# Patient Record
Sex: FEMALE | Race: WHITE | NOT HISPANIC OR LATINO | Employment: OTHER | URBAN - METROPOLITAN AREA
[De-identification: names, ages, dates, MRNs, and addresses within clinical notes are randomized per-mention and may not be internally consistent; named-entity substitution may affect disease eponyms.]

---

## 2018-05-09 RX ORDER — PSYLLIUM HUSK 0.4 G
CAPSULE ORAL
COMMUNITY
Start: 2015-01-14 | End: 2019-12-17

## 2018-05-09 RX ORDER — MULTIVITAMIN
TABLET ORAL
COMMUNITY
Start: 2015-01-14 | End: 2019-12-17

## 2018-05-09 RX ORDER — AMLODIPINE BESYLATE 10 MG/1
10 TABLET ORAL DAILY
COMMUNITY
Start: 2015-01-14 | End: 2022-01-19 | Stop reason: DRUGHIGH

## 2018-05-09 RX ORDER — NAPROXEN SODIUM 220 MG/1
81 TABLET, FILM COATED ORAL 3 TIMES WEEKLY
COMMUNITY
Start: 2017-04-10 | End: 2021-11-23

## 2018-05-09 RX ORDER — ATORVASTATIN CALCIUM 40 MG/1
40 TABLET, FILM COATED ORAL DAILY
COMMUNITY
Start: 2012-01-25

## 2018-05-09 RX ORDER — OMEPRAZOLE 20 MG/1
20 CAPSULE, DELAYED RELEASE ORAL
COMMUNITY
Start: 2015-01-14 | End: 2020-04-14

## 2018-05-09 RX ORDER — VENLAFAXINE HYDROCHLORIDE 75 MG/1
75 CAPSULE, EXTENDED RELEASE ORAL DAILY
COMMUNITY
Start: 2015-10-21

## 2018-05-16 ENCOUNTER — OFFICE VISIT (OUTPATIENT)
Dept: CARDIOLOGY | Facility: CLINIC | Age: 73
End: 2018-05-16
Attending: INTERNAL MEDICINE
Payer: MEDICARE

## 2018-05-16 VITALS
HEIGHT: 62 IN | WEIGHT: 113 LBS | SYSTOLIC BLOOD PRESSURE: 136 MMHG | BODY MASS INDEX: 20.8 KG/M2 | TEMPERATURE: 98.4 F | RESPIRATION RATE: 12 BRPM | HEART RATE: 76 BPM | DIASTOLIC BLOOD PRESSURE: 70 MMHG

## 2018-05-16 DIAGNOSIS — I10 MODERATE HYPERTENSION CONTROL: Primary | ICD-10-CM

## 2018-05-16 PROBLEM — I35.1 NONRHEUMATIC AORTIC VALVE INSUFFICIENCY: Status: ACTIVE | Noted: 2018-05-16

## 2018-05-16 PROBLEM — I34.0 NON-RHEUMATIC MITRAL REGURGITATION: Status: ACTIVE | Noted: 2018-05-16

## 2018-05-16 PROBLEM — Z82.49 FAMILY HISTORY OF CORONARY ARTERY DISEASE: Status: ACTIVE | Noted: 2018-05-16

## 2018-05-16 PROCEDURE — 93000 ELECTROCARDIOGRAM COMPLETE: CPT | Performed by: INTERNAL MEDICINE

## 2018-05-16 PROCEDURE — 99214 OFFICE O/P EST MOD 30 MIN: CPT | Performed by: INTERNAL MEDICINE

## 2018-05-16 RX ORDER — NITROFURANTOIN MACROCRYSTALS 50 MG/1
CAPSULE ORAL
Refills: 3 | COMMUNITY
Start: 2018-03-19 | End: 2019-12-17

## 2018-05-16 ASSESSMENT — ENCOUNTER SYMPTOMS
BACK PAIN: 1
CONSTITUTIONAL NEGATIVE: 1
ENDOCRINE NEGATIVE: 1
RESPIRATORY NEGATIVE: 1
NEUROLOGICAL NEGATIVE: 1
BRUISES/BLEEDS EASILY: 1
EYES NEGATIVE: 1
PSYCHIATRIC NEGATIVE: 1
GASTROINTESTINAL NEGATIVE: 1

## 2018-05-16 NOTE — PROGRESS NOTES
Chief Complaint: I saw the patient today on a routine visit for her hypertension and mild valvular heart disease.  She denies any form of chest discomfort or shortness of breath with exertion, anxiety, cold weather, postprandially, at rest, or nocturnally.  She denies exertional dyspnea, proximal nocturnal dyspnea, orthopnea, palpitations, syncope, ankle edema, she has nocturia ×1.       Medications:  Current Outpatient Prescriptions on File Prior to Visit   Medication Sig Dispense Refill   • amLODIPine (NORVASC) 5 mg tablet Take 5 mg by mouth daily.       • aspirin 325 mg tablet Take 325 mg by mouth daily.       • atorvastatin (LIPITOR) 40 mg tablet Take 40 mg by mouth daily.       • calcium carbonate-vitamin D3 (CALCIUM 500 + D) 500 mg(1,250mg) -200 unit per tablet take 2 tablet by oral route  every day     • multivitamin (THERAGRAN) tablet take 2 Tablet by oral route  every day with food     • omeprazole (PriLOSEC) 20 mg capsule Take 20 mg by mouth daily before breakfast.       • venlafaxine XR (EFFEXOR XR) 75 mg 24 hr capsule Take 75 mg by mouth daily.         No current facility-administered medications on file prior to visit.        Review of Systems:  Review of Systems   Constitution: Negative.   HENT: Positive for hearing loss.    Eyes: Negative.    Respiratory: Negative.    Endocrine: Negative.    Hematologic/Lymphatic: Bruises/bleeds easily.   Skin: Negative.    Musculoskeletal: Positive for back pain.   Gastrointestinal: Negative.    Genitourinary: Positive for nocturia.   Neurological: Negative.    Psychiatric/Behavioral: Negative.    Allergic/Immunologic: Positive for environmental allergies.       Physical Exam:  Vitals:    05/16/18 1305   BP: 136/70   Pulse: 76   Resp: 12   Temp: 36.9 °C (98.4 °F)     Physical Exam   Constitutional: She is oriented to person, place, and time. She appears well-developed and well-nourished.   HENT:   Head: Normocephalic and atraumatic.   Eyes: Conjunctivae and EOM  are normal. Pupils are equal, round, and reactive to light.   Neck: Normal range of motion. Neck supple.   Cardiovascular: Normal rate and regular rhythm.    Pulmonary/Chest: Effort normal and breath sounds normal.   Abdominal: Soft. Bowel sounds are normal.   Musculoskeletal: Normal range of motion.   Neurological: She is alert and oriented to person, place, and time. She has normal reflexes.   Skin: Skin is warm and dry.   Psychiatric: She has a normal mood and affect. Her behavior is normal. Judgment and thought content normal.     EKG: Sr WNL    Assessment and Plan:  Impression:  Hypertension.  Hyperlipidemia.  Previous TIA.  Aortic regurgitation.  Tricuspid regurgitation.  Recommendation:  She is hemodynamically stable on the current change any medications today.  We will see her again in 6 months time and repeat her echocardiogram.  If there is a problem we will be happy to see her sooner.  We did discuss diet and exercise with her today.    Chano Boateng,

## 2018-10-30 ENCOUNTER — TELEPHONE (OUTPATIENT)
Dept: CARDIOLOGY | Facility: CLINIC | Age: 73
End: 2018-10-30

## 2018-10-30 NOTE — TELEPHONE ENCOUNTER
LVM- for pt to call office back to reschedule ofv on 11/07/18 at 11:30 am due to  will be out of the office.Pt should still keep apt for echocardiogram at 10:30.

## 2018-10-31 NOTE — TELEPHONE ENCOUNTER
Spoke with pt and rescheduled apt from 11/07/18 to 11/14/18. Pt confirmed date and time. Mailed out new reminder.

## 2018-11-07 ENCOUNTER — HOSPITAL ENCOUNTER (OUTPATIENT)
Dept: CARDIOLOGY | Facility: HOSPITAL | Age: 73
Discharge: HOME | End: 2018-11-07
Attending: INTERNAL MEDICINE
Payer: MEDICARE

## 2018-11-07 VITALS
HEART RATE: 64 BPM | WEIGHT: 115 LBS | DIASTOLIC BLOOD PRESSURE: 84 MMHG | HEIGHT: 61 IN | SYSTOLIC BLOOD PRESSURE: 140 MMHG | BODY MASS INDEX: 21.71 KG/M2

## 2018-11-07 LAB
AORTIC ROOT ANNULUS - M-MODE: 2.49 CM
AORTIC VALVE AREA: 1.3 SQUARE CENTIMETERS PER SQUARE METER
ASCENDING AORTA: 3.09 CM
AV PEAK GRADIENT: 13.25 MMHG
AV PEAK VELOCITY-S: 1.82 M/S
AV REGURGITATION PRESSURE HALF TIME: 331 MS
BSA FOR ECHO PROCEDURE: 1.5 M2
CUSP SEPARATION: 1.59 CM
DOP CALC LVOT STROKE VOLUME: 67.51 ML
DOP CALC RVOT VTI: 13.33 CM
E WAVE DECELERATION TIME: 233.33 MS
E/A RATIO: 0.66
E/E' RATIO: 8.57
E/LAT E' RATIO: 6.26
EDV (BP): 72.35 ML
EF (A4C): 58.17 %
EF A2C: 61.74 %
EJECTION FRACTION: 59.96 %
EST RIGHT VENT SYSTOLIC PRESSURE BY TRICUSPID REGURGITATION JET: 26 MMHG
ESV (BP): 28.97 ML
INTERVENTRICULAR SEPTUM: 0.75 CM
LA ESV (BP): 59.72 ML
LA ESV INDEX (A2C): 31.73 ML/M2
LA ESV INDEX (BP): 39.81 ML/M2
LA/AORTA RATIO: 1.33
LAAS-AP2: 17.12 CM2
LAAS-AP4: 22.25 CM2
LAD 2D - M-MODE: 3.3 CM
LAV-S: 47.6 ML
LEFT ATRIUM VOLUME INDEX: 47.27 ML/M2
LEFT ATRIUM VOLUME: 70.9 ML
LEFT INTERNAL DIMENSION IN SYSTOLE: 2.83 CM (ref 2.23–3.38)
LEFT VENTRICLE DIASTOLIC VOLUME INDEX: 48.13 ML/M2
LEFT VENTRICLE DIASTOLIC VOLUME: 72.2 ML
LEFT VENTRICLE MASS INDEX: 84.57 G/M2
LEFT VENTRICLE SYSTOLIC VOLUME INDEX: 20.13 ML/M2
LEFT VENTRICLE SYSTOLIC VOLUME: 30.2 ML
LEFT VENTRICULAR INTERNAL DIMENSION IN DIASTOLE: 4.55 CM (ref 3.76–5.21)
LEFT VENTRICULAR MASS: 126.85 G
LEFT VENTRICULAR POSTERIOR WALL IN END DIASTOLE: 0.97 CM (ref 0.48–0.89)
LV DIASTOLIC VOLUME: 69 ML
LV ESV (APICAL 2 CHAMBER): 26.4 ML
LVCI: 1.79 LITERS PER MINUTE PER SQUARE METER
LVCO: 2.67 LITERS PER MINUTE
LVEDVI(A2C): 46 ML/M2
LVEDVI(BP): 48.23 ML/M2
LVESVI(A2C): 17.6 ML/M2
LVESVI(BP): 19.31 ML/M2
LVOT 2D: 1.84 CM
LVOT A: 2.67 CM2
LVOT MG: 3 MMHG
LVOT MV: 0.78 M/S
LVOT PEAK VELOCITY: 1.29 M/S
LVOT PG: 5.6 MMHG
LVOT VTI: 25.4 CM
MV E'TISSUE VEL-LAT: 0.11 M/S
MV E'TISSUE VEL-MED: 0.08 M/S
MV PEAK A VEL: 1.1 M/S
MV PEAK E VEL: 0.72 M/S
MV VALVE AREA P 1/2 METHOD: 3.25 CM2
POSTERIOR WALL: 0.97 CM
RVOT VMAX: 0.7 M/S
RVOT VTI: 16.2 CM
SEPTAL TISSUE DOPPLER FREE WALL LATE DIA VELOCITY (APICAL 4 CHAMBER VIEW): 0.1 M/S
TR MAX PG: 34.81 MMHG
TRICUSPID VALVE PEAK REGURGITATION VELOCITY: 2.95 M/S
Z-SCORE OF LEFT VENTRICULAR DIMENSION IN END DIASTOLE: 0.28
Z-SCORE OF LEFT VENTRICULAR DIMENSION IN END SYSTOLE: 0.24
Z-SCORE OF LEFT VENTRICULAR POSTERIOR WALL IN END DIASTOLE: 2.09

## 2018-11-07 PROCEDURE — 93306 TTE W/DOPPLER COMPLETE: CPT | Mod: 26 | Performed by: INTERNAL MEDICINE

## 2018-11-07 PROCEDURE — 93306 TTE W/DOPPLER COMPLETE: CPT

## 2018-11-14 ENCOUNTER — OFFICE VISIT (OUTPATIENT)
Dept: CARDIOLOGY | Facility: CLINIC | Age: 73
End: 2018-11-14
Payer: MEDICARE

## 2018-11-14 VITALS
RESPIRATION RATE: 14 BRPM | DIASTOLIC BLOOD PRESSURE: 76 MMHG | SYSTOLIC BLOOD PRESSURE: 142 MMHG | TEMPERATURE: 98.6 F | HEART RATE: 68 BPM

## 2018-11-14 DIAGNOSIS — I34.0 NON-RHEUMATIC MITRAL REGURGITATION: ICD-10-CM

## 2018-11-14 DIAGNOSIS — I35.1 NONRHEUMATIC AORTIC VALVE INSUFFICIENCY: ICD-10-CM

## 2018-11-14 DIAGNOSIS — R06.09 DYSPNEA ON EXERTION: ICD-10-CM

## 2018-11-14 DIAGNOSIS — I10 HYPERTENSION, BENIGN: Primary | ICD-10-CM

## 2018-11-14 PROCEDURE — 99214 OFFICE O/P EST MOD 30 MIN: CPT | Performed by: INTERNAL MEDICINE

## 2018-11-14 PROCEDURE — 93000 ELECTROCARDIOGRAM COMPLETE: CPT | Performed by: INTERNAL MEDICINE

## 2018-11-14 RX ORDER — AMLODIPINE BESYLATE 2.5 MG/1
2.5 TABLET ORAL DAILY
Qty: 90 TABLET | Refills: 1 | Status: SHIPPED | OUTPATIENT
Start: 2018-11-14 | End: 2019-01-02

## 2018-11-14 RX ORDER — VENLAFAXINE 75 MG/1
75 TABLET ORAL DAILY
Status: ON HOLD | COMMUNITY
Start: 2018-10-01 | End: 2019-02-01 | Stop reason: SDUPTHER

## 2018-11-14 RX ORDER — CLOTRIMAZOLE AND BETAMETHASONE DIPROPIONATE 10; .64 MG/G; MG/G
CREAM TOPICAL
COMMUNITY
Start: 2018-11-13 | End: 2019-01-02

## 2018-11-14 ASSESSMENT — ENCOUNTER SYMPTOMS
DYSPNEA ON EXERTION: 1
ENDOCRINE NEGATIVE: 1
BACK PAIN: 1
RESPIRATORY NEGATIVE: 1
EYES NEGATIVE: 1
PSYCHIATRIC NEGATIVE: 1
NEUROLOGICAL NEGATIVE: 1
HEMATOLOGIC/LYMPHATIC NEGATIVE: 1
GASTROINTESTINAL NEGATIVE: 1

## 2018-11-14 NOTE — LETTER
November 15, 2018     José Antonio REAGAN DO  [address]  [address]  [facility] [address]    Patient: Angeles Cardoza   YOB: 1945   Date of Visit: 11/14/2018       Dear Dr. Leroy:    Thank you for referring Angeles Cardoza to me for evaluation. Below are my notes for this consultation.    If you have questions, please do not hesitate to call me. I look forward to following your patient along with you.         Sincerely,        Chano Boateng DO        CC: No Recipients  Chano Boateng DO  11/15/2018 11:07 AM  Signed      Chief Complaint: I saw Angeles in the office today as a follow-up from her echocardiogram.  She is getting short of breath climbing 1-1/2 flights of stairs which is new.  She does not get chest discomfort or shortness of breath normally climbing 1 flight of stairs but she does get short of breath with anxiety not cold weather postprandially at rest or nocturnally.  She denies proximal nocturnal dyspnea, orthopnea, palpitations, syncope, ankle edema she has nocturia.       Medications:  Current Outpatient Prescriptions on File Prior to Visit   Medication Sig Dispense Refill   • amLODIPine (NORVASC) 5 mg tablet Take 5 mg by mouth daily.       • aspirin 325 mg tablet Take 325 mg by mouth daily.       • atorvastatin (LIPITOR) 40 mg tablet Take 40 mg by mouth daily.       • calcium carbonate-vitamin D3 (CALCIUM 500 + D) 500 mg(1,250mg) -200 unit per tablet take 2 tablet by oral route  every day     • multivitamin (THERAGRAN) tablet take 2 Tablet by oral route  every day with food     • nitrofurantoin (MACRODANTIN) 50 mg capsule TK 1 C PO QHS FOR 3 MONTHS THEN PRN  3   • omeprazole (PriLOSEC) 20 mg capsule Take 20 mg by mouth daily before breakfast.       • venlafaxine XR (EFFEXOR XR) 75 mg 24 hr capsule Take 75 mg by mouth daily.         No current facility-administered medications on file prior to visit.        Review of Systems:  Review of Systems   HENT: Negative.    Eyes: Negative.     Cardiovascular: Positive for dyspnea on exertion.   Respiratory: Negative.    Endocrine: Negative.    Hematologic/Lymphatic: Negative.    Skin: Negative.    Musculoskeletal: Positive for back pain.   Gastrointestinal: Negative.    Genitourinary: Positive for nocturia.   Neurological: Negative.    Psychiatric/Behavioral: Negative.    Allergic/Immunologic: Positive for environmental allergies.       Physical Exam:  There were no vitals filed for this visit.  Physical Exam   Constitutional: She is oriented to person, place, and time. She appears well-developed and well-nourished.   HENT:   Head: Normocephalic.   Eyes: Conjunctivae and EOM are normal. Pupils are equal, round, and reactive to light.   Neck: Normal range of motion. Neck supple.   Cardiovascular: Normal rate and regular rhythm.    Pulmonary/Chest: Effort normal and breath sounds normal.   Abdominal: Soft. Bowel sounds are normal.   Musculoskeletal: Normal range of motion.   Neurological: She is alert and oriented to person, place, and time. She has normal reflexes.   Skin: Skin is warm and dry.   Psychiatric: She has a normal mood and affect. Her behavior is normal. Judgment and thought content normal.     EKG: SR ST-Tabn    Assessment and Plan:  Impression:  Moderate mitral regurgitation by echocardiogram with mildly enlarged left atrium.  Dyspnea with exertion that is new.  Hypertension.  Hyperlipidemia.  Renal artery stenosis status post stent.  Aortic regurgitation.  Mitral regurgitation.  Tricuspid regurgitation.  Recommendation:  I increase her amlodipine to 7.5 mg once a day.  I schedule her for a standard stress test to rule out arrhythmia or ischemic heart disease as to the etiology of her shortness of breath.  We will see her in follow-up in 3 months, if her stress study is abnormal we will see her sooner.  Thank you for the opportunity seeing Savi in the office today.    Chano Boateng DO

## 2018-11-14 NOTE — PROGRESS NOTES
Chief Complaint: I saw Angeles in the office today as a follow-up from her echocardiogram.  She is getting short of breath climbing 1-1/2 flights of stairs which is new.  She does not get chest discomfort or shortness of breath normally climbing 1 flight of stairs but she does get short of breath with anxiety not cold weather postprandially at rest or nocturnally.  She denies proximal nocturnal dyspnea, orthopnea, palpitations, syncope, ankle edema she has nocturia.       Medications:  Current Outpatient Prescriptions on File Prior to Visit   Medication Sig Dispense Refill   • amLODIPine (NORVASC) 5 mg tablet Take 5 mg by mouth daily.       • aspirin 325 mg tablet Take 325 mg by mouth daily.       • atorvastatin (LIPITOR) 40 mg tablet Take 40 mg by mouth daily.       • calcium carbonate-vitamin D3 (CALCIUM 500 + D) 500 mg(1,250mg) -200 unit per tablet take 2 tablet by oral route  every day     • multivitamin (THERAGRAN) tablet take 2 Tablet by oral route  every day with food     • nitrofurantoin (MACRODANTIN) 50 mg capsule TK 1 C PO QHS FOR 3 MONTHS THEN PRN  3   • omeprazole (PriLOSEC) 20 mg capsule Take 20 mg by mouth daily before breakfast.       • venlafaxine XR (EFFEXOR XR) 75 mg 24 hr capsule Take 75 mg by mouth daily.         No current facility-administered medications on file prior to visit.        Review of Systems:  Review of Systems   HENT: Negative.    Eyes: Negative.    Cardiovascular: Positive for dyspnea on exertion.   Respiratory: Negative.    Endocrine: Negative.    Hematologic/Lymphatic: Negative.    Skin: Negative.    Musculoskeletal: Positive for back pain.   Gastrointestinal: Negative.    Genitourinary: Positive for nocturia.   Neurological: Negative.    Psychiatric/Behavioral: Negative.    Allergic/Immunologic: Positive for environmental allergies.       Physical Exam:  There were no vitals filed for this visit.  Physical Exam   Constitutional: She is oriented to person, place, and time.  She appears well-developed and well-nourished.   HENT:   Head: Normocephalic.   Eyes: Conjunctivae and EOM are normal. Pupils are equal, round, and reactive to light.   Neck: Normal range of motion. Neck supple.   Cardiovascular: Normal rate and regular rhythm.    Pulmonary/Chest: Effort normal and breath sounds normal.   Abdominal: Soft. Bowel sounds are normal.   Musculoskeletal: Normal range of motion.   Neurological: She is alert and oriented to person, place, and time. She has normal reflexes.   Skin: Skin is warm and dry.   Psychiatric: She has a normal mood and affect. Her behavior is normal. Judgment and thought content normal.     EKG:  ST-Tabn    Assessment and Plan:  Impression:  Moderate mitral regurgitation by echocardiogram with mildly enlarged left atrium.  Dyspnea with exertion that is new.  Hypertension.  Hyperlipidemia.  Renal artery stenosis status post stent.  Aortic regurgitation.  Mitral regurgitation.  Tricuspid regurgitation.  Recommendation:  I increase her amlodipine to 7.5 mg once a day.  I schedule her for a standard stress test to rule out arrhythmia or ischemic heart disease as to the etiology of her shortness of breath.  We will see her in follow-up in 3 months, if her stress study is abnormal we will see her sooner.  Thank you for the opportunity seeing Savi in the office today.    Chano Boateng DO

## 2018-11-15 ENCOUNTER — TELEPHONE (OUTPATIENT)
Dept: CARDIOLOGY | Facility: HOSPITAL | Age: 73
End: 2018-11-15

## 2018-11-16 ENCOUNTER — HOSPITAL ENCOUNTER (OUTPATIENT)
Dept: CARDIOLOGY | Facility: HOSPITAL | Age: 73
Discharge: HOME | End: 2018-11-16
Attending: INTERNAL MEDICINE
Payer: MEDICARE

## 2018-11-16 VITALS
WEIGHT: 115 LBS | SYSTOLIC BLOOD PRESSURE: 176 MMHG | OXYGEN SATURATION: 96 % | DIASTOLIC BLOOD PRESSURE: 86 MMHG | HEIGHT: 61 IN | HEART RATE: 95 BPM | BODY MASS INDEX: 21.71 KG/M2

## 2018-11-16 PROCEDURE — 93018 CV STRESS TEST I&R ONLY: CPT | Performed by: INTERNAL MEDICINE

## 2018-11-16 PROCEDURE — 93017 CV STRESS TEST TRACING ONLY: CPT

## 2018-11-16 PROCEDURE — 93016 CV STRESS TEST SUPVJ ONLY: CPT | Performed by: INTERNAL MEDICINE

## 2018-11-19 LAB
STRESS ANGINA INDEX: 0
STRESS BASELINE BP: NORMAL MMHG
STRESS BASELINE HR: 80 BPM
STRESS O2 SAT REST: 96 %
STRESS PERCENT HR: 101 %
STRESS POST ESTIMATED WORKLOAD: 10.1 METS
STRESS POST EXERCISE DUR MIN: 8 MIN
STRESS POST EXERCISE DUR SEC: 16 SEC
STRESS POST O2 SAT PEAK: 98 %
STRESS POST PEAK BP: NORMAL MMHG
STRESS POST PEAK HR: 146 BPM
STRESS TARGET HR: 123 BPM

## 2018-11-29 ENCOUNTER — TELEPHONE (OUTPATIENT)
Dept: SCHEDULING | Facility: CLINIC | Age: 73
End: 2018-11-29

## 2018-11-29 NOTE — TELEPHONE ENCOUNTER
Spoke with patient advised per Dr. MACIEL to have patient take Norvasc 10mg daily  As well as have her coming in 12/3 for an appointment with him/ Alyse Lopez  Can you call patient and schedule her with appt on 12/3 with Dr. MACIEL ?  Thanks

## 2018-11-29 NOTE — TELEPHONE ENCOUNTER
Pt calling to speak with Dr. Boateng in regards to her blood pressure being high, she currently taking Amlodipine 7.5mg and it is not helping. BP 11/28 4pm 201/98. Pressure this morning 160/80 HR 89. Pt can be reached at [phone]

## 2018-11-29 NOTE — TELEPHONE ENCOUNTER
I left a message for Mrs. Anderson that I was aware of her blood pressure problems and that I would see her on Monday.  That I believe that her renal artery stent may be having problems staying open and this might be the cause of her labile blood pressure at this point.  We will order a repeat renal ultrasound once I see her and may be a renal flow study as well.

## 2018-11-29 NOTE — TELEPHONE ENCOUNTER
"Spoke with patient   Last seen by Dr. MACIEL 11/14 -  /76, at this visit Norvasc increased to 7.5mg daily    Patient states she still doesn't \"feel right\" , reports no other symptoms      11/7- echo -EF = 60%.   · Mild aortic valve regurgitation.  · Moderate mitral valve regurgitation. Sclerotic mitral valve.  · Mildly dilated left atrium.  · Mild tricuspid valve regurgitation with RVSP of 26 mmHg.       11/16- stress test no ischemia seen    States she does have anxiety at the moment     BP 11/28 201/98 at 4pm    This /80 HR 69   "

## 2018-11-30 ENCOUNTER — APPOINTMENT (OUTPATIENT)
Dept: RADIOLOGY | Age: 73
End: 2018-11-30
Attending: FAMILY MEDICINE
Payer: MEDICARE

## 2018-11-30 ENCOUNTER — HOSPITAL ENCOUNTER (OUTPATIENT)
Facility: CLINIC | Age: 73
Discharge: HOME | End: 2018-11-30
Attending: FAMILY MEDICINE
Payer: MEDICARE

## 2018-11-30 VITALS
RESPIRATION RATE: 15 BRPM | HEART RATE: 80 BPM | TEMPERATURE: 98.2 F | SYSTOLIC BLOOD PRESSURE: 140 MMHG | DIASTOLIC BLOOD PRESSURE: 76 MMHG | OXYGEN SATURATION: 97 %

## 2018-11-30 DIAGNOSIS — R05.9 COUGH: Primary | ICD-10-CM

## 2018-11-30 PROCEDURE — 99213 OFFICE O/P EST LOW 20 MIN: CPT | Performed by: FAMILY MEDICINE

## 2018-11-30 PROCEDURE — 71046 X-RAY EXAM CHEST 2 VIEWS: CPT | Performed by: FAMILY MEDICINE

## 2018-11-30 ASSESSMENT — ENCOUNTER SYMPTOMS
PHOTOPHOBIA: 0
SINUS PAIN: 0
STRIDOR: 0
WHEEZING: 0
FACIAL SWELLING: 0
SHORTNESS OF BREATH: 0
CHOKING: 0
LIGHT-HEADEDNESS: 0
WEAKNESS: 0
NECK STIFFNESS: 0
CHEST TIGHTNESS: 0
GASTROINTESTINAL NEGATIVE: 1
EYE PAIN: 0

## 2018-11-30 NOTE — DISCHARGE INSTRUCTIONS
Suspect allergies with postnasal drip as the primary cause of past month of coughing.    Restart Allegra as needed for continued allergy symptoms.    Follow-up with your primary medical doctor if symptoms persist despite use of Allegra.  Chest x-ray shows nothing acute.  See details below.    IMPRESSION: No acute cardiopulmonary findings.    COMMENT: The current two view examination of the chest demonstrates the lungs to  be free of active infiltration and congestion.  A linear scar seen in the left  midlung zone.    The cardiomediastinal silhouette is within normal limits of size and  configuration.    A prominent rotatory thoracolumbar scoliosis is seen.  There is deformity of the  left rib cage with multiple old healed left rib fractures.

## 2018-11-30 NOTE — ED PROVIDER NOTES
History  Chief Complaint   Patient presents with   • Cough     x1 month   • Fatigue     1 month of coughing, postnasal drip.  h/o GERD with recent heartburn.  Symptoms not severe, just duration is a concern.    No mention of b-symptoms.              Past Medical History:   Diagnosis Date   • Acid reflux    • Hypertension    • Lipid disorder        Past Surgical History:   Procedure Laterality Date   • BREAST SURGERY  2003    breast reduction   • RENAL ANGIOPLASTY     • TOE SURGERY Left    • TONSILLECTOMY     • WISDOM TOOTH EXTRACTION         Family History   Problem Relation Age of Onset   • Stroke Mother    • Heart failure Father        Social History   Substance Use Topics   • Smoking status: Never Smoker   • Smokeless tobacco: Never Used   • Alcohol use Yes      Comment: occasional       Review of Systems   HENT: Negative for ear pain, facial swelling and sinus pain.    Eyes: Negative for photophobia, pain and visual disturbance.   Respiratory: Negative for choking, chest tightness, shortness of breath, wheezing and stridor.    Gastrointestinal: Negative.    Musculoskeletal: Negative for neck stiffness.   Neurological: Negative for syncope, weakness and light-headedness.        No lethargy       Physical Exam  ED Triage Vitals [11/30/18 1425]   Temp Heart Rate Resp BP SpO2   36.8 °C (98.2 °F) 80 15 140/76 97 %      Temp Source Heart Rate Source Patient Position BP Location FiO2 (%) (Set)   Oral Monitor Sitting Right upper arm --       Physical Exam   Constitutional: She is oriented to person, place, and time. She appears well-developed and well-nourished.   HENT:   Right Ear: External ear normal. No mastoid tenderness.   Left Ear: External ear normal. No mastoid tenderness.   Mouth/Throat: Oropharynx is clear and moist. No oropharyngeal exudate, posterior oropharyngeal edema or tonsillar abscesses. No tonsillar exudate.   Eyes: Conjunctivae are normal.   Neck: Neck supple. No tracheal deviation present.    Cardiovascular: Regular rhythm and normal heart sounds.    Pulmonary/Chest: Effort normal. No stridor. No respiratory distress. She has no decreased breath sounds. She has no wheezes. She has no rhonchi. She has no rales.   Abdominal: Soft. She exhibits no distension. There is no tenderness. There is no guarding.   Neurological: She is alert and oriented to person, place, and time.         Procedures  Procedures    UC Course  Clinical Impressions as of Nov 30 1640   Cough       MDM  Number of Diagnoses or Management Options  Cough:   Diagnosis management comments: 1 month of coughing, postnasal drip.  h/o GERD with recent heartburn.  lungs clear on exam.  vitals benign.   CXR to r/o PNA, etc.   Suspect GERD/PNDrip related.    Wants to leave & return to further discuss.  Reasonable.    X-RAY CHEST 2 VIEWS   Final Result    IMPRESSION: No acute cardiopulmonary findings.        COMMENT: The current two view examination of the chest demonstrates the lungs to    be free of active infiltration and congestion.  A linear scar seen in the left    midlung zone.        The cardiomediastinal silhouette is within normal limits of size and    configuration.        A prominent rotatory thoracolumbar scoliosis is seen.  There is deformity of the    left rib cage with multiple old healed left rib fractures.     Suspect allergy induced cough, given duration, benign CXR, etc.  Allegra.                   Ismael Salinas MD  11/30/18 4973

## 2018-12-03 ENCOUNTER — OFFICE VISIT (OUTPATIENT)
Dept: CARDIOLOGY | Facility: CLINIC | Age: 73
End: 2018-12-03
Payer: MEDICARE

## 2018-12-03 VITALS
TEMPERATURE: 98.6 F | WEIGHT: 116 LBS | DIASTOLIC BLOOD PRESSURE: 66 MMHG | OXYGEN SATURATION: 98 % | RESPIRATION RATE: 14 BRPM | HEART RATE: 88 BPM | BODY MASS INDEX: 21.92 KG/M2 | SYSTOLIC BLOOD PRESSURE: 136 MMHG

## 2018-12-03 DIAGNOSIS — I70.1 RENAL ARTERY STENOSIS DUE TO FIBROMUSCULAR DYSPLASIA (CMS/HCC): Primary | ICD-10-CM

## 2018-12-03 DIAGNOSIS — I77.3 RENAL ARTERY STENOSIS DUE TO FIBROMUSCULAR DYSPLASIA (CMS/HCC): Primary | ICD-10-CM

## 2018-12-03 PROCEDURE — 99214 OFFICE O/P EST MOD 30 MIN: CPT | Performed by: INTERNAL MEDICINE

## 2018-12-03 ASSESSMENT — ENCOUNTER SYMPTOMS
BRUISES/BLEEDS EASILY: 1
GASTROINTESTINAL NEGATIVE: 1
NEUROLOGICAL NEGATIVE: 1
BACK PAIN: 1
EYES NEGATIVE: 1
ENDOCRINE NEGATIVE: 1
NERVOUS/ANXIOUS: 1
CONSTITUTIONAL NEGATIVE: 1
CARDIOVASCULAR NEGATIVE: 1
COUGH: 1

## 2018-12-03 NOTE — PROGRESS NOTES
Chief Complaint: I saw Angeles in follow-up today because of low pressure has been rising.  She has had a headache and some awareness of her heartbeat but no syncope or lightheadedness or dizziness.  She denies chest discomfort has had a little bit of shortness of breath with climbing more than 1 flight of stairs but her recent stress test was negative.  She denies proximal nocturnal dyspnea, orthopnea, palpitations, syncope, ankle edema she has nocturia.  She has what sounds to possibly be a panic attack the other day which preceded her blood pressure rising.  She denies exertional dyspnea 1 flight of fair stairs, denies proximal nocturnal dyspnea orthopnea syncope or ankle edema.  She has a stent in her right renal artery and my concern is that this is beginning to close and her blood pressures becoming episodic.  Medications:  Current Outpatient Prescriptions   Medication Sig Dispense Refill   • amLODIPine (NORVASC) 5 mg tablet Take 10 mg by mouth daily.       • amLODIPine (NORVASC) 2.5 mg tablet Take 1 tablet (2.5 mg total) by mouth daily. (Patient not taking: Reported on 12/3/2018 ) 90 tablet 1   • aspirin 325 mg tablet Take 81 mg by mouth daily.      • atorvastatin (LIPITOR) 40 mg tablet Take 40 mg by mouth daily.       • calcium carbonate-vitamin D3 (CALCIUM 500 + D) 500 mg(1,250mg) -200 unit per tablet take 2 tablet by oral route  every day     • clotrimazole-betamethasone (LOTRISONE) 1-0.05 % cream      • multivitamin (THERAGRAN) tablet take 2 Tablet by oral route  every day with food     • nitrofurantoin (MACRODANTIN) 50 mg capsule TK 1 C PO QHS FOR 3 MONTHS THEN PRN  3   • omeprazole (PriLOSEC) 20 mg capsule Take 20 mg by mouth daily before breakfast.       • venlafaxine (EFFEXOR) 75 mg tablet      • venlafaxine XR (EFFEXOR XR) 75 mg 24 hr capsule Take 75 mg by mouth daily.         No current facility-administered medications for this visit.        Review of Systems:  Review of Systems   Constitution:  Negative.   HENT: Negative.    Eyes: Negative.    Cardiovascular: Negative.    Respiratory: Positive for cough.    Endocrine: Negative.    Hematologic/Lymphatic: Bruises/bleeds easily.   Skin: Negative.    Musculoskeletal: Positive for back pain.   Gastrointestinal: Negative.    Genitourinary: Positive for nocturia.   Neurological: Negative.    Psychiatric/Behavioral: The patient is nervous/anxious.    Allergic/Immunologic: Positive for environmental allergies.       Physical Exam:  Vitals:    12/03/18 1132   BP: 136/66   Pulse: 88   Resp: 14   Temp: 37 °C (98.6 °F)   SpO2:      Physical Exam   Constitutional: She is oriented to person, place, and time. She appears well-developed and well-nourished.   HENT:   Head: Normocephalic and atraumatic.   Eyes: Conjunctivae and EOM are normal. Pupils are equal, round, and reactive to light.   Neck: Normal range of motion. Neck supple.   Cardiovascular: Normal rate, regular rhythm, normal heart sounds and intact distal pulses.    Pulmonary/Chest: Effort normal and breath sounds normal.   Abdominal: Soft. Bowel sounds are normal.   Musculoskeletal: Normal range of motion.   Neurological: She is alert and oriented to person, place, and time. She has normal reflexes.   Skin: Skin is warm and dry.   Psychiatric: She has a normal mood and affect. Her behavior is normal. Judgment and thought content normal.     EKG: Not done today    Assessment and Plan:  Impression:  Hypertension etiology renal arteiy stenosis with stent in the right renal artery.  Rule out closing or narrowing of the renal artery stent causing episodic hypertension.  Anxiety on Effexor possibly this is not the correct drug for her at this point.  Recommendation:  I asked her to make an appointment to see her primary care doc to see if the Effexor should be changed to something more effective for anxiety as opposed to depression at this point.  I ordered a renal artery ultrasound and flow study to check the  patency of the stent.  If the stent is the problem then I will refer her to Dr. Garza to try to open the stent.  In the meantime her blood pressure is coming down nicely on 10 of [facility].  We will see her again in a month if there is a problem we will see her sooner.  Thank you so much for allowing us to see Rochelle today.    Chano Boateng, DO

## 2018-12-03 NOTE — LETTER
December 3, 2018     José Antonio REAGAN DO  [address]   [address]  [facility] [address]    Patient: Angeles Cardoza   YOB: 1945   Date of Visit: 12/3/2018       Dear Dr. Leroy:    Thank you for referring Angeles Cardoza to me for evaluation. Below are my notes for this consultation.    If you have questions, please do not hesitate to call me. I look forward to following your patient along with you.         Sincerely,        Chano Boateng DO        CC: No Recipients  Chano Boateng DO  12/3/2018 12:31 PM  Signed      Chief Complaint: I saw Angeles in follow-up today because of low pressure has been rising.  She has had a headache and some awareness of her heartbeat but no syncope or lightheadedness or dizziness.  She denies chest discomfort has had a little bit of shortness of breath with climbing more than 1 flight of stairs but her recent stress test was negative.  She denies proximal nocturnal dyspnea, orthopnea, palpitations, syncope, ankle edema she has nocturia.  She has what sounds to possibly be a panic attack the other day which preceded her blood pressure rising.  She denies exertional dyspnea 1 flight of fair stairs, denies proximal nocturnal dyspnea orthopnea syncope or ankle edema.  She has a stent in her right renal artery and my concern is that this is beginning to close and her blood pressures becoming episodic.  Medications:  Current Outpatient Prescriptions   Medication Sig Dispense Refill   • amLODIPine (NORVASC) 5 mg tablet Take 10 mg by mouth daily.       • amLODIPine (NORVASC) 2.5 mg tablet Take 1 tablet (2.5 mg total) by mouth daily. (Patient not taking: Reported on 12/3/2018 ) 90 tablet 1   • aspirin 325 mg tablet Take 81 mg by mouth daily.      • atorvastatin (LIPITOR) 40 mg tablet Take 40 mg by mouth daily.       • calcium carbonate-vitamin D3 (CALCIUM 500 + D) 500 mg(1,250mg) -200 unit per tablet take 2 tablet by oral route  every day     • clotrimazole-betamethasone  (LOTRISONE) 1-0.05 % cream      • multivitamin (THERAGRAN) tablet take 2 Tablet by oral route  every day with food     • nitrofurantoin (MACRODANTIN) 50 mg capsule TK 1 C PO QHS FOR 3 MONTHS THEN PRN  3   • omeprazole (PriLOSEC) 20 mg capsule Take 20 mg by mouth daily before breakfast.       • venlafaxine (EFFEXOR) 75 mg tablet      • venlafaxine XR (EFFEXOR XR) 75 mg 24 hr capsule Take 75 mg by mouth daily.         No current facility-administered medications for this visit.        Review of Systems:  Review of Systems   Constitution: Negative.   HENT: Negative.    Eyes: Negative.    Cardiovascular: Negative.    Respiratory: Positive for cough.    Endocrine: Negative.    Hematologic/Lymphatic: Bruises/bleeds easily.   Skin: Negative.    Musculoskeletal: Positive for back pain.   Gastrointestinal: Negative.    Genitourinary: Positive for nocturia.   Neurological: Negative.    Psychiatric/Behavioral: The patient is nervous/anxious.    Allergic/Immunologic: Positive for environmental allergies.       Physical Exam:  Vitals:    12/03/18 1132   BP: 136/66   Pulse: 88   Resp: 14   Temp: 37 °C (98.6 °F)   SpO2:      Physical Exam   Constitutional: She is oriented to person, place, and time. She appears well-developed and well-nourished.   HENT:   Head: Normocephalic and atraumatic.   Eyes: Conjunctivae and EOM are normal. Pupils are equal, round, and reactive to light.   Neck: Normal range of motion. Neck supple.   Cardiovascular: Normal rate, regular rhythm, normal heart sounds and intact distal pulses.    Pulmonary/Chest: Effort normal and breath sounds normal.   Abdominal: Soft. Bowel sounds are normal.   Musculoskeletal: Normal range of motion.   Neurological: She is alert and oriented to person, place, and time. She has normal reflexes.   Skin: Skin is warm and dry.   Psychiatric: She has a normal mood and affect. Her behavior is normal. Judgment and thought content normal.     EKG: Not done today    Assessment and  Plan:  Impression:  Hypertension etiology renal arteiy stenosis with stent in the right renal artery.  Rule out closing or narrowing of the renal artery stent causing episodic hypertension.  Anxiety on Effexor possibly this is not the correct drug for her at this point.  Recommendation:  I asked her to make an appointment to see her primary care doc to see if the Effexor should be changed to something more effective for anxiety as opposed to depression at this point.  I ordered a renal artery ultrasound and flow study to check the patency of the stent.  If the stent is the problem then I will refer her to Dr. Garza to try to open the stent.  In the meantime her blood pressure is coming down nicely on 10 of [facility].  We will see her again in a month if there is a problem we will see her sooner.  Thank you so much for allowing us to see Rochelle today.    Chano Boateng, DO

## 2018-12-05 ENCOUNTER — HOSPITAL ENCOUNTER (OUTPATIENT)
Dept: RADIOLOGY | Facility: HOSPITAL | Age: 73
Discharge: HOME | End: 2018-12-05
Attending: INTERNAL MEDICINE
Payer: MEDICARE

## 2018-12-05 DIAGNOSIS — I70.1 RENAL ARTERY STENOSIS DUE TO FIBROMUSCULAR DYSPLASIA (CMS/HCC): ICD-10-CM

## 2018-12-05 DIAGNOSIS — I77.3 RENAL ARTERY STENOSIS DUE TO FIBROMUSCULAR DYSPLASIA (CMS/HCC): ICD-10-CM

## 2018-12-05 PROCEDURE — 93975 VASCULAR STUDY: CPT

## 2018-12-10 LAB
ALBUMIN SERPL-MCNC: 4.5 G/DL (ref 3.6–5.1)
BUN SERPL-MCNC: 21 MG/DL (ref 7–25)
BUN/CREAT SERPL: NORMAL (CALC) (ref 6–22)
CALCIUM SERPL-MCNC: 9.9 MG/DL (ref 8.6–10.4)
CHLORIDE SERPL-SCNC: 101 MMOL/L (ref 98–110)
CO2 SERPL-SCNC: 27 MMOL/L (ref 20–32)
CREAT SERPL-MCNC: 0.78 MG/DL (ref 0.6–0.93)
GFR SERPL CREATININE-BSD FRML MDRD: 74 ML/MIN/1.73M2
GLUCOSE SERPL-MCNC: 79 MG/DL (ref 65–99)
PHOSPHATE SERPL-MCNC: 3.7 MG/DL (ref 2.1–4.3)
POTASSIUM SERPL-SCNC: 4.3 MMOL/L (ref 3.5–5.3)
SODIUM SERPL-SCNC: 140 MMOL/L (ref 135–146)

## 2019-01-02 ENCOUNTER — OFFICE VISIT (OUTPATIENT)
Dept: CARDIOLOGY | Facility: CLINIC | Age: 74
End: 2019-01-02
Payer: MEDICARE

## 2019-01-02 VITALS — WEIGHT: 114.9 LBS | HEART RATE: 66 BPM | BODY MASS INDEX: 21.69 KG/M2 | HEIGHT: 61 IN

## 2019-01-02 DIAGNOSIS — I10 HYPERTENSION, BENIGN: ICD-10-CM

## 2019-01-02 DIAGNOSIS — Z82.49 FAMILY HISTORY OF CORONARY ARTERY DISEASE: ICD-10-CM

## 2019-01-02 DIAGNOSIS — I10 ESSENTIAL HYPERTENSION: Primary | ICD-10-CM

## 2019-01-02 PROCEDURE — 93000 ELECTROCARDIOGRAM COMPLETE: CPT | Performed by: INTERNAL MEDICINE

## 2019-01-02 PROCEDURE — 99214 OFFICE O/P EST MOD 30 MIN: CPT | Performed by: INTERNAL MEDICINE

## 2019-01-02 RX ORDER — PSYLLIUM HUSK 0.4 G
CAPSULE ORAL DAILY
COMMUNITY
End: 2019-12-17

## 2019-01-02 ASSESSMENT — ENCOUNTER SYMPTOMS
BACK PAIN: 1
SHORTNESS OF BREATH: 1
BRUISES/BLEEDS EASILY: 1
GASTROINTESTINAL NEGATIVE: 1
NEUROLOGICAL NEGATIVE: 1
EYES NEGATIVE: 1
WEIGHT GAIN: 1
NERVOUS/ANXIOUS: 1
DEPRESSION: 1
ENDOCRINE NEGATIVE: 1
DYSPNEA ON EXERTION: 1
MEMORY LOSS: 1

## 2019-01-02 NOTE — PROGRESS NOTES
Chief Complaint: Saw Angeles in the office today on a routine visit for her hypertension which is basically controlled.  She denies chest discomfort but has some shortness of breath with severe exertion.  She denies chest discomfort or shortness of breath with anxiety, cold weather, postprandially, at rest, or nocturnally.  She can climb 1 flight of stairs without getting short of breath, she denies proximal nocturnal dyspnea, orthopnea, palpitations, syncope, ankle edema she has nocturia.       Medications:  Current Outpatient Prescriptions   Medication Sig Dispense Refill   • amLODIPine (NORVASC) 10 mg tablet Take 10 mg by mouth daily.       • aspirin 81 mg chewable tablet Take 81 mg by mouth daily.      • atorvastatin (LIPITOR) 40 mg tablet Take 40 mg by mouth daily.       • calcium carbonate-vitamin D3 (CALCIUM 500 + D) 500 mg(1,250mg) -200 unit per tablet take 2 tablet by oral route  every day     • calcium citrate-vitamin D3 (CITRACAL) 200 mg calcium -250 unit tablet Take by mouth daily.     • multivitamin (THERAGRAN) tablet take 2 Tablet by oral route  every day with food     • nitrofurantoin (MACRODANTIN) 50 mg capsule TK 1 C PO QHS FOR 3 MONTHS THEN PRN  3   • omeprazole (PriLOSEC) 20 mg capsule Take 20 mg by mouth daily before breakfast.       • venlafaxine XR (EFFEXOR XR) 75 mg 24 hr capsule Take 75 mg by mouth daily.       • venlafaxine (EFFEXOR) 75 mg tablet Take 75 mg by mouth daily.         No current facility-administered medications for this visit.        Review of Systems:  Review of Systems   Constitution: Positive for weight gain.   HENT: Positive for congestion and hearing loss.    Eyes: Negative.    Cardiovascular: Positive for dyspnea on exertion.   Respiratory: Positive for shortness of breath.    Endocrine: Negative.    Hematologic/Lymphatic: Bruises/bleeds easily.   Skin: Negative.    Musculoskeletal: Positive for back pain.   Gastrointestinal: Negative.    Genitourinary: Positive for  nocturia.   Neurological: Negative.    Psychiatric/Behavioral: Positive for depression and memory loss. The patient is nervous/anxious.    Allergic/Immunologic: Positive for environmental allergies.       Physical Exam:  Vitals:    01/02/19 1126   Pulse: 66     Physical Exam   Constitutional: She is oriented to person, place, and time. She appears well-developed and well-nourished.   HENT:   Head: Normocephalic and atraumatic.   Eyes: Conjunctivae and EOM are normal. Pupils are equal, round, and reactive to light.   Neck: Normal range of motion. Neck supple.   Cardiovascular: Normal rate and regular rhythm.    Pulmonary/Chest: Effort normal and breath sounds normal.   Abdominal: Soft. Bowel sounds are normal.   Musculoskeletal: Normal range of motion.   Neurological: She is alert and oriented to person, place, and time. She has normal reflexes.   Skin: Skin is warm and dry.   Psychiatric: She has a normal mood and affect. Her behavior is normal. Judgment and thought content normal.     EKG:AI     Assessment and Plan:  Impression:  Hypertension secondary to renal artery stenosis mostly controlled.  Anxiety/depression.  Dyspnea secondary to deconditioning.  Mild aortic regurgitation.  Mild mitral regurgitation.  Hyperlipidemia.  Recommendation:  We asked her to start to take her blood pressures again in the late afternoon prior to her taking her amlodipine.  If her blood pressure is rising later in the day than she needs some medication in the morning but her blood pressures in the morning are good.  We asked her to reinstitute her swimming.  We will see her again in 4 months time, if there is a problem we will see her sooner.  Thank you so much for allowing us see this very nice woman.    Chano Boateng,

## 2019-01-02 NOTE — LETTER
January 2, 2019     José Antonio REAGAN DO  [address]  [address]  [facility] [address]    Patient: Angeles Cardoza   YOB: 1945   Date of Visit: 1/2/2019       Dear Dr. Leroy:    Thank you for referring Angeles Cardoza to me for evaluation. Below are my notes for this consultation.    If you have questions, please do not hesitate to call me. I look forward to following your patient along with you.         Sincerely,        Chano Boateng DO        CC: No Recipients  Chano Boateng DO  1/2/2019 12:00 PM  Signed      Chief Complaint: Saw Angeles in the office today on a routine visit for her hypertension which is basically controlled.  She denies chest discomfort but has some shortness of breath with severe exertion.  She denies chest discomfort or shortness of breath with anxiety, cold weather, postprandially, at rest, or nocturnally.  She can climb 1 flight of stairs without getting short of breath, she denies proximal nocturnal dyspnea, orthopnea, palpitations, syncope, ankle edema she has nocturia.       Medications:  Current Outpatient Prescriptions   Medication Sig Dispense Refill   • amLODIPine (NORVASC) 10 mg tablet Take 10 mg by mouth daily.       • aspirin 81 mg chewable tablet Take 81 mg by mouth daily.      • atorvastatin (LIPITOR) 40 mg tablet Take 40 mg by mouth daily.       • calcium carbonate-vitamin D3 (CALCIUM 500 + D) 500 mg(1,250mg) -200 unit per tablet take 2 tablet by oral route  every day     • calcium citrate-vitamin D3 (CITRACAL) 200 mg calcium -250 unit tablet Take by mouth daily.     • multivitamin (THERAGRAN) tablet take 2 Tablet by oral route  every day with food     • nitrofurantoin (MACRODANTIN) 50 mg capsule TK 1 C PO QHS FOR 3 MONTHS THEN PRN  3   • omeprazole (PriLOSEC) 20 mg capsule Take 20 mg by mouth daily before breakfast.       • venlafaxine XR (EFFEXOR XR) 75 mg 24 hr capsule Take 75 mg by mouth daily.       • venlafaxine (EFFEXOR) 75 mg tablet Take 75 mg by mouth  daily.         No current facility-administered medications for this visit.        Review of Systems:  Review of Systems   Constitution: Positive for weight gain.   HENT: Positive for congestion and hearing loss.    Eyes: Negative.    Cardiovascular: Positive for dyspnea on exertion.   Respiratory: Positive for shortness of breath.    Endocrine: Negative.    Hematologic/Lymphatic: Bruises/bleeds easily.   Skin: Negative.    Musculoskeletal: Positive for back pain.   Gastrointestinal: Negative.    Genitourinary: Positive for nocturia.   Neurological: Negative.    Psychiatric/Behavioral: Positive for depression and memory loss. The patient is nervous/anxious.    Allergic/Immunologic: Positive for environmental allergies.       Physical Exam:  Vitals:    01/02/19 1126   Pulse: 66     Physical Exam   Constitutional: She is oriented to person, place, and time. She appears well-developed and well-nourished.   HENT:   Head: Normocephalic and atraumatic.   Eyes: Conjunctivae and EOM are normal. Pupils are equal, round, and reactive to light.   Neck: Normal range of motion. Neck supple.   Cardiovascular: Normal rate and regular rhythm.    Pulmonary/Chest: Effort normal and breath sounds normal.   Abdominal: Soft. Bowel sounds are normal.   Musculoskeletal: Normal range of motion.   Neurological: She is alert and oriented to person, place, and time. She has normal reflexes.   Skin: Skin is warm and dry.   Psychiatric: She has a normal mood and affect. Her behavior is normal. Judgment and thought content normal.     EKG:AI     Assessment and Plan:  Impression:  Hypertension secondary to renal artery stenosis mostly controlled.  Anxiety/depression.  Dyspnea secondary to deconditioning.  Mild aortic regurgitation.  Mild mitral regurgitation.  Hyperlipidemia.  Recommendation:  We asked her to start to take her blood pressures again in the late afternoon prior to her taking her amlodipine.  If her blood pressure is rising later  in the day than she needs some medication in the morning but her blood pressures in the morning are good.  We asked her to reinstitute her swimming.  We will see her again in 4 months time, if there is a problem we will see her sooner.  Thank you so much for allowing us see this very nice woman.    Chano Boateng, DO

## 2019-01-25 ENCOUNTER — TELEPHONE (OUTPATIENT)
Dept: SCHEDULING | Facility: CLINIC | Age: 74
End: 2019-01-25

## 2019-01-25 ENCOUNTER — APPOINTMENT (EMERGENCY)
Dept: RADIOLOGY | Facility: HOSPITAL | Age: 74
End: 2019-01-25
Attending: EMERGENCY MEDICINE
Payer: MEDICARE

## 2019-01-25 ENCOUNTER — HOSPITAL ENCOUNTER (EMERGENCY)
Facility: HOSPITAL | Age: 74
Discharge: HOME | End: 2019-01-25
Attending: EMERGENCY MEDICINE
Payer: MEDICARE

## 2019-01-25 VITALS
SYSTOLIC BLOOD PRESSURE: 173 MMHG | HEART RATE: 81 BPM | RESPIRATION RATE: 18 BRPM | BODY MASS INDEX: 22.38 KG/M2 | HEIGHT: 60 IN | TEMPERATURE: 97.8 F | DIASTOLIC BLOOD PRESSURE: 75 MMHG | WEIGHT: 114 LBS | OXYGEN SATURATION: 98 %

## 2019-01-25 DIAGNOSIS — R42 VERTIGO: Primary | ICD-10-CM

## 2019-01-25 LAB
ALBUMIN SERPL-MCNC: 4.7 G/DL (ref 3.4–5)
ALP SERPL-CCNC: 95 IU/L (ref 35–126)
ALT SERPL-CCNC: 38 IU/L (ref 11–54)
ANION GAP SERPL CALC-SCNC: 12 MEQ/L (ref 3–15)
AST SERPL-CCNC: 37 IU/L (ref 15–41)
BASOPHILS # BLD: 0.08 K/UL (ref 0.01–0.1)
BASOPHILS NFR BLD: 0.7 %
BILIRUB SERPL-MCNC: 0.6 MG/DL (ref 0.3–1.2)
BUN SERPL-MCNC: 11 MG/DL (ref 8–20)
CALCIUM SERPL-MCNC: 9.9 MG/DL (ref 8.9–10.3)
CHLORIDE SERPL-SCNC: 104 MEQ/L (ref 98–109)
CO2 SERPL-SCNC: 24 MEQ/L (ref 22–32)
CREAT SERPL-MCNC: 0.7 MG/DL
DIFFERENTIAL METHOD BLD: ABNORMAL
EOSINOPHIL # BLD: 0.02 K/UL (ref 0.04–0.36)
EOSINOPHIL NFR BLD: 0.2 %
ERYTHROCYTE [DISTWIDTH] IN BLOOD BY AUTOMATED COUNT: 13.8 % (ref 11.7–14.4)
GFR SERPL CREATININE-BSD FRML MDRD: >60 ML/MIN/1.73M*2
GLUCOSE BLD-MCNC: 120 MG/DL (ref 70–99)
GLUCOSE SERPL-MCNC: 124 MG/DL (ref 70–99)
HCT VFR BLDCO AUTO: 46 %
HGB BLD-MCNC: 15.3 G/DL
IMM GRANULOCYTES # BLD AUTO: 0.04 K/UL (ref 0–0.08)
IMM GRANULOCYTES NFR BLD AUTO: 0.4 %
LYMPHOCYTES # BLD: 1.84 K/UL (ref 1.2–3.5)
LYMPHOCYTES NFR BLD: 17.1 %
MCH RBC QN AUTO: 29.3 PG (ref 28–33.2)
MCHC RBC AUTO-ENTMCNC: 33.3 G/DL (ref 32.2–35.5)
MCV RBC AUTO: 88.1 FL (ref 83–98)
MONOCYTES # BLD: 0.86 K/UL (ref 0.28–0.8)
MONOCYTES NFR BLD: 8 %
NEUTROPHILS # BLD: 7.94 K/UL (ref 1.7–7)
NEUTS SEG NFR BLD: 73.6 %
NRBC BLD-RTO: 0 %
PDW BLD AUTO: 10 FL (ref 9.4–12.3)
PLATELET # BLD AUTO: 378 K/UL
POCT TEST: ABNORMAL
POTASSIUM SERPL-SCNC: 4 MEQ/L (ref 3.6–5.1)
PROT SERPL-MCNC: 8.3 G/DL (ref 6–8.2)
RBC # BLD AUTO: 5.22 M/UL (ref 3.93–5.22)
SODIUM SERPL-SCNC: 140 MEQ/L (ref 136–144)
TROPONIN I SERPL-MCNC: <0.03 NG/ML
WBC # BLD AUTO: 10.78 K/UL

## 2019-01-25 PROCEDURE — 63700000 HC SELF-ADMINISTRABLE DRUG: Performed by: PHYSICIAN ASSISTANT

## 2019-01-25 PROCEDURE — 93005 ELECTROCARDIOGRAM TRACING: CPT | Performed by: EMERGENCY MEDICINE

## 2019-01-25 PROCEDURE — 36415 COLL VENOUS BLD VENIPUNCTURE: CPT | Performed by: EMERGENCY MEDICINE

## 2019-01-25 PROCEDURE — 25800000 HC PHARMACY IV SOLUTIONS: Performed by: PHYSICIAN ASSISTANT

## 2019-01-25 PROCEDURE — 70450 CT HEAD/BRAIN W/O DYE: CPT

## 2019-01-25 PROCEDURE — 85025 COMPLETE CBC W/AUTO DIFF WBC: CPT | Performed by: EMERGENCY MEDICINE

## 2019-01-25 PROCEDURE — 80053 COMPREHEN METABOLIC PANEL: CPT | Performed by: EMERGENCY MEDICINE

## 2019-01-25 PROCEDURE — 84484 ASSAY OF TROPONIN QUANT: CPT | Performed by: EMERGENCY MEDICINE

## 2019-01-25 PROCEDURE — 99284 EMERGENCY DEPT VISIT MOD MDM: CPT | Mod: 25

## 2019-01-25 RX ORDER — MECLIZINE HCL 12.5 MG 12.5 MG/1
12.5 TABLET ORAL 3 TIMES DAILY PRN
Qty: 15 TABLET | Refills: 0 | Status: SHIPPED | OUTPATIENT
Start: 2019-01-25 | End: 2019-01-25

## 2019-01-25 RX ORDER — MECLIZINE HCL 12.5 MG 12.5 MG/1
12.5 TABLET ORAL 3 TIMES DAILY PRN
Status: DISCONTINUED | OUTPATIENT
Start: 2019-01-25 | End: 2019-01-25 | Stop reason: HOSPADM

## 2019-01-25 RX ORDER — MECLIZINE HCL 12.5 MG 12.5 MG/1
12.5 TABLET ORAL 3 TIMES DAILY PRN
Qty: 15 TABLET | Refills: 0 | Status: ON HOLD | OUTPATIENT
Start: 2019-01-25 | End: 2022-04-12

## 2019-01-25 RX ADMIN — MECLIZINE HCL 12.5 MG 12.5 MG: 12.5 TABLET ORAL at 15:20

## 2019-01-25 RX ADMIN — SODIUM CHLORIDE 1000 ML: 9 INJECTION, SOLUTION INTRAVENOUS at 14:52

## 2019-01-25 ASSESSMENT — ENCOUNTER SYMPTOMS
HEADACHES: 0
NAUSEA: 0
SHORTNESS OF BREATH: 0
WEAKNESS: 0
SEIZURES: 0
CHILLS: 0
COUGH: 0
VOMITING: 0
DYSURIA: 0
ARTHRALGIAS: 0
DIZZINESS: 1
BACK PAIN: 0
SORE THROAT: 0
PALPITATIONS: 0
VISUAL CHANGE: 0
FEVER: 0
EYE PAIN: 0
CONFUSION: 0
HEMATURIA: 0
COLOR CHANGE: 0
ABDOMINAL PAIN: 0

## 2019-01-25 NOTE — TELEPHONE ENCOUNTER
Pt reports waking up at 4am this morning with hr of 108 and bp of 208/88 and had extreme dizziness.      Pt was transferred to nurse line

## 2019-01-25 NOTE — DISCHARGE INSTRUCTIONS
RETURN IMMEDIATELY TO THE EMERGENCY DEPARTMENT IF YOU EXPERIENCE ANY NEW, WORSENING OR RECURRENT SYMPTOMS.     IF YOU HAVE ANY LAB RESULTS PENDING PLEASE LOG ON TO THE PATIENT PORTAL AND USE YOUR USER SPECIFIC CODE FOUND IN YOUR DISCHARGE PAPERWORK TO ACCESS THESE RESULTS.    ANY IMAGING THAT WAS PERFORMED TODAY (XRAYS, CT SCANS, ULTRASOUNDS, MRI SCANS) MAY BE STILL PENDING. PRELIMNARY READINGS MAY BE AVAILABLE TODAY BUT FINAL READINGS BY THE RADIOLOGIST MAY NOT BE AVAILABLE UNTIL TOMORROW. IMPORTANT FINDINGS MAY BE RESULTED IN THE RADIOLOGISTS FINAL REVIEW.    FOLLOW UP WITH YOUR DOCTOR WITHIN 48HRS AND REVIEW ALL OF YOUR (LABS AND IMAGES) FROM TODAYS VISIT.    Thank you for allowing us at [facility] to take care of you today.    Feel better soon!

## 2019-01-25 NOTE — TELEPHONE ENCOUNTER
Needs to be seen next week.  I believe there is something wrong with the renal artery stent will need repeat angiogram.

## 2019-01-25 NOTE — ED ATTESTATION NOTE
I have personally seen and examined the patient. I have discussed the current plan and answered all questions the patient has regarding the current plan.     I reviewed and agree with physician assistant assessment and plan of care, with the following exceptions: None    My examination, assessment, and plan of care is as follows:  Gen: She appears comfortable on examination supine in stretcher.  In seated position.  Cardiac: Not tachycardic equal pulses bilaterally  Pulmonary: Equal breath sounds  Neuro: Mental status testing reveals no deficits inpatient is awake alert and oriented to all realms. Cranial nerves: Cranial nerves grossly intact. With pupils equally reactive to light and intact accommodation with pupils equal size bilaterally. There is no nystagmus noted facial symmetry is intact. Motor Strength: Upper and lower extremity motor testing reveals intact tone, and strength. Sensation: Gross sensation to face, hands, feet intact. Cerebellar testing with no deficits noted. The patient has normal finger to nose testing       I was physically present for the key/critical portions of the following procedures: None    MDM:  Patient with history of vertigo presenting to the emergency department for evaluation of symptoms similar to previous vertiginous episodes.  The patient is neurologically intact with no deficits noted on neuro exam.  The patient states that the previously meclizine had helped with her symptoms.  However given the patient's age, and concern the patient is requesting CT imaging.  Will obtain CT scan head.     Nikunj Clifton,   01/25/19 155

## 2019-01-25 NOTE — ED PROVIDER NOTES
HPI     Chief Complaint   Patient presents with   • Dizziness     pt rpeors she woke up this morning feeling dizzy, nauseated and rapid heart beat       73-year-old female past medical history of hypertension, lipid disorder, acid reflux chief complaint of feeling dizzy and rapid heart rate this morning upon awakening.  Patient states that when she went to get out of bed she felt extremely dizzy like the room was spinning and similar to previous vertigo experiences.  Patient states she then noticed that her heart was racing.  Patient states she called her doctor's office after taking her blood pressure which was elevated to 202/90 something which is high for her and she was advised to come to the emergency department.  Patient states that the dizziness waxes and wanes along with associated nausea.  She is followed by Dr. Boateng and cardiology.  Her most recent echo revealed EF greater than 60%.  She states that she took her medicines this morning but has not eaten or drank anything.  She states that she is currently feeling better and only experiences the dizziness when she moves her head quickly side to side.  States that she has a very mild headache at this time 1 out of 2 on the pain scale.  Denies any visual changes, chest pain, shortness of breath, abdominal or back pain, difficulties or changes in urination or bowels, lower leg extremity swelling,        History provided by:  Patient   used: No    Dizziness   Quality:  Vertigo and room spinning  Severity:  Moderate  Onset quality:  Sudden  Duration:  2 hours  Timing:  Sporadic  Progression:  Partially resolved  Chronicity:  New  Context: head movement and standing up    Relieved by:  None tried  Worsened by:  Turning head and standing up  Ineffective treatments:  None tried  Associated symptoms: no chest pain, no headaches, no nausea, no palpitations, no shortness of breath, no tinnitus, no vision changes, no vomiting and no weakness     Risk factors: hx of vertigo         Patient History     Past Medical History:   Diagnosis Date   • Acid reflux    • Hypertension    • Lipid disorder        Past Surgical History:   Procedure Laterality Date   • BREAST SURGERY  2003    breast reduction   • RENAL ANGIOPLASTY     • TOE SURGERY Left    • TONSILLECTOMY     • WISDOM TOOTH EXTRACTION         Family History   Problem Relation Age of Onset   • Stroke Mother    • Heart failure Father        Social History   Substance Use Topics   • Smoking status: Never Smoker   • Smokeless tobacco: Never Used   • Alcohol use Yes      Comment: occasional       Systems Reviewed from Nursing Triage:          Review of Systems     Review of Systems   Constitutional: Negative for chills and fever.   HENT: Negative for ear pain, sore throat and tinnitus.    Eyes: Negative for pain and visual disturbance.   Respiratory: Negative for cough and shortness of breath.    Cardiovascular: Negative for chest pain and palpitations.   Gastrointestinal: Negative for abdominal pain, nausea and vomiting.   Genitourinary: Negative for dysuria and hematuria.   Musculoskeletal: Negative for arthralgias and back pain.   Skin: Negative for color change and rash.   Neurological: Positive for dizziness. Negative for seizures, syncope, weakness and headaches.   Psychiatric/Behavioral: Negative for confusion.   All other systems reviewed and are negative.       Physical Exam     ED Triage Vitals [01/25/19 1159]   Temp Heart Rate Resp BP SpO2   36.6 °C (97.8 °F) 96 18 (!) 190/76 98 %      Temp src Heart Rate Source Patient Position BP Location FiO2 (%) (Set)   -- -- -- -- --                     Patient Vitals for the past 24 hrs:   BP Temp Pulse Resp SpO2 Height Weight   01/25/19 1159 (!) 190/76 36.6 °C (97.8 °F) 96 18 98 % 1.524 m (5') 51.7 kg (114 lb)           Physical Exam   Constitutional: She appears well-developed and well-nourished. No distress.   HENT:   Head: Normocephalic and atraumatic.    Eyes: Conjunctivae and EOM are normal. Pupils are equal, round, and reactive to light.   Neck: Neck supple.   Cardiovascular: Normal rate and regular rhythm.    No murmur heard.  Pulmonary/Chest: Effort normal and breath sounds normal. No respiratory distress.   Abdominal: Soft. Bowel sounds are normal. There is no tenderness.   Musculoskeletal: She exhibits no edema or tenderness.   Neurological: She is alert. She has normal strength.   CN 2-12 intact. Motor: Good muscle bulk and tone. Strength 5/5 throughout.  appropriate finger to nose, heel to shin intact.  No pronator drift.  Mild nystagmus was appreciated on EOM evaluation however the patient was placed supine and had moved left and right no nystagmus was appreciated.   Skin: Skin is warm and dry.   Psychiatric: She has a normal mood and affect.   Nursing note and vitals reviewed.           Procedures    ED Course & MDM     Labs Reviewed   CBC - Abnormal        Result Value    WBC 10.78 (*)     RBC 5.22      Hemoglobin 15.3      Hematocrit 46.0 (*)     MCV 88.1      MCH 29.3      MCHC 33.3      RDW 13.8      Platelets 378 (*)     MPV 10.0     DIFF COUNT - Abnormal     Differential Type Auto      nRBC 0.0      Immature Granulocytes 0.4      Neutrophils 73.6      Lymphocytes 17.1      Monocytes 8.0      Eosinophils 0.2      Basophils 0.7      Immature Granulocytes, Absolute 0.04      Neutrophils, Absolute 7.94 (*)     Lymphocytes, Absolute 1.84      Monocytes, Absolute 0.86 (*)     Eosinophils, Absolute 0.02 (*)     Basophils, Absolute 0.08     POCT GLUCOSE (BEAKER) - Abnormal     POCT Bedside Glucose 120 (*)     POC Test POC     CBC AND DIFFERENTIAL    Narrative:     The following orders were created for panel order CBC and differential.  Procedure                               Abnormality         Status                     ---------                               -----------         ------                     CBC[[id number]]                           Abnormal             Final result               Diff Count[[id number]]                    Abnormal            Final result                 Please view results for these tests on the individual orders.   TROPONIN I   COMPREHENSIVE METABOLIC PANEL   URINALYSIS REFLEX CULTURE    Narrative:     The following orders were created for panel order Urinalysis with Reflex Culture.  Procedure                               Abnormality         Status                     ---------                               -----------         ------                     UA Reflex to Culture (Mac...[[id number]]                                                   Please view results for these tests on the individual orders.   UA REFLEX CULTURE (MACROSCOPIC)   POCT GLUCOSE       ECG 12 lead    (Results Pending)               MDM  Number of Diagnoses or Management Options  Diagnosis management comments: 73-year-old female past medical history of hypertension, lipid disorder, acid reflux chief complaint of dizziness upon awakening this morning.  Patient states that she felt as if the room was spinning and then she felt like her heart was racing so she took her blood pressure which was 202 over 90s.  The patient was concerned and called her doctor who advised her to come to the emergency department.  On exam in emergency department patient states that she was no longer having dizziness and was having a very mild headache that was a 1-2 out of 10 on the pain scale.  She was given Antivert and fluids as well as a CT exam of the head with no acute findings.  Labs were within normal ranges.  Will ambulate patient at this time and give prescription for Antivert to go home with as she is diagnosed with vertigo and will have with close follow-up.  Patient is stable and she agrees with this course of action.       Amount and/or Complexity of Data Reviewed  Clinical lab tests: reviewed  Decide to obtain previous medical records or to obtain history from someone other than the  patient: yes             ED Course as of Jan 25 1728 Fri Jan 25, 2019   1425 Differential diagnosis at this time includes vertigo, anxiety, heart arrhythmia, stroke.  [GM]   1450 Patient discussed with Dr. Clifton, since patient dizziness is very mild at this time she will be given meclizine and 1 L of fluids.  Will reassess after treatment and labs are returned.  After discussion will wait on head CT as there is no indication that it is needed currently.  [GM]   1710 CT Head: IMPRESSION:  No acute intracranial hemorrhage, acute infarct in a major vascular  territory or mass-effect.  [GM]   1728 Patient tolerated ambulation will be discharged home with close follow-up and Antivert  [GM]      ED Course User Index  [GM] Juan Antonio Perkins PA C         Clinical Impressions as of Jan 25 1728   Vertigo        Juan Antonio Perkins PA C  01/25/19 1728

## 2019-01-25 NOTE — TELEPHONE ENCOUNTER
Patient of Dr Boateng.  Patient woke at 4AM with extreme dizziness, nausea and SOB.  Her BP  At that time was 208/88with  and could feel her heart racing.  She takes her Amlodipine at night and did take it last PM.  Not using NSAIDs, eating take ut or having high Na diet.    At this point her BP is still high but better 170's/70's with heart rate 86.  She still feels woozy and is audibly SOB.  I instructed her to go to ER.  She will go t  ER.  Dr Boateng paged.  Report to ER.

## 2019-01-26 LAB
ATRIAL RATE: 99
P AXIS: 58
PR INTERVAL: 134
QRS DURATION: 70
QT INTERVAL: 324
QTC CALCULATION(BAZETT): 415
R AXIS: 16
T WAVE AXIS: 43
VENTRICULAR RATE: 99

## 2019-01-26 PROCEDURE — 93010 ELECTROCARDIOGRAM REPORT: CPT | Performed by: INTERNAL MEDICINE

## 2019-01-28 DIAGNOSIS — I16.1 HYPERTENSIVE EMERGENCY: Primary | ICD-10-CM

## 2019-01-28 NOTE — TELEPHONE ENCOUNTER
Spoke with pt/ Pt is out of town and won't be back until end of February. Pt stated she feels okay. Pt was unsure if her bp was high due to not feeling well (vertigo). Pt can be reached on her home phone to discuss.

## 2019-02-01 ENCOUNTER — TELEPHONE (OUTPATIENT)
Dept: SCHEDULING | Facility: CLINIC | Age: 74
End: 2019-02-01

## 2019-02-01 ENCOUNTER — HOSPITAL ENCOUNTER (OUTPATIENT)
Facility: HOSPITAL | Age: 74
Setting detail: HOSPITAL OUTPATIENT SURGERY
Discharge: HOME | End: 2019-02-01
Attending: INTERNAL MEDICINE | Admitting: INTERNAL MEDICINE
Payer: MEDICARE

## 2019-02-01 VITALS
RESPIRATION RATE: 32 BRPM | HEART RATE: 85 BPM | SYSTOLIC BLOOD PRESSURE: 178 MMHG | DIASTOLIC BLOOD PRESSURE: 78 MMHG | TEMPERATURE: 97.5 F

## 2019-02-01 DIAGNOSIS — I16.1 HYPERTENSIVE EMERGENCY: ICD-10-CM

## 2019-02-01 LAB — TSH SERPL DL<=0.05 MIU/L-ACNC: 1.66 MIU/L (ref 0.34–5.6)

## 2019-02-01 PROCEDURE — 71000011 HC PACU PHASE 1 EA ADDL MIN: Performed by: INTERNAL MEDICINE

## 2019-02-01 PROCEDURE — 75625 CONTRAST EXAM ABDOMINL AORTA: CPT | Performed by: INTERNAL MEDICINE

## 2019-02-01 PROCEDURE — 36252 INS CATH REN ART 1ST BILAT: CPT

## 2019-02-01 PROCEDURE — 36252 INS CATH REN ART 1ST BILAT: CPT | Performed by: INTERNAL MEDICINE

## 2019-02-01 PROCEDURE — 63600105 HC IODINE BASED CONTRAST: Mod: JW | Performed by: INTERNAL MEDICINE

## 2019-02-01 PROCEDURE — C1887 CATHETER, GUIDING: HCPCS | Performed by: INTERNAL MEDICINE

## 2019-02-01 PROCEDURE — 99153 MOD SED SAME PHYS/QHP EA: CPT | Performed by: INTERNAL MEDICINE

## 2019-02-01 PROCEDURE — 99152 MOD SED SAME PHYS/QHP 5/>YRS: CPT | Performed by: INTERNAL MEDICINE

## 2019-02-01 PROCEDURE — B4181ZZ FLUOROSCOPY OF BILATERAL RENAL ARTERIES USING LOW OSMOLAR CONTRAST: ICD-10-PCS | Performed by: INTERNAL MEDICINE

## 2019-02-01 PROCEDURE — C1894 INTRO/SHEATH, NON-LASER: HCPCS | Performed by: INTERNAL MEDICINE

## 2019-02-01 PROCEDURE — 25000000 HC PHARMACY GENERAL: Performed by: INTERNAL MEDICINE

## 2019-02-01 PROCEDURE — 27200000 HC STERILE SUPPLY: Performed by: INTERNAL MEDICINE

## 2019-02-01 PROCEDURE — 63700000 HC SELF-ADMINISTRABLE DRUG: Performed by: INTERNAL MEDICINE

## 2019-02-01 PROCEDURE — 71000001 HC PACU PHASE 1 INITIAL 30MIN: Performed by: INTERNAL MEDICINE

## 2019-02-01 PROCEDURE — 36415 COLL VENOUS BLD VENIPUNCTURE: CPT | Performed by: NURSE PRACTITIONER

## 2019-02-01 PROCEDURE — 63600000 HC DRUGS/DETAIL CODE: Performed by: INTERNAL MEDICINE

## 2019-02-01 PROCEDURE — 84443 ASSAY THYROID STIM HORMONE: CPT | Performed by: NURSE PRACTITIONER

## 2019-02-01 PROCEDURE — C1769 GUIDE WIRE: HCPCS | Performed by: INTERNAL MEDICINE

## 2019-02-01 RX ORDER — LIDOCAINE HYDROCHLORIDE 10 MG/ML
INJECTION, SOLUTION INFILTRATION; PERINEURAL AS NEEDED
Status: DISCONTINUED | OUTPATIENT
Start: 2019-02-01 | End: 2019-02-01 | Stop reason: HOSPADM

## 2019-02-01 RX ORDER — NICARDIPINE HCL-0.9% SOD CHLOR 1 MG/10 ML
SYRINGE (ML) INTRAVENOUS AS NEEDED
Status: DISCONTINUED | OUTPATIENT
Start: 2019-02-01 | End: 2019-02-01 | Stop reason: HOSPADM

## 2019-02-01 RX ORDER — IODIXANOL 320 MG/ML
INJECTION, SOLUTION INTRAVASCULAR AS NEEDED
Status: DISCONTINUED | OUTPATIENT
Start: 2019-02-01 | End: 2019-02-01 | Stop reason: HOSPADM

## 2019-02-01 RX ORDER — HEPARIN SODIUM 1000 [USP'U]/ML
INJECTION, SOLUTION INTRAVENOUS; SUBCUTANEOUS AS NEEDED
Status: DISCONTINUED | OUTPATIENT
Start: 2019-02-01 | End: 2019-02-01 | Stop reason: HOSPADM

## 2019-02-01 RX ORDER — ASPIRIN 325 MG
325 TABLET ORAL ONCE
Status: COMPLETED | OUTPATIENT
Start: 2019-02-01 | End: 2019-02-01

## 2019-02-01 RX ORDER — FENTANYL CITRATE 50 UG/ML
INJECTION, SOLUTION INTRAMUSCULAR; INTRAVENOUS AS NEEDED
Status: DISCONTINUED | OUTPATIENT
Start: 2019-02-01 | End: 2019-02-01 | Stop reason: HOSPADM

## 2019-02-01 RX ORDER — MIDAZOLAM HYDROCHLORIDE 2 MG/2ML
INJECTION, SOLUTION INTRAMUSCULAR; INTRAVENOUS AS NEEDED
Status: DISCONTINUED | OUTPATIENT
Start: 2019-02-01 | End: 2019-02-01 | Stop reason: HOSPADM

## 2019-02-01 RX ADMIN — ASPIRIN 325 MG: 325 TABLET ORAL at 07:20

## 2019-02-01 NOTE — TELEPHONE ENCOUNTER
ROLDAN in case discharge instructions state otherwise and pt calls back.    Pt anticipating hospital discharge today. States Dr. Boateng advised her to schedule appt w/ us next week. Pt scheduled 2/7. [phone]

## 2019-02-01 NOTE — DISCHARGE INSTRUCTIONS
· Post cardiac catheterization instructions:  · NO driving for 24 hours.  This is because of sedation you received for your procedure.  · On day of discharge, limit activities.  · No heavy lifting greater than 10 lbs for the next 2 days after procedure.    · Remove dressing next day. Keep site clean and dry.  · May shower next day of procedure. Do not submerge catherization site in pool or tub baths for 5 days  · Call if drainage, swelling, bleeding, fever or drainage from catheterization site          Medications:  Please take medications as directed. Any questions or concerns, please contact your physician's  office.    Follow up: Dr. Boateng   [phone]  Follow up in 2 weeks

## 2019-02-01 NOTE — Clinical Note
Closure device placed for the left radial artery. Closure device used: radial compression system. Hemostasis achieved. 
GE DAP of 4094 cGy*cm2 converts to 40.94 Gy*cm2. 
ID band present and verified with patient name and . 
Inserted under fluoro.  
Inserted under fluoro.  
Over the wire.  
Over the wire.  
Removed intact.  
Sheath inserted in the left radial artery. 
The right groin and left radial was clipped, marked  and prepped with ChloraPrep. The patient was draped in a sterile fashion after allowing for the recommended dry time. 
Universal procedure checklist completed. Airway assessment completed. Physician agrees with Mallampati. 
Verified procedural consent signed. Verified complete H&P in chart.
etCO2 hooked up but not working
Negative

## 2019-02-07 ENCOUNTER — OFFICE VISIT (OUTPATIENT)
Dept: CARDIOLOGY | Facility: CLINIC | Age: 74
End: 2019-02-07
Payer: MEDICARE

## 2019-02-07 VITALS
HEIGHT: 60 IN | WEIGHT: 115.4 LBS | SYSTOLIC BLOOD PRESSURE: 144 MMHG | RESPIRATION RATE: 14 BRPM | DIASTOLIC BLOOD PRESSURE: 70 MMHG | BODY MASS INDEX: 22.65 KG/M2 | HEART RATE: 74 BPM

## 2019-02-07 DIAGNOSIS — I10 ESSENTIAL HYPERTENSION: Primary | ICD-10-CM

## 2019-02-07 PROCEDURE — 93000 ELECTROCARDIOGRAM COMPLETE: CPT | Performed by: INTERNAL MEDICINE

## 2019-02-07 PROCEDURE — 99214 OFFICE O/P EST MOD 30 MIN: CPT | Performed by: INTERNAL MEDICINE

## 2019-02-07 RX ORDER — METOPROLOL SUCCINATE 25 MG/1
25 TABLET, EXTENDED RELEASE ORAL DAILY
Qty: 90 TABLET | Refills: 0 | Status: SHIPPED | OUTPATIENT
Start: 2019-02-07 | End: 2019-05-01 | Stop reason: SDUPTHER

## 2019-02-07 RX ORDER — AMLODIPINE BESYLATE 5 MG/1
2 TABLET ORAL DAILY
COMMUNITY
Start: 2019-01-29 | End: 2019-05-28 | Stop reason: SDUPTHER

## 2019-02-07 RX ORDER — CALCIUM CARBONATE/VITAMIN D3 500 MG-10
2 TABLET,CHEWABLE ORAL DAILY
COMMUNITY
End: 2019-12-17

## 2019-02-07 ASSESSMENT — ENCOUNTER SYMPTOMS
RESPIRATORY NEGATIVE: 1
MYALGIAS: 1
ENDOCRINE NEGATIVE: 1
PSYCHIATRIC NEGATIVE: 1
EYES NEGATIVE: 1
CONSTITUTIONAL NEGATIVE: 1
GASTROINTESTINAL NEGATIVE: 1
CARDIOVASCULAR NEGATIVE: 1

## 2019-02-07 NOTE — PROGRESS NOTES
Chief Complaint: I saw Angeles status post renal artery arteriogram last week to check the patency of her renal artery stent.  The stent is patent with a 50% stenosis and her symptoms of dizziness lightheadedness and diaphoresis have not been repeated.  She denies any chest discomfort or shortness of breath that is episodic as well as signs and symptoms of stroke or arrhythmia.  She can climb a flight of stairs without getting short of breath, she denies proximal nocturnal dyspnea, orthopnea, palpitations, syncope, ankle edema, she still has nocturia.  Her medications presently are Norvasc 10 mg daily.   Medications:  Current Outpatient Prescriptions   Medication Sig Dispense Refill   • amLODIPine (NORVASC) 5 mg tablet Take 2 tablets by mouth daily.     • aspirin 81 mg chewable tablet Take 81 mg by mouth daily.      • atorvastatin (LIPITOR) 40 mg tablet Take 40 mg by mouth daily.       • calcium carbonate-vitamin D3 (CALCIUM 500 + D) 500 mg(1,250mg) -400 unit chewable tablet Take 2 tablets by mouth daily.     • meclizine (ANTIVERT) 12.5 mg tablet Take 1 tablet (12.5 mg total) by mouth 3 (three) times a day as needed for dizziness for up to 5 days. 15 tablet 0   • multivitamin (THERAGRAN) tablet take 2 Tablet by oral route  every day with food     • omeprazole (PriLOSEC) 20 mg capsule Take 20 mg by mouth daily before breakfast.       • venlafaxine XR (EFFEXOR XR) 75 mg 24 hr capsule Take 75 mg by mouth daily.       • amLODIPine (NORVASC) 10 mg tablet Take 10 mg by mouth daily.       • calcium carbonate-vitamin D3 (CALCIUM 500 + D) 500 mg(1,250mg) -200 unit per tablet take 2 tablet by oral route  every day     • calcium citrate-vitamin D3 (CITRACAL) 200 mg calcium -250 unit tablet Take by mouth daily.     • nitrofurantoin (MACRODANTIN) 50 mg capsule TK 1 C PO QHS FOR 3 MONTHS THEN PRN  3     No current facility-administered medications for this visit.        Review of Systems:  Review of Systems   Constitution:  Negative.   HENT: Negative.    Eyes: Negative.    Cardiovascular: Negative.    Respiratory: Negative.    Endocrine: Negative.    Skin: Negative.    Musculoskeletal: Positive for myalgias.   Gastrointestinal: Negative.    Genitourinary: Positive for nocturia.   Psychiatric/Behavioral: Negative.    Allergic/Immunologic: Positive for environmental allergies.       Physical Exam:  Vitals:    02/07/19 1512   BP: (!) 144/70   Pulse: 74   Resp: 14     Physical Exam   Constitutional: She is oriented to person, place, and time. She appears well-developed and well-nourished.   HENT:   Head: Normocephalic and atraumatic.   Eyes: Conjunctivae are normal. Pupils are equal, round, and reactive to light.   Neck: Normal range of motion. Neck supple.   Cardiovascular: Normal rate and regular rhythm.    Pulmonary/Chest: Effort normal and breath sounds normal.   Abdominal: Soft. Bowel sounds are normal.   Musculoskeletal: Normal range of motion.   Neurological: She is alert and oriented to person, place, and time. She has normal reflexes.   Skin: Skin is warm and dry.   Psychiatric: She has a normal mood and affect. Her behavior is normal. Judgment and thought content normal.     EKG: SR     Assessment and Plan:  Impression:  Vertigo causing hypertensive urgency.  Panic attack secondary to the vertigo.  Hypertension.  Hyperlipidemia.  Renal artery stenosis status post stent that is patent.  Recommendation:  Though considered a fourth line drug to control blood pressure I decided to use Toprol-XL 50 mg in the evening along with her amlodipine in the morning to control her blood pressure.  I want a centrally acting agent that has some sedation to it so she is calmer and less anxious because she is living alone.  I will see her every month for the next 3 months to supervise her blood pressure management.  If there is a problem she knows to call me.  Thank you for allowing me to see this very nice woman.  We counseled her on exercise and  diet to help relieve her anxieties and depression over the loss of her .    Chano Boateng, DO

## 2019-02-07 NOTE — LETTER
February 7, 2019     José Antonio Leroy V DO  [address]  [address]  [facility] [address]    Patient: Angeles Cardoza   YOB: 1945   Date of Visit: 2/7/2019       Dear Dr. Leroy:    Thank you for referring Angeles Cardoza to me for evaluation. Below are my notes for this consultation.    If you have questions, please do not hesitate to call me. I look forward to following your patient along with you.         Sincerely,        Chano Boateng DO        CC: No Recipients  Chano Boateng DO  2/7/2019  4:22 PM  Signed      Chief Complaint: I saw Angeles status post renal artery arteriogram last week to check the patency of her renal artery stent.  The stent is patent with a 50% stenosis and her symptoms of dizziness lightheadedness and diaphoresis have not been repeated.  She denies any chest discomfort or shortness of breath that is episodic as well as signs and symptoms of stroke or arrhythmia.  She can climb a flight of stairs without getting short of breath, she denies proximal nocturnal dyspnea, orthopnea, palpitations, syncope, ankle edema, she still has nocturia.  Her medications presently are Norvasc 10 mg daily.   Medications:  Current Outpatient Prescriptions   Medication Sig Dispense Refill   • amLODIPine (NORVASC) 5 mg tablet Take 2 tablets by mouth daily.     • aspirin 81 mg chewable tablet Take 81 mg by mouth daily.      • atorvastatin (LIPITOR) 40 mg tablet Take 40 mg by mouth daily.       • calcium carbonate-vitamin D3 (CALCIUM 500 + D) 500 mg(1,250mg) -400 unit chewable tablet Take 2 tablets by mouth daily.     • meclizine (ANTIVERT) 12.5 mg tablet Take 1 tablet (12.5 mg total) by mouth 3 (three) times a day as needed for dizziness for up to 5 days. 15 tablet 0   • multivitamin (THERAGRAN) tablet take 2 Tablet by oral route  every day with food     • omeprazole (PriLOSEC) 20 mg capsule Take 20 mg by mouth daily before breakfast.       • venlafaxine XR (EFFEXOR XR) 75 mg 24 hr capsule Take 75  mg by mouth daily.       • amLODIPine (NORVASC) 10 mg tablet Take 10 mg by mouth daily.       • calcium carbonate-vitamin D3 (CALCIUM 500 + D) 500 mg(1,250mg) -200 unit per tablet take 2 tablet by oral route  every day     • calcium citrate-vitamin D3 (CITRACAL) 200 mg calcium -250 unit tablet Take by mouth daily.     • nitrofurantoin (MACRODANTIN) 50 mg capsule TK 1 C PO QHS FOR 3 MONTHS THEN PRN  3     No current facility-administered medications for this visit.        Review of Systems:  Review of Systems   Constitution: Negative.   HENT: Negative.    Eyes: Negative.    Cardiovascular: Negative.    Respiratory: Negative.    Endocrine: Negative.    Skin: Negative.    Musculoskeletal: Positive for myalgias.   Gastrointestinal: Negative.    Genitourinary: Positive for nocturia.   Psychiatric/Behavioral: Negative.    Allergic/Immunologic: Positive for environmental allergies.       Physical Exam:  Vitals:    02/07/19 1512   BP: (!) 144/70   Pulse: 74   Resp: 14     Physical Exam   Constitutional: She is oriented to person, place, and time. She appears well-developed and well-nourished.   HENT:   Head: Normocephalic and atraumatic.   Eyes: Conjunctivae are normal. Pupils are equal, round, and reactive to light.   Neck: Normal range of motion. Neck supple.   Cardiovascular: Normal rate and regular rhythm.    Pulmonary/Chest: Effort normal and breath sounds normal.   Abdominal: Soft. Bowel sounds are normal.   Musculoskeletal: Normal range of motion.   Neurological: She is alert and oriented to person, place, and time. She has normal reflexes.   Skin: Skin is warm and dry.   Psychiatric: She has a normal mood and affect. Her behavior is normal. Judgment and thought content normal.     EKG: SR     Assessment and Plan:  Impression:  Vertigo causing hypertensive urgency.  Panic attack secondary to the vertigo.  Hypertension.  Hyperlipidemia.  Renal artery stenosis status post stent that is patent.  Recommendation:  Though  considered a fourth line drug to control blood pressure I decided to use Toprol-XL 50 mg in the evening along with her amlodipine in the morning to control her blood pressure.  I want a centrally acting agent that has some sedation to it so she is calmer and less anxious because she is living alone.  I will see her every month for the next 3 months to supervise her blood pressure management.  If there is a problem she knows to call me.  Thank you for allowing me to see this very nice woman.  We counseled her on exercise and diet to help relieve her anxieties and depression over the loss of her .    Chano Boateng, DO

## 2019-03-18 ENCOUNTER — OFFICE VISIT (OUTPATIENT)
Dept: CARDIOLOGY | Facility: CLINIC | Age: 74
End: 2019-03-18
Payer: MEDICARE

## 2019-03-18 VITALS
WEIGHT: 112 LBS | OXYGEN SATURATION: 98 % | TEMPERATURE: 98.6 F | SYSTOLIC BLOOD PRESSURE: 140 MMHG | HEART RATE: 78 BPM | BODY MASS INDEX: 21.14 KG/M2 | DIASTOLIC BLOOD PRESSURE: 62 MMHG | RESPIRATION RATE: 14 BRPM | HEIGHT: 61 IN

## 2019-03-18 DIAGNOSIS — I10 ESSENTIAL HYPERTENSION: Primary | ICD-10-CM

## 2019-03-18 DIAGNOSIS — E78.00 PURE HYPERCHOLESTEROLEMIA: ICD-10-CM

## 2019-03-18 PROCEDURE — 93000 ELECTROCARDIOGRAM COMPLETE: CPT | Performed by: INTERNAL MEDICINE

## 2019-03-18 PROCEDURE — 99214 OFFICE O/P EST MOD 30 MIN: CPT | Performed by: INTERNAL MEDICINE

## 2019-03-18 ASSESSMENT — ENCOUNTER SYMPTOMS
CARDIOVASCULAR NEGATIVE: 1
RESPIRATORY NEGATIVE: 1
CONSTITUTIONAL NEGATIVE: 1
NEUROLOGICAL NEGATIVE: 1
BRUISES/BLEEDS EASILY: 1
EYES NEGATIVE: 1
ENDOCRINE NEGATIVE: 1
GASTROINTESTINAL NEGATIVE: 1
MUSCULOSKELETAL NEGATIVE: 1
DEPRESSION: 1

## 2019-03-18 NOTE — LETTER
March 18, 2019     José Antonio Leroy V DO  [address]  [address]  [facility] (formerly [facility]) [address]    Patient: Angeles Cardoza   YOB: 1945   Date of Visit: 3/18/2019       Dear Dr. Leroy:    Thank you for referring Angeles Cardoza to me for evaluation. Below are my notes for this consultation.    If you have questions, please do not hesitate to call me. I look forward to following your patient along with you.         Sincerely,        Chano Boateng DO        CC: No Recipients  Chano Boateng DO  3/18/2019  2:43 PM  Signed      Chief Complaint: I saw Angeles in the office today on her for her hypertension.  She is feeling well denies any form of chest discomfort or shortness of breath with exertion, anxiety, cold weather, postprandially, with sex, at rest, or nocturnally.  She denies exertional dyspnea, proximal nocturnal dyspnea, orthopnea, palpitations, syncope, ankle edema, she has nocturia.       Medications:  Current Outpatient Prescriptions   Medication Sig Dispense Refill   • amLODIPine (NORVASC) 10 mg tablet Take 10 mg by mouth daily.       • amLODIPine (NORVASC) 5 mg tablet Take 2 tablets by mouth daily.     • aspirin 81 mg chewable tablet Take 81 mg by mouth daily.      • atorvastatin (LIPITOR) 40 mg tablet Take 40 mg by mouth daily.       • calcium carbonate-vitamin D3 (CALCIUM 500 + D) 500 mg(1,250mg) -200 unit per tablet take 2 tablet by oral route  every day     • calcium carbonate-vitamin D3 (CALCIUM 500 + D) 500 mg(1,250mg) -400 unit chewable tablet Take 2 tablets by mouth daily.     • calcium citrate-vitamin D3 (CITRACAL) 200 mg calcium -250 unit tablet Take by mouth daily.     • meclizine (ANTIVERT) 12.5 mg tablet Take 1 tablet (12.5 mg total) by mouth 3 (three) times a day as needed for dizziness for up to 5 days. 15 tablet 0   • metoprolol succinate XL (TOPROL-XL) 25 mg 24 hr tablet Take 1 tablet (25 mg total) by mouth daily. 90 tablet 0   • multivitamin (THERAGRAN) tablet take 2 Tablet by oral  route  every day with food     • nitrofurantoin (MACRODANTIN) 50 mg capsule TK 1 C PO QHS FOR 3 MONTHS THEN PRN  3   • omeprazole (PriLOSEC) 20 mg capsule Take 20 mg by mouth daily before breakfast.       • venlafaxine XR (EFFEXOR XR) 75 mg 24 hr capsule Take 75 mg by mouth daily.         No current facility-administered medications for this visit.        Review of Systems:  Review of Systems   Constitution: Negative.   HENT: Negative.    Eyes: Negative.    Cardiovascular: Negative.    Respiratory: Negative.    Endocrine: Negative.    Hematologic/Lymphatic: Bruises/bleeds easily.   Skin: Negative.    Musculoskeletal: Negative.    Gastrointestinal: Negative.    Genitourinary: Positive for nocturia.   Neurological: Negative.    Psychiatric/Behavioral: Positive for depression.       Physical Exam:  Vitals:    03/18/19 1336   BP: 140/62   Pulse: 78   Resp: 14   Temp: 37 °C (98.6 °F)   SpO2: 98%     Physical Exam   Constitutional: She is oriented to person, place, and time. She appears well-developed and well-nourished.   HENT:   Head: Normocephalic and atraumatic.   Eyes: Conjunctivae and EOM are normal. Pupils are equal, round, and reactive to light.   Neck: Normal range of motion. Neck supple.   Cardiovascular: Normal rate, regular rhythm and intact distal pulses.  Exam reveals gallop.    Pulmonary/Chest: Effort normal and breath sounds normal.   Abdominal: Soft. Bowel sounds are normal.   Musculoskeletal: Normal range of motion.   Neurological: She is alert and oriented to person, place, and time. She has normal strength and normal reflexes.   Skin: Skin is warm, dry and intact.   Psychiatric: She has a normal mood and affect. Her speech is normal and behavior is normal. Judgment and thought content normal. Cognition and memory are normal.     EKG:  SR wnl    Assessment and Plan:  Impression:  Hypertension secondary to renal artery stenosis.  Aortic regurgitation.  Mitral regurgitation.  Recommendation:  I am going  to add the Toprol-XL 25 mg in the evening and keep the amlodipine 10 mg in the morning we will see her in 6 weeks time and see how she is doing.  If there is a problem we will see her sooner.  She is doing much better she is lost a few pounds she is able to exercise I think were on the right track but I am not sure this is going to be the final dosages of medication he could go up or could go down at this point Francine.  Thank you very much I see mom in the office today if there is a problem we will see her sooner in 6 weeks.  Chano Boateng, DO

## 2019-03-18 NOTE — PROGRESS NOTES
Chief Complaint: I saw Angeles in the office today on her for her hypertension.  She is feeling well denies any form of chest discomfort or shortness of breath with exertion, anxiety, cold weather, postprandially, with sex, at rest, or nocturnally.  She denies exertional dyspnea, proximal nocturnal dyspnea, orthopnea, palpitations, syncope, ankle edema, she has nocturia.       Medications:  Current Outpatient Prescriptions   Medication Sig Dispense Refill   • amLODIPine (NORVASC) 10 mg tablet Take 10 mg by mouth daily.       • amLODIPine (NORVASC) 5 mg tablet Take 2 tablets by mouth daily.     • aspirin 81 mg chewable tablet Take 81 mg by mouth daily.      • atorvastatin (LIPITOR) 40 mg tablet Take 40 mg by mouth daily.       • calcium carbonate-vitamin D3 (CALCIUM 500 + D) 500 mg(1,250mg) -200 unit per tablet take 2 tablet by oral route  every day     • calcium carbonate-vitamin D3 (CALCIUM 500 + D) 500 mg(1,250mg) -400 unit chewable tablet Take 2 tablets by mouth daily.     • calcium citrate-vitamin D3 (CITRACAL) 200 mg calcium -250 unit tablet Take by mouth daily.     • meclizine (ANTIVERT) 12.5 mg tablet Take 1 tablet (12.5 mg total) by mouth 3 (three) times a day as needed for dizziness for up to 5 days. 15 tablet 0   • metoprolol succinate XL (TOPROL-XL) 25 mg 24 hr tablet Take 1 tablet (25 mg total) by mouth daily. 90 tablet 0   • multivitamin (THERAGRAN) tablet take 2 Tablet by oral route  every day with food     • nitrofurantoin (MACRODANTIN) 50 mg capsule TK 1 C PO QHS FOR 3 MONTHS THEN PRN  3   • omeprazole (PriLOSEC) 20 mg capsule Take 20 mg by mouth daily before breakfast.       • venlafaxine XR (EFFEXOR XR) 75 mg 24 hr capsule Take 75 mg by mouth daily.         No current facility-administered medications for this visit.        Review of Systems:  Review of Systems   Constitution: Negative.   HENT: Negative.    Eyes: Negative.    Cardiovascular: Negative.    Respiratory: Negative.    Endocrine:  Negative.    Hematologic/Lymphatic: Bruises/bleeds easily.   Skin: Negative.    Musculoskeletal: Negative.    Gastrointestinal: Negative.    Genitourinary: Positive for nocturia.   Neurological: Negative.    Psychiatric/Behavioral: Positive for depression.       Physical Exam:  Vitals:    03/18/19 1336   BP: 140/62   Pulse: 78   Resp: 14   Temp: 37 °C (98.6 °F)   SpO2: 98%     Physical Exam   Constitutional: She is oriented to person, place, and time. She appears well-developed and well-nourished.   HENT:   Head: Normocephalic and atraumatic.   Eyes: Conjunctivae and EOM are normal. Pupils are equal, round, and reactive to light.   Neck: Normal range of motion. Neck supple.   Cardiovascular: Normal rate, regular rhythm and intact distal pulses.  Exam reveals gallop.    Pulmonary/Chest: Effort normal and breath sounds normal.   Abdominal: Soft. Bowel sounds are normal.   Musculoskeletal: Normal range of motion.   Neurological: She is alert and oriented to person, place, and time. She has normal strength and normal reflexes.   Skin: Skin is warm, dry and intact.   Psychiatric: She has a normal mood and affect. Her speech is normal and behavior is normal. Judgment and thought content normal. Cognition and memory are normal.     EKG:  SR wnl    Assessment and Plan:  Impression:  Hypertension secondary to renal artery stenosis.  Aortic regurgitation.  Mitral regurgitation.  Recommendation:  I am going to add the Toprol-XL 25 mg in the evening and keep the amlodipine 10 mg in the morning we will see her in 6 weeks time and see how she is doing.  If there is a problem we will see her sooner.  She is doing much better she is lost a few pounds she is able to exercise I think were on the right track but I am not sure this is going to be the final dosages of medication he could go up or could go down at this point Francine.  Thank you very much I see mom in the office today if there is a problem we will see her sooner in 6  weeks.  Chano Boateng, DO

## 2019-04-11 ENCOUNTER — TELEPHONE (OUTPATIENT)
Dept: SCHEDULING | Facility: CLINIC | Age: 74
End: 2019-04-11

## 2019-04-11 NOTE — TELEPHONE ENCOUNTER
Pt of Dr. Boateng. Pt called to report a recent increase in bruising on her hands and feet. Concerned this may be r/t the ASA 81mg. Pt has been on ASA for many years, but has noticed the increased bruising over the past week. Not sure if she has experienced any recent trauma to the hands and feet that may have caused bruising. No other symptoms of increased bruising or bleeding.    Pt requesting callback to discuss. [phone]. Thank you.

## 2019-04-11 NOTE — TELEPHONE ENCOUNTER
ROLDAN Ellison. The patient called back. I advised as below. Directed her to please call office if her symptoms do not improve. Thank you.

## 2019-04-24 RX ORDER — VENLAFAXINE 75 MG/1
TABLET ORAL
COMMUNITY
Start: 2019-04-02 | End: 2019-12-17 | Stop reason: SDUPTHER

## 2019-05-02 RX ORDER — METOPROLOL SUCCINATE 25 MG/1
25 TABLET, EXTENDED RELEASE ORAL DAILY
Qty: 90 TABLET | Refills: 3 | Status: SHIPPED | OUTPATIENT
Start: 2019-05-02 | End: 2019-05-07 | Stop reason: SDUPTHER

## 2019-05-07 RX ORDER — METOPROLOL SUCCINATE 25 MG/1
25 TABLET, EXTENDED RELEASE ORAL DAILY
Qty: 90 TABLET | Refills: 2 | Status: SHIPPED | OUTPATIENT
Start: 2019-05-07 | End: 2019-05-08 | Stop reason: SDUPTHER

## 2019-05-08 RX ORDER — METOPROLOL SUCCINATE 25 MG/1
25 TABLET, EXTENDED RELEASE ORAL DAILY
Qty: 90 TABLET | Refills: 2 | Status: SHIPPED | OUTPATIENT
Start: 2019-05-08 | End: 2020-11-13 | Stop reason: SDUPTHER

## 2019-05-15 NOTE — TELEPHONE ENCOUNTER
Jessica spoke with Dr. MACIEL patient needs to come in this week ASAP can you please try her again for appt?    Milady, as we discussed as she was having symptoms would consider adding low-dose beta-blocker therapy as blood pressure allows.. aravind

## 2019-05-28 RX ORDER — AMLODIPINE BESYLATE 10 MG/1
10 TABLET ORAL DAILY
Qty: 90 TABLET | Refills: 3 | Status: SHIPPED | OUTPATIENT
Start: 2019-05-28 | End: 2019-12-17 | Stop reason: SDUPTHER

## 2019-05-28 NOTE — TELEPHONE ENCOUNTER
Medicine Refill Request    Last Office Visit: 3/18/19  Next Office Visit: 6/4/19        Current Outpatient Prescriptions:   •  amLODIPine (NORVASC) 10 mg tablet, Take 10 mg by mouth daily.  , Disp: , Rfl:   •  amLODIPine (NORVASC) 5 mg tablet, Take 2 tablets by mouth daily., Disp: , Rfl:   •  aspirin 81 mg chewable tablet, Take 81 mg by mouth daily. , Disp: , Rfl:   •  atorvastatin (LIPITOR) 40 mg tablet, Take 40 mg by mouth daily.  , Disp: , Rfl:   •  calcium carbonate-vitamin D3 (CALCIUM 500 + D) 500 mg(1,250mg) -200 unit per tablet, take 2 tablet by oral route  every day, Disp: , Rfl:   •  calcium carbonate-vitamin D3 (CALCIUM 500 + D) 500 mg(1,250mg) -400 unit chewable tablet, Take 2 tablets by mouth daily., Disp: , Rfl:   •  calcium citrate-vitamin D3 (CITRACAL) 200 mg calcium -250 unit tablet, Take by mouth daily., Disp: , Rfl:   •  meclizine (ANTIVERT) 12.5 mg tablet, Take 1 tablet (12.5 mg total) by mouth 3 (three) times a day as needed for dizziness for up to 5 days., Disp: 15 tablet, Rfl: 0  •  metoprolol succinate XL (TOPROL-XL) 25 mg 24 hr tablet, Take 1 tablet (25 mg total) by mouth daily., Disp: 90 tablet, Rfl: 2  •  multivitamin (THERAGRAN) tablet, take 2 Tablet by oral route  every day with food, Disp: , Rfl:   •  nitrofurantoin (MACRODANTIN) 50 mg capsule, TK 1 C PO QHS FOR 3 MONTHS THEN PRN, Disp: , Rfl: 3  •  omeprazole (PriLOSEC) 20 mg capsule, Take 20 mg by mouth daily before breakfast.  , Disp: , Rfl:   •  venlafaxine (EFFEXOR) 75 mg tablet, , Disp: , Rfl:   •  venlafaxine XR (EFFEXOR XR) 75 mg 24 hr capsule, Take 75 mg by mouth daily.  , Disp: , Rfl:       BP Readings from Last 3 Encounters:   03/18/19 140/62   02/07/19 (!) 144/70   02/01/19 (!) 178/78       Recent Lab results:  No results found for: CHOL, No results found for: HDL, No results found for: LDLCALC, No results found for: TRIG     Lab Results   Component Value Date    GLUCOSE 124 (H) 01/25/2019   , No results found for:  HGBA1C    Lab Results   Component Value Date    CREATININE 0.7 01/25/2019      Pt requesting to take two 5 mg tablets daily instead of 10mg tablet - Out of meds

## 2019-06-04 ENCOUNTER — OFFICE VISIT (OUTPATIENT)
Dept: CARDIOLOGY | Facility: CLINIC | Age: 74
End: 2019-06-04
Payer: MEDICARE

## 2019-06-04 VITALS
OXYGEN SATURATION: 97 % | BODY MASS INDEX: 21.52 KG/M2 | HEIGHT: 61 IN | RESPIRATION RATE: 14 BRPM | WEIGHT: 114 LBS | DIASTOLIC BLOOD PRESSURE: 64 MMHG | TEMPERATURE: 98.6 F | SYSTOLIC BLOOD PRESSURE: 134 MMHG | HEART RATE: 73 BPM

## 2019-06-04 DIAGNOSIS — I10 HYPERTENSION, BENIGN: Primary | ICD-10-CM

## 2019-06-04 PROCEDURE — 93000 ELECTROCARDIOGRAM COMPLETE: CPT | Performed by: INTERNAL MEDICINE

## 2019-06-04 PROCEDURE — 99214 OFFICE O/P EST MOD 30 MIN: CPT | Performed by: INTERNAL MEDICINE

## 2019-06-04 RX ORDER — PNV NO.95/FERROUS FUM/FOLIC AC 28MG-0.8MG
100 TABLET ORAL DAILY
COMMUNITY

## 2019-06-04 RX ORDER — SYRING-NEEDL,DISP,INSUL,0.3 ML 29 G X1/2"
296 SYRINGE, EMPTY DISPOSABLE MISCELLANEOUS ONCE
COMMUNITY
End: 2021-11-23

## 2019-06-04 ASSESSMENT — ENCOUNTER SYMPTOMS
BRUISES/BLEEDS EASILY: 1
GASTROINTESTINAL NEGATIVE: 1
DEPRESSION: 1
NEUROLOGICAL NEGATIVE: 1
NERVOUS/ANXIOUS: 1
EYES NEGATIVE: 1
BACK PAIN: 1
CONSTITUTIONAL NEGATIVE: 1
CARDIOVASCULAR NEGATIVE: 1
ENDOCRINE NEGATIVE: 1
JOINT SWELLING: 1
COUGH: 1

## 2019-06-04 NOTE — PROGRESS NOTES
Chief Complaint: I saw Angeles in the office today on a routine she is here to have her blood pressure checked which she is just about normal now.  She denies any chest discomfort or shortness of breath with exertion, anxiety, cold weather, postprandially, at rest, or nocturnally.  She denies exertional dyspnea, proximal nocturnal dyspnea, orthopnea, palpitations, syncope, ankle edema she has intermittently that goes down by the next day and she has nocturia.       Medications:  Current Outpatient Prescriptions   Medication Sig Dispense Refill   • amLODIPine (NORVASC) 10 mg tablet Take 10 mg by mouth daily.       • aspirin 81 mg chewable tablet Take 81 mg by mouth 3 (three) times a week (Mon, Wed, Fri).       • atorvastatin (LIPITOR) 40 mg tablet Take 40 mg by mouth daily.       • calcium carbonate-vitamin D3 (CALCIUM 500 + D) 500 mg(1,250mg) -200 unit per tablet take 2 tablet by oral route  every day     • cyanocobalamin (VITAMIN B12) 100 mcg tablet Take 100 mcg by mouth daily.     • magnesium citrate solution Take 296 mL by mouth once.     • meclizine (ANTIVERT) 12.5 mg tablet Take 1 tablet (12.5 mg total) by mouth 3 (three) times a day as needed for dizziness for up to 5 days. 15 tablet 0   • metoprolol succinate XL (TOPROL-XL) 25 mg 24 hr tablet Take 1 tablet (25 mg total) by mouth daily. 90 tablet 2   • multivitamin (THERAGRAN) tablet take 2 Tablet by oral route  every day with food     • omeprazole (PriLOSEC) 20 mg capsule Take 20 mg by mouth daily before breakfast.       • venlafaxine (EFFEXOR) 75 mg tablet      • amLODIPine (NORVASC) 10 mg tablet Take 1 tablet (10 mg total) by mouth daily. (Patient not taking: Reported on 6/4/2019 ) 90 tablet 3   • calcium carbonate-vitamin D3 (CALCIUM 500 + D) 500 mg(1,250mg) -400 unit chewable tablet Take 2 tablets by mouth daily.     • calcium citrate-vitamin D3 (CITRACAL) 200 mg calcium -250 unit tablet Take by mouth daily.     • nitrofurantoin (MACRODANTIN) 50 mg  capsule TK 1 C PO QHS FOR 3 MONTHS THEN PRN  3   • venlafaxine XR (EFFEXOR XR) 75 mg 24 hr capsule Take 75 mg by mouth daily.         No current facility-administered medications for this visit.        Review of Systems:  Review of Systems   Constitution: Negative.   HENT: Negative.    Eyes: Negative.    Cardiovascular: Negative.    Respiratory: Positive for cough.         Cough is dry   Endocrine: Negative.    Hematologic/Lymphatic: Bruises/bleeds easily.   Skin: Positive for rash.   Musculoskeletal: Positive for back pain and joint swelling.   Gastrointestinal: Negative.    Genitourinary: Positive for nocturia.   Neurological: Negative.    Psychiatric/Behavioral: Positive for depression. The patient is nervous/anxious.    Allergic/Immunologic: Positive for environmental allergies.       Physical Exam:  Vitals:    06/04/19 1119   BP: 134/64   Pulse: 73   Resp: 14   Temp: 37 °C (98.6 °F)   SpO2: 97%     Physical Exam   Constitutional: She is oriented to person, place, and time. She appears well-developed and well-nourished.   HENT:   Head: Normocephalic and atraumatic.   Eyes: Pupils are equal, round, and reactive to light. Conjunctivae and EOM are normal.   Neck: Normal range of motion. Neck supple.   Cardiovascular: Normal rate, regular rhythm and intact distal pulses.  Exam reveals gallop (S4).    Pulmonary/Chest: Effort normal and breath sounds normal.   Abdominal: Soft. Bowel sounds are normal.   Musculoskeletal: Normal range of motion.   Neurological: She is alert and oriented to person, place, and time. She has normal strength and normal reflexes.   Skin: Skin is warm, dry and intact.   Psychiatric: She has a normal mood and affect. Her speech is normal and behavior is normal. Judgment and thought content normal. Cognition and memory are normal.     EKG:  SR WNL    Assessment and Plan:  Impression:  Hypertension controlled.  Mitral regurgitation.  Tricuspid regurgitation.  Benign  edema.  Hyperlipidemia.  Recommendation:  She is hemodynamically stable I did not have to make any adjustments in her medications today.  We counseled her on diet and exercise.  She seems to be doing but much better she seems to be calmer and I think her blood pressure will remain controlled.  We will see her again in 4 months time if the summer is over.  If there is a problem we will see her sooner.  We counseled her on diet and exercise today.    Chano Boateng, DO

## 2019-06-04 NOTE — LETTER
June 4, 2019     José Antonio Leroy V DO  [address]  [address]  [facility] [address]    Patient: Angeles Cardoza   YOB: 1945   Date of Visit: 6/4/2019       Dear Dr. Leroy:    Thank you for referring Angeles Cardoza to me for evaluation. Below are my notes for this consultation.    If you have questions, please do not hesitate to call me. I look forward to following your patient along with you.         Sincerely,        Chano Boateng DO        CC: No Recipients  Chano Boateng DO  6/4/2019 11:47 AM  Signed      Chief Complaint: I saw Angeles in the office today on a routine she is here to have her blood pressure checked which she is just about normal now.  She denies any chest discomfort or shortness of breath with exertion, anxiety, cold weather, postprandially, at rest, or nocturnally.  She denies exertional dyspnea, proximal nocturnal dyspnea, orthopnea, palpitations, syncope, ankle edema she has intermittently that goes down by the next day and she has nocturia.       Medications:  Current Outpatient Prescriptions   Medication Sig Dispense Refill   • amLODIPine (NORVASC) 10 mg tablet Take 10 mg by mouth daily.       • aspirin 81 mg chewable tablet Take 81 mg by mouth 3 (three) times a week (Mon, Wed, Fri).       • atorvastatin (LIPITOR) 40 mg tablet Take 40 mg by mouth daily.       • calcium carbonate-vitamin D3 (CALCIUM 500 + D) 500 mg(1,250mg) -200 unit per tablet take 2 tablet by oral route  every day     • cyanocobalamin (VITAMIN B12) 100 mcg tablet Take 100 mcg by mouth daily.     • magnesium citrate solution Take 296 mL by mouth once.     • meclizine (ANTIVERT) 12.5 mg tablet Take 1 tablet (12.5 mg total) by mouth 3 (three) times a day as needed for dizziness for up to 5 days. 15 tablet 0   • metoprolol succinate XL (TOPROL-XL) 25 mg 24 hr tablet Take 1 tablet (25 mg total) by mouth daily. 90 tablet 2   • multivitamin (THERAGRAN) tablet take 2 Tablet by oral route  every day with food     •  omeprazole (PriLOSEC) 20 mg capsule Take 20 mg by mouth daily before breakfast.       • venlafaxine (EFFEXOR) 75 mg tablet      • amLODIPine (NORVASC) 10 mg tablet Take 1 tablet (10 mg total) by mouth daily. (Patient not taking: Reported on 6/4/2019 ) 90 tablet 3   • calcium carbonate-vitamin D3 (CALCIUM 500 + D) 500 mg(1,250mg) -400 unit chewable tablet Take 2 tablets by mouth daily.     • calcium citrate-vitamin D3 (CITRACAL) 200 mg calcium -250 unit tablet Take by mouth daily.     • nitrofurantoin (MACRODANTIN) 50 mg capsule TK 1 C PO QHS FOR 3 MONTHS THEN PRN  3   • venlafaxine XR (EFFEXOR XR) 75 mg 24 hr capsule Take 75 mg by mouth daily.         No current facility-administered medications for this visit.        Review of Systems:  Review of Systems   Constitution: Negative.   HENT: Negative.    Eyes: Negative.    Cardiovascular: Negative.    Respiratory: Positive for cough.         Cough is dry   Endocrine: Negative.    Hematologic/Lymphatic: Bruises/bleeds easily.   Skin: Positive for rash.   Musculoskeletal: Positive for back pain and joint swelling.   Gastrointestinal: Negative.    Genitourinary: Positive for nocturia.   Neurological: Negative.    Psychiatric/Behavioral: Positive for depression. The patient is nervous/anxious.    Allergic/Immunologic: Positive for environmental allergies.       Physical Exam:  Vitals:    06/04/19 1119   BP: 134/64   Pulse: 73   Resp: 14   Temp: 37 °C (98.6 °F)   SpO2: 97%     Physical Exam   Constitutional: She is oriented to person, place, and time. She appears well-developed and well-nourished.   HENT:   Head: Normocephalic and atraumatic.   Eyes: Pupils are equal, round, and reactive to light. Conjunctivae and EOM are normal.   Neck: Normal range of motion. Neck supple.   Cardiovascular: Normal rate, regular rhythm and intact distal pulses.  Exam reveals gallop (S4).    Pulmonary/Chest: Effort normal and breath sounds normal.   Abdominal: Soft. Bowel sounds are normal.    Musculoskeletal: Normal range of motion.   Neurological: She is alert and oriented to person, place, and time. She has normal strength and normal reflexes.   Skin: Skin is warm, dry and intact.   Psychiatric: She has a normal mood and affect. Her speech is normal and behavior is normal. Judgment and thought content normal. Cognition and memory are normal.     EKG:  SR WNL    Assessment and Plan:  Impression:  Hypertension controlled.  Mitral regurgitation.  Tricuspid regurgitation.  Benign edema.  Hyperlipidemia.  Recommendation:  She is hemodynamically stable I did not have to make any adjustments in her medications today.  We counseled her on diet and exercise.  She seems to be doing but much better she seems to be calmer and I think her blood pressure will remain controlled.  We will see her again in 4 months time if the summer is over.  If there is a problem we will see her sooner.  We counseled her on diet and exercise today.    Chano Boateng, DO

## 2019-11-05 ENCOUNTER — OFFICE VISIT (OUTPATIENT)
Dept: CARDIOLOGY | Facility: CLINIC | Age: 74
End: 2019-11-05
Payer: MEDICARE

## 2019-11-05 VITALS
HEIGHT: 61 IN | OXYGEN SATURATION: 95 % | SYSTOLIC BLOOD PRESSURE: 106 MMHG | TEMPERATURE: 98.6 F | RESPIRATION RATE: 14 BRPM | BODY MASS INDEX: 21.9 KG/M2 | HEART RATE: 73 BPM | DIASTOLIC BLOOD PRESSURE: 64 MMHG | WEIGHT: 116 LBS

## 2019-11-05 DIAGNOSIS — I10 HYPERTENSION, UNSPECIFIED TYPE: Primary | ICD-10-CM

## 2019-11-05 PROCEDURE — 93000 ELECTROCARDIOGRAM COMPLETE: CPT | Performed by: INTERNAL MEDICINE

## 2019-11-05 PROCEDURE — 99214 OFFICE O/P EST MOD 30 MIN: CPT | Performed by: INTERNAL MEDICINE

## 2019-11-05 RX ORDER — CHOLECALCIFEROL (VITAMIN D3) 25 MCG
2000 TABLET ORAL DAILY
COMMUNITY

## 2019-11-05 RX ORDER — ASCORBIC ACID 250 MG
250 TABLET ORAL DAILY PRN
COMMUNITY
End: 2022-05-02

## 2019-11-05 ASSESSMENT — ENCOUNTER SYMPTOMS
RESPIRATORY NEGATIVE: 1
HEMATOLOGIC/LYMPHATIC NEGATIVE: 1
ENDOCRINE NEGATIVE: 1
GASTROINTESTINAL NEGATIVE: 1
MUSCULOSKELETAL NEGATIVE: 1
WEIGHT GAIN: 1
VERTIGO: 1
CARDIOVASCULAR NEGATIVE: 1
EYES NEGATIVE: 1
SORE THROAT: 1
NERVOUS/ANXIOUS: 1

## 2019-11-05 NOTE — PROGRESS NOTES
Chief Complaint: I saw Savannah in the office today on a routine visit for her labile hypertension.  She is doing well and had a good summer.  She denies any form of chest discomfort or shortness of breath with exertion, anxiety, cold weather, postprandially, at rest, or nocturnally.  She can climb a flight of stairs without getting short of breath, she denies proximal nocturnal dyspnea, orthopnea, palpitations, syncope, ankle edema she has nocturia.  She seems to be stable as had a couple vertigo episodes but this is not new and she had an uneventful summer.       Medications:  Current Outpatient Medications   Medication Sig Dispense Refill   • amLODIPine (NORVASC) 10 mg tablet Take 10 mg by mouth daily.       • ascorbic acid (VITAMIN C) 250 mg tablet Take 250 mg by mouth daily.     • aspirin 81 mg chewable tablet Take 81 mg by mouth 3 (three) times a week (Mon, Wed, Fri).       • atorvastatin (LIPITOR) 40 mg tablet Take 40 mg by mouth daily.       • calcium citrate-vitamin D3 (CITRACAL) 200 mg calcium -250 unit tablet Take by mouth daily.     • cholecalciferol, vitamin D3, 1,000 unit (25 mcg) tablet Take 1,000 Units by mouth daily.     • cyanocobalamin (VITAMIN B12) 100 mcg tablet Take 100 mcg by mouth daily.     • magnesium citrate solution Take 296 mL by mouth once.     • meclizine (ANTIVERT) 12.5 mg tablet Take 1 tablet (12.5 mg total) by mouth 3 (three) times a day as needed for dizziness for up to 5 days. 15 tablet 0   • metoprolol succinate XL (TOPROL-XL) 25 mg 24 hr tablet Take 1 tablet (25 mg total) by mouth daily. 90 tablet 2   • omeprazole (PriLOSEC) 20 mg capsule Take 20 mg by mouth daily before breakfast.       • venlafaxine (EFFEXOR) 75 mg tablet      • amLODIPine (NORVASC) 10 mg tablet Take 1 tablet (10 mg total) by mouth daily. (Patient not taking: Reported on 6/4/2019 ) 90 tablet 3   • calcium carbonate-vitamin D3 (CALCIUM 500 + D) 500 mg(1,250mg) -200 unit per tablet take 2 tablet by oral route   every day     • calcium carbonate-vitamin D3 (CALCIUM 500 + D) 500 mg(1,250mg) -400 unit chewable tablet Take 2 tablets by mouth daily.     • multivitamin (THERAGRAN) tablet take 2 Tablet by oral route  every day with food     • nitrofurantoin (MACRODANTIN) 50 mg capsule TK 1 C PO QHS FOR 3 MONTHS THEN PRN  3   • venlafaxine XR (EFFEXOR XR) 75 mg 24 hr capsule Take 75 mg by mouth daily.         No current facility-administered medications for this visit.        Review of Systems:  Review of Systems   Constitution: Positive for weight gain.   HENT: Positive for sore throat.    Eyes: Negative.    Cardiovascular: Negative.    Respiratory: Negative.    Endocrine: Negative.    Hematologic/Lymphatic: Negative.    Skin: Negative.    Musculoskeletal: Negative.    Gastrointestinal: Negative.    Genitourinary: Positive for nocturia.   Neurological: Positive for vertigo.   Psychiatric/Behavioral: The patient is nervous/anxious.    Allergic/Immunologic: Positive for environmental allergies.       Physical Exam:  Vitals:    11/05/19 1106   BP: 106/64   Pulse: 73   Resp: 14   Temp: 37 °C (98.6 °F)   SpO2: 95%     Physical Exam   Constitutional: She is oriented to person, place, and time. She appears well-developed and well-nourished.   HENT:   Head: Normocephalic and atraumatic.   Eyes: Pupils are equal, round, and reactive to light. Conjunctivae and EOM are normal.   Neck: Normal range of motion. Neck supple.   Cardiovascular: Normal rate and regular rhythm.   Pulmonary/Chest: Effort normal and breath sounds normal.   Abdominal: Soft. Bowel sounds are normal.   Musculoskeletal: Normal range of motion.   Neurological: She is alert and oriented to person, place, and time. She has normal reflexes.   Skin: Skin is warm and dry.   Psychiatric: She has a normal mood and affect. Her behavior is normal. Judgment and thought content normal.     EKG:   ST-Tabn    Assessment and Plan:  Impression:  Hypertension.  Aortic  regurgitation.  Mitral regurgitation.  Hyperlipidemia.  Anxiety.  Recommendation:  She is hemodynamically stable I did not have to make any changes in her medications today.  Her blood pressure is well controlled and she only rarely has some palpitations which I think her anxiety produced.  Her vertigo is positional probably from her sinuses.  Please forward to me her latest labs for our records.  We will see her again in 4 months time, if there is a problem we will see her sooner.  Thank you for the opportunity seeing this very nice woman in the office today.    Chano Boateng, DO

## 2019-11-05 NOTE — LETTER
November 5, 2019     José Antonio REAGAN DO  [address] [facility] [address]    Patient: Angeles Cardoza  YOB: 1945  Date of Visit: 11/5/2019      Dear Dr. Leroy:    Thank you for referring Angeles Cardoza to me for evaluation. Below are my notes for this consultation.    If you have questions, please do not hesitate to call me. I look forward to following your patient along with you.         Sincerely,        Chano Boateng DO        CC: No Recipients  Chano Boateng DO  11/5/2019 12:17 PM  Signed      Chief Complaint: I saw Savannah in the office today on a routine visit for her labile hypertension.  She is doing well and had a good summer.  She denies any form of chest discomfort or shortness of breath with exertion, anxiety, cold weather, postprandially, at rest, or nocturnally.  She can climb a flight of stairs without getting short of breath, she denies proximal nocturnal dyspnea, orthopnea, palpitations, syncope, ankle edema she has nocturia.  She seems to be stable as had a couple vertigo episodes but this is not new and she had an uneventful summer.       Medications:  Current Outpatient Medications   Medication Sig Dispense Refill   • amLODIPine (NORVASC) 10 mg tablet Take 10 mg by mouth daily.       • ascorbic acid (VITAMIN C) 250 mg tablet Take 250 mg by mouth daily.     • aspirin 81 mg chewable tablet Take 81 mg by mouth 3 (three) times a week (Mon, Wed, Fri).       • atorvastatin (LIPITOR) 40 mg tablet Take 40 mg by mouth daily.       • calcium citrate-vitamin D3 (CITRACAL) 200 mg calcium -250 unit tablet Take by mouth daily.     • cholecalciferol, vitamin D3, 1,000 unit (25 mcg) tablet Take 1,000 Units by mouth daily.     • cyanocobalamin (VITAMIN B12) 100 mcg tablet Take 100 mcg by mouth daily.     • magnesium citrate solution Take 296 mL by mouth once.     • meclizine (ANTIVERT) 12.5 mg tablet Take 1 tablet (12.5 mg total) by mouth 3 (three) times a day as needed for  dizziness for up to 5 days. 15 tablet 0   • metoprolol succinate XL (TOPROL-XL) 25 mg 24 hr tablet Take 1 tablet (25 mg total) by mouth daily. 90 tablet 2   • omeprazole (PriLOSEC) 20 mg capsule Take 20 mg by mouth daily before breakfast.       • venlafaxine (EFFEXOR) 75 mg tablet      • amLODIPine (NORVASC) 10 mg tablet Take 1 tablet (10 mg total) by mouth daily. (Patient not taking: Reported on 6/4/2019 ) 90 tablet 3   • calcium carbonate-vitamin D3 (CALCIUM 500 + D) 500 mg(1,250mg) -200 unit per tablet take 2 tablet by oral route  every day     • calcium carbonate-vitamin D3 (CALCIUM 500 + D) 500 mg(1,250mg) -400 unit chewable tablet Take 2 tablets by mouth daily.     • multivitamin (THERAGRAN) tablet take 2 Tablet by oral route  every day with food     • nitrofurantoin (MACRODANTIN) 50 mg capsule TK 1 C PO QHS FOR 3 MONTHS THEN PRN  3   • venlafaxine XR (EFFEXOR XR) 75 mg 24 hr capsule Take 75 mg by mouth daily.         No current facility-administered medications for this visit.        Review of Systems:  Review of Systems   Constitution: Positive for weight gain.   HENT: Positive for sore throat.    Eyes: Negative.    Cardiovascular: Negative.    Respiratory: Negative.    Endocrine: Negative.    Hematologic/Lymphatic: Negative.    Skin: Negative.    Musculoskeletal: Negative.    Gastrointestinal: Negative.    Genitourinary: Positive for nocturia.   Neurological: Positive for vertigo.   Psychiatric/Behavioral: The patient is nervous/anxious.    Allergic/Immunologic: Positive for environmental allergies.       Physical Exam:  Vitals:    11/05/19 1106   BP: 106/64   Pulse: 73   Resp: 14   Temp: 37 °C (98.6 °F)   SpO2: 95%     Physical Exam   Constitutional: She is oriented to person, place, and time. She appears well-developed and well-nourished.   HENT:   Head: Normocephalic and atraumatic.   Eyes: Pupils are equal, round, and reactive to light. Conjunctivae and EOM are normal.   Neck: Normal range of motion.  Neck supple.   Cardiovascular: Normal rate and regular rhythm.   Pulmonary/Chest: Effort normal and breath sounds normal.   Abdominal: Soft. Bowel sounds are normal.   Musculoskeletal: Normal range of motion.   Neurological: She is alert and oriented to person, place, and time. She has normal reflexes.   Skin: Skin is warm and dry.   Psychiatric: She has a normal mood and affect. Her behavior is normal. Judgment and thought content normal.     EKG:  SR ST-Tabn    Assessment and Plan:  Impression:  Hypertension.  Aortic regurgitation.  Mitral regurgitation.  Hyperlipidemia.  Anxiety.  Recommendation:  She is hemodynamically stable I did not have to make any changes in her medications today.  Her blood pressure is well controlled and she only rarely has some palpitations which I think her anxiety produced.  Her vertigo is positional probably from her sinuses.  Please forward to me her latest labs for our records.  We will see her again in 4 months time, if there is a problem we will see her sooner.  Thank you for the opportunity seeing this very nice woman in the office today.    Chano Boateng,

## 2019-12-17 ENCOUNTER — TRANSCRIBE ORDERS (OUTPATIENT)
Dept: LAB | Facility: HOSPITAL | Age: 74
End: 2019-12-17

## 2019-12-17 ENCOUNTER — TRANSCRIBE ORDERS (OUTPATIENT)
Dept: SCHEDULING | Age: 74
End: 2019-12-17

## 2019-12-17 ENCOUNTER — APPOINTMENT (OUTPATIENT)
Dept: LAB | Facility: HOSPITAL | Age: 74
End: 2019-12-17
Attending: UROLOGY
Payer: MEDICARE

## 2019-12-17 DIAGNOSIS — R31.0 GROSS HEMATURIA: Primary | ICD-10-CM

## 2019-12-17 DIAGNOSIS — R31.0 GROSS HEMATURIA: ICD-10-CM

## 2019-12-17 LAB
BUN SERPL-MCNC: 24 MG/DL (ref 8–20)
CREAT SERPL-MCNC: 0.7 MG/DL
GFR SERPL CREATININE-BSD FRML MDRD: >60 ML/MIN/1.73M*2

## 2019-12-17 PROCEDURE — 36415 COLL VENOUS BLD VENIPUNCTURE: CPT

## 2019-12-17 PROCEDURE — 82565 ASSAY OF CREATININE: CPT

## 2019-12-17 PROCEDURE — 84520 ASSAY OF UREA NITROGEN: CPT

## 2019-12-23 ENCOUNTER — HOSPITAL ENCOUNTER (OUTPATIENT)
Dept: RADIOLOGY | Age: 74
Discharge: HOME | End: 2019-12-23
Attending: UROLOGY
Payer: MEDICARE

## 2019-12-23 VITALS — WEIGHT: 113 LBS | BODY MASS INDEX: 21.35 KG/M2

## 2019-12-23 DIAGNOSIS — R31.0 GROSS HEMATURIA: ICD-10-CM

## 2019-12-23 PROCEDURE — 74178 CT ABD&PLV WO CNTR FLWD CNTR: CPT

## 2019-12-23 PROCEDURE — 63600105 HC IODINE BASED CONTRAST: Mod: JW | Performed by: UROLOGY

## 2019-12-23 RX ADMIN — IOHEXOL 95 ML: 350 INJECTION, SOLUTION INTRAVENOUS at 11:09

## 2020-02-10 ENCOUNTER — TELEPHONE (OUTPATIENT)
Dept: CARDIOLOGY | Facility: CLINIC | Age: 75
End: 2020-02-10

## 2020-02-10 NOTE — TELEPHONE ENCOUNTER
Alyse see below,    Looks like patient has no hx of cardiac stents  Per renal angiography of kidneys- she has a stent in her right renal artery      Per Dr. Boateng note- Hx: HTN secondary to renal artery stenosis, TIA  Please advise

## 2020-02-10 NOTE — TELEPHONE ENCOUNTER
Patient of Dr Boateng.  Patient is having an endoscopy on Tuesday the 18th.  Can she hold her Aspirin?  If so how long?  She takes it every MWF.

## 2020-02-11 NOTE — TELEPHONE ENCOUNTER
Andreia Chester I spoke with Dr. Boateng she needs to stay in [facility] for procedure  Hx of right renal stent and TIA  Please tell patient to stay on [facility]    Dr. Boateng prefers she is on daily dosing if she is willing   She has had bruising in the past, hence MWF dosing  TY

## 2020-04-13 ENCOUNTER — TELEPHONE (OUTPATIENT)
Dept: CARDIOLOGY | Facility: CLINIC | Age: 75
End: 2020-04-13

## 2020-04-13 NOTE — TELEPHONE ENCOUNTER
Left vm to call back and confirm telemed on 4/14    If confirm, please verify address, phone number and insurance

## 2020-04-13 NOTE — TELEPHONE ENCOUNTER
FYI- patient called to confirm tele med apt, all information is up to date.    Pt can be reached at   [phone]

## 2020-04-14 ENCOUNTER — TELEMEDICINE (OUTPATIENT)
Dept: CARDIOLOGY | Facility: CLINIC | Age: 75
End: 2020-04-14
Payer: MEDICARE

## 2020-04-14 DIAGNOSIS — I10 ESSENTIAL HYPERTENSION: Primary | ICD-10-CM

## 2020-04-14 PROCEDURE — 99443 PR PHYS/QHP TELEPHONE EVALUATION 21-30 MIN: CPT | Performed by: INTERNAL MEDICINE

## 2020-04-14 RX ORDER — OMEPRAZOLE 40 MG/1
20 CAPSULE, DELAYED RELEASE ORAL
Refills: 3 | COMMUNITY
Start: 2020-04-14

## 2020-04-14 ASSESSMENT — ENCOUNTER SYMPTOMS
GASTROINTESTINAL NEGATIVE: 1
PSYCHIATRIC NEGATIVE: 1
NEUROLOGICAL NEGATIVE: 1
HEMATOLOGIC/LYMPHATIC NEGATIVE: 1
CARDIOVASCULAR NEGATIVE: 1
CONSTITUTIONAL NEGATIVE: 1
RESPIRATORY NEGATIVE: 1
EYES NEGATIVE: 1
ENDOCRINE NEGATIVE: 1
MUSCULOSKELETAL NEGATIVE: 1

## 2020-04-14 NOTE — PROGRESS NOTES
Chief Complaint:I spoke to the patient and explained what a tele med visit is and what they are trying to accomplish.  I told her the visit was going to be charged to her insurance.  She agreed to the visit.       Medications:  Current Outpatient Medications   Medication Sig Dispense Refill   • amLODIPine (NORVASC) 10 mg tablet Take 10 mg by mouth daily.       • ascorbic acid (VITAMIN C) 250 mg tablet Take 250 mg by mouth daily.     • aspirin 81 mg chewable tablet Take 81 mg by mouth 3 (three) times a week (Mon, Wed, Fri).       • atorvastatin (LIPITOR) 40 mg tablet Take 40 mg by mouth daily.       • cholecalciferol, vitamin D3, 1,000 unit (25 mcg) tablet Take 2,000 Units by mouth daily.      • cyanocobalamin (VITAMIN B12) 100 mcg tablet Take 100 mcg by mouth daily.     • magnesium citrate solution Take 296 mL by mouth once.     • meclizine (ANTIVERT) 12.5 mg tablet Take 1 tablet (12.5 mg total) by mouth 3 (three) times a day as needed for dizziness for up to 5 days. 15 tablet 0   • MELATONIN ORAL Take by mouth as needed.     • metoprolol succinate XL (TOPROL-XL) 25 mg 24 hr tablet Take 1 tablet (25 mg total) by mouth daily. 90 tablet 2   • omeprazole (PriLOSEC) 20 mg capsule Take 20 mg by mouth daily before breakfast.       • venlafaxine XR (EFFEXOR XR) 75 mg 24 hr capsule Take 75 mg by mouth daily.         No current facility-administered medications for this visit.        Review of Systems:  Review of Systems   Constitution: Negative.   HENT: Negative.    Eyes: Negative.    Cardiovascular: Negative.    Respiratory: Negative.    Endocrine: Negative.    Hematologic/Lymphatic: Negative.    Skin: Negative.    Musculoskeletal: Negative.    Gastrointestinal: Negative.    Genitourinary: Positive for nocturia.   Neurological: Negative.    Psychiatric/Behavioral: Negative.    Allergic/Immunologic: Positive for environmental allergies.       Physical Exam:  There were no vitals filed for this visit.  Physical  Exam  EKG: not done    Assessment and Plan:  Impression:  Hypertension.  TIA  Hypercholesterolemia  Gerd.  Recommendation:  Stay on the same  Meds.  Continue on her present diet and to exercise on a daily basis.  Make an office visit for July.  This tele visit took 25 minutes    Chano Boateng DO

## 2020-11-03 ENCOUNTER — APPOINTMENT (RX ONLY)
Dept: URBAN - METROPOLITAN AREA CLINIC 28 | Facility: CLINIC | Age: 75
Setting detail: DERMATOLOGY
End: 2020-11-03

## 2020-11-03 DIAGNOSIS — L82.1 OTHER SEBORRHEIC KERATOSIS: ICD-10-CM

## 2020-11-03 DIAGNOSIS — D18.0 HEMANGIOMA: ICD-10-CM

## 2020-11-03 DIAGNOSIS — L81.4 OTHER MELANIN HYPERPIGMENTATION: ICD-10-CM

## 2020-11-03 DIAGNOSIS — L57.0 ACTINIC KERATOSIS: ICD-10-CM

## 2020-11-03 PROBLEM — D18.01 HEMANGIOMA OF SKIN AND SUBCUTANEOUS TISSUE: Status: ACTIVE | Noted: 2020-11-03

## 2020-11-03 PROCEDURE — 17003 DESTRUCT PREMALG LES 2-14: CPT

## 2020-11-03 PROCEDURE — ? FULL BODY SKIN EXAM

## 2020-11-03 PROCEDURE — ? BENIGN DESTRUCTION

## 2020-11-03 PROCEDURE — ? PREMALIGNANT DESTRUCTION

## 2020-11-03 PROCEDURE — 99213 OFFICE O/P EST LOW 20 MIN: CPT | Mod: 25

## 2020-11-03 PROCEDURE — 17000 DESTRUCT PREMALG LESION: CPT

## 2020-11-03 PROCEDURE — ? COUNSELING

## 2020-11-03 ASSESSMENT — LOCATION ZONE DERM
LOCATION ZONE: NECK
LOCATION ZONE: ARM
LOCATION ZONE: LEG
LOCATION ZONE: TRUNK
LOCATION ZONE: EAR

## 2020-11-03 ASSESSMENT — LOCATION DETAILED DESCRIPTION DERM
LOCATION DETAILED: MIDDLE STERNUM
LOCATION DETAILED: LEFT KNEE
LOCATION DETAILED: RIGHT KNEE
LOCATION DETAILED: LEFT ANTERIOR DISTAL THIGH
LOCATION DETAILED: LEFT LATERAL NECK
LOCATION DETAILED: LEFT PROXIMAL POSTERIOR UPPER ARM
LOCATION DETAILED: RIGHT SUPERIOR HELIX
LOCATION DETAILED: RIGHT DISTAL DORSAL FOREARM
LOCATION DETAILED: RIGHT MEDIAL BREAST 2-3:00 REGION
LOCATION DETAILED: RIGHT LATERAL NECK
LOCATION DETAILED: PERIUMBILICAL SKIN
LOCATION DETAILED: SUPERIOR THORACIC SPINE
LOCATION DETAILED: LEFT PROXIMAL DORSAL FOREARM
LOCATION DETAILED: RIGHT ANTERIOR DISTAL THIGH
LOCATION DETAILED: LEFT MEDIAL PROXIMAL PRETIBIAL REGION
LOCATION DETAILED: LEFT INFERIOR POSTERIOR NECK
LOCATION DETAILED: RIGHT DISTAL POSTERIOR UPPER ARM

## 2020-11-03 ASSESSMENT — LOCATION SIMPLE DESCRIPTION DERM
LOCATION SIMPLE: RIGHT KNEE
LOCATION SIMPLE: RIGHT FOREARM
LOCATION SIMPLE: CHEST
LOCATION SIMPLE: LEFT KNEE
LOCATION SIMPLE: RIGHT THIGH
LOCATION SIMPLE: POSTERIOR NECK
LOCATION SIMPLE: UPPER BACK
LOCATION SIMPLE: LEFT FOREARM
LOCATION SIMPLE: RIGHT EAR
LOCATION SIMPLE: LEFT THIGH
LOCATION SIMPLE: RIGHT POSTERIOR UPPER ARM
LOCATION SIMPLE: LEFT PRETIBIAL REGION
LOCATION SIMPLE: ABDOMEN
LOCATION SIMPLE: RIGHT BREAST
LOCATION SIMPLE: LEFT POSTERIOR UPPER ARM

## 2020-11-03 NOTE — PROCEDURE: PREMALIGNANT DESTRUCTION
Consent: The patient's consent was obtained including but not limited to risks of crusting, scabbing, blistering, scarring, darker or lighter pigmentary change, recurrence, incomplete removal and infection.
Method: electrodesiccation
Render Post-Care In The Note: No
Post-Care Instructions: I reviewed with the patient in detail post-care instructions. Patient is to wear sunprotection, and avoid picking at any of the treated lesions. Pt may apply Vaseline to crusted or scabbing areas.
Detail Level: Detailed
Anesthesia Volume In Cc: 0

## 2020-11-03 NOTE — PROCEDURE: BENIGN DESTRUCTION
Medical Necessity Clause: This procedure was medically necessary because the lesions that were treated were:
Consent: The patient's consent was obtained including but not limited to risks of crusting, scabbing, blistering, scarring, darker or lighter pigmentary change, recurrence, incomplete removal and infection.
Anesthesia Volume In Cc: 0.5
Render Note In Bullet Format When Appropriate: No
Detail Level: Detailed
Post-Care Instructions: I reviewed with the patient in detail post-care instructions. Patient is to wear sunprotection, and avoid picking at any of the treated lesions. Pt may apply Vaseline to crusted or scabbing areas.
Medical Necessity Information: It is in your best interest to select a reason for this procedure from the list below. All of these items fulfill various CMS LCD requirements except the new and changing color options.

## 2020-11-13 RX ORDER — METOPROLOL SUCCINATE 25 MG/1
25 TABLET, EXTENDED RELEASE ORAL DAILY
Qty: 90 TABLET | Refills: 1 | Status: SHIPPED | OUTPATIENT
Start: 2020-11-13 | End: 2021-07-13

## 2020-11-13 NOTE — TELEPHONE ENCOUNTER
Medicine Refill Request    Last Office Visit: Visit date not found  Last Telemedicine Visit: Visit date not found    Next Office Visit: Visit date not found  Next Telemedicine Visit: Visit date not found   [facility] contacted last week for refill.    Metoprolol succinate XL 25mg take daily #90 with refills      Current Outpatient Medications:   •  amLODIPine (NORVASC) 10 mg tablet, Take 10 mg by mouth daily.  , Disp: , Rfl:   •  ascorbic acid (VITAMIN C) 250 mg tablet, Take 250 mg by mouth daily., Disp: , Rfl:   •  aspirin 81 mg chewable tablet, Take 81 mg by mouth 3 (three) times a week (Mon, Wed, Fri).  , Disp: , Rfl:   •  atorvastatin (LIPITOR) 40 mg tablet, Take 40 mg by mouth daily.  , Disp: , Rfl:   •  cholecalciferol, vitamin D3, 1,000 unit (25 mcg) tablet, Take 2,000 Units by mouth daily. , Disp: , Rfl:   •  cyanocobalamin (VITAMIN B12) 100 mcg tablet, Take 100 mcg by mouth daily., Disp: , Rfl:   •  magnesium citrate solution, Take 296 mL by mouth once., Disp: , Rfl:   •  meclizine (ANTIVERT) 12.5 mg tablet, Take 1 tablet (12.5 mg total) by mouth 3 (three) times a day as needed for dizziness for up to 5 days., Disp: 15 tablet, Rfl: 0  •  MELATONIN ORAL, Take by mouth as needed., Disp: , Rfl:   •  metoprolol succinate XL (TOPROL-XL) 25 mg 24 hr tablet, Take 1 tablet (25 mg total) by mouth daily., Disp: 90 tablet, Rfl: 2  •  omeprazole (PriLOSEC) 20 mg capsule, Take 2 capsules (40 mg total) by mouth 2 (two) times a day., Disp: , Rfl: 3  •  venlafaxine XR (EFFEXOR XR) 75 mg 24 hr capsule, Take 75 mg by mouth daily.  , Disp: , Rfl:       BP Readings from Last 3 Encounters:   11/05/19 106/64   06/04/19 134/64   03/18/19 140/62       Recent Lab results:  No results found for: CHOL, No results found for: HDL, No results found for: LDLCALC, No results found for: TRIG     Lab Results   Component Value Date    GLUCOSE 124 (H) 01/25/2019   , No results found for: HGBA1C      Lab Results   Component Value Date     CREATININE 0.7 12/17/2019       Lab Results   Component Value Date    TSH 1.66 02/01/2019

## 2021-01-20 ENCOUNTER — OFFICE VISIT (OUTPATIENT)
Dept: CARDIOLOGY | Facility: CLINIC | Age: 76
End: 2021-01-20
Payer: MEDICARE

## 2021-01-20 VITALS
HEIGHT: 61 IN | HEART RATE: 65 BPM | RESPIRATION RATE: 15 BRPM | WEIGHT: 114 LBS | OXYGEN SATURATION: 96 % | BODY MASS INDEX: 21.52 KG/M2

## 2021-01-20 DIAGNOSIS — I10 ESSENTIAL HYPERTENSION: Primary | ICD-10-CM

## 2021-01-20 PROCEDURE — 93000 ELECTROCARDIOGRAM COMPLETE: CPT | Performed by: INTERNAL MEDICINE

## 2021-01-20 PROCEDURE — 99214 OFFICE O/P EST MOD 30 MIN: CPT | Performed by: INTERNAL MEDICINE

## 2021-01-20 ASSESSMENT — ENCOUNTER SYMPTOMS
CARDIOVASCULAR NEGATIVE: 1
PSYCHIATRIC NEGATIVE: 1
ENDOCRINE NEGATIVE: 1
EYES NEGATIVE: 1
BRUISES/BLEEDS EASILY: 1
GASTROINTESTINAL NEGATIVE: 1
NEUROLOGICAL NEGATIVE: 1
BACK PAIN: 1
CONSTITUTIONAL NEGATIVE: 1
RESPIRATORY NEGATIVE: 1

## 2021-01-20 NOTE — LETTER
January 20, 2021     José Antonio REAGAN DO  [address] [facility] [address]    Patient: Angeles Cardoza  YOB: 1945  Date of Visit: 1/20/2021      Dear Dr. Leroy:    Thank you for referring Angeles Cardoza to me for evaluation. Below are my notes for this consultation.    If you have questions, please do not hesitate to call me. I look forward to following your patient along with you.         Sincerely,        Chano Boateng DO        CC: No Recipients  Chano Boateng DO  1/20/2021 12:13 PM  Signed      Chief Complaint: I saw Angeles in the office today on a routine visit for her labile hypertension which is low today.  She denies any chest discomfort or shortness of breath with exertion, anxiety, cold weather, postprandially, at rest, or nocturnally.  She can easily climb a flight of stairs without getting short of breath, she denies proximal nocturnal dyspnea, orthopnea, palpitations, syncope, ankle edema she has nocturia.       Medications:  Current Outpatient Medications   Medication Sig Dispense Refill   • amLODIPine (NORVASC) 10 mg tablet Take 10 mg by mouth daily.       • ascorbic acid (VITAMIN C) 250 mg tablet Take 250 mg by mouth daily.     • aspirin 81 mg chewable tablet Take 81 mg by mouth 3 (three) times a week (Mon, Wed, Fri).       • atorvastatin (LIPITOR) 40 mg tablet Take 40 mg by mouth daily.       • calcium carbonate (CALCIUM 500 ORAL) Take by mouth daily.     • cholecalciferol, vitamin D3, 1,000 unit (25 mcg) tablet Take 2,000 Units by mouth daily.      • cyanocobalamin (VITAMIN B12) 100 mcg tablet Take 100 mcg by mouth daily.     • MELATONIN ORAL Take by mouth as needed.     • metoprolol succinate XL (TOPROL-XL) 25 mg 24 hr tablet Take 1 tablet (25 mg total) by mouth daily. 90 tablet 1   • omeprazole (PriLOSEC) 40 mg capsule Take 40 mg by mouth daily.    3   • venlafaxine XR (EFFEXOR XR) 75 mg 24 hr capsule Take 75 mg by mouth daily.       • magnesium citrate solution  Take 296 mL by mouth once.     • meclizine (ANTIVERT) 12.5 mg tablet Take 1 tablet (12.5 mg total) by mouth 3 (three) times a day as needed for dizziness for up to 5 days. (Patient not taking: Reported on 1/20/2021 ) 15 tablet 0     No current facility-administered medications for this visit.        Review of Systems:  Review of Systems   Constitution: Negative.   HENT: Negative.    Eyes: Negative.    Cardiovascular: Negative.    Respiratory: Negative.    Endocrine: Negative.    Hematologic/Lymphatic: Bruises/bleeds easily.   Skin: Positive for rash.   Musculoskeletal: Positive for back pain.   Gastrointestinal: Negative.    Genitourinary: Positive for nocturia.   Neurological: Negative.    Psychiatric/Behavioral: Negative.    Allergic/Immunologic: Positive for environmental allergies.       Physical Exam:  Vitals:    01/20/21 1135   Pulse: 65   Resp: 15   SpO2: 96%     Physical Exam   Constitutional: She is oriented to person, place, and time. She appears well-developed and well-nourished.   HENT:   Head: Normocephalic and atraumatic.   Eyes: Pupils are equal, round, and reactive to light. Conjunctivae and EOM are normal.   Neck: Normal range of motion. Neck supple.   Cardiovascular: Normal rate, regular rhythm and intact distal pulses. Exam reveals gallop.   Pulmonary/Chest: Effort normal and breath sounds normal.   Abdominal: Soft. Bowel sounds are normal.   Musculoskeletal: Normal range of motion.   Neurological: She is alert and oriented to person, place, and time. She has normal strength and normal reflexes.   Skin: Skin is warm, dry and intact.   Psychiatric: She has a normal mood and affect. Her speech is normal and behavior is normal. Judgment and thought content normal. Cognition and memory are normal.     EKG: SR ST-Tabn no change    Assessment and Plan:  Impression:  Hypertension controlled.  Aortic regurgitation.  Mitral regurgitation.  Hyperlipidemia.  GERD.  Recommendation she appears to be  hemodynamically stable I did not make any changes in her medications.  We counseled her on diet and exercise.  We will see her again in 6 months time, if there is a problem we will see her sooner.  Thank you for allowing us see this very nice woman in the office today.    Chano Boateng, DO

## 2021-01-20 NOTE — PROGRESS NOTES
Chief Complaint: I saw Angeles in the office today on a routine visit for her labile hypertension which is low today.  She denies any chest discomfort or shortness of breath with exertion, anxiety, cold weather, postprandially, at rest, or nocturnally.  She can easily climb a flight of stairs without getting short of breath, she denies proximal nocturnal dyspnea, orthopnea, palpitations, syncope, ankle edema she has nocturia.       Medications:  Current Outpatient Medications   Medication Sig Dispense Refill   • amLODIPine (NORVASC) 10 mg tablet Take 10 mg by mouth daily.       • ascorbic acid (VITAMIN C) 250 mg tablet Take 250 mg by mouth daily.     • aspirin 81 mg chewable tablet Take 81 mg by mouth 3 (three) times a week (Mon, Wed, Fri).       • atorvastatin (LIPITOR) 40 mg tablet Take 40 mg by mouth daily.       • calcium carbonate (CALCIUM 500 ORAL) Take by mouth daily.     • cholecalciferol, vitamin D3, 1,000 unit (25 mcg) tablet Take 2,000 Units by mouth daily.      • cyanocobalamin (VITAMIN B12) 100 mcg tablet Take 100 mcg by mouth daily.     • MELATONIN ORAL Take by mouth as needed.     • metoprolol succinate XL (TOPROL-XL) 25 mg 24 hr tablet Take 1 tablet (25 mg total) by mouth daily. 90 tablet 1   • omeprazole (PriLOSEC) 40 mg capsule Take 40 mg by mouth daily.    3   • venlafaxine XR (EFFEXOR XR) 75 mg 24 hr capsule Take 75 mg by mouth daily.       • magnesium citrate solution Take 296 mL by mouth once.     • meclizine (ANTIVERT) 12.5 mg tablet Take 1 tablet (12.5 mg total) by mouth 3 (three) times a day as needed for dizziness for up to 5 days. (Patient not taking: Reported on 1/20/2021 ) 15 tablet 0     No current facility-administered medications for this visit.        Review of Systems:  Review of Systems   Constitution: Negative.   HENT: Negative.    Eyes: Negative.    Cardiovascular: Negative.    Respiratory: Negative.    Endocrine: Negative.    Hematologic/Lymphatic: Bruises/bleeds easily.    Skin: Positive for rash.   Musculoskeletal: Positive for back pain.   Gastrointestinal: Negative.    Genitourinary: Positive for nocturia.   Neurological: Negative.    Psychiatric/Behavioral: Negative.    Allergic/Immunologic: Positive for environmental allergies.       Physical Exam:  Vitals:    01/20/21 1135   Pulse: 65   Resp: 15   SpO2: 96%     Physical Exam   Constitutional: She is oriented to person, place, and time. She appears well-developed and well-nourished.   HENT:   Head: Normocephalic and atraumatic.   Eyes: Pupils are equal, round, and reactive to light. Conjunctivae and EOM are normal.   Neck: Normal range of motion. Neck supple.   Cardiovascular: Normal rate, regular rhythm and intact distal pulses. Exam reveals gallop.   Pulmonary/Chest: Effort normal and breath sounds normal.   Abdominal: Soft. Bowel sounds are normal.   Musculoskeletal: Normal range of motion.   Neurological: She is alert and oriented to person, place, and time. She has normal strength and normal reflexes.   Skin: Skin is warm, dry and intact.   Psychiatric: She has a normal mood and affect. Her speech is normal and behavior is normal. Judgment and thought content normal. Cognition and memory are normal.     EKG: SR ST-Tabn no change    Assessment and Plan:  Impression:  Hypertension controlled.  Aortic regurgitation.  Mitral regurgitation.  Hyperlipidemia.  GERD.  Recommendation she appears to be hemodynamically stable I did not make any changes in her medications.  We counseled her on diet and exercise.  We will see her again in 6 months time, if there is a problem we will see her sooner.  Thank you for allowing us see this very nice woman in the office today.    Chano Boateng,

## 2021-07-13 RX ORDER — METOPROLOL SUCCINATE 25 MG/1
TABLET, EXTENDED RELEASE ORAL
Qty: 90 TABLET | Refills: 3 | Status: SHIPPED | OUTPATIENT
Start: 2021-07-13 | End: 2022-01-10 | Stop reason: SDUPTHER

## 2021-11-02 ENCOUNTER — APPOINTMENT (RX ONLY)
Dept: URBAN - METROPOLITAN AREA CLINIC 28 | Facility: CLINIC | Age: 76
Setting detail: DERMATOLOGY
End: 2021-11-02

## 2021-11-02 ENCOUNTER — OFFICE VISIT (OUTPATIENT)
Dept: CARDIOLOGY | Facility: CLINIC | Age: 76
End: 2021-11-02
Payer: MEDICARE

## 2021-11-02 VITALS
DIASTOLIC BLOOD PRESSURE: 76 MMHG | BODY MASS INDEX: 23.31 KG/M2 | TEMPERATURE: 98.6 F | HEIGHT: 59 IN | OXYGEN SATURATION: 98 % | SYSTOLIC BLOOD PRESSURE: 124 MMHG | WEIGHT: 115.6 LBS | HEART RATE: 80 BPM | RESPIRATION RATE: 14 BRPM

## 2021-11-02 DIAGNOSIS — I35.1 NONRHEUMATIC AORTIC VALVE INSUFFICIENCY: ICD-10-CM

## 2021-11-02 DIAGNOSIS — L81.4 OTHER MELANIN HYPERPIGMENTATION: ICD-10-CM

## 2021-11-02 DIAGNOSIS — L82.1 OTHER SEBORRHEIC KERATOSIS: ICD-10-CM

## 2021-11-02 DIAGNOSIS — L30.4 ERYTHEMA INTERTRIGO: ICD-10-CM

## 2021-11-02 DIAGNOSIS — D22 MELANOCYTIC NEVI: ICD-10-CM

## 2021-11-02 DIAGNOSIS — D485 NEOPLASM OF UNCERTAIN BEHAVIOR OF SKIN: ICD-10-CM

## 2021-11-02 DIAGNOSIS — D18.0 HEMANGIOMA: ICD-10-CM

## 2021-11-02 DIAGNOSIS — L57.8 OTHER SKIN CHANGES DUE TO CHRONIC EXPOSURE TO NONIONIZING RADIATION: ICD-10-CM

## 2021-11-02 DIAGNOSIS — I10 ESSENTIAL HYPERTENSION: Primary | ICD-10-CM

## 2021-11-02 PROBLEM — D22.9 MELANOCYTIC NEVI, UNSPECIFIED: Status: ACTIVE | Noted: 2021-11-02

## 2021-11-02 PROBLEM — D18.01 HEMANGIOMA OF SKIN AND SUBCUTANEOUS TISSUE: Status: ACTIVE | Noted: 2021-11-02

## 2021-11-02 PROBLEM — D48.5 NEOPLASM OF UNCERTAIN BEHAVIOR OF SKIN: Status: ACTIVE | Noted: 2021-11-02

## 2021-11-02 PROCEDURE — G8752 SYS BP LESS 140: HCPCS | Performed by: INTERNAL MEDICINE

## 2021-11-02 PROCEDURE — ? PRESCRIPTION

## 2021-11-02 PROCEDURE — G8754 DIAS BP LESS 90: HCPCS | Performed by: INTERNAL MEDICINE

## 2021-11-02 PROCEDURE — 11102 TANGNTL BX SKIN SINGLE LES: CPT

## 2021-11-02 PROCEDURE — 99213 OFFICE O/P EST LOW 20 MIN: CPT | Mod: 25

## 2021-11-02 PROCEDURE — ? COUNSELING

## 2021-11-02 PROCEDURE — ? PHOTO-DOCUMENTATION

## 2021-11-02 PROCEDURE — ? BIOPSY BY SHAVE METHOD

## 2021-11-02 PROCEDURE — 99214 OFFICE O/P EST MOD 30 MIN: CPT | Performed by: INTERNAL MEDICINE

## 2021-11-02 PROCEDURE — 93000 ELECTROCARDIOGRAM COMPLETE: CPT | Performed by: INTERNAL MEDICINE

## 2021-11-02 PROCEDURE — ? FULL BODY SKIN EXAM

## 2021-11-02 RX ORDER — CLOTRIMAZOLE AND BETAMETHASONE DIPROPIONATE 10; .5 MG/G; MG/G
CREAM TOPICAL
Qty: 45 | Refills: 3 | Status: ERX | COMMUNITY
Start: 2021-11-02

## 2021-11-02 RX ADMIN — CLOTRIMAZOLE AND BETAMETHASONE DIPROPIONATE 1-0.05: 10; .5 CREAM TOPICAL at 00:00

## 2021-11-02 ASSESSMENT — ENCOUNTER SYMPTOMS
DYSPNEA ON EXERTION: 1
RESPIRATORY NEGATIVE: 1
PSYCHIATRIC NEGATIVE: 1
CONSTITUTIONAL NEGATIVE: 1
EYES NEGATIVE: 1
NEUROLOGICAL NEGATIVE: 1
BACK PAIN: 1
BRUISES/BLEEDS EASILY: 1
GASTROINTESTINAL NEGATIVE: 1

## 2021-11-02 ASSESSMENT — LOCATION SIMPLE DESCRIPTION DERM: LOCATION SIMPLE: ABDOMEN

## 2021-11-02 ASSESSMENT — LOCATION ZONE DERM: LOCATION ZONE: TRUNK

## 2021-11-02 ASSESSMENT — LOCATION DETAILED DESCRIPTION DERM: LOCATION DETAILED: LEFT LATERAL ABDOMEN

## 2021-11-02 NOTE — LETTER
November 2, 2021     José Antonio REAGAN DO  [address] [facility] [address]    Patient: Angeles Cardoza  YOB: 1945  Date of Visit: 11/2/2021      Dear Dr. Leroy:    Thank you for referring Angeles Cardoza to me for evaluation. Below are my notes for this consultation.    If you have questions, please do not hesitate to call me. I look forward to following your patient along with you.         Sincerely,        Chano Boateng DO        CC: No Recipients  Chano Boateng DO  11/2/2021  2:13 PM  Signed      Chief Complaint: I saw Angeles in the office today on a routine visit for her hypertension and mild valvular heart disease.  She denies any form of chest discomfort occasionally gets short of breath with exertion or climbing stairs it goes away right away.  She denies chest discomfort or shortness of breath with anxiety, cold weather, postprandially, at rest, or nocturnally.  She can climb 1 flight of stairs most the time without getting short of breath, she denies proximal nocturnal dyspnea orthopnea palpitations syncope ankle edema she has nocturia.       Medications:  Current Outpatient Medications   Medication Sig Dispense Refill   • amLODIPine (NORVASC) 10 mg tablet Take 10 mg by mouth daily.       • ascorbic acid (VITAMIN C) 250 mg tablet Take 250 mg by mouth daily.     • aspirin 81 mg chewable tablet Take 81 mg by mouth 3 (three) times a week (Mon, Wed, Fri).       • atorvastatin (LIPITOR) 40 mg tablet Take 40 mg by mouth daily.       • calcium carbonate (CALCIUM 500 ORAL) Take by mouth daily.     • cholecalciferol, vitamin D3, 1,000 unit (25 mcg) tablet Take 2,000 Units by mouth daily.      • cyanocobalamin (VITAMIN B12) 100 mcg tablet Take 100 mcg by mouth daily.     • MELATONIN ORAL Take by mouth as needed.     • metoprolol succinate XL (TOPROL-XL) 25 mg 24 hr tablet TAKE 1 TABLET DAILY 90 tablet 3   • omeprazole (PriLOSEC) 40 mg capsule Take 40 mg by mouth daily.    3   •  venlafaxine XR (EFFEXOR XR) 75 mg 24 hr capsule Take 75 mg by mouth daily.       • magnesium citrate solution Take 296 mL by mouth once.     • meclizine (ANTIVERT) 12.5 mg tablet Take 1 tablet (12.5 mg total) by mouth 3 (three) times a day as needed for dizziness for up to 5 days. (Patient not taking: Reported on 1/20/2021 ) 15 tablet 0     No current facility-administered medications for this visit.       Review of Systems:  Review of Systems   Constitutional: Negative.   HENT: Negative.    Eyes: Negative.    Cardiovascular: Positive for chest pain and dyspnea on exertion.   Respiratory: Negative.    Hematologic/Lymphatic: Bruises/bleeds easily.   Skin: Positive for rash.   Musculoskeletal: Positive for back pain and joint pain.   Gastrointestinal: Negative.    Genitourinary: Positive for nocturia.   Neurological: Negative.    Psychiatric/Behavioral: Negative.    Allergic/Immunologic: Positive for environmental allergies.       Physical Exam:  Vitals:    11/02/21 1345   BP: 124/76   Pulse: 80   Resp: 14   Temp: 37 °C (98.6 °F)   SpO2:      Physical Exam  Constitutional:       Appearance: She is well-developed.   HENT:      Head: Normocephalic and atraumatic.   Eyes:      Conjunctiva/sclera: Conjunctivae normal.      Pupils: Pupils are equal, round, and reactive to light.   Cardiovascular:      Rate and Rhythm: Normal rate and regular rhythm.      Pulses: Intact distal pulses.      Heart sounds: Murmur (AR) heard.   Gallop present.    Pulmonary:      Effort: Pulmonary effort is normal.      Breath sounds: Normal breath sounds.   Abdominal:      General: Bowel sounds are normal.      Palpations: Abdomen is soft.   Musculoskeletal:         General: Normal range of motion.      Cervical back: Normal range of motion and neck supple.   Skin:     General: Skin is warm and dry.   Neurological:      Mental Status: She is alert and oriented to person, place, and time.      Deep Tendon Reflexes: Reflexes are normal and  symmetric.   Psychiatric:         Speech: Speech normal.         Behavior: Behavior normal.         Thought Content: Thought content normal.         Judgment: Judgment normal.       EKG: SR voltage criteria for LVH borderline AI ST-Tabn    Assessment and Plan:  Impression:  Hypertension controlled.  Mild aortic regurgitation.  Mild mitral regurgitation.  Mild tricuspid regurgitation.  Hyperlipidemia.  She appears to be hemodynamically stable I did not make any changes in her medications there are no signs and symptoms of heart failure from her mild valvular heart disease.  We will see her again in 6 months time and repeat her echocardiogram because it will be almost 3-1/2 years to follow her mild AR and MR.  If there is a problem we will see her sooner.  Thank you for allowing us see this odell woman in the office today.    Chano Boateng, DO

## 2021-11-02 NOTE — PROGRESS NOTES
Chief Complaint: I saw Angeles in the office today on a routine visit for her hypertension and mild valvular heart disease.  She denies any form of chest discomfort occasionally gets short of breath with exertion or climbing stairs it goes away right away.  She denies chest discomfort or shortness of breath with anxiety, cold weather, postprandially, at rest, or nocturnally.  She can climb 1 flight of stairs most the time without getting short of breath, she denies proximal nocturnal dyspnea orthopnea palpitations syncope ankle edema she has nocturia.       Medications:  Current Outpatient Medications   Medication Sig Dispense Refill   • amLODIPine (NORVASC) 10 mg tablet Take 10 mg by mouth daily.       • ascorbic acid (VITAMIN C) 250 mg tablet Take 250 mg by mouth daily.     • aspirin 81 mg chewable tablet Take 81 mg by mouth 3 (three) times a week (Mon, Wed, Fri).       • atorvastatin (LIPITOR) 40 mg tablet Take 40 mg by mouth daily.       • calcium carbonate (CALCIUM 500 ORAL) Take by mouth daily.     • cholecalciferol, vitamin D3, 1,000 unit (25 mcg) tablet Take 2,000 Units by mouth daily.      • cyanocobalamin (VITAMIN B12) 100 mcg tablet Take 100 mcg by mouth daily.     • MELATONIN ORAL Take by mouth as needed.     • metoprolol succinate XL (TOPROL-XL) 25 mg 24 hr tablet TAKE 1 TABLET DAILY 90 tablet 3   • omeprazole (PriLOSEC) 40 mg capsule Take 40 mg by mouth daily.    3   • venlafaxine XR (EFFEXOR XR) 75 mg 24 hr capsule Take 75 mg by mouth daily.       • magnesium citrate solution Take 296 mL by mouth once.     • meclizine (ANTIVERT) 12.5 mg tablet Take 1 tablet (12.5 mg total) by mouth 3 (three) times a day as needed for dizziness for up to 5 days. (Patient not taking: Reported on 1/20/2021 ) 15 tablet 0     No current facility-administered medications for this visit.       Review of Systems:  Review of Systems   Constitutional: Negative.   HENT: Negative.    Eyes: Negative.    Cardiovascular: Positive  for chest pain and dyspnea on exertion.   Respiratory: Negative.    Hematologic/Lymphatic: Bruises/bleeds easily.   Skin: Positive for rash.   Musculoskeletal: Positive for back pain and joint pain.   Gastrointestinal: Negative.    Genitourinary: Positive for nocturia.   Neurological: Negative.    Psychiatric/Behavioral: Negative.    Allergic/Immunologic: Positive for environmental allergies.       Physical Exam:  Vitals:    11/02/21 1345   BP: 124/76   Pulse: 80   Resp: 14   Temp: 37 °C (98.6 °F)   SpO2:      Physical Exam  Constitutional:       Appearance: She is well-developed.   HENT:      Head: Normocephalic and atraumatic.   Eyes:      Conjunctiva/sclera: Conjunctivae normal.      Pupils: Pupils are equal, round, and reactive to light.   Cardiovascular:      Rate and Rhythm: Normal rate and regular rhythm.      Pulses: Intact distal pulses.      Heart sounds: Murmur (AR) heard.   Gallop present.    Pulmonary:      Effort: Pulmonary effort is normal.      Breath sounds: Normal breath sounds.   Abdominal:      General: Bowel sounds are normal.      Palpations: Abdomen is soft.   Musculoskeletal:         General: Normal range of motion.      Cervical back: Normal range of motion and neck supple.   Skin:     General: Skin is warm and dry.   Neurological:      Mental Status: She is alert and oriented to person, place, and time.      Deep Tendon Reflexes: Reflexes are normal and symmetric.   Psychiatric:         Speech: Speech normal.         Behavior: Behavior normal.         Thought Content: Thought content normal.         Judgment: Judgment normal.       EKG: SR voltage criteria for LVH borderline AI ST-Tabn    Assessment and Plan:  Impression:  Hypertension controlled.  Mild aortic regurgitation.  Mild mitral regurgitation.  Mild tricuspid regurgitation.  Hyperlipidemia.  She appears to be hemodynamically stable I did not make any changes in her medications there are no signs and symptoms of heart failure from  her mild valvular heart disease.  We will see her again in 6 months time and repeat her echocardiogram because it will be almost 3-1/2 years to follow her mild AR and MR.  If there is a problem we will see her sooner.  Thank you for allowing us see this odell woman in the office today.    Chano Boateng, DO

## 2021-11-02 NOTE — PROCEDURE: BIOPSY BY SHAVE METHOD
Detail Level: Detailed
Depth Of Biopsy: dermis
Was A Bandage Applied: Yes
Size Of Lesion In Cm: 1.4
X Size Of Lesion In Cm: 1.6
Biopsy Type: H and E
Biopsy Method: curette
Anesthesia Type: 1% lidocaine with epinephrine
Anesthesia Volume In Cc (Will Not Render If 0): 0.5
Additional Anesthesia Volume In Cc (Will Not Render If 0): 0
Hemostasis: Electrodesiccation
Wound Care: Petrolatum
Dressing: bandage
Destruction After The Procedure: No
Type Of Destruction Used: Curettage
Curettage Text: The wound bed was treated with curettage after the biopsy was performed.
Cryotherapy Text: The wound bed was treated with cryotherapy after the biopsy was performed.
Electrodesiccation Text: The wound bed was treated with electrodesiccation after the biopsy was performed.
Electrodesiccation And Curettage Text: The wound bed was treated with electrodesiccation and curettage after the biopsy was performed.
Silver Nitrate Text: The wound bed was treated with silver nitrate after the biopsy was performed.
Lab: 6
Consent: Written consent was obtained and risks were reviewed including but not limited to scarring, infection, bleeding, scabbing, incomplete removal, nerve damage and allergy to anesthesia.
Post-Care Instructions: I reviewed with the patient in detail post-care instructions. Patient is to keep the biopsy site dry overnight, and then apply bacitracin twice daily until healed. Patient may apply hydrogen peroxide soaks to remove any crusting.
Notification Instructions: Patient will be notified of biopsy results. However, patient instructed to call the office if not contacted within 2 weeks.
Billing Type: Third-Party Bill
Information: Selecting Yes will display possible errors in your note based on the variables you have selected. This validation is only offered as a suggestion for you. PLEASE NOTE THAT THE VALIDATION TEXT WILL BE REMOVED WHEN YOU FINALIZE YOUR NOTE. IF YOU WANT TO FAX A PRELIMINARY NOTE YOU WILL NEED TO TOGGLE THIS TO 'NO' IF YOU DO NOT WANT IT IN YOUR FAXED NOTE.

## 2021-11-22 ENCOUNTER — TELEPHONE (OUTPATIENT)
Dept: SCHEDULING | Facility: CLINIC | Age: 76
End: 2021-11-22

## 2021-11-22 NOTE — TELEPHONE ENCOUNTER
Spoke with the patient  She is scheduled 11/23 at 9:45 for hospital follow up with GERARDO Richard- please see prior task down below the patient she wondering if you give her a call sometime today if you are not busy but she did say if he can't she can wait till tomorrow.

## 2021-11-22 NOTE — TELEPHONE ENCOUNTER
Hospital Follow Up     Name of patient: Angeles Cardoza    New or Established: Est. Patient     Doctor requested:  Dr. Boateng     Location of Admission:  [facility] in [place]. Admitted for 1 week - Pt was found to have New A-Fib.     Timeframe instructed to follow-up within: Pt states ASAP. Pt is also requesting to speak with Dr. Boateng.     Best contact number: [phone].     No Appt to offer.     TY

## 2021-11-23 ENCOUNTER — OFFICE VISIT (OUTPATIENT)
Dept: CARDIOLOGY | Facility: CLINIC | Age: 76
End: 2021-11-23
Payer: MEDICARE

## 2021-11-23 VITALS
BODY MASS INDEX: 22.19 KG/M2 | RESPIRATION RATE: 16 BRPM | DIASTOLIC BLOOD PRESSURE: 70 MMHG | SYSTOLIC BLOOD PRESSURE: 100 MMHG | HEIGHT: 60 IN | TEMPERATURE: 98.6 F | HEART RATE: 64 BPM | WEIGHT: 113 LBS | OXYGEN SATURATION: 98 %

## 2021-11-23 DIAGNOSIS — I10 ESSENTIAL HYPERTENSION: Primary | ICD-10-CM

## 2021-11-23 PROCEDURE — G8754 DIAS BP LESS 90: HCPCS | Performed by: INTERNAL MEDICINE

## 2021-11-23 PROCEDURE — 93000 ELECTROCARDIOGRAM COMPLETE: CPT | Performed by: INTERNAL MEDICINE

## 2021-11-23 PROCEDURE — G8752 SYS BP LESS 140: HCPCS | Performed by: INTERNAL MEDICINE

## 2021-11-23 PROCEDURE — 99214 OFFICE O/P EST MOD 30 MIN: CPT | Performed by: INTERNAL MEDICINE

## 2021-11-23 RX ORDER — APIXABAN 5 MG/1
TABLET, FILM COATED ORAL 2 TIMES DAILY
COMMUNITY
Start: 2021-11-21 | End: 2021-12-14 | Stop reason: SDUPTHER

## 2021-11-23 RX ORDER — AMIODARONE HYDROCHLORIDE 200 MG/1
200 TABLET ORAL DAILY
COMMUNITY
Start: 2021-11-21 | End: 2022-01-19 | Stop reason: ALTCHOICE

## 2021-11-23 ASSESSMENT — ENCOUNTER SYMPTOMS
HEMATOLOGIC/LYMPHATIC NEGATIVE: 1
WEIGHT LOSS: 1
PSYCHIATRIC NEGATIVE: 1
RESPIRATORY NEGATIVE: 1
DIARRHEA: 1
PALPITATIONS: 1
ENDOCRINE NEGATIVE: 1
ALLERGIC/IMMUNOLOGIC NEGATIVE: 1
NEUROLOGICAL NEGATIVE: 1
EYES NEGATIVE: 1

## 2021-11-23 NOTE — LETTER
November 23, 2021     José Antonio REAGAN DO  [address] [facility] [address]    Patient: Angeles Cardoza  YOB: 1945  Date of Visit: 11/23/2021      Dear Dr. Leroy:    Thank you for referring Angeles Cardoza to me for evaluation. Below are my notes for this consultation.    If you have questions, please do not hesitate to call me. I look forward to following your patient along with you.         Sincerely,        Chano Boateng DO        CC: No Recipients  Chano Boateng,   11/23/2021 10:51 AM  Signed      Chief Complaint: I saw Angeles in the office today on a routine visit for she flipped into atrial fibrillation I suspect because she had severe diarrhea from C. difficile and lost some of her magnesium and potassium.  She still has some abdominal pain multiple stools but noted no more diarrhea.  She denies any form of chest discomfort probably has some shortness of breath climbing stairs because she lost a lot of volume.  She denies chest discomfort or shortness of breath with anxiety, cold weather, postprandially, at rest, or nocturnally.  She admits to exertional dyspnea, she denies proximal nocturnal dyspnea orthopnea no longer has palpitations no syncope no ankle edema she has nocturia.       Medications:  Current Outpatient Medications   Medication Sig Dispense Refill   • amiodarone 200 mg tablet Take 200 mg by mouth daily. Take 2 tablets 2 times daily      • amLODIPine (NORVASC) 10 mg tablet Take 10 mg by mouth daily.       • ascorbic acid (VITAMIN C) 250 mg tablet Take 250 mg by mouth daily.     • atorvastatin (LIPITOR) 40 mg tablet Take 40 mg by mouth daily.       • calcium carbonate (CALCIUM 500 ORAL) Take by mouth daily.     • cholecalciferol, vitamin D3, 1,000 unit (25 mcg) tablet Take 2,000 Units by mouth daily.      • cyanocobalamin (VITAMIN B12) 100 mcg tablet Take 100 mcg by mouth daily.     • ELIQUIS 5 mg tablet Take by mouth 2 (two) times a day.     • meclizine  (ANTIVERT) 12.5 mg tablet Take 1 tablet (12.5 mg total) by mouth 3 (three) times a day as needed for dizziness for up to 5 days. 15 tablet 0   • MELATONIN ORAL Take by mouth as needed.     • metoprolol succinate XL (TOPROL-XL) 25 mg 24 hr tablet TAKE 1 TABLET DAILY 90 tablet 3   • multivit-min/ferrous fumarate (MULTI VITAMIN ORAL) Take by mouth daily.     • omeprazole (PriLOSEC) 40 mg capsule Take 40 mg by mouth 2 (two) times a day.    3   • venlafaxine XR (EFFEXOR XR) 75 mg 24 hr capsule Take 75 mg by mouth daily.         No current facility-administered medications for this visit.       Review of Systems:  Review of Systems   Constitutional: Positive for weight loss.   HENT: Negative.    Eyes: Negative.    Cardiovascular: Positive for palpitations.   Respiratory: Negative.    Endocrine: Negative.    Hematologic/Lymphatic: Negative.    Skin: Negative.    Musculoskeletal: Positive for joint pain.   Gastrointestinal: Positive for diarrhea.   Genitourinary: Positive for nocturia.   Neurological: Negative.    Psychiatric/Behavioral: Negative.    Allergic/Immunologic: Negative.        Physical Exam:  Vitals:    11/23/21 1002   BP: 100/70   Pulse: 64   Resp: 16   Temp: 37 °C (98.6 °F)   SpO2: 98%     Physical Exam  Constitutional:       Appearance: She is well-developed.   HENT:      Head: Normocephalic and atraumatic.   Eyes:      Conjunctiva/sclera: Conjunctivae normal.      Pupils: Pupils are equal, round, and reactive to light.   Cardiovascular:      Rate and Rhythm: Normal rate and regular rhythm.      Pulses: Intact distal pulses.      Heart sounds: Murmur (TR) heard.       Pulmonary:      Effort: Pulmonary effort is normal.      Breath sounds: Normal breath sounds.   Abdominal:      General: Bowel sounds are normal.      Palpations: Abdomen is soft.   Musculoskeletal:         General: Normal range of motion.      Cervical back: Normal range of motion and neck supple.   Skin:     General: Skin is warm and dry.    Neurological:      Mental Status: She is alert and oriented to person, place, and time.      Deep Tendon Reflexes: Reflexes are normal and symmetric.   Psychiatric:         Speech: Speech normal.         Behavior: Behavior normal.         Thought Content: Thought content normal.         Judgment: Judgment normal.       EKG: SR AI LVH T wave abn    Assessment and Plan:  Impression:  New onset A. fib etiology probably electrolyte dysfunction.  C. difficile.  Hypertension controlled.  Hyperlipidemia.  Mitral regurgitation.  Aortic regurgitation.  Recommendation:  I am going to leave her alone for 6 weeks I did take her off aspirin I because of her age I do not want her on Eliquis and aspirin at the same time because of the risk of bleeding.  I will reassess her in 6 weeks time and hopefully I will take her off the amiodarone and pending what her blood pressure either bump up her beta-blocker or switch her to either flecainide or Multaq probably Multaq.  If there is a problem she is to call me.  Thank you for allowing me to see Angeles in the office today.    Chano Boateng, DO

## 2021-11-23 NOTE — PROGRESS NOTES
Chief Complaint: I saw Angeles in the office today on a routine visit for she flipped into atrial fibrillation I suspect because she had severe diarrhea from C. difficile and lost some of her magnesium and potassium.  She still has some abdominal pain multiple stools but noted no more diarrhea.  She denies any form of chest discomfort probably has some shortness of breath climbing stairs because she lost a lot of volume.  She denies chest discomfort or shortness of breath with anxiety, cold weather, postprandially, at rest, or nocturnally.  She admits to exertional dyspnea, she denies proximal nocturnal dyspnea orthopnea no longer has palpitations no syncope no ankle edema she has nocturia.       Medications:  Current Outpatient Medications   Medication Sig Dispense Refill   • amiodarone 200 mg tablet Take 200 mg by mouth daily. Take 2 tablets 2 times daily      • amLODIPine (NORVASC) 10 mg tablet Take 10 mg by mouth daily.       • ascorbic acid (VITAMIN C) 250 mg tablet Take 250 mg by mouth daily.     • atorvastatin (LIPITOR) 40 mg tablet Take 40 mg by mouth daily.       • calcium carbonate (CALCIUM 500 ORAL) Take by mouth daily.     • cholecalciferol, vitamin D3, 1,000 unit (25 mcg) tablet Take 2,000 Units by mouth daily.      • cyanocobalamin (VITAMIN B12) 100 mcg tablet Take 100 mcg by mouth daily.     • ELIQUIS 5 mg tablet Take by mouth 2 (two) times a day.     • meclizine (ANTIVERT) 12.5 mg tablet Take 1 tablet (12.5 mg total) by mouth 3 (three) times a day as needed for dizziness for up to 5 days. 15 tablet 0   • MELATONIN ORAL Take by mouth as needed.     • metoprolol succinate XL (TOPROL-XL) 25 mg 24 hr tablet TAKE 1 TABLET DAILY 90 tablet 3   • multivit-min/ferrous fumarate (MULTI VITAMIN ORAL) Take by mouth daily.     • omeprazole (PriLOSEC) 40 mg capsule Take 40 mg by mouth 2 (two) times a day.    3   • venlafaxine XR (EFFEXOR XR) 75 mg 24 hr capsule Take 75 mg by mouth daily.         No current  facility-administered medications for this visit.       Review of Systems:  Review of Systems   Constitutional: Positive for weight loss.   HENT: Negative.    Eyes: Negative.    Cardiovascular: Positive for palpitations.   Respiratory: Negative.    Endocrine: Negative.    Hematologic/Lymphatic: Negative.    Skin: Negative.    Musculoskeletal: Positive for joint pain.   Gastrointestinal: Positive for diarrhea.   Genitourinary: Positive for nocturia.   Neurological: Negative.    Psychiatric/Behavioral: Negative.    Allergic/Immunologic: Negative.        Physical Exam:  Vitals:    11/23/21 1002   BP: 100/70   Pulse: 64   Resp: 16   Temp: 37 °C (98.6 °F)   SpO2: 98%     Physical Exam  Constitutional:       Appearance: She is well-developed.   HENT:      Head: Normocephalic and atraumatic.   Eyes:      Conjunctiva/sclera: Conjunctivae normal.      Pupils: Pupils are equal, round, and reactive to light.   Cardiovascular:      Rate and Rhythm: Normal rate and regular rhythm.      Pulses: Intact distal pulses.      Heart sounds: Murmur (TR) heard.       Pulmonary:      Effort: Pulmonary effort is normal.      Breath sounds: Normal breath sounds.   Abdominal:      General: Bowel sounds are normal.      Palpations: Abdomen is soft.   Musculoskeletal:         General: Normal range of motion.      Cervical back: Normal range of motion and neck supple.   Skin:     General: Skin is warm and dry.   Neurological:      Mental Status: She is alert and oriented to person, place, and time.      Deep Tendon Reflexes: Reflexes are normal and symmetric.   Psychiatric:         Speech: Speech normal.         Behavior: Behavior normal.         Thought Content: Thought content normal.         Judgment: Judgment normal.       EKG: SR AI LVH T wave abn    Assessment and Plan:  Impression:  New onset A. fib etiology probably electrolyte dysfunction.  C. difficile.  Hypertension controlled.  Hyperlipidemia.  Mitral regurgitation.  Aortic  regurgitation.  Recommendation:  I am going to leave her alone for 6 weeks I did take her off aspirin I because of her age I do not want her on Eliquis and aspirin at the same time because of the risk of bleeding.  I will reassess her in 6 weeks time and hopefully I will take her off the amiodarone and pending what her blood pressure either bump up her beta-blocker or switch her to either flecainide or Multaq probably Multaq.  If there is a problem she is to call me.  Thank you for allowing me to see Angeles in the office today.    Chano Boateng, DO

## 2021-12-02 ENCOUNTER — TELEPHONE (OUTPATIENT)
Dept: SCHEDULING | Facility: CLINIC | Age: 76
End: 2021-12-02

## 2021-12-02 NOTE — TELEPHONE ENCOUNTER
I spoke to Angeles and told her she had to force fluids over the next week to bring up her blood pressure so she is not lightheaded or dizzy.  She will try and agrees

## 2021-12-02 NOTE — TELEPHONE ENCOUNTER
Pt called wanting to speak with Dr Boateng about off and on dizzy spells and lost of balance. Pt call was sent to triage.   Pt still would like to speak with Dr Boateng.  Pt can be reached at [phone]

## 2021-12-02 NOTE — TELEPHONE ENCOUNTER
A couple of outpatient sessions would certainly help her she needs to increase her volume status I will try and call her

## 2021-12-02 NOTE — TELEPHONE ENCOUNTER
Mrs. Cardoza was hospitalized in [place] 11/15/21-11/21/21-presented with severe diarrhea (C diff). Developed new onset atrial fibrillation which was felt to be related to electrolyte imbalance. Placed on Eliquis 5mg BID and Amiodarone-initially 400mg BID-reduced to 200mg BID on 11/28/21 with direction to reduce to 200mg daily on 12/13/21.    Patient has noted daily episodes of dizziness/lightheadedness since her hospitalization.    At times, above associated with balance issues but not always.    No falls.    Hasn't checked vital signs with all of her episodes but when she does, readings within normal range. BP 130s/60s mm Hg and HRs=mid 60s bpm.    While she doesn't check blood sugars, doesn't seem to be related to hypoglycemia.    No further diarrhea since hospitalization last month.    No active bleeding.    Dehydration may play a role-only drinks 24 oz water daily so I asked her to increase this for better hydration.    Drinks 1-2 cups decaf coffee daily.    Do you think an outpatient monitoring session would be of benefit?      I asked her about Meclizine which is on her active list of medications.    She reports this medication was prescribed for 2 episodes of vasovagal syncope in the past-most recent during last month's hospitalization. She reports pain during attempted IV insertion resulted in a drop in her BP and sensation of near syncope.    She would like to speak with Dr. Boateng but will not be available today between 10:30am-1:15pm.    Call back # [phone].

## 2021-12-14 RX ORDER — APIXABAN 5 MG/1
5 TABLET, FILM COATED ORAL 2 TIMES DAILY
Qty: 180 TABLET | Refills: 1 | Status: SHIPPED | OUTPATIENT
Start: 2021-12-14 | End: 2022-01-10 | Stop reason: SDUPTHER

## 2021-12-14 NOTE — TELEPHONE ENCOUNTER
Medicine Refill Request  Medicine Refill Request    Pt needs refill on eliquis 5mg        Current Outpatient Medications:   •  amiodarone 200 mg tablet, Take 200 mg by mouth daily. Take 2 tablets 2 times daily , Disp: , Rfl:   •  amLODIPine (NORVASC) 10 mg tablet, Take 10 mg by mouth daily.  , Disp: , Rfl:   •  ascorbic acid (VITAMIN C) 250 mg tablet, Take 250 mg by mouth daily., Disp: , Rfl:   •  atorvastatin (LIPITOR) 40 mg tablet, Take 40 mg by mouth daily.  , Disp: , Rfl:   •  calcium carbonate (CALCIUM 500 ORAL), Take by mouth daily., Disp: , Rfl:   •  cholecalciferol, vitamin D3, 1,000 unit (25 mcg) tablet, Take 2,000 Units by mouth daily. , Disp: , Rfl:   •  cyanocobalamin (VITAMIN B12) 100 mcg tablet, Take 100 mcg by mouth daily., Disp: , Rfl:   •  ELIQUIS 5 mg tablet, Take by mouth 2 (two) times a day., Disp: , Rfl:   •  meclizine (ANTIVERT) 12.5 mg tablet, Take 1 tablet (12.5 mg total) by mouth 3 (three) times a day as needed for dizziness for up to 5 days., Disp: 15 tablet, Rfl: 0  •  MELATONIN ORAL, Take by mouth as needed., Disp: , Rfl:   •  metoprolol succinate XL (TOPROL-XL) 25 mg 24 hr tablet, TAKE 1 TABLET DAILY, Disp: 90 tablet, Rfl: 3  •  multivit-min/ferrous fumarate (MULTI VITAMIN ORAL), Take by mouth daily., Disp: , Rfl:   •  omeprazole (PriLOSEC) 40 mg capsule, Take 40 mg by mouth 2 (two) times a day.  , Disp: , Rfl: 3  •  venlafaxine XR (EFFEXOR XR) 75 mg 24 hr capsule, Take 75 mg by mouth daily.  , Disp: , Rfl:       BP Readings from Last 3 Encounters:   11/23/21 100/70   11/02/21 124/76   11/05/19 106/64       Recent Lab results:  No results found for: CHOL, No results found for: HDL, No results found for: LDLCALC, No results found for: TRIG     Lab Results   Component Value Date    GLUCOSE 124 (H) 01/25/2019   , No results found for: HGBA1C      Lab Results   Component Value Date    CREATININE 0.7 12/17/2019       Lab Results   Component Value Date    TSH 1.66 02/01/2019                Current Outpatient Medications:   •  amiodarone 200 mg tablet, Take 200 mg by mouth daily. Take 2 tablets 2 times daily , Disp: , Rfl:   •  amLODIPine (NORVASC) 10 mg tablet, Take 10 mg by mouth daily.  , Disp: , Rfl:   •  ascorbic acid (VITAMIN C) 250 mg tablet, Take 250 mg by mouth daily., Disp: , Rfl:   •  atorvastatin (LIPITOR) 40 mg tablet, Take 40 mg by mouth daily.  , Disp: , Rfl:   •  calcium carbonate (CALCIUM 500 ORAL), Take by mouth daily., Disp: , Rfl:   •  cholecalciferol, vitamin D3, 1,000 unit (25 mcg) tablet, Take 2,000 Units by mouth daily. , Disp: , Rfl:   •  cyanocobalamin (VITAMIN B12) 100 mcg tablet, Take 100 mcg by mouth daily., Disp: , Rfl:   •  ELIQUIS 5 mg tablet, Take by mouth 2 (two) times a day., Disp: , Rfl:   •  meclizine (ANTIVERT) 12.5 mg tablet, Take 1 tablet (12.5 mg total) by mouth 3 (three) times a day as needed for dizziness for up to 5 days., Disp: 15 tablet, Rfl: 0  •  MELATONIN ORAL, Take by mouth as needed., Disp: , Rfl:   •  metoprolol succinate XL (TOPROL-XL) 25 mg 24 hr tablet, TAKE 1 TABLET DAILY, Disp: 90 tablet, Rfl: 3  •  multivit-min/ferrous fumarate (MULTI VITAMIN ORAL), Take by mouth daily., Disp: , Rfl:   •  omeprazole (PriLOSEC) 40 mg capsule, Take 40 mg by mouth 2 (two) times a day.  , Disp: , Rfl: 3  •  venlafaxine XR (EFFEXOR XR) 75 mg 24 hr capsule, Take 75 mg by mouth daily.  , Disp: , Rfl:       BP Readings from Last 3 Encounters:   11/23/21 100/70   11/02/21 124/76   11/05/19 106/64       Recent Lab results:  No results found for: CHOL, No results found for: HDL, No results found for: LDLCALC, No results found for: TRIG     Lab Results   Component Value Date    GLUCOSE 124 (H) 01/25/2019   , No results found for: HGBA1C      Lab Results   Component Value Date    CREATININE 0.7 12/17/2019       Lab Results   Component Value Date    TSH 1.66 02/01/2019

## 2022-01-05 ENCOUNTER — OFFICE VISIT (OUTPATIENT)
Dept: CARDIOLOGY | Facility: CLINIC | Age: 77
End: 2022-01-05
Payer: MEDICARE

## 2022-01-05 VITALS
BODY MASS INDEX: 22.38 KG/M2 | RESPIRATION RATE: 16 BRPM | HEIGHT: 60 IN | HEART RATE: 56 BPM | OXYGEN SATURATION: 95 % | WEIGHT: 114 LBS

## 2022-01-05 DIAGNOSIS — I10 ESSENTIAL HYPERTENSION: Primary | ICD-10-CM

## 2022-01-05 PROCEDURE — G8756 NO BP MEASURE DOC: HCPCS | Performed by: INTERNAL MEDICINE

## 2022-01-05 PROCEDURE — 99214 OFFICE O/P EST MOD 30 MIN: CPT | Performed by: INTERNAL MEDICINE

## 2022-01-05 PROCEDURE — 93000 ELECTROCARDIOGRAM COMPLETE: CPT | Performed by: INTERNAL MEDICINE

## 2022-01-05 ASSESSMENT — ENCOUNTER SYMPTOMS
PALPITATIONS: 1
GASTROINTESTINAL NEGATIVE: 1
MUSCULOSKELETAL NEGATIVE: 1
RESPIRATORY NEGATIVE: 1
DIZZINESS: 1
EYES NEGATIVE: 1
ENDOCRINE NEGATIVE: 1
WEIGHT LOSS: 1
BRUISES/BLEEDS EASILY: 1
NERVOUS/ANXIOUS: 1
DYSPNEA ON EXERTION: 1

## 2022-01-05 NOTE — PROGRESS NOTES
Chief Complaint: I saw Angeles in follow-up today and she is having a lot of side effects of nausea diarrhea lightheadedness dizziness and I believe this is due to her amiodarone.  She is also experiencing shortness of breath with exertion anxiety not cold weather postprandially at rest or nocturnally.  He is short of breath climbing stairs, she denies proximal nocturnal dyspnea orthopnea still has a few palpitations not associated with syncope but she does get a little lightheaded she has some intermittent lower extremity edema that goes away by the next day and she has nocturia.       Medications:  Current Outpatient Medications   Medication Sig Dispense Refill   • amiodarone 200 mg tablet Take 200 mg by mouth daily.       • amLODIPine (NORVASC) 10 mg tablet Take 10 mg by mouth daily.       • ascorbic acid (VITAMIN C) 250 mg tablet Take 250 mg by mouth daily.     • atorvastatin (LIPITOR) 40 mg tablet Take 40 mg by mouth daily.       • calcium carbonate (CALCIUM 500 ORAL) Take by mouth daily.     • cholecalciferol, vitamin D3, 1,000 unit (25 mcg) tablet Take 2,000 Units by mouth daily.      • cyanocobalamin (VITAMIN B12) 100 mcg tablet Take 100 mcg by mouth daily.     • ELIQUIS 5 mg tablet Take 1 tablet (5 mg total) by mouth 2 (two) times a day. 180 tablet 1   • meclizine (ANTIVERT) 12.5 mg tablet Take 1 tablet (12.5 mg total) by mouth 3 (three) times a day as needed for dizziness for up to 5 days. 15 tablet 0   • MELATONIN ORAL Take by mouth as needed.     • metoprolol succinate XL (TOPROL-XL) 25 mg 24 hr tablet TAKE 1 TABLET DAILY 90 tablet 3   • omeprazole (PriLOSEC) 40 mg capsule Take 40 mg by mouth daily and an additional dose as needed.    3   • venlafaxine XR (EFFEXOR XR) 75 mg 24 hr capsule Take 75 mg by mouth daily.       • multivit-min/ferrous fumarate (MULTI VITAMIN ORAL) Take by mouth daily.       No current facility-administered medications for this visit.       Review of Systems:  Review of Systems    Constitutional: Positive for weight loss.   HENT: Negative.    Eyes: Negative.    Cardiovascular: Positive for dyspnea on exertion, leg swelling and palpitations.   Respiratory: Negative.    Endocrine: Negative.    Hematologic/Lymphatic: Bruises/bleeds easily.   Skin: Positive for itching.   Musculoskeletal: Negative.    Gastrointestinal: Negative.    Genitourinary: Positive for nocturia.   Neurological: Positive for dizziness.   Psychiatric/Behavioral: The patient is nervous/anxious.    Allergic/Immunologic: Positive for environmental allergies.       Physical Exam:  Vitals:    01/05/22 1121   Pulse: (!) 56   Resp: 16   SpO2: 95%     Physical Exam  Constitutional:       Appearance: She is well-developed.   HENT:      Head: Normocephalic and atraumatic.   Eyes:      Conjunctiva/sclera: Conjunctivae normal.      Pupils: Pupils are equal, round, and reactive to light.   Cardiovascular:      Rate and Rhythm: Normal rate and regular rhythm.      Pulses: Intact distal pulses.      Heart sounds:     Gallop present.   Pulmonary:      Effort: Pulmonary effort is normal.      Breath sounds: Normal breath sounds.   Abdominal:      General: Bowel sounds are normal.      Palpations: Abdomen is soft.   Musculoskeletal:         General: Normal range of motion.      Cervical back: Normal range of motion and neck supple.   Skin:     General: Skin is warm and dry.   Neurological:      Mental Status: She is alert and oriented to person, place, and time.      Deep Tendon Reflexes: Reflexes are normal and symmetric.   Psychiatric:         Speech: Speech normal.         Behavior: Behavior normal.         Thought Content: Thought content normal.         Judgment: Judgment normal.       EKG:SB ST-Tabn    Assessment and Plan:  Impression:  Atrial fibrillation now sinus bradycardia.  Possible side effects from amiodarone.  Hypertension controlled.  Hyperlipidemia.  GERD.  Depression.  Recommendation:  I am going to stop the amiodarone  altogether I am going to increase her Metroprolol XL from 25 mg to 37-1/2 starting Saturday but we are going to stop the amiodarone today.  We will see her in 2 weeks to see how she is doing.  If there is a problem she knows to call me.  Thank you for allowing us see this odell woman in the office today.    Chano Boateng, DO

## 2022-01-05 NOTE — LETTER
January 5, 2022     José Antonio REAGAN DO  [address] [facility] [address]    Patient: Angeles Cardoza  YOB: 1945  Date of Visit: 1/5/2022      Dear Dr. Leroy:    Thank you for referring Angeles Cardoza to me for evaluation. Below are my notes for this consultation.    If you have questions, please do not hesitate to call me. I look forward to following your patient along with you.         Sincerely,        Chano Boateng DO        CC: No Recipients  Chano Boateng DO  1/5/2022 11:54 AM  Signed      Chief Complaint: I saw Angeles in follow-up today and she is having a lot of side effects of nausea diarrhea lightheadedness dizziness and I believe this is due to her amiodarone.  She is also experiencing shortness of breath with exertion anxiety not cold weather postprandially at rest or nocturnally.  He is short of breath climbing stairs, she denies proximal nocturnal dyspnea orthopnea still has a few palpitations not associated with syncope but she does get a little lightheaded she has some intermittent lower extremity edema that goes away by the next day and she has nocturia.       Medications:  Current Outpatient Medications   Medication Sig Dispense Refill   • amiodarone 200 mg tablet Take 200 mg by mouth daily.       • amLODIPine (NORVASC) 10 mg tablet Take 10 mg by mouth daily.       • ascorbic acid (VITAMIN C) 250 mg tablet Take 250 mg by mouth daily.     • atorvastatin (LIPITOR) 40 mg tablet Take 40 mg by mouth daily.       • calcium carbonate (CALCIUM 500 ORAL) Take by mouth daily.     • cholecalciferol, vitamin D3, 1,000 unit (25 mcg) tablet Take 2,000 Units by mouth daily.      • cyanocobalamin (VITAMIN B12) 100 mcg tablet Take 100 mcg by mouth daily.     • ELIQUIS 5 mg tablet Take 1 tablet (5 mg total) by mouth 2 (two) times a day. 180 tablet 1   • meclizine (ANTIVERT) 12.5 mg tablet Take 1 tablet (12.5 mg total) by mouth 3 (three) times a day as needed for dizziness for up to  5 days. 15 tablet 0   • MELATONIN ORAL Take by mouth as needed.     • metoprolol succinate XL (TOPROL-XL) 25 mg 24 hr tablet TAKE 1 TABLET DAILY 90 tablet 3   • omeprazole (PriLOSEC) 40 mg capsule Take 40 mg by mouth daily and an additional dose as needed.    3   • venlafaxine XR (EFFEXOR XR) 75 mg 24 hr capsule Take 75 mg by mouth daily.       • multivit-min/ferrous fumarate (MULTI VITAMIN ORAL) Take by mouth daily.       No current facility-administered medications for this visit.       Review of Systems:  Review of Systems   Constitutional: Positive for weight loss.   HENT: Negative.    Eyes: Negative.    Cardiovascular: Positive for dyspnea on exertion, leg swelling and palpitations.   Respiratory: Negative.    Endocrine: Negative.    Hematologic/Lymphatic: Bruises/bleeds easily.   Skin: Positive for itching.   Musculoskeletal: Negative.    Gastrointestinal: Negative.    Genitourinary: Positive for nocturia.   Neurological: Positive for dizziness.   Psychiatric/Behavioral: The patient is nervous/anxious.    Allergic/Immunologic: Positive for environmental allergies.       Physical Exam:  Vitals:    01/05/22 1121   Pulse: (!) 56   Resp: 16   SpO2: 95%     Physical Exam  Constitutional:       Appearance: She is well-developed.   HENT:      Head: Normocephalic and atraumatic.   Eyes:      Conjunctiva/sclera: Conjunctivae normal.      Pupils: Pupils are equal, round, and reactive to light.   Cardiovascular:      Rate and Rhythm: Normal rate and regular rhythm.      Pulses: Intact distal pulses.      Heart sounds:     Gallop present.   Pulmonary:      Effort: Pulmonary effort is normal.      Breath sounds: Normal breath sounds.   Abdominal:      General: Bowel sounds are normal.      Palpations: Abdomen is soft.   Musculoskeletal:         General: Normal range of motion.      Cervical back: Normal range of motion and neck supple.   Skin:     General: Skin is warm and dry.   Neurological:      Mental Status: She is  alert and oriented to person, place, and time.      Deep Tendon Reflexes: Reflexes are normal and symmetric.   Psychiatric:         Speech: Speech normal.         Behavior: Behavior normal.         Thought Content: Thought content normal.         Judgment: Judgment normal.       EKG:SB ST-Tabn    Assessment and Plan:  Impression:  Atrial fibrillation now sinus bradycardia.  Possible side effects from amiodarone.  Hypertension controlled.  Hyperlipidemia.  GERD.  Depression.  Recommendation:  I am going to stop the amiodarone altogether I am going to increase her Metroprolol XL from 25 mg to 37-1/2 starting Saturday but we are going to stop the amiodarone today.  We will see her in 2 weeks to see how she is doing.  If there is a problem she knows to call me.  Thank you for allowing us see this odell woman in the office today.    Chano Boateng, DO

## 2022-01-10 ENCOUNTER — TELEPHONE (OUTPATIENT)
Dept: SCHEDULING | Facility: CLINIC | Age: 77
End: 2022-01-10
Payer: MEDICARE

## 2022-01-10 RX ORDER — METOPROLOL SUCCINATE 25 MG/1
25 TABLET, EXTENDED RELEASE ORAL
Qty: 90 TABLET | Refills: 3 | Status: SHIPPED | OUTPATIENT
Start: 2022-01-10 | End: 2022-01-12 | Stop reason: SDUPTHER

## 2022-01-10 RX ORDER — APIXABAN 5 MG/1
5 TABLET, FILM COATED ORAL 2 TIMES DAILY
Qty: 180 TABLET | Refills: 3 | Status: SHIPPED | OUTPATIENT
Start: 2022-01-10 | End: 2022-01-10 | Stop reason: SDUPTHER

## 2022-01-10 RX ORDER — APIXABAN 5 MG/1
5 TABLET, FILM COATED ORAL 2 TIMES DAILY
Qty: 180 TABLET | Refills: 3 | Status: ON HOLD | OUTPATIENT
Start: 2022-01-10 | End: 2022-04-12 | Stop reason: SDUPTHER

## 2022-01-10 NOTE — TELEPHONE ENCOUNTER
Medicine Refill Request    Last Office: Visit date not found   Last Consult Visit: Visit date not found  Last Telemedicine Visit: Visit date not found    Next Appointment: Visit date not found  ELIQUIS 5 mg tablet    Current Outpatient Medications:   •  amiodarone 200 mg tablet, Take 200 mg by mouth daily.  , Disp: , Rfl:   •  amLODIPine (NORVASC) 10 mg tablet, Take 10 mg by mouth daily.  , Disp: , Rfl:   •  ascorbic acid (VITAMIN C) 250 mg tablet, Take 250 mg by mouth daily., Disp: , Rfl:   •  atorvastatin (LIPITOR) 40 mg tablet, Take 40 mg by mouth daily.  , Disp: , Rfl:   •  calcium carbonate (CALCIUM 500 ORAL), Take by mouth daily., Disp: , Rfl:   •  cholecalciferol, vitamin D3, 1,000 unit (25 mcg) tablet, Take 2,000 Units by mouth daily. , Disp: , Rfl:   •  cyanocobalamin (VITAMIN B12) 100 mcg tablet, Take 100 mcg by mouth daily., Disp: , Rfl:   •  ELIQUIS 5 mg tablet, Take 1 tablet (5 mg total) by mouth 2 (two) times a day., Disp: 180 tablet, Rfl: 3  •  meclizine (ANTIVERT) 12.5 mg tablet, Take 1 tablet (12.5 mg total) by mouth 3 (three) times a day as needed for dizziness for up to 5 days., Disp: 15 tablet, Rfl: 0  •  MELATONIN ORAL, Take by mouth as needed., Disp: , Rfl:   •  metoprolol succinate XL 25 mg 24 hr tablet, Take 1 tablet (25 mg total) by mouth once daily., Disp: 90 tablet, Rfl: 3  •  omeprazole (PriLOSEC) 40 mg capsule, Take 40 mg by mouth daily and an additional dose as needed.  , Disp: , Rfl: 3  •  venlafaxine XR (EFFEXOR XR) 75 mg 24 hr capsule, Take 75 mg by mouth daily.  , Disp: , Rfl:       BP Readings from Last 3 Encounters:   11/23/21 100/70   11/02/21 124/76   11/05/19 106/64       Recent Lab results:  No results found for: CHOL, No results found for: HDL, No results found for: LDLCALC, No results found for: TRIG     Lab Results   Component Value Date    GLUCOSE 124 (H) 01/25/2019   , No results found for: HGBA1C      Lab Results   Component Value Date    CREATININE 0.7 12/17/2019        Lab Results   Component Value Date    TSH 1.66 02/01/2019

## 2022-01-12 RX ORDER — METOPROLOL SUCCINATE 25 MG/1
37.5 TABLET, EXTENDED RELEASE ORAL
Qty: 135 TABLET | Refills: 3 | Status: SHIPPED | OUTPATIENT
Start: 2022-01-12 | End: 2022-01-19 | Stop reason: DRUGHIGH

## 2022-01-12 NOTE — TELEPHONE ENCOUNTER
Medicine Refill Request    Last Office: Visit date not found   Last Consult Visit: Visit date not found  Last Telemedicine Visit: Visit date not found    Next Appointment: Visit date not found      Current Outpatient Medications:   •  amiodarone 200 mg tablet, Take 200 mg by mouth daily.  , Disp: , Rfl:   •  amLODIPine (NORVASC) 10 mg tablet, Take 10 mg by mouth daily.  , Disp: , Rfl:   •  ascorbic acid (VITAMIN C) 250 mg tablet, Take 250 mg by mouth daily., Disp: , Rfl:   •  atorvastatin (LIPITOR) 40 mg tablet, Take 40 mg by mouth daily.  , Disp: , Rfl:   •  calcium carbonate (CALCIUM 500 ORAL), Take by mouth daily., Disp: , Rfl:   •  cholecalciferol, vitamin D3, 1,000 unit (25 mcg) tablet, Take 2,000 Units by mouth daily. , Disp: , Rfl:   •  cyanocobalamin (VITAMIN B12) 100 mcg tablet, Take 100 mcg by mouth daily., Disp: , Rfl:   •  ELIQUIS 5 mg tablet, Take 1 tablet (5 mg total) by mouth 2 (two) times a day., Disp: 180 tablet, Rfl: 3  •  meclizine (ANTIVERT) 12.5 mg tablet, Take 1 tablet (12.5 mg total) by mouth 3 (three) times a day as needed for dizziness for up to 5 days., Disp: 15 tablet, Rfl: 0  •  MELATONIN ORAL, Take by mouth as needed., Disp: , Rfl:   •  metoprolol succinate XL 25 mg 24 hr tablet, Take 1 tablet (25 mg total) by mouth once daily., Disp: 90 tablet, Rfl: 3  •  omeprazole (PriLOSEC) 40 mg capsule, Take 40 mg by mouth daily and an additional dose as needed.  , Disp: , Rfl: 3  •  venlafaxine XR (EFFEXOR XR) 75 mg 24 hr capsule, Take 75 mg by mouth daily.  , Disp: , Rfl:       BP Readings from Last 3 Encounters:   11/23/21 100/70   11/02/21 124/76   11/05/19 106/64       Recent Lab results:  No results found for: CHOL, No results found for: HDL, No results found for: LDLCALC, No results found for: TRIG     Lab Results   Component Value Date    GLUCOSE 124 (H) 01/25/2019   , No results found for: HGBA1C      Lab Results   Component Value Date    CREATININE 0.7 12/17/2019       Lab Results    Component Value Date    TSH 1.66 02/01/2019

## 2022-01-18 NOTE — PROGRESS NOTES
Subjective     Angeles Cardoza is a 76 y.o. female who is seen in the office today for Cardiovascular follow-up.  She was last seen by Dr. Boateng on 1/5/2022. She has a past medical history of  Atrial fibrillation, hyperlipidemia, hypertension. At last visit she was bradycardic and amiodarone was discontinued. Metoprolol increased from 25 mg daily to 37-1/2 mg daily. She presents today for follow-up.    Notably she was hospitalized at [facility] in [place] in November 2021 for atrial fibrillation with rapid ventricular response requiring IV amiodarone, acute diverticular flare/colitis,    Today reports she's still intermittently lightheaded and nauseous  Thinks she's drinking 32 oz fluid daily  BP sometimes running low and she gets dizzy  Appetite good  No more diarrhea  Rare palpitations  Off amiodarone. Does feel better  Has GI follow up in Feb.    No chest pain, SOB at rest, PND/orthopnea. Does note DICKSON with 2 flights stairs; this is new. No edema  Occasional palpitations  No pnd/orthopnea      Allergies   Allergen Reactions   • Penicillins Hives     Other reaction(s): Hives   • Ditropan [Oxybutynin Chloride] Other (see comments)     Unable to urinate          Current Outpatient Medications   Medication Sig Dispense Refill   • ascorbic acid (VITAMIN C) 250 mg tablet Take 250 mg by mouth daily.     • atorvastatin (LIPITOR) 40 mg tablet Take 40 mg by mouth daily.       • calcium carbonate (CALCIUM 500 ORAL) Take by mouth daily.     • cholecalciferol, vitamin D3, 1,000 unit (25 mcg) tablet Take 2,000 Units by mouth daily.      • cyanocobalamin (VITAMIN B12) 100 mcg tablet Take 100 mcg by mouth daily.     • ELIQUIS 5 mg tablet Take 1 tablet (5 mg total) by mouth 2 (two) times a day. 180 tablet 3   • meclizine (ANTIVERT) 12.5 mg tablet Take 1 tablet (12.5 mg total) by mouth 3 (three) times a day as needed for dizziness for up to 5 days. 15 tablet 0   • MELATONIN ORAL Take by mouth as needed.     • NOT IN  DATABASE Lona   Two once daily     • omeprazole (PriLOSEC) 40 mg capsule Take 40 mg by mouth daily and an additional dose as needed.    3   • venlafaxine XR (EFFEXOR XR) 75 mg 24 hr capsule Take 75 mg by mouth daily.       • amLODIPine 5 mg tablet Take 1 tablet (5 mg total) by mouth 2 (two) times a day. 180 tablet 1   • metoprolol succinate XL 25 mg 24 hr tablet Take 25 mg in AM and 12.5 mg in  tablet 1     No current facility-administered medications for this visit.         Patient Active Problem List    Diagnosis Date Noted   • Paroxysmal atrial fibrillation (CMS/HCC) 01/19/2022   • Hypertensive heart disease without heart failure 01/19/2022   • DICKSON (dyspnea on exertion) 01/19/2022   • Hypertensive emergency 01/28/2019   • Family history of coronary artery disease 05/16/2018   • Essential hypertension 05/16/2018   • Nonrheumatic aortic valve insufficiency 05/16/2018   • Non-rheumatic mitral regurgitation 05/16/2018   • Moderate hypertension control 05/16/2018   • Hyperlipidemia 08/27/2012   • Hypertension, benign 08/27/2012   • Nocturia 08/27/2012   .        Objective     ROS        1/20/2021 11/2/2021 11/23/2021 1/5/2022   SpO2 96 % 98 % 98 % 95 %   Weight 51.7 kg (114 lb) 52.4 kg (115 lb 9.6 oz) 51.3 kg (113 lb) 51.7 kg (114 lb)       1/19/2022   Weight 51.3 kg (113 lb)     Vitals:    01/19/22 1254 01/19/22 1337   BP: (!) 142/80 (!) 128/0   Patient Position:  Standing   Pulse: (!) 57    SpO2: 96%    Weight: 51.3 kg (113 lb)    Height: 1.524 m (5')      Physical Exam  Vitals reviewed.   Constitutional:       Appearance: Normal appearance.   HENT:      Head: Normocephalic and atraumatic.   Eyes:      Conjunctiva/sclera: Conjunctivae normal.   Cardiovascular:      Rate and Rhythm: Normal rate and regular rhythm.      Pulses: Normal pulses.      Heart sounds: Normal heart sounds, S1 normal and S2 normal.   Pulmonary:      Effort: Pulmonary effort is normal.      Breath sounds: Normal breath sounds.    Abdominal:      General: Bowel sounds are normal.      Palpations: Abdomen is soft.   Musculoskeletal:         General: Normal range of motion.      Right shoulder: No swelling.      Cervical back: Normal range of motion and neck supple.      Comments: kyphosis   Skin:     General: Skin is warm and dry.   Neurological:      General: No focal deficit present.      Mental Status: She is alert and oriented to person, place, and time.   Psychiatric:         Mood and Affect: Mood normal.         Speech: Speech normal.         Behavior: Behavior normal.         Cardiographics  ECG: sinus santosh pac. HR57  Echocardiogram: Cardiac Imaging    ECHOCARDIOGRAM - EXTERNAL RESULT [facility] 11/21/21        Lab Review   Lab Results   Component Value Date    WBC 10.78 (H) 01/25/2019    HGB 15.3 01/25/2019    HCT 46.0 (H) 01/25/2019     (H) 01/25/2019    ALT 38 01/25/2019    AST 37 01/25/2019     01/25/2019    K 4.0 01/25/2019     01/25/2019    CREATININE 0.7 12/17/2019    BUN 24 (H) 12/17/2019    CO2 24 01/25/2019    TSH 1.66 02/01/2019    INR 0.9 11/13/2016         ASSESSMENT and PLAN:  76 y.o. female being consulted for cardiac follow up  Problem List Items Addressed This Visit        Respiratory    DICKSON (dyspnea on exertion)    Current Assessment & Plan     New  She typically does 2 h water aerobics without issue  Now has difficulty climbing 18 stairs  Check BNP/CBC/CMP/TSH  Nuclear stress         Relevant Orders    CV Nuclear Cardiology MPI Exercise Stress       Circulatory    Essential hypertension - Primary    Overview     Overview:          Current Assessment & Plan     /80 sitting, 128/80 standing  Does not hydrate well.  Will split up amlodipine to make it 5 mg BID and spilt up metop dose as well.  Hydrate with at least 48 oz water!         Relevant Medications    amLODIPine 5 mg tablet    metoprolol succinate XL 25 mg 24 hr tablet    Other Relevant Orders    ECG 12 LEAD-OFFICE PERFORMED  (Completed)    Comprehensive metabolic panel    CBC and differential    TSH w reflex FT4    Magnesium    Nonrheumatic aortic valve insufficiency    Current Assessment & Plan     Moderate aortic regurg by echo 11/21/21  continue serial echos         Relevant Medications    amLODIPine 5 mg tablet    metoprolol succinate XL 25 mg 24 hr tablet    Non-rheumatic mitral regurgitation    Current Assessment & Plan     Mild by echo 11/21/21         Relevant Medications    amLODIPine 5 mg tablet    metoprolol succinate XL 25 mg 24 hr tablet    Paroxysmal atrial fibrillation (CMS/HCC)    Current Assessment & Plan     New dx 11/21. Hospitalized at [facility] [place].  Amiodarone and metoprolol.  Had nausea, dizziness and amiodarone d/c 10 days ago  Remains bradycardic in 50s.  Nausea has improved, dizziness improved.  anticoag with Eliquis.  Continue metoprolol 37.5 mg daily  Fall precautions  Now with DICKSON; proceed to nuclear stress.  Follow up in 3 weeks.         Relevant Medications    amLODIPine 5 mg tablet    metoprolol succinate XL 25 mg 24 hr tablet    Other Relevant Orders    Comprehensive metabolic panel    CBC and differential    TSH w reflex FT4    Magnesium    Hypertensive heart disease without heart failure    Current Assessment & Plan     Long term HTN  Recent episode of Afib, now anticoagulated. Was on amiodarone which was d/c due to nausea.  Rate controlled with metoprolol.  Has new DICKSON.  Recommend exercise nuclear stress to r/o ischemia.         Relevant Medications    amLODIPine 5 mg tablet    metoprolol succinate XL 25 mg 24 hr tablet    Other Relevant Orders    CV Nuclear Cardiology MPI Exercise Stress       Endocrine/Metabolic    Hyperlipidemia    Overview     Overview:     Overview:          Current Assessment & Plan     Continue statin             Thank you for allowing me to participate in the care of thsi patient; please feel free to call with questions.  Follow up in 3 weeks  Pt was evaluated with   Tasneem.    SHOBHA Bullard  1/19/2022  2:30 PM         Disclaimer:  This dictation was generated by voice activation software, please disregard any type of  errors.

## 2022-01-19 ENCOUNTER — OFFICE VISIT (OUTPATIENT)
Dept: CARDIOLOGY | Facility: CLINIC | Age: 77
End: 2022-01-19
Payer: MEDICARE

## 2022-01-19 VITALS
SYSTOLIC BLOOD PRESSURE: 128 MMHG | OXYGEN SATURATION: 96 % | DIASTOLIC BLOOD PRESSURE: 80 MMHG | BODY MASS INDEX: 22.19 KG/M2 | WEIGHT: 113 LBS | HEART RATE: 57 BPM | HEIGHT: 60 IN

## 2022-01-19 DIAGNOSIS — I34.0 NON-RHEUMATIC MITRAL REGURGITATION: ICD-10-CM

## 2022-01-19 DIAGNOSIS — I10 ESSENTIAL HYPERTENSION: Primary | ICD-10-CM

## 2022-01-19 DIAGNOSIS — I11.9 HYPERTENSIVE HEART DISEASE WITHOUT HEART FAILURE: ICD-10-CM

## 2022-01-19 DIAGNOSIS — E78.00 PURE HYPERCHOLESTEROLEMIA: ICD-10-CM

## 2022-01-19 DIAGNOSIS — R06.09 DOE (DYSPNEA ON EXERTION): ICD-10-CM

## 2022-01-19 DIAGNOSIS — I35.1 NONRHEUMATIC AORTIC VALVE INSUFFICIENCY: ICD-10-CM

## 2022-01-19 DIAGNOSIS — I48.0 PAROXYSMAL ATRIAL FIBRILLATION (CMS/HCC): ICD-10-CM

## 2022-01-19 PROCEDURE — 93000 ELECTROCARDIOGRAM COMPLETE: CPT | Performed by: NURSE PRACTITIONER

## 2022-01-19 PROCEDURE — G8754 DIAS BP LESS 90: HCPCS | Performed by: NURSE PRACTITIONER

## 2022-01-19 PROCEDURE — 99214 OFFICE O/P EST MOD 30 MIN: CPT | Performed by: NURSE PRACTITIONER

## 2022-01-19 PROCEDURE — G8752 SYS BP LESS 140: HCPCS | Performed by: NURSE PRACTITIONER

## 2022-01-19 RX ORDER — AMLODIPINE BESYLATE 5 MG/1
5 TABLET ORAL 2 TIMES DAILY
Qty: 180 TABLET | Refills: 1 | Status: SHIPPED | OUTPATIENT
Start: 2022-01-19 | End: 2022-05-02

## 2022-01-19 RX ORDER — METOPROLOL SUCCINATE 25 MG/1
TABLET, EXTENDED RELEASE ORAL
Qty: 130 TABLET | Refills: 1 | Status: SHIPPED | OUTPATIENT
Start: 2022-01-19 | End: 2022-02-28 | Stop reason: DRUGHIGH

## 2022-01-19 ASSESSMENT — CHADS2 SCORE
VASCULAR DISEASE: NO
AGE: 75+ (+2 PT.)
CHF: NO
AGE: 75+ (+2 PT.)
HYPERTENSION: YES (+1 PT.)
SEX: FEMALE (+1PT.)
CHF: NO
VASCULAR DISEASE: NO
DIABETES: NO
DIABETES: NO
PRIOR STROKE OR TIA OR THROMBOEMBOLISM: NO
HYPERTENSION: YES (+1 PT.)
CHADS2 SCORE: 4
PRIOR STROKE OR TIA OR THROMBOEMBOLISM: NO

## 2022-01-19 NOTE — ASSESSMENT & PLAN NOTE
Long term HTN  Recent episode of Afib, now anticoagulated. Was on amiodarone which was d/c due to nausea.  Rate controlled with metoprolol.  Has new DICKSON.  Recommend exercise nuclear stress to r/o ischemia.

## 2022-01-19 NOTE — ASSESSMENT & PLAN NOTE
New  She typically does 2 h water aerobics without issue  Now has difficulty climbing 18 stairs  Check BNP/CBC/CMP/TSH  Nuclear stress

## 2022-01-19 NOTE — PATIENT INSTRUCTIONS
Please split up your amlodipine to 5 mg twice daily    Switch metoprolol so you take 25 mg in AM and 12.5 mg in PM    Hopefully this will help your dizziness    Drink at least 48 oz water daily!    Schedule nuclear stress test    Get lab work today    Call if any problems    Continue to HOLD amiodarone

## 2022-01-19 NOTE — ASSESSMENT & PLAN NOTE
New [place] 11/21. [place] [facility] [place]  Amiodarone and metoprolol.  Had nausea, dizziness and amiodarone d/c 10 days ago  Remains bradycardic in 50s.  Nausea has improved, dizziness improved.  anticoag with Eliquis.  Continue metoprolol 37.5 mg daily  Fall precautions  Now with DICKSON; proceed to nuclear stress.  Follow up in 3 weeks.

## 2022-01-19 NOTE — ASSESSMENT & PLAN NOTE
/80 sitting, 128/80 standing  Does not hydrate well.  Will split up amlodipine to make it 5 mg BID and spilt up metop dose as well.  Hydrate with at least 48 oz water!

## 2022-01-20 ENCOUNTER — HOSPITAL ENCOUNTER (OUTPATIENT)
Dept: CARDIOLOGY | Facility: HOSPITAL | Age: 77
Discharge: HOME | End: 2022-01-20
Attending: NURSE PRACTITIONER
Payer: MEDICARE

## 2022-01-20 ENCOUNTER — TELEPHONE (OUTPATIENT)
Dept: CARDIOLOGY | Facility: CLINIC | Age: 77
End: 2022-01-20
Payer: MEDICARE

## 2022-01-20 ENCOUNTER — TELEPHONE (OUTPATIENT)
Dept: CARDIOLOGY | Facility: HOSPITAL | Age: 77
End: 2022-01-20
Payer: MEDICARE

## 2022-01-20 VITALS
OXYGEN SATURATION: 98 % | WEIGHT: 113 LBS | RESPIRATION RATE: 16 BRPM | BODY MASS INDEX: 22.19 KG/M2 | HEART RATE: 81 BPM | DIASTOLIC BLOOD PRESSURE: 58 MMHG | HEIGHT: 60 IN | SYSTOLIC BLOOD PRESSURE: 138 MMHG

## 2022-01-20 VITALS — OXYGEN SATURATION: 98 % | SYSTOLIC BLOOD PRESSURE: 147 MMHG | DIASTOLIC BLOOD PRESSURE: 72 MMHG | HEART RATE: 107 BPM

## 2022-01-20 DIAGNOSIS — I11.9 HYPERTENSIVE HEART DISEASE WITHOUT HEART FAILURE: ICD-10-CM

## 2022-01-20 DIAGNOSIS — R06.09 DOE (DYSPNEA ON EXERTION): ICD-10-CM

## 2022-01-20 LAB
CV NM TETROFOSMIN REST DOSE: 10.8 MCI
CV NM TETROFOSMIN STRESS DOSE: 32.9 MCI
STRESS ANGINA INDEX: 0
STRESS BASELINE BP: NORMAL MMHG
STRESS BASELINE HR: 101 BPM
STRESS O2 SAT REST: 98 %
STRESS PERCENT HR: 101 %
STRESS POST ESTIMATED WORKLOAD: 10.1 METS
STRESS POST EXERCISE DUR MIN: 7 MIN
STRESS POST EXERCISE DUR SEC: 10 SEC
STRESS POST PEAK BP: NORMAL MMHG
STRESS POST PEAK HR: 144 BPM
STRESS TARGET HR: 121 BPM

## 2022-01-20 PROCEDURE — 93018 CV STRESS TEST I&R ONLY: CPT | Performed by: INTERNAL MEDICINE

## 2022-01-20 PROCEDURE — 93016 CV STRESS TEST SUPVJ ONLY: CPT | Performed by: INTERNAL MEDICINE

## 2022-01-20 PROCEDURE — 93017 CV STRESS TEST TRACING ONLY: CPT

## 2022-01-20 PROCEDURE — A9502 TC99M TETROFOSMIN: HCPCS | Performed by: NURSE PRACTITIONER

## 2022-01-20 PROCEDURE — 78452 HT MUSCLE IMAGE SPECT MULT: CPT | Mod: 26 | Performed by: INTERNAL MEDICINE

## 2022-01-20 RX ADMIN — TETROFOSMIN 32.9 MILLICURIE: 1.38 INJECTION, POWDER, LYOPHILIZED, FOR SOLUTION INTRAVENOUS at 14:45

## 2022-01-20 RX ADMIN — TETROFOSMIN 9.3 MILLICURIE: 1.38 INJECTION, POWDER, LYOPHILIZED, FOR SOLUTION INTRAVENOUS at 13:00

## 2022-01-20 NOTE — TELEPHONE ENCOUNTER
Pt of Dr. Boateng- Nuc Med stress test scheduled for today at 1:20.     Instructions say to take BP meds as prescribed. Pt asking if she needs to hold any other medications prior.     Best call back [phone]

## 2022-01-20 NOTE — NURSING NOTE
Pretest assessment: Patient denies chest pain or shortness of breath at this time. Lungs clear to auscultation.

## 2022-01-21 ENCOUNTER — TELEPHONE (OUTPATIENT)
Dept: SCHEDULING | Facility: CLINIC | Age: 77
End: 2022-01-21
Payer: MEDICARE

## 2022-01-21 NOTE — TELEPHONE ENCOUNTER
Pt is inquiring if OV on 2/22 could be r/s to 2/21 at 1PM?    Pt can be reached at #[phone].    Ty.

## 2022-01-22 LAB
ALBUMIN SERPL-MCNC: 4.4 G/DL (ref 3.6–5.1)
ALBUMIN/GLOB SERPL: 1.8 (CALC) (ref 1–2.5)
ALP SERPL-CCNC: 75 U/L (ref 37–153)
ALT SERPL-CCNC: 25 U/L (ref 6–29)
AST SERPL-CCNC: 30 U/L (ref 10–35)
BASOPHILS # BLD AUTO: 95 CELLS/UL (ref 0–200)
BASOPHILS NFR BLD AUTO: 1.1 %
BILIRUB SERPL-MCNC: 0.5 MG/DL (ref 0.2–1.2)
BUN SERPL-MCNC: 32 MG/DL (ref 7–25)
BUN/CREAT SERPL: 35 (CALC) (ref 6–22)
CALCIUM SERPL-MCNC: 9.3 MG/DL (ref 8.6–10.4)
CHLORIDE SERPL-SCNC: 105 MMOL/L (ref 98–110)
CO2 SERPL-SCNC: 23 MMOL/L (ref 20–32)
CREAT SERPL-MCNC: 0.91 MG/DL (ref 0.6–0.93)
EOSINOPHIL # BLD AUTO: 163 CELLS/UL (ref 15–500)
EOSINOPHIL NFR BLD AUTO: 1.9 %
ERYTHROCYTE [DISTWIDTH] IN BLOOD BY AUTOMATED COUNT: 13.1 % (ref 11–15)
GLOBULIN SER CALC-MCNC: 2.5 G/DL (CALC) (ref 1.9–3.7)
GLUCOSE SERPL-MCNC: 110 MG/DL (ref 65–139)
HCT VFR BLD AUTO: 37.8 % (ref 35–45)
HGB BLD-MCNC: 12.5 G/DL (ref 11.7–15.5)
LYMPHOCYTES # BLD AUTO: 1668 CELLS/UL (ref 850–3900)
LYMPHOCYTES NFR BLD AUTO: 19.4 %
MCH RBC QN AUTO: 28.8 PG (ref 27–33)
MCHC RBC AUTO-ENTMCNC: 33.1 G/DL (ref 32–36)
MCV RBC AUTO: 87.1 FL (ref 80–100)
MONOCYTES # BLD AUTO: 783 CELLS/UL (ref 200–950)
MONOCYTES NFR BLD AUTO: 9.1 %
NEUTROPHILS # BLD AUTO: 5891 CELLS/UL (ref 1500–7800)
NEUTROPHILS NFR BLD AUTO: 68.5 %
PLATELET # BLD AUTO: 375 THOUSAND/UL (ref 140–400)
PMV BLD REES-ECKER: 11.3 FL (ref 7.5–12.5)
POTASSIUM SERPL-SCNC: 4.4 MMOL/L (ref 3.5–5.3)
PROT SERPL-MCNC: 6.9 G/DL (ref 6.1–8.1)
QUEST EGFR AFRICAN AMERICAN: 70 ML/MIN/1.73M2
QUEST EGFR NON-AFR. AMERICAN: 60 ML/MIN/1.73M2
RBC # BLD AUTO: 4.34 MILLION/UL (ref 3.8–5.1)
SODIUM SERPL-SCNC: 138 MMOL/L (ref 135–146)
TSH SERPL-ACNC: 2.05 MIU/L (ref 0.4–4.5)
WBC # BLD AUTO: 8.6 THOUSAND/UL (ref 3.8–10.8)

## 2022-01-24 ENCOUNTER — TELEPHONE (OUTPATIENT)
Dept: SCHEDULING | Facility: CLINIC | Age: 77
End: 2022-01-24
Payer: MEDICARE

## 2022-01-24 NOTE — TELEPHONE ENCOUNTER
Spoke with patient.  Does not want flecainide at this time.  Remains on amlodipine 5 mg BID and  Metoprolol 25 mg in am , 12.5 mg in pm.  States still does not feel great.  Still feels tired and has intermittent nausea and lightheadedness.  States she is trying to hydrate, informed needs 48 ounces water daily as per OV note.  Asked she hydrate and call with updates and/or issues.

## 2022-01-24 NOTE — TELEPHONE ENCOUNTER
Pt calling to follow up with Dr Boateng about her meds.  Pt states she does not want to change meds now, she would like to keep them the same.  Pt states she recently discussed with Dr Boateng possibly switching meds and to let him know.  Pt can be reached at [phone], if needed.

## 2022-02-21 ENCOUNTER — TELEPHONE (OUTPATIENT)
Dept: CARDIOLOGY | Facility: CLINIC | Age: 77
End: 2022-02-21

## 2022-02-21 ENCOUNTER — OFFICE VISIT (OUTPATIENT)
Dept: CARDIOLOGY | Facility: CLINIC | Age: 77
End: 2022-02-21
Payer: MEDICARE

## 2022-02-21 ENCOUNTER — APPOINTMENT (OUTPATIENT)
Dept: CARDIOLOGY | Facility: CLINIC | Age: 77
End: 2022-02-21
Payer: MEDICARE

## 2022-02-21 VITALS
SYSTOLIC BLOOD PRESSURE: 128 MMHG | OXYGEN SATURATION: 99 % | HEART RATE: 60 BPM | HEIGHT: 60 IN | WEIGHT: 117 LBS | BODY MASS INDEX: 22.97 KG/M2 | DIASTOLIC BLOOD PRESSURE: 82 MMHG | RESPIRATION RATE: 16 BRPM

## 2022-02-21 DIAGNOSIS — I48.0 PAROXYSMAL ATRIAL FIBRILLATION (CMS/HCC): ICD-10-CM

## 2022-02-21 DIAGNOSIS — R06.02 SHORTNESS OF BREATH: ICD-10-CM

## 2022-02-21 DIAGNOSIS — E78.2 MIXED HYPERLIPIDEMIA: ICD-10-CM

## 2022-02-21 DIAGNOSIS — R06.09 DOE (DYSPNEA ON EXERTION): Primary | ICD-10-CM

## 2022-02-21 DIAGNOSIS — I10 HYPERTENSION, BENIGN: ICD-10-CM

## 2022-02-21 PROCEDURE — 99214 OFFICE O/P EST MOD 30 MIN: CPT | Performed by: NURSE PRACTITIONER

## 2022-02-21 PROCEDURE — G8752 SYS BP LESS 140: HCPCS | Performed by: NURSE PRACTITIONER

## 2022-02-21 PROCEDURE — 93000 ELECTROCARDIOGRAM COMPLETE: CPT | Performed by: NURSE PRACTITIONER

## 2022-02-21 PROCEDURE — G8754 DIAS BP LESS 90: HCPCS | Performed by: NURSE PRACTITIONER

## 2022-02-21 ASSESSMENT — ENCOUNTER SYMPTOMS
COLOR CHANGE: 0
DEPRESSION: 0
ALLERGIC/IMMUNOLOGIC NEGATIVE: 1
NAIL CHANGES: 0
FOCAL WEAKNESS: 0
DIAPHORESIS: 0
BRIEF PARALYSIS: 0
FALLS: 0
LIGHT-HEADEDNESS: 0
SPUTUM PRODUCTION: 0
SLEEP DISTURBANCES DUE TO BREATHING: 0
ANOREXIA: 0
CONSTITUTIONAL NEGATIVE: 1
HEARTBURN: 0
ABDOMINAL PAIN: 0
DIZZINESS: 0
ALTERED MENTAL STATUS: 0
HEADACHES: 0
GASTROINTESTINAL NEGATIVE: 1
NERVOUS/ANXIOUS: 1
DYSPNEA ON EXERTION: 1
DECREASED APPETITE: 0
ORTHOPNEA: 0
HEMATURIA: 0
DOUBLE VISION: 0
CHANGE IN BOWEL HABIT: 0
NEUROLOGICAL NEGATIVE: 1
WEIGHT LOSS: 0
LOSS OF BALANCE: 0
DISTURBANCES IN COORDINATION: 0
RIGHT EYE: 0
WHEEZING: 0
SHORTNESS OF BREATH: 1
HEMATEMESIS: 0
SNORING: 0
POLYPHAGIA: 0
EXCESSIVE DAYTIME SLEEPINESS: 0
MUSCULOSKELETAL NEGATIVE: 1
SYNCOPE: 0
BLOATING: 0
STRIDOR: 0
POOR WOUND HEALING: 0
IRREGULAR HEARTBEAT: 0
ENDOCRINE NEGATIVE: 1
PALPITATIONS: 1
FLANK PAIN: 0
COUGH: 0
BLURRED VISION: 0
CLAUDICATION: 0
FREQUENCY: 0
PND: 0
WEIGHT GAIN: 0
EYES NEGATIVE: 1
LEFT EYE: 0
HEMOPTYSIS: 0
MUSCLE CRAMPS: 0
NEAR-SYNCOPE: 0
MYALGIAS: 0
HEMATOCHEZIA: 0
BACK PAIN: 0

## 2022-02-21 ASSESSMENT — LIFESTYLE VARIABLES: SUBSTANCE_ABUSE: 0

## 2022-02-21 NOTE — TELEPHONE ENCOUNTER
What do think about adding flecainide 50 mg bid and decreasing her her metoprolol to 25 mg bid.  She has no ischemia and no strucural heart disease and then she can go to [facility] with peace of min?f she agrees./

## 2022-02-21 NOTE — PROGRESS NOTES
Subjective     Angeles Cardoza is a 76 y.o. female who is seen in the office today for Cardiovascular follow-up.  She was last seen In the office on 1/19/2022.  Prior to that she been seen by Dr. Boateng on 1/5/2022. She has a past medical history of Atrial fibrillation, hyperlipidemia, hypertension. At last visit she was bradycardic and amiodarone was discontinued. Metoprolol increased from 25 mg daily to 37-1/2 mg daily. She presents today for follow-up.    Notably she was hospitalized at [facility] in [place] in November 2021 for atrial fibrillation with rapid ventricular response requiring IV amiodarone, acute diverticular flare/colitis,    When seen on 1/19/2022, she reported continued intermittent lightheadedness and nausea.  Thinks she's drinking 32 oz fluid daily  BP sometimes running low and she gets dizzy  Appetite good  No more diarrhea  Rare palpitations  Off amiodarone. Does feel better    She complained of new dyspnea on exertion and Exercise nuclear stress test was recommended.  This was completed on 1/20/2022 And showed no evidence of scar or ischemia, EF is 72%.  Exercise capacity 10.1 METS.  She was orthostatic with blood pressure 142/80 sitting and 128/80 standing.  Meds were split up so she was taking amlodipine 5 mg twice daily and metoprolol dose was also split up to 25 mg twice daily to see if there was improvement in symptoms.  Heart rate was 55, sinus rhythm however she was noted to be in Remittent atrial fibrillation during her stress test    Today c/o ongoing and intermittent SOB and DICKSON  Does her water aerobics without notable symptoms  Today walking from car to she got DICKSON  Home BP cuff noting afib frequently. She rarely feels palpitations.    Denies chest pain.  Sometimes gets some tightness of upper chest ; no associated SOB/Sweats/lightheadedness/nausea.  Nothing on exertion.  BP at home running 102-150s/60s-80s  HOME BP cuff noting afib frequently      Allergies   Allergen  Reactions   • Penicillins Hives     Other reaction(s): Hives   • Ditropan [Oxybutynin Chloride] Other (see comments)     Unable to urinate          Current Outpatient Medications   Medication Sig Dispense Refill   • amLODIPine 5 mg tablet Take 1 tablet (5 mg total) by mouth 2 (two) times a day. 180 tablet 1   • ascorbic acid (VITAMIN C) 250 mg tablet Take 250 mg by mouth daily.     • atorvastatin (LIPITOR) 40 mg tablet Take 40 mg by mouth daily.       • calcium carbonate (CALCIUM 500 ORAL) Take by mouth daily.     • cholecalciferol, vitamin D3, 1,000 unit (25 mcg) tablet Take 2,000 Units by mouth daily.      • cyanocobalamin (VITAMIN B12) 100 mcg tablet Take 100 mcg by mouth daily.     • ELIQUIS 5 mg tablet Take 1 tablet (5 mg total) by mouth 2 (two) times a day. 180 tablet 3   • meclizine (ANTIVERT) 12.5 mg tablet Take 1 tablet (12.5 mg total) by mouth 3 (three) times a day as needed for dizziness for up to 5 days. 15 tablet 0   • MELATONIN ORAL Take by mouth as needed.     • metoprolol succinate XL 25 mg 24 hr tablet Take 25 mg in AM and 12.5 mg in  tablet 1   • NOT IN DATABASE Lona   Two once daily     • omeprazole (PriLOSEC) 40 mg capsule Take 40 mg by mouth daily and an additional dose as needed.    3   • venlafaxine XR (EFFEXOR XR) 75 mg 24 hr capsule Take 75 mg by mouth daily.         No current facility-administered medications for this visit.         Patient Active Problem List    Diagnosis Date Noted   • Paroxysmal atrial fibrillation (CMS/HCC) 01/19/2022   • Hypertensive heart disease without heart failure 01/19/2022   • DICKSON (dyspnea on exertion) 01/19/2022   • Hypertensive emergency 01/28/2019   • Family history of coronary artery disease 05/16/2018   • Essential hypertension 05/16/2018   • Nonrheumatic aortic valve insufficiency 05/16/2018   • Non-rheumatic mitral regurgitation 05/16/2018   • Moderate hypertension control 05/16/2018   • Hyperlipidemia 08/27/2012   • Hypertension, benign  08/27/2012   • Nocturia 08/27/2012   .        Objective     Review of Systems   Constitutional: Negative. Negative for decreased appetite, diaphoresis, malaise/fatigue, weight gain and weight loss.   HENT: Negative.  Negative for nosebleeds, stridor and tinnitus.    Eyes: Negative.  Negative for blurred vision, double vision, vision loss in left eye, vision loss in right eye and visual disturbance.   Cardiovascular: Positive for chest pain (rare chest tightness), dyspnea on exertion and palpitations (rare). Negative for claudication, cyanosis, irregular heartbeat, leg swelling, near-syncope, orthopnea, paroxysmal nocturnal dyspnea and syncope.   Respiratory: Positive for shortness of breath. Negative for cough, hemoptysis, sleep disturbances due to breathing, snoring, sputum production and wheezing.         ?? snoring   Endocrine: Negative.  Negative for polyphagia and polyuria.   Skin: Negative.  Negative for color change, dry skin, flushing, itching, nail changes, poor wound healing and rash.   Musculoskeletal: Negative.  Negative for back pain, falls, muscle cramps, muscle weakness and myalgias.   Gastrointestinal: Negative.  Negative for bloating, abdominal pain, anorexia, change in bowel habit, dysphagia, heartburn, hematemesis and hematochezia.   Genitourinary: Negative.  Negative for flank pain, frequency, hematuria and nocturia.   Neurological: Negative.  Negative for brief paralysis, disturbances in coordination, excessive daytime sleepiness, dizziness, focal weakness, headaches, light-headedness and loss of balance.   Psychiatric/Behavioral: Negative for altered mental status, depression and substance abuse. The patient is nervous/anxious.    Allergic/Immunologic: Negative.  Negative for hives.      Wt Readings from Last 5 Encounters:   02/21/22 53.1 kg (117 lb)   01/20/22 51.3 kg (113 lb)   01/19/22 51.3 kg (113 lb)   01/05/22 51.7 kg (114 lb)   11/23/21 51.3 kg (113 lb)       Vitals:    02/21/22 1056  02/21/22 1136   BP: 133/65 128/82   Pulse: 60    Resp: 16    SpO2: 99%    Weight: 53.1 kg (117 lb)    Height: 1.524 m (5')      Physical Exam  Vitals reviewed.   Constitutional:       Appearance: Normal appearance.   HENT:      Head: Normocephalic and atraumatic.   Eyes:      Conjunctiva/sclera: Conjunctivae normal.   Cardiovascular:      Rate and Rhythm: Normal rate and regular rhythm.      Pulses: Normal pulses.      Heart sounds: Normal heart sounds, S1 normal and S2 normal.   Pulmonary:      Effort: Pulmonary effort is normal.      Breath sounds: Normal breath sounds.   Abdominal:      General: Bowel sounds are normal.      Palpations: Abdomen is soft.   Musculoskeletal:         General: Normal range of motion.      Right shoulder: No swelling.      Cervical back: Normal range of motion and neck supple.   Skin:     General: Skin is warm and dry.   Neurological:      General: No focal deficit present.      Mental Status: She is alert and oriented to person, place, and time.   Psychiatric:         Speech: Speech normal.         Behavior: Behavior normal.       Cardiographics  ECG: sinus rhythm, rate 60    Exercise nuclear stress test, 1/20/2022  1. Exercise technetium tetrofosmin shows no evidence of scar or ischemia.  2. ECG shows no ST changes diagnostic of ischemia. Intermittent atrial fibrillation though the study at rest, exercise and recovery.  3. Gated SPECT obtained in the resting state post stress shows left ventricular ejection fraction of 72%.  All walls show normal thickening and endocardial excursion. No regional wall motion abnormalities are noted.  4. Duke Treadmill score of 7 which correlates with a low risk study. The patient exercised for 7 minutes and 10 seconds achieving 10.1 METS.  5. No prior study for comparison.  6. This is a normal study.      Echocardiogram: Cardiac Imaging    ECHOCARDIOGRAM - EXTERNAL RESULT [facility] 11/21/21        Lab Review   Lab Results   Component Value  "Date    WBC 8.6 01/21/2022    HGB 12.5 01/21/2022    HCT 37.8 01/21/2022     01/21/2022    ALT 25 01/21/2022    AST 30 01/21/2022     01/21/2022    K 4.4 01/21/2022     01/21/2022    CREATININE 0.91 01/21/2022    BUN 32 (H) 01/21/2022    CO2 23 01/21/2022    TSH 2.05 01/21/2022    INR 0.9 11/13/2016       ASSESSMENT and PLAN:  76 y.o. female being consulted for cardiac follow up  Problem List Items Addressed This Visit        Respiratory    DICKSON (dyspnea on exertion) - Primary    Current Assessment & Plan     Able to do 2 hour water aerobics with few symptoms  Feels \"Huffy Puffy\" on stairs and when walking from parking lot  Labs unrevealing 1/21/22  Lungs clear  Home BP cuff noting frequent afib; suspect symptoms are rhythm related.  Will do BNP, repeat CBC as she's on Eliquis and CXR.  If severe symptoms, to ED  Notably, nuclear stress showed no myocardium at risk, no scar.           Relevant Orders    ECG 12 lead (Completed)    Basic metabolic panel    CBC and differential    B-type natriuretic peptide    X-RAY CHEST 2 VIEWS       Circulatory    Hypertension, benign    Overview     Overview:          Current Assessment & Plan     Long term HTN  BP at goal of < 130/80  Continue amlodipine 5 mg BID and metoprolol succ split dose.  Home cuff is accurate           Paroxysmal atrial fibrillation (CMS/HCC)    Current Assessment & Plan     [place] [facility] [place]  Amiodarone initiated and d/c 1/9/22 due to nausea, dizziness.  She has been in SR in office but home BP cuff noting afib frequently.  Symptoms of SOB and DICKSON persist.  Will do 48h holter to check afib burden  Rate is controlled in office on metoprolol  25 mg in AM and 12.5 mg in PM.  SHE remains on Eliquis 5 mg BID  Nuclear stress test without ischemia but noted to go in and out of afib during procedure.           Relevant Orders    Basic metabolic panel    CBC and differential    B-type natriuretic peptide    [facility] GE Holter " monitor - 48 hour       Endocrine/Metabolic    Hyperlipidemia    Overview     Overview:     Overview:          Current Assessment & Plan     Continue statin         Relevant Orders    Basic metabolic panel    CBC and differential    B-type natriuretic peptide      Other Visit Diagnoses     Shortness of breath        Relevant Orders    X-RAY CHEST 2 VIEWS        Thank you for allowing me to participate in the care of this patient; please feel free to call with questions.  She is leaving for [place] in 2 weeks  Will assess holter, labs and cxr prior to departure.      SHOBHA Bullard  2/21/2022  11:58 AM         Disclaimer:  This dictation was generated by voice activation software, please disregard any type of  errors.

## 2022-02-21 NOTE — ASSESSMENT & PLAN NOTE
New Dx 11/21 and [place] [facility] [place]  Amiodarone initiated and d/c 1/9/22 due to nausea, dizziness.  She has been in SR in office but home BP cuff noting afib frequently.  Symptoms of SOB and DICKSON persist.  Will do 48h holter to check afib burden  Rate is controlled in office on metoprolol  25 mg in AM and 12.5 mg in PM.  SHE remains on Eliquis 5 mg BID  Nuclear stress test without ischemia but noted to go in and out of afib during procedure.

## 2022-02-21 NOTE — TELEPHONE ENCOUNTER
BK,  Pt still with SOB/DICKSON but intermittent; think prob d/t afib  Put a 48h holter on her  Will do CXR at her request.  Checking BNP

## 2022-02-21 NOTE — ASSESSMENT & PLAN NOTE
"Able to do 2 hour water aerobics with few symptoms  Feels \"Huffy Puffy\" on stairs and when walking from parking lot  Labs unrevealing 1/21/22  Lungs clear  Home BP cuff noting frequent afib; suspect symptoms are rhythm related.  Will do BNP, repeat CBC as she's on Eliquis and CXR.  If severe symptoms, to ED  Notably, nuclear stress showed no myocardium at risk, no scar.    "

## 2022-02-21 NOTE — ASSESSMENT & PLAN NOTE
Long term HTN  BP at goal of < 130/80  Continue amlodipine 5 mg BID and metoprolol succ split dose.  Home cuff is accurate

## 2022-02-21 NOTE — TELEPHONE ENCOUNTER
Ok; I think she would feel more comfortable if we saw her holter first, so ok to wait until we get results?

## 2022-02-21 NOTE — PATIENT INSTRUCTIONS
We are doing a 48 hour holter monitor to see how often you are in afib    If you get acutely short of breath, to Emergency    Labs today    Follow up with BK when you get back    Call if any problems    DRINK AT LEAST 48 OZ WATER DAILY! ESPECIALLY WHILE IN [place]!    Get chest xray when you drop off holter monitor later this week.

## 2022-02-23 ENCOUNTER — HOSPITAL ENCOUNTER (OUTPATIENT)
Dept: RADIOLOGY | Facility: HOSPITAL | Age: 77
Discharge: HOME | End: 2022-02-23
Attending: NURSE PRACTITIONER
Payer: MEDICARE

## 2022-02-23 DIAGNOSIS — R06.09 DOE (DYSPNEA ON EXERTION): ICD-10-CM

## 2022-02-23 DIAGNOSIS — R06.02 SHORTNESS OF BREATH: ICD-10-CM

## 2022-02-23 LAB
BASOPHILS # BLD AUTO: 88 CELLS/UL (ref 0–200)
BASOPHILS NFR BLD AUTO: 1.2 %
BNP SERPL-MCNC: 301 PG/ML
BUN SERPL-MCNC: 28 MG/DL (ref 7–25)
BUN/CREAT SERPL: 25 (CALC) (ref 6–22)
CALCIUM SERPL-MCNC: 10 MG/DL (ref 8.6–10.4)
CHLORIDE SERPL-SCNC: 102 MMOL/L (ref 98–110)
CO2 SERPL-SCNC: 28 MMOL/L (ref 20–32)
CREAT SERPL-MCNC: 1.1 MG/DL (ref 0.6–0.93)
EOSINOPHIL # BLD AUTO: 190 CELLS/UL (ref 15–500)
EOSINOPHIL NFR BLD AUTO: 2.6 %
ERYTHROCYTE [DISTWIDTH] IN BLOOD BY AUTOMATED COUNT: 13.6 % (ref 11–15)
GLUCOSE SERPL-MCNC: 113 MG/DL (ref 65–139)
HCT VFR BLD AUTO: 39.1 % (ref 35–45)
HGB BLD-MCNC: 13.2 G/DL (ref 11.7–15.5)
LYMPHOCYTES # BLD AUTO: 1402 CELLS/UL (ref 850–3900)
LYMPHOCYTES NFR BLD AUTO: 19.2 %
MCH RBC QN AUTO: 30.4 PG (ref 27–33)
MCHC RBC AUTO-ENTMCNC: 33.8 G/DL (ref 32–36)
MCV RBC AUTO: 90.1 FL (ref 80–100)
MONOCYTES # BLD AUTO: 1080 CELLS/UL (ref 200–950)
MONOCYTES NFR BLD AUTO: 14.8 %
NEUTROPHILS # BLD AUTO: 4541 CELLS/UL (ref 1500–7800)
NEUTROPHILS NFR BLD AUTO: 62.2 %
PLATELET # BLD AUTO: 383 THOUSAND/UL (ref 140–400)
PMV BLD REES-ECKER: 11.1 FL (ref 7.5–12.5)
POTASSIUM SERPL-SCNC: 4.2 MMOL/L (ref 3.5–5.3)
QUEST EGFR AFRICAN AMERICAN: 56 ML/MIN/1.73M2
QUEST EGFR NON-AFR. AMERICAN: 48 ML/MIN/1.73M2
RBC # BLD AUTO: 4.34 MILLION/UL (ref 3.8–5.1)
SODIUM SERPL-SCNC: 139 MMOL/L (ref 135–146)
WBC # BLD AUTO: 7.3 THOUSAND/UL (ref 3.8–10.8)

## 2022-02-23 PROCEDURE — 71046 X-RAY EXAM CHEST 2 VIEWS: CPT

## 2022-02-28 ENCOUNTER — TELEPHONE (OUTPATIENT)
Dept: CARDIOLOGY | Facility: CLINIC | Age: 77
End: 2022-02-28
Payer: MEDICARE

## 2022-02-28 PROCEDURE — 93224 XTRNL ECG REC UP TO 48 HRS: CPT | Performed by: INTERNAL MEDICINE

## 2022-02-28 RX ORDER — FLECAINIDE ACETATE 50 MG/1
50 TABLET ORAL 2 TIMES DAILY
Qty: 180 TABLET | Refills: 3 | Status: SHIPPED | OUTPATIENT
Start: 2022-02-28 | End: 2022-03-29 | Stop reason: SDUPTHER

## 2022-02-28 RX ORDER — METOPROLOL SUCCINATE 25 MG/1
25 TABLET, EXTENDED RELEASE ORAL DAILY
Qty: 90 TABLET | Refills: 3 | Status: SHIPPED | OUTPATIENT
Start: 2022-02-28 | End: 2022-03-08 | Stop reason: ALTCHOICE

## 2022-02-28 NOTE — TELEPHONE ENCOUNTER
Pt is aware of results.  Called in RX for flecainide 50 mg bid.  Will speak to daughter tomorrow about results.  Decreased toprol from 37.5 mg to 25 mg daily.  Long puases at night.

## 2022-03-07 ENCOUNTER — TELEPHONE (OUTPATIENT)
Dept: SCHEDULING | Facility: CLINIC | Age: 77
End: 2022-03-07
Payer: MEDICARE

## 2022-03-07 NOTE — TELEPHONE ENCOUNTER
Spoke with patient.  States recently started Flecainide 50 mg BID.  Still taking Metoprolol succinate total 37.5 mg daily.  States HR 90's to low 100's.  BP's 112/70, 143/85.  States hydrated with 72 ounces water yesterday.  Informed 64 ounces is recommended.  States dizziness over the weekend.  Awoke at midnight to go to bathroom, felt dizzy.  Awoke on floor at 1 am.  Informed needs ED evaluation s/p fall on Eliquis.  States R ear area sore.  Is in [place].  Will proceed to ED in the next hour.  Asked she call with updates.

## 2022-03-07 NOTE — TELEPHONE ENCOUNTER
She was to decrease metoprolol to 25 mg daily . This week then a other day next week then off. Please call and touch base

## 2022-03-07 NOTE — TELEPHONE ENCOUNTER
Patient is in [place] and went to the ER per conversation earlier with Anna.    However they would not send over the ER notes to Dr Boateng without a call to [facility] [phone]   Ask for Medical Records    Pt can reached at [phone]

## 2022-03-07 NOTE — TELEPHONE ENCOUNTER
Pt calling in regards to flecainide and states she had a few dizziness over the weekend. Pt currently not having symptoms but will like to speak to     Pt can be reach at [phone]

## 2022-03-08 ENCOUNTER — TELEPHONE (OUTPATIENT)
Dept: SCHEDULING | Facility: CLINIC | Age: 77
End: 2022-03-08
Payer: MEDICARE

## 2022-03-08 ENCOUNTER — TELEPHONE (OUTPATIENT)
Dept: CARDIOLOGY | Facility: CLINIC | Age: 77
End: 2022-03-08
Payer: MEDICARE

## 2022-03-08 DIAGNOSIS — I48.0 PAROXYSMAL ATRIAL FIBRILLATION (CMS/HCC): Primary | ICD-10-CM

## 2022-03-08 NOTE — TELEPHONE ENCOUNTER
GERARDO; did you s/w pt about a monitor? She is in [place]; we can send an event monitor if you want...    Also, this note written yesterday doesn't look like it was routed to anyone.  You want her weaned off metop, and continue flecainide?    Chano Boateng, DO         3/7/22 11:24 AM  Note     She was to decrease metoprolol to 25 mg daily . This week then a other day next week then off. Please call and touch base         This encounter is not signed. The conversation may still be ongoing.

## 2022-03-08 NOTE — TELEPHONE ENCOUNTER
I faxed Medical Records request to [facility]  in [place] Medical Records Dept. Fax no. [phone]  .  Medical Records Phone No. [phone].    I will scan in  Medical records when I receive Medical records from [facility] .

## 2022-03-08 NOTE — TELEPHONE ENCOUNTER
On 3/8/2022 I will enroll Patient with [employer] to receive an Event Monitor to be mailed to    [address]  [address] [place] [id number]

## 2022-03-08 NOTE — TELEPHONE ENCOUNTER
Please process order for 14 day looping event monitor  Pt is in [place]; please call her to get mailing address.  Please have her notify us if she doesn't get monitor in 5 working days. ty!

## 2022-03-08 NOTE — TELEPHONE ENCOUNTER
Pt currently on metop 25 in 12.5 pm    Advised to stop pm dose now, continue 25 mg until 3/12/22    3/12-3/19 take 12.5 mg    3/19  stop metoprolol    Continue flecainide    Advised 14 day looping event recorder is going to be mailed to her  On license of [facility]: here is mailing address:    MEGHNA DWYER  [address] [place] [address]  [place] [address]

## 2022-03-08 NOTE — TELEPHONE ENCOUNTER
I want her off the metoprolol and then I want her on a event recorder for 2 weeks to see what she is doing.

## 2022-03-08 NOTE — TELEPHONE ENCOUNTER
Pt states that she missed a call from Dr. Boateng's office regarding [place] ED visit.     Pt can be reached at #[phone].    Ty.

## 2022-03-08 NOTE — TELEPHONE ENCOUNTER
On 3/8/2022 I will enroll Patient with [employer] to receive an Event Monitor to be mailed to     [address]  [address] [place] [id number]

## 2022-03-08 NOTE — TELEPHONE ENCOUNTER
Pt requesting a call back to discuss getting heart monitor sooner.    Pt can be reached at [phone]

## 2022-03-09 NOTE — TELEPHONE ENCOUNTER
Received some records from [facility].    Records given to Dr. Boateng for review

## 2022-03-09 NOTE — TELEPHONE ENCOUNTER
Pt requesting a callback to confirm if all records were received from [facility] .    Pt can be reached at [phone] (h)

## 2022-03-10 ENCOUNTER — TELEPHONE (OUTPATIENT)
Dept: SCHEDULING | Facility: CLINIC | Age: 77
End: 2022-03-10
Payer: MEDICARE

## 2022-03-10 NOTE — TELEPHONE ENCOUNTER
Pt requesting recent lab results be sent to pt pcp- Dr. José Antonio gurrola .    Pt can be reached at   [phone]

## 2022-03-10 NOTE — TELEPHONE ENCOUNTER
VM message left.  Informed we received some records.  Informed BK will review.  Informed we will call her if any changes need to be made.

## 2022-03-10 NOTE — TELEPHONE ENCOUNTER
Titrate down the beta-blocker stay on the flecainide start the monitor we will call her with any changes after we see the monitor

## 2022-03-10 NOTE — TELEPHONE ENCOUNTER
I only Received  EKGS done 3/7/2022 from [facility] in [place].   Dr. Boateng saw the EKGS and signed the EKGS..  I scanned EKGS done 3/7/2022 in Patient Chart.

## 2022-03-10 NOTE — TELEPHONE ENCOUNTER
These are the instructions I gave her.  I'm not sure what the issue is. If you can give her a holler that would be great.

## 2022-03-10 NOTE — TELEPHONE ENCOUNTER
I believe you gave these instructions already.  I did not call patient.  Please let me know if necessary to call.  Thank you.

## 2022-03-10 NOTE — TELEPHONE ENCOUNTER
Spoke with patient.  She wrote down instructions from Alyse.  States feeling better without pm metoprolol.  She was able to read me back instructions.        Daph:  Please scan records from hospital into chart,  If not already done.  Thank you.

## 2022-03-10 NOTE — TELEPHONE ENCOUNTER
lab results were faxed  to Patient  PCP- Dr. José Antonio gurrola .  Dr. José Antonio gurrola . Fax No. Is [phone]

## 2022-03-17 ENCOUNTER — TELEPHONE (OUTPATIENT)
Dept: SCHEDULING | Facility: CLINIC | Age: 77
End: 2022-03-17
Payer: MEDICARE

## 2022-03-17 ENCOUNTER — DOCUMENTATION (OUTPATIENT)
Dept: CARDIOLOGY | Facility: CLINIC | Age: 77
End: 2022-03-17
Payer: MEDICARE

## 2022-03-17 DIAGNOSIS — I48.0 PAROXYSMAL ATRIAL FIBRILLATION (CMS/HCC): ICD-10-CM

## 2022-03-17 NOTE — TELEPHONE ENCOUNTER
She just wanted to know it was okay to cut the pill because it was not scored I told her it once her flecainide dose will be 75 mg in the morning or 1-1/2 tablets in the morning and 1 full tablet in the evening

## 2022-03-17 NOTE — TELEPHONE ENCOUNTER
Patient is calling to get clarity on increase in Flecainide-she spoke with Dr. Boateng today and he made the changes.    She can be reached at: [phone]

## 2022-03-17 NOTE — PROGRESS NOTES
Received strips from loop recorder [place] patient in sinus rhythm for the most part with some periods of paroxysmal atrial fibrillation asymptomatic.  While sleeping at 3:00 4:00 and 5:00 in the morning the patient has had prolonged pauses from 3 to 5 seconds of asystole while asleep.  Patient is completely asymptomatic.  Does have symptoms occasionally when her heart rate is fast no chest pain no shortness of breath but just aware of her higher heart rate.  I spoke to Hernandez Scott about this and he has no concerns about the prolonged pauses while she is sleeping as long as she is asymptomatic.  He did tell me it is safe to increase her flecainide to keep her in sinus rhythm.  The patient is aware of this conversation and the results.

## 2022-03-21 ENCOUNTER — TELEPHONE (OUTPATIENT)
Dept: CARDIOLOGY | Facility: CLINIC | Age: 77
End: 2022-03-21
Payer: MEDICARE

## 2022-03-21 NOTE — TELEPHONE ENCOUNTER
S/w dr. colon   Pt with 5-6 sec pause occurring around 3am during sleep.  Dr Colon s/w pt over weekend  She is essentially asymptomatic.  He discussed with EP.  Pauses occurring at night.  Remains on flecainide

## 2022-03-29 RX ORDER — FLECAINIDE ACETATE 50 MG/1
50 TABLET ORAL 2 TIMES DAILY
Qty: 180 TABLET | Refills: 3 | Status: SHIPPED | OUTPATIENT
Start: 2022-03-29 | End: 2022-04-13 | Stop reason: HOSPADM

## 2022-04-06 ENCOUNTER — TELEPHONE (OUTPATIENT)
Dept: CARDIOLOGY | Facility: CLINIC | Age: 77
End: 2022-04-06
Payer: MEDICARE

## 2022-04-06 NOTE — TELEPHONE ENCOUNTER
Spoke with patient.  States HR usually .  BP's 140-150's/70's.  States occasional flutter/lightheadedness.  Less often now hydrating well.  States SOB at rest about twice daily for 3-4 minutes.  Informed BK wants her to be seen by EP soon.    EP:  Can you please schedule OV soon.  BK says pauses in daytime now on monitor.  Thank you.

## 2022-04-06 NOTE — TELEPHONE ENCOUNTER
IVUS catheter inserted over the wire. Recently sent to [facility]  it needs to be stopped and [facility]

## 2022-04-06 NOTE — TELEPHONE ENCOUNTER
Angeles called requesting to speak with Alyse, she states that the first available with the EP Cardiology dept isnt until end of April or May. She was told to make an appt either Tomorrow or Friday     She can be reached @ [phone]

## 2022-04-06 NOTE — TELEPHONE ENCOUNTER
I spoke with the patient who indicated that she will consult with her daughter first before making the appt.

## 2022-04-06 NOTE — TELEPHONE ENCOUNTER
AK-this is a patient that has been added to your schedule tomorrow.  Referred by Dr. Boateng and SHOBHA Medina.  Patient is having lightheadedness.  She is currently wearing a monitor and having pauses during daytime hours.    Cynthia

## 2022-04-06 NOTE — TELEPHONE ENCOUNTER
Left vm asking pt to call   RE: event recorder; pt having pauses during wakeful hrs.  Needs to see EP  Is she still in [place]?  Symptoms?  Please call back  If still in [place], will likely need to see EP in [place].

## 2022-04-07 ENCOUNTER — OFFICE VISIT (OUTPATIENT)
Dept: CARDIOLOGY | Facility: CLINIC | Age: 77
End: 2022-04-07
Payer: MEDICARE

## 2022-04-07 VITALS
DIASTOLIC BLOOD PRESSURE: 80 MMHG | BODY MASS INDEX: 23.16 KG/M2 | HEART RATE: 89 BPM | SYSTOLIC BLOOD PRESSURE: 124 MMHG | HEIGHT: 60 IN | RESPIRATION RATE: 15 BRPM | WEIGHT: 118 LBS

## 2022-04-07 DIAGNOSIS — R06.02 SHORTNESS OF BREATH: ICD-10-CM

## 2022-04-07 DIAGNOSIS — I48.0 PAROXYSMAL ATRIAL FIBRILLATION (CMS/HCC): Primary | ICD-10-CM

## 2022-04-07 DIAGNOSIS — I48.19 OTHER PERSISTENT ATRIAL FIBRILLATION: ICD-10-CM

## 2022-04-07 PROCEDURE — 93000 ELECTROCARDIOGRAM COMPLETE: CPT | Performed by: INTERNAL MEDICINE

## 2022-04-07 PROCEDURE — G8752 SYS BP LESS 140: HCPCS | Performed by: INTERNAL MEDICINE

## 2022-04-07 PROCEDURE — G8754 DIAS BP LESS 90: HCPCS | Performed by: INTERNAL MEDICINE

## 2022-04-07 PROCEDURE — 99204 OFFICE O/P NEW MOD 45 MIN: CPT | Performed by: INTERNAL MEDICINE

## 2022-04-07 NOTE — LETTER
April 7, 2022     Chano Boateng DO  [address]  [facility]  [address]  [place] [address]    Patient: Angeles Cardoza  YOB: 1945  Date of Visit: 4/7/2022      Dear Dr. Boateng:    Thank you for referring Angeles Cardoza to me for evaluation. Below are my notes for this consultation.    If you have questions, please do not hesitate to call me. I look forward to following your patient along with you.         Sincerely,        Neel Pizano MD        CC: DO Harinder Faustin Ali R, MD  4/7/2022  3:59 PM  Signed       Electrophysiology         Reason for visit: Atrial fibrillation with prolonged conversion pauses   Referred by : Dr. Boateng      History of Present Illness:    Ms. Angeles Cardoza is a 76 year old female with a past medical history significant for hyperlipidemia, hypertension, and a recent diagnosis of atrial fibrillation in November of 2021. She was referred to our office by her primary cardiologist, Dr. Boateng.     Patient was originally diagnosed with atrial fibrillation in November of 2021 when she presented to an OSH for acute diverticulitis/colitis flare up. She was initiated on Eliquis 5 mg BID and amiodarone at that time. ECHO demonstrated normal LV function with an EF of 60-65% and normal bi-lateral atria size. She spontaneously converted back to normal rhythm. She followed up with Dr. Boateng where it was noted that she was unable to tolerate amiodarone due to GI side effects of nausea and vomiting. Amiodarone was discontinued and the patient was initiated on flecainide 50 mg BID. A stress test was completed which was unremarkable and a MCOT was ordered to assess the true burden of atrial fibrillation. The patient reported minimal signs or symptoms associated with the arrhythmia, but does note that she had intermittent shortness of breath at times and reports that the vein in her neck would rapidly pulsate. She did not endorse any chest  pain or palpitations. She could not answer if she had any true dizziness or lightheadedness. The MCOT results did demonstrate an elevated burden of atrial fibrillation around 40% but what was more concerning was the prolonged and frequent conversion pauses. The patient was noted to have 26 pauses on her MCOT with some last up to 6 seconds. She was therefore referred to our office for further management of atrial fibrillation on possible pacemaker evaluation.     A comprehensive 15-point review of systems was performed and was negative unless specifically mentioned in the History of Present Illness.        Past Medical History:   Diagnosis Date   • Acid reflux    • Hypertension    • Hypertension, benign 8/27/2012    Overview:    • Lipid disorder        Past Surgical History:   Procedure Laterality Date   • BREAST SURGERY  2003    breast reduction   • RENAL ANGIOPLASTY     • TOE SURGERY Left    • TONSILLECTOMY     • WISDOM TOOTH EXTRACTION         Current Outpatient Medications on File Prior to Visit   Medication Sig Dispense Refill   • amLODIPine 5 mg tablet Take 1 tablet (5 mg total) by mouth 2 (two) times a day. 180 tablet 1   • ascorbic acid (VITAMIN C) 250 mg tablet Take 250 mg by mouth daily.     • atorvastatin (LIPITOR) 40 mg tablet Take 40 mg by mouth daily.       • calcium carbonate (CALCIUM 500 ORAL) Take by mouth daily.     • cholecalciferol, vitamin D3, 1,000 unit (25 mcg) tablet Take 2,000 Units by mouth daily.      • cyanocobalamin (VITAMIN B12) 100 mcg tablet Take 100 mcg by mouth daily.     • ELIQUIS 5 mg tablet Take 1 tablet (5 mg total) by mouth 2 (two) times a day. 180 tablet 3   • flecainide (TAMBOCOR) 50 mg tablet Take 1 tablet (50 mg total) by mouth 2 (two) times a day. 180 tablet 3   • meclizine (ANTIVERT) 12.5 mg tablet Take 1 tablet (12.5 mg total) by mouth 3 (three) times a day as needed for dizziness for up to 5 days. 15 tablet 0   • MELATONIN ORAL Take by mouth as needed.     • omeprazole  (PriLOSEC) 40 mg capsule Take 40 mg by mouth daily and an additional dose as needed.    3   • venlafaxine XR (EFFEXOR-XR) 75 mg 24 hr capsule Take 75 mg by mouth daily.       • NOT IN DATABASE [place]   Two once daily       No current facility-administered medications on file prior to visit.       Allergies, Social History and Family History were reviewed and updated in EMR.     Physical Examination:  Vitals:    04/07/22 1319   BP: 124/80   Pulse: 89   Resp: 15     Constitutional: The patient appeared physically well and in no acute distress.  Respiratory: Clear to auscultation, no wheezes or rubs.  Cardiovascular: A comprehensive cardiovascular examination was performed. Highlights include normal jugular venous pressure, 2+ carotids, no bruits. The precordium is quiet. Regular rhythm, rate, S1 and S2 normal, no rubs, or gallops were appreciated. Murmurs: No pathologic murmurs appreciated.  Musculoskeletal/ Extremities: No edema, normal peripheral pulses.  Skin: No rashes or lesions apparent.       Lab Results   Component Value Date    WBC 7.3 02/22/2022    HGB 13.2 02/22/2022    HCT 39.1 02/22/2022     02/22/2022    ALT 25 01/21/2022    AST 30 01/21/2022     02/22/2022    K 4.2 02/22/2022    CREATININE 1.10 (H) 02/22/2022    BUN 28 (H) 02/22/2022    TSH 2.05 01/21/2022    INR 0.9 11/13/2016       Cardiac Imaging    ECHOCARDIOGRAM - EXTERNAL RESULT     Narrative  Ordered by an unspecified provider.    Most recent stress test results:    CV NM STRESS EXERCISE WITH MYOC PERF SPECT MULTI 01/20/2022 (Final) 1/20/2022    Interpretation Summary  1. Exercise technetium tetrofosmin shows no evidence of scar or ischemia.  2. ECG shows no ST changes diagnostic of ischemia. Intermittent atrial fibrillation though the study at rest, exercise and recovery.  3. Gated SPECT obtained in the resting state post stress shows left ventricular ejection fraction of 72%.  All walls show normal thickening and endocardial  excursion. No regional wall motion abnormalities are noted.  4. Duke Treadmill score of 7 which correlates with a low risk study. The patient exercised for 7 minutes and 10 seconds achieving 10.1 METS.  5. No prior study for comparison.  6. This is a normal study.    ECG: typical atrial flutter with controlled ventricular rate of 89 bpm        Assessment and plan:  Ms. Angeles Cardoza is a 76 year old female with a past medical history significant for hyperlipidemia, hypertension, and a recent diagnosis of atrial fibrillation in November of 2021. She was referred to our office by her primary cardiologist, Dr. Boateng, for further management of her atrial fibrillation and frequent conversion pauses.     #Atrial fibrillation   - Continue Eliquis 5 mg BID for systemic anticoagulation. Unfortunately the patient notes that she has missed a few doses of Eliquis since initiation and I reviewed the importance of anticoagulation compliance to prevent stroke.  - Given the frequency and length of these pauses and the paroxsymal nature of her arrhythmia, it is reasonable to pursue pacemaker implantation for prevention of conversion pauses and allow for adequate medical management of her arrhythmia   - I discussed the procedure of pacemaker implantation and the risks and benefits with the patient. All of her questions were answered to her satisfaction and the decision was made to move forward with pacemaker implantation.  - Additionally, it is reasonable to pursue rhythm control in this patient as she is newly diagnosed with atrial fibrillation and heart failure symptoms. Maintaining normal rhythm with also help to minimize the number of conversion pauses. We discussed different antiarrythmic medications for this patient, she already did not tolerate amiodarone and she is currently failing flecainide.  We will proceed with Tikosyn loading a few days after pacemaker implantation.   - At this time she will continue her current  regimen of flecainide and Eliquis, she is holding beta blocker as her rates in atrial flutter are well controlled and she is having symptomatic conversion pauses.     I had a lengthy discussion with the patient about atrial fibrillation. We discussed the demographic features of the disease, the associated risk or stroke, and the implications of atrial fibrillation concerning quality of life, dementia, heart failure, dementia, and longevity. I explained to the patient that the main goal of therapy is stroke prevention. We reviewed his CHADSVasc risk score and estimated his annual risk of stroke. Based on this assessment we discussed recommendations concerning anticoagulation. We also discussed the availability of coumadin as well as the newer anticoagulant agents including rivaroxaban, dabigatran, and apixaban. We then discussed that the other goal of treatment of maintaining quality of life. We discussed in detail the strategies of rate versus rhythm control. We discussed in detail the risks and benefits of rate control alone, antiarrhythmic drug therapy, catheter ablation, and surgical ablation. All of the patients questions were addressed in full.    I spent 45 minutes with the patient for record review, examination, care coordination and counseling.     This letter was generated using speech recognition software. Please excuse any typographical errors.       Neel Pizano MD, [facility]  Cardiac Electrophysiology  [facility]  4/7/2022

## 2022-04-07 NOTE — PROGRESS NOTES
Electrophysiology         Reason for visit: Atrial fibrillation with prolonged conversion pauses   Referred by : Dr. Boateng      History of Present Illness:    Ms. Angeles Cardoza is a 76 year old female with a past medical history significant for hyperlipidemia, hypertension, and a recent diagnosis of atrial fibrillation in November of 2021. She was referred to our office by her primary cardiologist, Dr. Boateng.     Patient was originally diagnosed with atrial fibrillation in November of 2021 when she presented to an OSH for acute diverticulitis/colitis flare up. She was initiated on Eliquis 5 mg BID and amiodarone at that time. ECHO demonstrated normal LV function with an EF of 60-65% and normal bi-lateral atria size. She spontaneously converted back to normal rhythm. She followed up with Dr. Boateng where it was noted that she was unable to tolerate amiodarone due to GI side effects of nausea and vomiting. Amiodarone was discontinued and the patient was initiated on flecainide 50 mg BID. A stress test was completed which was unremarkable and a MCOT was ordered to assess the true burden of atrial fibrillation. The patient reported minimal signs or symptoms associated with the arrhythmia, but does note that she had intermittent shortness of breath at times and reports that the vein in her neck would rapidly pulsate. She did not endorse any chest pain or palpitations. She could not answer if she had any true dizziness or lightheadedness. The MCOT results did demonstrate an elevated burden of atrial fibrillation around 40% but what was more concerning was the prolonged and frequent conversion pauses. The patient was noted to have 26 pauses on her MCOT with some last up to 6 seconds. She was therefore referred to our office for further management of atrial fibrillation on possible pacemaker evaluation.     A comprehensive 15-point review of systems was performed and was negative unless specifically  mentioned in the History of Present Illness.        Past Medical History:   Diagnosis Date   • Acid reflux    • Hypertension    • Hypertension, benign 8/27/2012    Overview:    • Lipid disorder        Past Surgical History:   Procedure Laterality Date   • BREAST SURGERY  2003    breast reduction   • RENAL ANGIOPLASTY     • TOE SURGERY Left    • TONSILLECTOMY     • WISDOM TOOTH EXTRACTION         Current Outpatient Medications on File Prior to Visit   Medication Sig Dispense Refill   • amLODIPine 5 mg tablet Take 1 tablet (5 mg total) by mouth 2 (two) times a day. 180 tablet 1   • ascorbic acid (VITAMIN C) 250 mg tablet Take 250 mg by mouth daily.     • atorvastatin (LIPITOR) 40 mg tablet Take 40 mg by mouth daily.       • calcium carbonate (CALCIUM 500 ORAL) Take by mouth daily.     • cholecalciferol, vitamin D3, 1,000 unit (25 mcg) tablet Take 2,000 Units by mouth daily.      • cyanocobalamin (VITAMIN B12) 100 mcg tablet Take 100 mcg by mouth daily.     • ELIQUIS 5 mg tablet Take 1 tablet (5 mg total) by mouth 2 (two) times a day. 180 tablet 3   • flecainide (TAMBOCOR) 50 mg tablet Take 1 tablet (50 mg total) by mouth 2 (two) times a day. 180 tablet 3   • meclizine (ANTIVERT) 12.5 mg tablet Take 1 tablet (12.5 mg total) by mouth 3 (three) times a day as needed for dizziness for up to 5 days. 15 tablet 0   • MELATONIN ORAL Take by mouth as needed.     • omeprazole (PriLOSEC) 40 mg capsule Take 40 mg by mouth daily and an additional dose as needed.    3   • venlafaxine XR (EFFEXOR-XR) 75 mg 24 hr capsule Take 75 mg by mouth daily.       • NOT IN DATABASE [facility]ona   Two once daily       No current facility-administered medications on file prior to visit.       Allergies, Social History and Family History were reviewed and updated in EMR.     Physical Examination:  Vitals:    04/07/22 1319   BP: 124/80   Pulse: 89   Resp: 15     Constitutional: The patient appeared physically well and in no acute distress.  Respiratory:  Clear to auscultation, no wheezes or rubs.  Cardiovascular: A comprehensive cardiovascular examination was performed. Highlights include normal jugular venous pressure, 2+ carotids, no bruits. The precordium is quiet. Regular rhythm, rate, S1 and S2 normal, no rubs, or gallops were appreciated. Murmurs: No pathologic murmurs appreciated.  Musculoskeletal/ Extremities: No edema, normal peripheral pulses.  Skin: No rashes or lesions apparent.       Lab Results   Component Value Date    WBC 7.3 02/22/2022    HGB 13.2 02/22/2022    HCT 39.1 02/22/2022     02/22/2022    ALT 25 01/21/2022    AST 30 01/21/2022     02/22/2022    K 4.2 02/22/2022    CREATININE 1.10 (H) 02/22/2022    BUN 28 (H) 02/22/2022    TSH 2.05 01/21/2022    INR 0.9 11/13/2016       Cardiac Imaging    ECHOCARDIOGRAM - EXTERNAL RESULT     Narrative  Ordered by an unspecified provider.    Most recent stress test results:    CV NM STRESS EXERCISE WITH MYOC PERF SPECT MULTI 01/20/2022 (Final) 1/20/2022    Interpretation Summary  1. Exercise technetium tetrofosmin shows no evidence of scar or ischemia.  2. ECG shows no ST changes diagnostic of ischemia. Intermittent atrial fibrillation though the study at rest, exercise and recovery.  3. Gated SPECT obtained in the resting state post stress shows left ventricular ejection fraction of 72%.  All walls show normal thickening and endocardial excursion. No regional wall motion abnormalities are noted.  4. Duke Treadmill score of 7 which correlates with a low risk study. The patient exercised for 7 minutes and 10 seconds achieving 10.1 METS.  5. No prior study for comparison.  6. This is a normal study.    ECG: typical atrial flutter with controlled ventricular rate of 89 bpm        Assessment and plan:  Ms. Angeles Cardoza is a 76 year old female with a past medical history significant for hyperlipidemia, hypertension, and a recent diagnosis of atrial fibrillation in November of 2021. She was  referred to our office by her primary cardiologist, Dr. Boateng, for further management of her atrial fibrillation and frequent conversion pauses.     #Atrial fibrillation   - Continue Eliquis 5 mg BID for systemic anticoagulation. Unfortunately the patient notes that she has missed a few doses of Eliquis since initiation and I reviewed the importance of anticoagulation compliance to prevent stroke.  - Given the frequency and length of these pauses and the paroxsymal nature of her arrhythmia, it is reasonable to pursue pacemaker implantation for prevention of conversion pauses and allow for adequate medical management of her arrhythmia   - I discussed the procedure of pacemaker implantation and the risks and benefits with the patient. All of her questions were answered to her satisfaction and the decision was made to move forward with pacemaker implantation.  - Additionally, it is reasonable to pursue rhythm control in this patient as she is newly diagnosed with atrial fibrillation and heart failure symptoms. Maintaining normal rhythm with also help to minimize the number of conversion pauses. We discussed different antiarrythmic medications for this patient, she already did not tolerate amiodarone and she is currently failing flecainide.  We will proceed with Tikosyn loading a few days after pacemaker implantation.   - At this time she will continue her current regimen of flecainide and Eliquis, she is holding beta blocker as her rates in atrial flutter are well controlled and she is having symptomatic conversion pauses.     I had a lengthy discussion with the patient about atrial fibrillation. We discussed the demographic features of the disease, the associated risk or stroke, and the implications of atrial fibrillation concerning quality of life, dementia, heart failure, dementia, and longevity. I explained to the patient that the main goal of therapy is stroke prevention. We reviewed his CHADSVasc risk score  and estimated his annual risk of stroke. Based on this assessment we discussed recommendations concerning anticoagulation. We also discussed the availability of coumadin as well as the newer anticoagulant agents including rivaroxaban, dabigatran, and apixaban. We then discussed that the other goal of treatment of maintaining quality of life. We discussed in detail the strategies of rate versus rhythm control. We discussed in detail the risks and benefits of rate control alone, antiarrhythmic drug therapy, catheter ablation, and surgical ablation. All of the patients questions were addressed in full.    I spent 45 minutes with the patient for record review, examination, care coordination and counseling.     This letter was generated using speech recognition software. Please excuse any typographical errors.       Neel Pizano MD, [facility]  Cardiac Electrophysiology  [facility]  4/7/2022

## 2022-04-07 NOTE — H&P (VIEW-ONLY)
Electrophysiology         Reason for visit: Atrial fibrillation with prolonged conversion pauses   Referred by : Dr. Boateng      History of Present Illness:    Ms. Angeles Cardoza is a 76 year old female with a past medical history significant for hyperlipidemia, hypertension, and a recent diagnosis of atrial fibrillation in November of 2021. She was referred to our office by her primary cardiologist, Dr. Boateng.     Patient was originally diagnosed with atrial fibrillation in November of 2021 when she presented to an OSH for acute diverticulitis/colitis flare up. She was initiated on Eliquis 5 mg BID and amiodarone at that time. ECHO demonstrated normal LV function with an EF of 60-65% and normal bi-lateral atria size. She spontaneously converted back to normal rhythm. She followed up with Dr. Boateng where it was noted that she was unable to tolerate amiodarone due to GI side effects of nausea and vomiting. Amiodarone was discontinued and the patient was initiated on flecainide 50 mg BID. A stress test was completed which was unremarkable and a MCOT was ordered to assess the true burden of atrial fibrillation. The patient reported minimal signs or symptoms associated with the arrhythmia, but does note that she had intermittent shortness of breath at times and reports that the vein in her neck would rapidly pulsate. She did not endorse any chest pain or palpitations. She could not answer if she had any true dizziness or lightheadedness. The MCOT results did demonstrate an elevated burden of atrial fibrillation around 40% but what was more concerning was the prolonged and frequent conversion pauses. The patient was noted to have 26 pauses on her MCOT with some last up to 6 seconds. She was therefore referred to our office for further management of atrial fibrillation on possible pacemaker evaluation.     A comprehensive 15-point review of systems was performed and was negative unless specifically  mentioned in the History of Present Illness.        Past Medical History:   Diagnosis Date   • Acid reflux    • Hypertension    • Hypertension, benign 8/27/2012    Overview:    • Lipid disorder        Past Surgical History:   Procedure Laterality Date   • BREAST SURGERY  2003    breast reduction   • RENAL ANGIOPLASTY     • TOE SURGERY Left    • TONSILLECTOMY     • WISDOM TOOTH EXTRACTION         Current Outpatient Medications on File Prior to Visit   Medication Sig Dispense Refill   • amLODIPine 5 mg tablet Take 1 tablet (5 mg total) by mouth 2 (two) times a day. 180 tablet 1   • ascorbic acid (VITAMIN C) 250 mg tablet Take 250 mg by mouth daily.     • atorvastatin (LIPITOR) 40 mg tablet Take 40 mg by mouth daily.       • calcium carbonate (CALCIUM 500 ORAL) Take by mouth daily.     • cholecalciferol, vitamin D3, 1,000 unit (25 mcg) tablet Take 2,000 Units by mouth daily.      • cyanocobalamin (VITAMIN B12) 100 mcg tablet Take 100 mcg by mouth daily.     • ELIQUIS 5 mg tablet Take 1 tablet (5 mg total) by mouth 2 (two) times a day. 180 tablet 3   • flecainide (TAMBOCOR) 50 mg tablet Take 1 tablet (50 mg total) by mouth 2 (two) times a day. 180 tablet 3   • meclizine (ANTIVERT) 12.5 mg tablet Take 1 tablet (12.5 mg total) by mouth 3 (three) times a day as needed for dizziness for up to 5 days. 15 tablet 0   • MELATONIN ORAL Take by mouth as needed.     • omeprazole (PriLOSEC) 40 mg capsule Take 40 mg by mouth daily and an additional dose as needed.    3   • venlafaxine XR (EFFEXOR-XR) 75 mg 24 hr capsule Take 75 mg by mouth daily.       • NOT IN DATABASE Lona   Two once daily       No current facility-administered medications on file prior to visit.       Allergies, Social History and Family History were reviewed and updated in EMR.     Physical Examination:  Vitals:    04/07/22 1319   BP: 124/80   Pulse: 89   Resp: 15     Constitutional: The patient appeared physically well and in no acute distress.  Respiratory:  Clear to auscultation, no wheezes or rubs.  Cardiovascular: A comprehensive cardiovascular examination was performed. Highlights include normal jugular venous pressure, 2+ carotids, no bruits. The precordium is quiet. Regular rhythm, rate, S1 and S2 normal, no rubs, or gallops were appreciated. Murmurs: No pathologic murmurs appreciated.  Musculoskeletal/ Extremities: No edema, normal peripheral pulses.  Skin: No rashes or lesions apparent.       Lab Results   Component Value Date    WBC 7.3 02/22/2022    HGB 13.2 02/22/2022    HCT 39.1 02/22/2022     02/22/2022    ALT 25 01/21/2022    AST 30 01/21/2022     02/22/2022    K 4.2 02/22/2022    CREATININE 1.10 (H) 02/22/2022    BUN 28 (H) 02/22/2022    TSH 2.05 01/21/2022    INR 0.9 11/13/2016       Cardiac Imaging    ECHOCARDIOGRAM - EXTERNAL RESULT     Narrative  Ordered by an unspecified provider.    Most recent stress test results:    CV NM STRESS EXERCISE WITH MYOC PERF SPECT MULTI 01/20/2022 (Final) 1/20/2022    Interpretation Summary  1. Exercise technetium tetrofosmin shows no evidence of scar or ischemia.  2. ECG shows no ST changes diagnostic of ischemia. Intermittent atrial fibrillation though the study at rest, exercise and recovery.  3. Gated SPECT obtained in the resting state post stress shows left ventricular ejection fraction of 72%.  All walls show normal thickening and endocardial excursion. No regional wall motion abnormalities are noted.  4. Duke Treadmill score of 7 which correlates with a low risk study. The patient exercised for 7 minutes and 10 seconds achieving 10.1 METS.  5. No prior study for comparison.  6. This is a normal study.    ECG: typical atrial flutter with controlled ventricular rate of 89 bpm        Assessment and plan:  Ms. Angeles Cardoza is a 76 year old female with a past medical history significant for hyperlipidemia, hypertension, and a recent diagnosis of atrial fibrillation in November of 2021. She was  referred to our office by her primary cardiologist, Dr. Boateng, for further management of her atrial fibrillation and frequent conversion pauses.     #Atrial fibrillation   - Continue Eliquis 5 mg BID for systemic anticoagulation. Unfortunately the patient notes that she has missed a few doses of Eliquis since initiation and I reviewed the importance of anticoagulation compliance to prevent stroke.  - Given the frequency and length of these pauses and the paroxsymal nature of her arrhythmia, it is reasonable to pursue pacemaker implantation for prevention of conversion pauses and allow for adequate medical management of her arrhythmia   - I discussed the procedure of pacemaker implantation and the risks and benefits with the patient. All of her questions were answered to her satisfaction and the decision was made to move forward with pacemaker implantation.  - Additionally, it is reasonable to pursue rhythm control in this patient as she is newly diagnosed with atrial fibrillation and heart failure symptoms. Maintaining normal rhythm with also help to minimize the number of conversion pauses. We discussed different antiarrythmic medications for this patient, she already did not tolerate amiodarone and she is currently failing flecainide.  We will proceed with Tikosyn loading a few days after pacemaker implantation.   - At this time she will continue her current regimen of flecainide and Eliquis, she is holding beta blocker as her rates in atrial flutter are well controlled and she is having symptomatic conversion pauses.     I had a lengthy discussion with the patient about atrial fibrillation. We discussed the demographic features of the disease, the associated risk or stroke, and the implications of atrial fibrillation concerning quality of life, dementia, heart failure, dementia, and longevity. I explained to the patient that the main goal of therapy is stroke prevention. We reviewed his CHADSVasc risk score  and estimated his annual risk of stroke. Based on this assessment we discussed recommendations concerning anticoagulation. We also discussed the availability of coumadin as well as the newer anticoagulant agents including rivaroxaban, dabigatran, and apixaban. We then discussed that the other goal of treatment of maintaining quality of life. We discussed in detail the strategies of rate versus rhythm control. We discussed in detail the risks and benefits of rate control alone, antiarrhythmic drug therapy, catheter ablation, and surgical ablation. All of the patients questions were addressed in full.    I spent 45 minutes with the patient for record review, examination, care coordination and counseling.     This letter was generated using speech recognition software. Please excuse any typographical errors.       Neel Pizano MD, [facility]  Cardiac Electrophysiology  [facility]  4/7/2022

## 2022-04-08 ENCOUNTER — TELEPHONE (OUTPATIENT)
Dept: SCHEDULING | Facility: CLINIC | Age: 77
End: 2022-04-08
Payer: MEDICARE

## 2022-04-08 PROBLEM — I48.19 OTHER PERSISTENT ATRIAL FIBRILLATION: Status: ACTIVE | Noted: 2022-04-08

## 2022-04-08 NOTE — TELEPHONE ENCOUNTER
Team would you mind clarifying with patient and assist her with scheduling pacer?  My understanding is there is a plan to admit for tikosyn as well; same admission.  Would appreciate!

## 2022-04-08 NOTE — TELEPHONE ENCOUNTER
Pt inquiring how to schedule her pacemaker implantation procedure.      Pt can be reached at [phone] (B)

## 2022-04-08 NOTE — TELEPHONE ENCOUNTER
Ned Pederson,     I should be able to implant the pacemaker on Tuesday 4/12/2022 after my PVI.     Company: [employer]  Hold Eliquis the night and morning of the procedure.     Thanks  Neel

## 2022-04-08 NOTE — TELEPHONE ENCOUNTER
Spoke with pt. First available is 5/10/22. Advised pt that I would give a call back after it is confirmed that there is or isn't anything sooner.

## 2022-04-08 NOTE — TELEPHONE ENCOUNTER
Spoke to pt. Pt scheduled for procedure. Covid Test and labs to be done at [facility]. Are there any meds to hold? Device Company? Please advise

## 2022-04-08 NOTE — TELEPHONE ENCOUNTER
fyi-pt wants Dr. Boateng to know next available date is 5.10.22 for pacemaker implantation    Pt can be reached at [phone]

## 2022-04-11 ENCOUNTER — APPOINTMENT (OUTPATIENT)
Dept: LAB | Facility: HOSPITAL | Age: 77
End: 2022-04-11
Payer: MEDICARE

## 2022-04-11 ENCOUNTER — TRANSCRIBE ORDERS (OUTPATIENT)
Dept: CARDIOLOGY | Facility: CLINIC | Age: 77
End: 2022-04-11

## 2022-04-11 DIAGNOSIS — Z20.822 CONTACT WITH AND (SUSPECTED) EXPOSURE TO COVID-19: ICD-10-CM

## 2022-04-11 DIAGNOSIS — I48.0 PAROXYSMAL ATRIAL FIBRILLATION (CMS/HCC): ICD-10-CM

## 2022-04-11 DIAGNOSIS — Z01.812 ENCOUNTER FOR PREPROCEDURAL LABORATORY EXAMINATION: Primary | ICD-10-CM

## 2022-04-11 DIAGNOSIS — Z01.812 ENCOUNTER FOR PREPROCEDURAL LABORATORY EXAMINATION: ICD-10-CM

## 2022-04-11 DIAGNOSIS — R06.02 SHORTNESS OF BREATH: ICD-10-CM

## 2022-04-11 LAB
ANION GAP SERPL CALC-SCNC: 11 MEQ/L (ref 3–15)
BUN SERPL-MCNC: 23 MG/DL (ref 8–20)
CALCIUM SERPL-MCNC: 9.8 MG/DL (ref 8.9–10.3)
CHLORIDE SERPL-SCNC: 100 MEQ/L (ref 98–109)
CO2 SERPL-SCNC: 26 MEQ/L (ref 22–32)
CREAT SERPL-MCNC: 1.3 MG/DL (ref 0.6–1.1)
ERYTHROCYTE [DISTWIDTH] IN BLOOD BY AUTOMATED COUNT: 15.4 % (ref 11.7–14.4)
GFR SERPL CREATININE-BSD FRML MDRD: 39.6 ML/MIN/1.73M*2
GLUCOSE SERPL-MCNC: 92 MG/DL (ref 70–99)
HCT VFR BLDCO AUTO: 42.2 % (ref 35–45)
HGB BLD-MCNC: 13.6 G/DL (ref 11.8–15.7)
MAGNESIUM SERPL-MCNC: 1.8 MG/DL (ref 1.8–2.5)
MCH RBC QN AUTO: 29.4 PG (ref 28–33.2)
MCHC RBC AUTO-ENTMCNC: 32.2 G/DL (ref 32.2–35.5)
MCV RBC AUTO: 91.1 FL (ref 83–98)
PDW BLD AUTO: 10.1 FL (ref 9.4–12.3)
PLATELET # BLD AUTO: 427 K/UL (ref 150–369)
POTASSIUM SERPL-SCNC: 4.3 MEQ/L (ref 3.6–5.1)
RBC # BLD AUTO: 4.63 M/UL (ref 3.93–5.22)
SARS-COV-2 RNA RESP QL NAA+PROBE: NEGATIVE
SODIUM SERPL-SCNC: 137 MEQ/L (ref 136–144)
WBC # BLD AUTO: 7.77 K/UL (ref 3.8–10.5)

## 2022-04-11 PROCEDURE — C9803 HOPD COVID-19 SPEC COLLECT: HCPCS

## 2022-04-11 PROCEDURE — 83735 ASSAY OF MAGNESIUM: CPT

## 2022-04-11 PROCEDURE — 36415 COLL VENOUS BLD VENIPUNCTURE: CPT

## 2022-04-11 PROCEDURE — 80048 BASIC METABOLIC PNL TOTAL CA: CPT

## 2022-04-11 PROCEDURE — U0003 INFECTIOUS AGENT DETECTION BY NUCLEIC ACID (DNA OR RNA); SEVERE ACUTE RESPIRATORY SYNDROME CORONAVIRUS 2 (SARS-COV-2) (CORONAVIRUS DISEASE [COVID-19]), AMPLIFIED PROBE TECHNIQUE, MAKING USE OF HIGH THROUGHPUT TECHNOLOGIES AS DESCRIBED BY CMS-2020-01-R: HCPCS

## 2022-04-11 PROCEDURE — 85027 COMPLETE CBC AUTOMATED: CPT

## 2022-04-11 NOTE — TELEPHONE ENCOUNTER
Pt requesting call back from [facility].    Pt can be reached at [phone]

## 2022-04-11 NOTE — TELEPHONE ENCOUNTER
Spoke to pt. Pt rescheduled for procedure tomorrow. Pt will have Covid test and labs done today @ [facility]

## 2022-04-12 ENCOUNTER — ANESTHESIA EVENT (OUTPATIENT)
Dept: CARDIOLOGY | Facility: HOSPITAL | Age: 77
Setting detail: HOSPITAL OUTPATIENT SURGERY
End: 2022-04-12
Payer: MEDICARE

## 2022-04-12 ENCOUNTER — APPOINTMENT (OUTPATIENT)
Dept: RADIOLOGY | Facility: HOSPITAL | Age: 77
Setting detail: HOSPITAL OUTPATIENT SURGERY
End: 2022-04-12
Attending: INTERNAL MEDICINE
Payer: MEDICARE

## 2022-04-12 ENCOUNTER — HOSPITAL ENCOUNTER (OUTPATIENT)
Facility: HOSPITAL | Age: 77
Setting detail: HOSPITAL OUTPATIENT SURGERY
Discharge: HOME | End: 2022-04-12
Attending: INTERNAL MEDICINE | Admitting: INTERNAL MEDICINE
Payer: MEDICARE

## 2022-04-12 VITALS
HEART RATE: 78 BPM | HEIGHT: 60 IN | SYSTOLIC BLOOD PRESSURE: 133 MMHG | TEMPERATURE: 98.9 F | DIASTOLIC BLOOD PRESSURE: 75 MMHG | RESPIRATION RATE: 31 BRPM | WEIGHT: 114 LBS | OXYGEN SATURATION: 93 % | BODY MASS INDEX: 22.38 KG/M2

## 2022-04-12 DIAGNOSIS — I49.5 SICK SINUS SYNDROME (CMS/HCC): Primary | ICD-10-CM

## 2022-04-12 DIAGNOSIS — I48.0 PAROXYSMAL ATRIAL FIBRILLATION (CMS/HCC): ICD-10-CM

## 2022-04-12 DIAGNOSIS — I48.19 OTHER PERSISTENT ATRIAL FIBRILLATION: ICD-10-CM

## 2022-04-12 LAB
ATRIAL RATE: 78
ATRIAL RATE: 80
P AXIS: -5
P AXIS: 56
PR INTERVAL: 170
PR INTERVAL: 186
QRS DURATION: 80
QRS DURATION: 90
QT INTERVAL: 394
QT INTERVAL: 400
QTC CALCULATION(BAZETT): 449
QTC CALCULATION(BAZETT): 461
R AXIS: 23
R AXIS: 59
T WAVE AXIS: 73
T WAVE AXIS: 99
VENTRICULAR RATE: 78
VENTRICULAR RATE: 80

## 2022-04-12 PROCEDURE — 0JH606Z INSERTION OF PACEMAKER, DUAL CHAMBER INTO CHEST SUBCUTANEOUS TISSUE AND FASCIA, OPEN APPROACH: ICD-10-PCS | Performed by: INTERNAL MEDICINE

## 2022-04-12 PROCEDURE — C1898 LEAD, PMKR, OTHER THAN TRANS: HCPCS | Performed by: INTERNAL MEDICINE

## 2022-04-12 PROCEDURE — 93005 ELECTROCARDIOGRAM TRACING: CPT | Performed by: INTERNAL MEDICINE

## 2022-04-12 PROCEDURE — 63700000 HC SELF-ADMINISTRABLE DRUG: Performed by: PHYSICIAN ASSISTANT

## 2022-04-12 PROCEDURE — 63600000 HC DRUGS/DETAIL CODE: Performed by: INTERNAL MEDICINE

## 2022-04-12 PROCEDURE — 63600000 HC DRUGS/DETAIL CODE: Mod: JW | Performed by: NURSE ANESTHETIST, CERTIFIED REGISTERED

## 2022-04-12 PROCEDURE — 25800000 HC PHARMACY IV SOLUTIONS: Performed by: NURSE ANESTHETIST, CERTIFIED REGISTERED

## 2022-04-12 PROCEDURE — 33208 INSRT HEART PM ATRIAL & VENT: CPT | Mod: KX | Performed by: INTERNAL MEDICINE

## 2022-04-12 PROCEDURE — 93010 ELECTROCARDIOGRAM REPORT: CPT | Mod: 76,XP | Performed by: INTERNAL MEDICINE

## 2022-04-12 PROCEDURE — 25800000 HC PHARMACY IV SOLUTIONS: Performed by: INTERNAL MEDICINE

## 2022-04-12 PROCEDURE — C1785 PMKR, DUAL, RATE-RESP: HCPCS | Performed by: INTERNAL MEDICINE

## 2022-04-12 PROCEDURE — 02HK3JZ INSERTION OF PACEMAKER LEAD INTO RIGHT VENTRICLE, PERCUTANEOUS APPROACH: ICD-10-PCS | Performed by: INTERNAL MEDICINE

## 2022-04-12 PROCEDURE — 71045 X-RAY EXAM CHEST 1 VIEW: CPT

## 2022-04-12 PROCEDURE — 25000000 HC PHARMACY GENERAL: Performed by: NURSE ANESTHETIST, CERTIFIED REGISTERED

## 2022-04-12 PROCEDURE — 71000011 HC PACU PHASE 1 EA ADDL MIN: Performed by: INTERNAL MEDICINE

## 2022-04-12 PROCEDURE — 93005 ELECTROCARDIOGRAM TRACING: CPT | Performed by: PHYSICIAN ASSISTANT

## 2022-04-12 PROCEDURE — 71000001 HC PACU PHASE 1 INITIAL 30MIN: Performed by: INTERNAL MEDICINE

## 2022-04-12 PROCEDURE — 25000000 HC PHARMACY GENERAL: Performed by: INTERNAL MEDICINE

## 2022-04-12 PROCEDURE — 02H63JZ INSERTION OF PACEMAKER LEAD INTO RIGHT ATRIUM, PERCUTANEOUS APPROACH: ICD-10-PCS | Performed by: INTERNAL MEDICINE

## 2022-04-12 PROCEDURE — 37000002 HC ANESTHESIA MAC: Performed by: INTERNAL MEDICINE

## 2022-04-12 PROCEDURE — C1887 CATHETER, GUIDING: HCPCS | Performed by: INTERNAL MEDICINE

## 2022-04-12 PROCEDURE — C1894 INTRO/SHEATH, NON-LASER: HCPCS | Performed by: INTERNAL MEDICINE

## 2022-04-12 PROCEDURE — 33217 INSERT 2 ELECTRODE PM-DEFIB: CPT | Performed by: INTERNAL MEDICINE

## 2022-04-12 DEVICE — LEAD 383069 MRI US
Type: IMPLANTABLE DEVICE | Site: HEART | Status: FUNCTIONAL
Brand: SELECTSECURE™ MRI SURESCAN™

## 2022-04-12 DEVICE — IPG W1DR01 AZURE XT DR MRI USA
Type: IMPLANTABLE DEVICE | Site: CHEST  WALL | Status: FUNCTIONAL
Brand: AZURE™ XT DR MRI SURESCAN™

## 2022-04-12 DEVICE — LEAD 5076-45 MRI US RCMCRD
Type: IMPLANTABLE DEVICE | Site: HEART | Status: FUNCTIONAL
Brand: CAPSUREFIX NOVUS MRI™ SURESCAN®

## 2022-04-12 RX ORDER — DEXTROSE 40 %
15-30 GEL (GRAM) ORAL AS NEEDED
Status: CANCELLED | OUTPATIENT
Start: 2022-04-12

## 2022-04-12 RX ORDER — MIDAZOLAM HYDROCHLORIDE 2 MG/2ML
INJECTION, SOLUTION INTRAMUSCULAR; INTRAVENOUS AS NEEDED
Status: DISCONTINUED | OUTPATIENT
Start: 2022-04-12 | End: 2022-04-12 | Stop reason: SURG

## 2022-04-12 RX ORDER — PROPOFOL 10 MG/ML
INJECTION, EMULSION INTRAVENOUS CONTINUOUS PRN
Status: DISCONTINUED | OUTPATIENT
Start: 2022-04-12 | End: 2022-04-12 | Stop reason: SURG

## 2022-04-12 RX ORDER — LIDOCAINE HYDROCHLORIDE 10 MG/ML
INJECTION, SOLUTION INFILTRATION; PERINEURAL
Status: DISCONTINUED | OUTPATIENT
Start: 2022-04-12 | End: 2022-04-12 | Stop reason: HOSPADM

## 2022-04-12 RX ORDER — OXYCODONE HYDROCHLORIDE 5 MG/1
5 TABLET ORAL EVERY 4 HOURS PRN
Status: DISCONTINUED | OUTPATIENT
Start: 2022-04-12 | End: 2022-04-13 | Stop reason: HOSPADM

## 2022-04-12 RX ORDER — IBUPROFEN 200 MG
16-32 TABLET ORAL AS NEEDED
Status: CANCELLED | OUTPATIENT
Start: 2022-04-12

## 2022-04-12 RX ORDER — APIXABAN 5 MG/1
5 TABLET, FILM COATED ORAL 2 TIMES DAILY
Qty: 60 TABLET | Refills: 0
Start: 2022-04-13 | End: 2023-01-06

## 2022-04-12 RX ORDER — DEXTROSE 50 % IN WATER (D50W) INTRAVENOUS SYRINGE
25 AS NEEDED
Status: CANCELLED | OUTPATIENT
Start: 2022-04-12

## 2022-04-12 RX ORDER — METOPROLOL SUCCINATE 50 MG/1
25 TABLET, EXTENDED RELEASE ORAL 2 TIMES DAILY
Qty: 30 TABLET | Refills: 3 | Status: SHIPPED | OUTPATIENT
Start: 2022-04-12 | End: 2022-05-02

## 2022-04-12 RX ORDER — FENTANYL CITRATE 50 UG/ML
INJECTION, SOLUTION INTRAMUSCULAR; INTRAVENOUS AS NEEDED
Status: DISCONTINUED | OUTPATIENT
Start: 2022-04-12 | End: 2022-04-12 | Stop reason: SURG

## 2022-04-12 RX ORDER — SODIUM CHLORIDE 9 MG/ML
INJECTION, SOLUTION INTRAVENOUS CONTINUOUS PRN
Status: DISCONTINUED | OUTPATIENT
Start: 2022-04-12 | End: 2022-04-12 | Stop reason: SURG

## 2022-04-12 RX ORDER — LIDOCAINE HYDROCHLORIDE 10 MG/ML
INJECTION, SOLUTION EPIDURAL; INFILTRATION; INTRACAUDAL; PERINEURAL AS NEEDED
Status: DISCONTINUED | OUTPATIENT
Start: 2022-04-12 | End: 2022-04-12 | Stop reason: SURG

## 2022-04-12 RX ORDER — ACETAMINOPHEN 325 MG/1
650 TABLET ORAL EVERY 4 HOURS PRN
Start: 2022-04-12 | End: 2022-05-12

## 2022-04-12 RX ORDER — ACETAMINOPHEN 325 MG/1
975 TABLET ORAL EVERY 6 HOURS PRN
Status: DISCONTINUED | OUTPATIENT
Start: 2022-04-12 | End: 2022-04-13 | Stop reason: HOSPADM

## 2022-04-12 RX ADMIN — ACETAMINOPHEN 975 MG: 325 TABLET ORAL at 13:23

## 2022-04-12 RX ADMIN — FENTANYL CITRATE 50 MCG: 50 INJECTION, SOLUTION INTRAMUSCULAR; INTRAVENOUS at 10:03

## 2022-04-12 RX ADMIN — MIDAZOLAM HYDROCHLORIDE 0.5 MG: 1 INJECTION, SOLUTION INTRAMUSCULAR; INTRAVENOUS at 10:31

## 2022-04-12 RX ADMIN — LIDOCAINE HYDROCHLORIDE 5 ML: 10 INJECTION, SOLUTION EPIDURAL; INFILTRATION; INTRACAUDAL; PERINEURAL at 10:03

## 2022-04-12 RX ADMIN — MIDAZOLAM HYDROCHLORIDE 0.5 MG: 1 INJECTION, SOLUTION INTRAMUSCULAR; INTRAVENOUS at 10:15

## 2022-04-12 RX ADMIN — MIDAZOLAM HYDROCHLORIDE 1 MG: 1 INJECTION, SOLUTION INTRAMUSCULAR; INTRAVENOUS at 10:03

## 2022-04-12 RX ADMIN — FENTANYL CITRATE 25 MCG: 50 INJECTION, SOLUTION INTRAMUSCULAR; INTRAVENOUS at 10:31

## 2022-04-12 RX ADMIN — GENTAMICIN SULFATE 150 MG: 40 INJECTION, SOLUTION INTRAMUSCULAR; INTRAVENOUS at 10:10

## 2022-04-12 RX ADMIN — PROPOFOL INJECTABLE EMULSION 50 MCG/KG/MIN: 10 INJECTION, EMULSION INTRAVENOUS at 10:03

## 2022-04-12 RX ADMIN — FENTANYL CITRATE 25 MCG: 50 INJECTION, SOLUTION INTRAMUSCULAR; INTRAVENOUS at 10:15

## 2022-04-12 RX ADMIN — VANCOMYCIN HYDROCHLORIDE 1 G: 500 INJECTION, POWDER, LYOPHILIZED, FOR SOLUTION INTRAVENOUS at 09:15

## 2022-04-12 RX ADMIN — SODIUM CHLORIDE: 9 INJECTION, SOLUTION INTRAVENOUS at 10:01

## 2022-04-12 ASSESSMENT — ENCOUNTER SYMPTOMS: DYSRHYTHMIAS: 1

## 2022-04-12 NOTE — Clinical Note
Patient placed on procedure table in supine position with arms at side. Positioning devices: all pressure points padded, arm board under arms, heel pads in place, safety strap applied, wrist straps in place, donut foam under head and pillow under knees. Defib and grounding pads attached.

## 2022-04-12 NOTE — ANESTHESIA POSTPROCEDURE EVALUATION
Patient: Angeles Cardoza    Procedure Summary     Date: 04/12/22 Room / Location: [facility] EP LAB 4 / [facility] CARDIAC CATH/EP    Anesthesia Start: 1001 Anesthesia Stop: 1219    Procedure: PPM - DUAL CHAMBER NEW (N/A ) Diagnosis:       Paroxysmal atrial fibrillation (CMS/HCC)      Other persistent atrial fibrillation (CMS/HCC)      (atrial fibrillation, conversion pauses)    Providers: Neel Pizano MD Responsible Provider: Juliane Cadet DO    Anesthesia Type: MAC ASA Status: 3          Anesthesia Type: MAC  PACU Vitals  4/12/2022 1207 - 4/12/2022 1307      4/12/2022  1217 4/12/2022  1225 4/12/2022  1232 4/12/2022  1247    BP: 142/65 127/58 127/58 129/61    Pulse: 80 76 80 78    Resp: 23 21 20 18    SpO2: 92 % 94 % 95 % 95 %              4/12/2022  1300 4/12/2022  1302          BP: -- 132/61      Pulse: 72 76      Resp: 20 19      SpO2: 97 % 97 %              Anesthesia Post Evaluation    Pain management: adequate  Patient location during evaluation: PACU  Patient participation: complete - patient participated  Level of consciousness: awake and alert  Cardiovascular status: acceptable  Airway Patency: adequate  Respiratory status: acceptable  Hydration status: acceptable  Anesthetic complications: no

## 2022-04-12 NOTE — Clinical Note
The bilateral chest was site clipped  and prepped with ChloraPrep. The patient was draped in a sterile fashion after allowing for the recommended dry time.

## 2022-04-12 NOTE — POST-PROCEDURE NOTE
Same Day Discharge Note  Dx: sinus node dysfunction  Procedure: dual chamber pacemaker (atrial lead & left bundle lead)     CXR: no pneumothorax; atrial lead and left bundle pacing lead stable   - AV dual paced rhythm (His capture?)     Interrogation:  Deana XT   Mode: AAI -- DDD 60-120bpm  Atrial lead - sensing 3.1 mV, threshold 0.75V @ 0.4msec bipolar, impedence 456 ohms  Ventricular lead - sensing 14.9mV, threshold 0.5V @ 0.4msec bipolar, impedence 589 ohms    I saw and examined the patient prior to discharge. Her left chest incision with dermabond is intact. There is no evidence of bleeding or hematoma. She has no complaints of chest pain, palpitations, SOB. She has remained hemodynamically stable in the post-procedure period. I reviewed her CXR, ECG, and interrogation as noted above. We had an extensive discussion regarding arm restrictions, wound care, medication changes, remote monitoring, and follow up plan. All questions were answered.    Plan: ok for discharge to home today.

## 2022-04-12 NOTE — ANESTHESIA PREPROCEDURE EVALUATION
Relevant Problems   CARDIOVASCULAR   (+) DICKSON (dyspnea on exertion)   (+) Essential hypertension   (+) Hypertension, benign   (+) Hypertensive emergency   (+) Non-rheumatic mitral regurgitation   (+) Nonrheumatic aortic valve insufficiency   (+) Paroxysmal atrial fibrillation (CMS/HCC)   (+) Sick sinus syndrome (CMS/HCC)      RESPIRATORY SYSTEM   (+) DICKSON (dyspnea on exertion)   (+) Shortness of breath       Anesthesia ROS/MED HX      Cardiovascular   hypertension  Dysrhythmias and atrial fibrillation   Echocardiogram reviewed, stress test reviewed, Covid19 Test Reviewed and ECG reviewed  GI/Hepatic   GERD       Past Surgical History:   Procedure Laterality Date   • BREAST SURGERY  2003    breast reduction   • RENAL ANGIOPLASTY     • TOE SURGERY Left    • TONSILLECTOMY     • WISDOM TOOTH EXTRACTION         Physical Exam    Airway   Mallampati: II   TM distance: >3 FB   Neck ROM: full  Cardiovascular - normal   Rhythm: regular   Rate: normalPulmonary - normal   clear to auscultation  Dental    Teeth Problems: chipped and missing        Anesthesia Plan    Plan: MAC    Technique: MAC     Lines and Monitors: PIV     Airway: natural airway / supplemental oxygen   ASA 3  Blood Products:     Use of Blood Products Discussed: Yes     Consented to blood products  Anesthetic plan and risks discussed with: patient  Postop Plan:   Patient Disposition: phase II then home   Pain Management: IV analgesics

## 2022-04-12 NOTE — DISCHARGE INSTRUCTIONS
MEDICATIONS  - Please wait until tomorrow to resume your blood thinner, Eliquis.  - You should resume metoprolol succinate 25mg twice daily.  - You should STOP flecainide.      PACEMAKER DISCHARGE INSTRUCTIONS  * Returning to work, driving, and other daily activities should be discussed with your doctor before discharge from the hospital. Generally, driving is safe after 72 hours unless you are taking narcotics for pain relief.  * Notify your doctor immediately if you notice any of the following: fever, chills, warmth, redness, swelling, or drainage at your incision.  * There are some restrictions for your arm on the same side as your device:  * Do not reach behind your back or lift your arm above shoulder level for four weeks. After this period, it is usually safe to resume normal physical activities.  * Avoid activities that involve exertional or vigorous movements of the affected arm, such as golf or tennis, until you are cleared at your 4-6 week follow up visit.  * Avoid lifting any objects greater than ten pounds for four weeks.  * Many pacemakers/defibrillators are now MRI compatible. Please discuss this further with your electrophysiologist before scheduling an MRI.  * House-hold electronics (microwaves, cellular phones) are safe for use.  * Place the temporary identification card in your wallet at time of discharge and replace it with your permanent card once it is mailed to your home in four to eight weeks. Make sure you inform healthcare providers that you have an implanted device, particularly if surgical procedures are scheduled.  * Please read your device 's information booklet for future reference. Write down any questions you may have and bring a list with you to your follow-up appointment.  * You were provided instructions for use of your home monitoring system. This was also demonstrated for you. Please review the information and connect the monitor at your bedside, if applicable.    *  Please keep all appointments to have your device checked. You will be scheduled for a follow-up appointment to assess your incision in 1-2 weeks. You will also be scheduled to have an office visit for device check in 4-6 weeks. After that, your office device interrogations will usually be every 6-12 months.    WOUND CARE:  * Keep your incision dry for 24 hours. When showering after that, keep the incision out of a direct stream of water for one week, and use soap and water only. Do not scrub the incision. Instead, pat it dry. Do not apply any creams, lotions, or powders to your incision.  * If your incision has steri-strips, do not remove them. They will fall off on their own. You may clip the ends with scissors if they curl up.  * If your incision has medical glue, it will flake off on its own. Do not pick it off.  * You should avoid submerging the incision under water such as in a hot tub, bath tub, swimming pool, or ocean for at least one week.    THINGS YOU SHOULD AVOID:  * Any large magnets including industrial equipment, induction furnaces, welding, or large electrical motors.  * Security systems at airports do not interfere with your device. However, your device is made of metal and will therefore set off metal detectors. Have your device identification card ready for presentation to the . They may search the rest of your body using a handheld wand.  * Some medical procedures use cautery, diathermy, ultrasound therapy, and radiation therapy which may interfere with your device. Notify the performing physician that you have a pacemaker or defibrillator.  * Do not push on the area around your device or twist the device under the skin.

## 2022-04-12 NOTE — Clinical Note
Marla Friedman applied to patient's lower body @ a setting of medium - 38° C. Applied by: Suzanne Braswell RN.

## 2022-04-12 NOTE — Clinical Note
The Pacemaker [place] Xt Dr Mri device was inserted. The leads were placed into the connector and visually verified to be in correct position. Injury current obtained.

## 2022-04-12 NOTE — ANESTHESIOLOGIST PRE-PROCEDURE ATTESTATION
Pre-Procedure Patient Identification:  I am the Primary Anesthesiologist and have identified the patient on 04/12/22 at 9:48 AM.   I have confirmed the procedure(s) will be performed by the following surgeon/proceduralist Neel Pizano MD.

## 2022-04-12 NOTE — INTERVAL H&P NOTE
H&P reviewed. The patient was examined and there are no changes to the H&P.    The procedure of pacemaker implant was explained to the patient including the operation, risks and potential benefits.  Risks include (but are not limited to) chronic pain at the implant site, bruising and/or bleeding at the site, allergic reaction to or kidney damage from contrast (if used)  venous occlusion or damage to the blood vessel, infection, pneumothorax, pericardial tamponade, dislodgement of a wire requiring reoperation, fracture of a wire over time, malfunction of the pacemaker, and the very unlikely risks of stroke, heart attack and death. The option of not having the operation done and the risks of that alternative was discussed as well.  After reviewing all of this information, the decision was made to proceed with implant of the pacemaker.

## 2022-04-13 ENCOUNTER — TELEPHONE (OUTPATIENT)
Dept: CARDIOLOGY | Facility: CLINIC | Age: 77
End: 2022-04-13
Payer: MEDICARE

## 2022-04-13 DIAGNOSIS — I48.0 PAROXYSMAL ATRIAL FIBRILLATION (CMS/HCC): Primary | ICD-10-CM

## 2022-04-13 LAB
ATRIAL RATE: 84
ATRIAL RATE: 92
P AXIS: -6
P AXIS: 51
PR INTERVAL: 160
PR INTERVAL: 186
QRS DURATION: 80
QRS DURATION: 96
QT INTERVAL: 292
QT INTERVAL: 386
QTC CALCULATION(BAZETT): 345
QTC CALCULATION(BAZETT): 477
R AXIS: 127
R AXIS: 22
T WAVE AXIS: 76
T WAVE AXIS: 95
VENTRICULAR RATE: 84
VENTRICULAR RATE: 92

## 2022-04-13 PROCEDURE — 93010 ELECTROCARDIOGRAM REPORT: CPT | Mod: 76 | Performed by: INTERNAL MEDICINE

## 2022-04-13 NOTE — TELEPHONE ENCOUNTER
Hi team,     Can you please initiate the admission for Tikosyn load for this patient please.     Continue all oral medications  Continue Eliquis. No medication hold    Neel Simpson

## 2022-04-13 NOTE — TELEPHONE ENCOUNTER
Spoke to pt. Pt scheduled for Tikosyn admission. Covid test to be done at [facility] on 4/19/22. Can order for Covid test be placed?

## 2022-04-19 ENCOUNTER — APPOINTMENT (OUTPATIENT)
Dept: LAB | Age: 77
DRG: 310 | End: 2022-04-19
Payer: MEDICARE

## 2022-04-19 DIAGNOSIS — I48.0 PAROXYSMAL ATRIAL FIBRILLATION (CMS/HCC): ICD-10-CM

## 2022-04-19 LAB — SARS-COV-2 RNA RESP QL NAA+PROBE: NEGATIVE

## 2022-04-19 PROCEDURE — U0003 INFECTIOUS AGENT DETECTION BY NUCLEIC ACID (DNA OR RNA); SEVERE ACUTE RESPIRATORY SYNDROME CORONAVIRUS 2 (SARS-COV-2) (CORONAVIRUS DISEASE [COVID-19]), AMPLIFIED PROBE TECHNIQUE, MAKING USE OF HIGH THROUGHPUT TECHNOLOGIES AS DESCRIBED BY CMS-2020-01-R: HCPCS

## 2022-04-20 ENCOUNTER — HOSPITAL ENCOUNTER (INPATIENT)
Facility: HOSPITAL | Age: 77
LOS: 3 days | Discharge: HOME | DRG: 310 | End: 2022-04-23
Attending: INTERNAL MEDICINE | Admitting: INTERNAL MEDICINE
Payer: MEDICARE

## 2022-04-20 DIAGNOSIS — I48.0 PAROXYSMAL ATRIAL FIBRILLATION (CMS/HCC): Primary | ICD-10-CM

## 2022-04-20 PROBLEM — I48.19 PERSISTENT ATRIAL FIBRILLATION (CMS/HCC): Status: ACTIVE | Noted: 2022-04-20

## 2022-04-20 LAB
ANION GAP SERPL CALC-SCNC: 12 MEQ/L (ref 3–15)
ATRIAL RATE: 60
BASOPHILS # BLD: 0.07 K/UL (ref 0.01–0.1)
BASOPHILS NFR BLD: 0.6 %
BUN SERPL-MCNC: 26 MG/DL (ref 8–20)
CALCIUM SERPL-MCNC: 9.7 MG/DL (ref 8.9–10.3)
CHLORIDE SERPL-SCNC: 102 MEQ/L (ref 98–109)
CO2 SERPL-SCNC: 23 MEQ/L (ref 22–32)
CREAT SERPL-MCNC: 0.8 MG/DL (ref 0.6–1.1)
DIFFERENTIAL METHOD BLD: ABNORMAL
EOSINOPHIL # BLD: 0.12 K/UL (ref 0.04–0.36)
EOSINOPHIL NFR BLD: 1 %
ERYTHROCYTE [DISTWIDTH] IN BLOOD BY AUTOMATED COUNT: 15 % (ref 11.7–14.4)
GFR SERPL CREATININE-BSD FRML MDRD: >60 ML/MIN/1.73M*2
GLUCOSE SERPL-MCNC: 94 MG/DL (ref 70–99)
HCT VFR BLDCO AUTO: 37.7 % (ref 35–45)
HGB BLD-MCNC: 12.3 G/DL (ref 11.8–15.7)
IMM GRANULOCYTES # BLD AUTO: 0.07 K/UL (ref 0–0.08)
IMM GRANULOCYTES NFR BLD AUTO: 0.6 %
LYMPHOCYTES # BLD: 2.48 K/UL (ref 1.2–3.5)
LYMPHOCYTES NFR BLD: 20.3 %
MAGNESIUM SERPL-MCNC: 2.2 MG/DL (ref 1.8–2.5)
MCH RBC QN AUTO: 29.2 PG (ref 28–33.2)
MCHC RBC AUTO-ENTMCNC: 32.6 G/DL (ref 32.2–35.5)
MCV RBC AUTO: 89.5 FL (ref 83–98)
MONOCYTES # BLD: 1.14 K/UL (ref 0.28–0.8)
MONOCYTES NFR BLD: 9.4 %
NEUTROPHILS # BLD: 8.31 K/UL (ref 1.7–7)
NEUTS SEG NFR BLD: 68.1 %
NRBC BLD-RTO: 0 %
P AXIS: -5
PDW BLD AUTO: 9.5 FL (ref 9.4–12.3)
PLATELET # BLD AUTO: 399 K/UL (ref 150–369)
POTASSIUM SERPL-SCNC: 4.1 MEQ/L (ref 3.6–5.1)
PR INTERVAL: 192
QRS DURATION: 76
QT INTERVAL: 412
QTC CALCULATION(BAZETT): 412
R AXIS: 3
RBC # BLD AUTO: 4.21 M/UL (ref 3.93–5.22)
SODIUM SERPL-SCNC: 137 MEQ/L (ref 136–144)
T WAVE AXIS: 34
VENTRICULAR RATE: 60
WBC # BLD AUTO: 12.19 K/UL (ref 3.8–10.5)

## 2022-04-20 PROCEDURE — 63700000 HC SELF-ADMINISTRABLE DRUG: Performed by: STUDENT IN AN ORGANIZED HEALTH CARE EDUCATION/TRAINING PROGRAM

## 2022-04-20 PROCEDURE — 36415 COLL VENOUS BLD VENIPUNCTURE: CPT

## 2022-04-20 PROCEDURE — 83735 ASSAY OF MAGNESIUM: CPT

## 2022-04-20 PROCEDURE — 21400000 HC ROOM AND CARE CCU/INTERMEDIATE

## 2022-04-20 PROCEDURE — 93010 ELECTROCARDIOGRAM REPORT: CPT | Performed by: INTERNAL MEDICINE

## 2022-04-20 PROCEDURE — 99223 1ST HOSP IP/OBS HIGH 75: CPT | Mod: 24 | Performed by: INTERNAL MEDICINE

## 2022-04-20 PROCEDURE — 93005 ELECTROCARDIOGRAM TRACING: CPT

## 2022-04-20 PROCEDURE — 85025 COMPLETE CBC W/AUTO DIFF WBC: CPT

## 2022-04-20 PROCEDURE — 80048 BASIC METABOLIC PNL TOTAL CA: CPT

## 2022-04-20 RX ORDER — DOFETILIDE 0.5 MG/1
500 CAPSULE ORAL EVERY 12 HOURS
Status: DISCONTINUED | OUTPATIENT
Start: 2022-04-20 | End: 2022-04-20

## 2022-04-20 RX ORDER — DOFETILIDE 0.25 MG/1
250 CAPSULE ORAL EVERY 12 HOURS
Status: DISCONTINUED | OUTPATIENT
Start: 2022-04-20 | End: 2022-04-23 | Stop reason: HOSPADM

## 2022-04-20 RX ORDER — DEXTROSE 40 %
15-30 GEL (GRAM) ORAL AS NEEDED
Status: DISCONTINUED | OUTPATIENT
Start: 2022-04-20 | End: 2022-04-23 | Stop reason: HOSPADM

## 2022-04-20 RX ORDER — LANOLIN ALCOHOL/MO/W.PET/CERES
400 CREAM (GRAM) TOPICAL 2 TIMES DAILY PRN
Status: DISCONTINUED | OUTPATIENT
Start: 2022-04-20 | End: 2022-04-23 | Stop reason: HOSPADM

## 2022-04-20 RX ORDER — AMLODIPINE BESYLATE 5 MG/1
5 TABLET ORAL 2 TIMES DAILY
Status: DISCONTINUED | OUTPATIENT
Start: 2022-04-20 | End: 2022-04-23 | Stop reason: HOSPADM

## 2022-04-20 RX ORDER — ACETAMINOPHEN 325 MG/1
650 TABLET ORAL EVERY 4 HOURS PRN
Status: DISCONTINUED | OUTPATIENT
Start: 2022-04-20 | End: 2022-04-23 | Stop reason: HOSPADM

## 2022-04-20 RX ORDER — CEFUROXIME AXETIL 250 MG/1
500 TABLET ORAL EVERY 12 HOURS
Status: COMPLETED | OUTPATIENT
Start: 2022-04-20 | End: 2022-04-21

## 2022-04-20 RX ORDER — ATROPINE SULFATE 0.1 MG/ML
0.5 INJECTION INTRAVENOUS EVERY 5 MIN PRN
Status: DISCONTINUED | OUTPATIENT
Start: 2022-04-20 | End: 2022-04-23 | Stop reason: HOSPADM

## 2022-04-20 RX ORDER — PANTOPRAZOLE SODIUM 40 MG/1
40 TABLET, DELAYED RELEASE ORAL DAILY
Refills: 3 | Status: DISCONTINUED | OUTPATIENT
Start: 2022-04-20 | End: 2022-04-23 | Stop reason: HOSPADM

## 2022-04-20 RX ORDER — POTASSIUM CHLORIDE 750 MG/1
40 TABLET, EXTENDED RELEASE ORAL AS NEEDED
Status: DISCONTINUED | OUTPATIENT
Start: 2022-04-20 | End: 2022-04-23 | Stop reason: HOSPADM

## 2022-04-20 RX ORDER — METOPROLOL SUCCINATE 25 MG/1
25 TABLET, EXTENDED RELEASE ORAL 2 TIMES DAILY
Status: DISCONTINUED | OUTPATIENT
Start: 2022-04-20 | End: 2022-04-23 | Stop reason: HOSPADM

## 2022-04-20 RX ORDER — DEXTROSE 50 % IN WATER (D50W) INTRAVENOUS SYRINGE
25 AS NEEDED
Status: DISCONTINUED | OUTPATIENT
Start: 2022-04-20 | End: 2022-04-23 | Stop reason: HOSPADM

## 2022-04-20 RX ORDER — PREDNISONE 10 MG/1
10 TABLET ORAL DAILY
Status: COMPLETED | OUTPATIENT
Start: 2022-04-21 | End: 2022-04-21

## 2022-04-20 RX ORDER — POTASSIUM CHLORIDE 750 MG/1
20 TABLET, EXTENDED RELEASE ORAL AS NEEDED
Status: DISCONTINUED | OUTPATIENT
Start: 2022-04-20 | End: 2022-04-23 | Stop reason: HOSPADM

## 2022-04-20 RX ORDER — IBUPROFEN 200 MG
16-32 TABLET ORAL AS NEEDED
Status: DISCONTINUED | OUTPATIENT
Start: 2022-04-20 | End: 2022-04-23 | Stop reason: HOSPADM

## 2022-04-20 RX ORDER — ATORVASTATIN CALCIUM 40 MG/1
40 TABLET, FILM COATED ORAL DAILY
Status: DISCONTINUED | OUTPATIENT
Start: 2022-04-20 | End: 2022-04-23 | Stop reason: HOSPADM

## 2022-04-20 RX ORDER — VENLAFAXINE HYDROCHLORIDE 75 MG/1
75 CAPSULE, EXTENDED RELEASE ORAL DAILY
Status: DISCONTINUED | OUTPATIENT
Start: 2022-04-20 | End: 2022-04-23 | Stop reason: HOSPADM

## 2022-04-20 RX ORDER — NITROGLYCERIN 0.4 MG/1
0.4 TABLET SUBLINGUAL EVERY 5 MIN PRN
Status: DISCONTINUED | OUTPATIENT
Start: 2022-04-20 | End: 2022-04-23 | Stop reason: HOSPADM

## 2022-04-20 RX ADMIN — METOPROLOL SUCCINATE 25 MG: 25 TABLET, FILM COATED, EXTENDED RELEASE ORAL at 21:04

## 2022-04-20 RX ADMIN — ATORVASTATIN CALCIUM 40 MG: 40 TABLET, FILM COATED ORAL at 18:52

## 2022-04-20 RX ADMIN — DOFETILIDE 250 MCG: 250 CAPSULE ORAL at 21:04

## 2022-04-20 RX ADMIN — AMLODIPINE BESYLATE 5 MG: 5 TABLET ORAL at 21:05

## 2022-04-20 RX ADMIN — CEFUROXIME AXETIL 500 MG: 250 TABLET ORAL at 21:05

## 2022-04-20 RX ADMIN — APIXABAN 5 MG: 5 TABLET, FILM COATED ORAL at 21:05

## 2022-04-20 ASSESSMENT — PATIENT HEALTH QUESTIONNAIRE - PHQ9: SUM OF ALL RESPONSES TO PHQ9 QUESTIONS 1 & 2: 0

## 2022-04-20 NOTE — ASSESSMENT & PLAN NOTE
She has a history of atrial fibrillation that was first diagnosed in member of 2021 when she presented to an outside hospital for an acute diverticulitis/colitis flareup.  At that time she was started on Eliquis 5 mg twice daily and amiodarone.  Her EF was 60 to 65% with normal bilateral atria size.  She converted back to sinus rhythm on her own.    She followed up with her primary cardiologist Dr. Boateng and was unable to tolerate amiodarone due to GI side effects.  Amiodarone was stopped and flecainide 50 mg twice daily was started.  She underwent M cot and the results showed an elevated atrial fibrillation burden around 40%.  Also showed prolonged and frequent conversion pauses.  She underwent dual-chamber pacemaker implantation on 4/12/2022 to prevent conversion pauses and permits more aggressive management of her atrial fibrillation.    She has received 5 doses of Tikosyn without excessive QT prolongation and with maintenance of sinus rhythm.  She is stable for discharge today.    PLAN:   - Continue Tikosyn 250 mcg BID, plan for 5th dose this evening. Please obtained EKG 2 hours after  - Continue Eliquis 5 mg twice daily  - Continue on metoprolol 25 mg twice daily  - Continue telemetry monitoring while hospitalized   - Monitor and replete electrolytes to maintain K above 4 and Mg above 2  - Discharge today

## 2022-04-20 NOTE — ASSESSMENT & PLAN NOTE
Patient is status post dual-chamber pacemaker implantation on 4/12/2022 that was placed in the setting of long conversion pauses associated with atrial fibrillation. Pacemaker site is well approximated with skin glue. There is minimal swelling at the site.     Patient should continue to maintain pacemaker site precautions. She has a follow up appointment scheduled for May 2, 2022.

## 2022-04-20 NOTE — PLAN OF CARE
Plan of Care Review  Plan of Care Reviewed With: patient  Progress: no change  Outcome Summary: Pt. direct admit to [facility] for tykosyn load. Pt. stable. EKG - A-paced on monitor. Ambulates well in room. Oriented to care/setting. Call bell and belongings in reach.

## 2022-04-20 NOTE — H&P
Electrophysiology Consult        Subjective     Agneles Cardoza is a 76 y.o. female who was admitted for dofetilide initiation. She has a past medical history significant for hyperlipidemia, hypertension, and a recent diagnosis of atrial fibrillation in November of 2021. Her primary cardiologist is Dr. Boateng.      Patient was originally diagnosed with atrial fibrillation in November of 2021 when she presented to an OSH for acute diverticulitis/colitis flare up. She was initiated on Eliquis 5 mg BID and amiodarone at that time. ECHO demonstrated normal LV function with an EF of 60-65% and normal bi-lateral atria size. She spontaneously converted back to normal rhythm.     She followed up with Dr. Boateng where it was noted that she was unable to tolerate amiodarone due to GI side effects of nausea and vomiting. Amiodarone was discontinued and the patient was initiated on flecainide 50 mg BID. A stress test was completed which was unremarkable and a MCOT was ordered to assess the true burden of atrial fibrillation. The patient reported minimal signs or symptoms associated with the arrhythmia, but does note that she had intermittent shortness of breath at times and reports that the vein in her neck would rapidly pulsate. She did not endorse any chest pain or palpitations. She could not answer if she had any true dizziness or lightheadedness. The MCOT results did demonstrate an elevated burden of atrial fibrillation around 40% but what was more concerning was the prolonged and frequent conversion pauses. The patient was noted to have 26 pauses on her MCOT with some last up to 6 seconds.     She was seen by Dr. Pizano for management of her atrial fibrillation and pauses.  He underwent dual-chamber pacemaker implantation by Dr. Pizano on 4/12/2022 to prevent conversion pauses.  Implantation of pacemaker allows for more options and rhythm control for this patient.  She presents today for dofetilide  initiation.      Past Medical History:   Diagnosis Date   • Acid reflux    • Hypertension    • Hypertension, benign 8/27/2012    Overview:    • Lipid disorder      Past Surgical History:   Procedure Laterality Date   • BREAST SURGERY  2003    breast reduction   • RENAL ANGIOPLASTY     • TOE SURGERY Left    • TONSILLECTOMY     • WISDOM TOOTH EXTRACTION       Social History     Socioeconomic History   • Marital status:    Tobacco Use   • Smoking status: Never Smoker   • Smokeless tobacco: Never Used   Vaping Use   • Vaping Use: Never used   Substance and Sexual Activity   • Alcohol use: Yes     Comment: occasional     Family History   Problem Relation Age of Onset   • Stroke Biological Mother    • Heart failure Biological Father      Allergies:  Penicillins and Ditropan [oxybutynin chloride]    •  atropine, 0.5 mg, intravenous, q5 min PRN  •  glucose, 16-32 g of dextrose, oral, PRN **OR** dextrose, 15-30 g of dextrose, oral, PRN **OR** glucagon, 1 mg, intramuscular, PRN **OR** dextrose in water, 25 mL, intravenous, PRN  •  magnesium oxide, 400 mg, oral, 2x daily PRN  •  magnesium sulfate, 1 g, intravenous, PRN **OR** magnesium sulfate, 2 g, intravenous, PRN  •  nitroglycerin, 0.4 mg, sublingual, q5 min PRN  •  potassium chloride, 20 mEq, oral, PRN  •  potassium chloride, 40 mEq, oral, PRN  •  acetaminophen  •  amLODIPine  •  ascorbic acid  •  atorvastatin  •  calcium carbonate (CALCIUM 500 ORAL)  •  cholecalciferol (vitamin D3)  •  cyanocobalamin  •  ELIQUIS  •  metoprolol succinate XL  •  NOT IN DATABASE  •  omeprazole  •  venlafaxine XR    Review of Systems    Objective   There were no vitals taken for this visit.  Physical Exam    Labs       0   Lab Value Date/Time    INR 0.9 11/13/2016 1856    INR 1.0 01/14/2015 1130     0   Lab Value Date/Time    TROPONINI <0.03 01/25/2019 1359    TROPONINI  11/13/2016 1856     <0.02  A rise or fall of troponin with at least one abnormal value is consistent with myocardial  injury or necrosis in the presence of appropriate clinical, ECG, and/or imaging abnormalities.                 ECG: pending for review of qtc prior to dofetilide initiation      Cardiac Imaging    ECHOCARDIOGRAM - EXTERNAL RESULT       Sick sinus syndrome (CMS/HCC)  Assessment & Plan  Patient is status post dual-chamber pacemaker implantation on 4/12/2022.    Essential hypertension  Assessment & Plan  She is maintained on amlodipine 5 mg twice daily.    Hyperlipidemia  Assessment & Plan  She is maintained on atorvastatin 40 mg daily.    * Persistent atrial fibrillation (CMS/HCC)  Assessment & Plan  She has a history of atrial fibrillation that was first diagnosed in member of 2021 when she presented to an outside hospital for an acute diverticulitis/colitis flareup.  At that time she was started on Eliquis 5 mg twice daily and amiodarone.  Her EF was 60 to 65% with normal bilateral atria size.  She converted back to sinus rhythm on her own.    She followed up with her primary cardiologist Dr. Boateng and was unable to tolerate amiodarone due to GI side effects.  Amiodarone was stopped and flecainide 50 mg twice daily was started.  She underwent M cot and the results showed an elevated atrial fibrillation burden around 40%.  Also showed prolonged and frequent conversion pauses.  She underwent dual-chamber pacemaker implantation on 4/12/2022 to prevent conversion pauses.  Implantation of pacemaker also last for more options for rhythm control for this patient.    PLAN:   - Obtain baseline EKG prior to initiation of Tikosyn to evaluate QT  - Obtain EKG 2 hours after each dose  - Continue Eliquis 5 mg twice daily  - Continue on metoprolol 25 mg twice daily  - Obtain BMP/mag to evaluate electrolytes  - Replete electrolytes to keep K above 4 and mag above 2          This letter was generated using speech recognition software. Please excuse any typographical errors.

## 2022-04-20 NOTE — TELEPHONE ENCOUNTER
Pt called stating that she was scheduled to be admitted today so she can be monitored on medication. She is wondering what time she needs to come in. Please advise.     Pt can be reached at [phone]

## 2022-04-21 ENCOUNTER — TELEPHONE (OUTPATIENT)
Dept: SCHEDULING | Facility: CLINIC | Age: 77
End: 2022-04-21
Payer: MEDICARE

## 2022-04-21 LAB
ANION GAP SERPL CALC-SCNC: 10 MEQ/L (ref 3–15)
ATRIAL RATE: 60
ATRIAL RATE: 60
BASOPHILS # BLD: 0.02 K/UL (ref 0.01–0.1)
BASOPHILS NFR BLD: 0.3 %
BUN SERPL-MCNC: 24 MG/DL (ref 8–20)
CALCIUM SERPL-MCNC: 9.4 MG/DL (ref 8.9–10.3)
CHLORIDE SERPL-SCNC: 108 MEQ/L (ref 98–109)
CO2 SERPL-SCNC: 22 MEQ/L (ref 22–32)
CREAT SERPL-MCNC: 0.9 MG/DL (ref 0.6–1.1)
DIFFERENTIAL METHOD BLD: ABNORMAL
EOSINOPHIL # BLD: 0 K/UL (ref 0.04–0.36)
EOSINOPHIL NFR BLD: 0 %
ERYTHROCYTE [DISTWIDTH] IN BLOOD BY AUTOMATED COUNT: 14.8 % (ref 11.7–14.4)
GFR SERPL CREATININE-BSD FRML MDRD: >60 ML/MIN/1.73M*2
GLUCOSE SERPL-MCNC: 127 MG/DL (ref 70–99)
HCT VFR BLDCO AUTO: 34.6 % (ref 35–45)
HGB BLD-MCNC: 10.7 G/DL (ref 11.8–15.7)
IMM GRANULOCYTES # BLD AUTO: 0.03 K/UL (ref 0–0.08)
IMM GRANULOCYTES NFR BLD AUTO: 0.4 %
LYMPHOCYTES # BLD: 0.98 K/UL (ref 1.2–3.5)
LYMPHOCYTES NFR BLD: 13.4 %
MAGNESIUM SERPL-MCNC: 2.1 MG/DL (ref 1.8–2.5)
MCH RBC QN AUTO: 28.6 PG (ref 28–33.2)
MCHC RBC AUTO-ENTMCNC: 30.9 G/DL (ref 32.2–35.5)
MCV RBC AUTO: 92.5 FL (ref 83–98)
MONOCYTES # BLD: 0.32 K/UL (ref 0.28–0.8)
MONOCYTES NFR BLD: 4.4 %
NEUTROPHILS # BLD: 5.96 K/UL (ref 1.7–7)
NEUTS SEG NFR BLD: 81.5 %
NRBC BLD-RTO: 0 %
P AXIS: 0
P AXIS: 14
PDW BLD AUTO: 9.9 FL (ref 9.4–12.3)
PLATELET # BLD AUTO: 353 K/UL (ref 150–369)
POTASSIUM SERPL-SCNC: 4.6 MEQ/L (ref 3.6–5.1)
PR INTERVAL: 192
PR INTERVAL: 208
QRS DURATION: 74
QRS DURATION: 76
QT INTERVAL: 454
QT INTERVAL: 500
QTC CALCULATION(BAZETT): 454
QTC CALCULATION(BAZETT): 500
R AXIS: 0
R AXIS: 9
RBC # BLD AUTO: 3.74 M/UL (ref 3.93–5.22)
SODIUM SERPL-SCNC: 140 MEQ/L (ref 136–144)
T WAVE AXIS: 19
T WAVE AXIS: 46
VENTRICULAR RATE: 60
VENTRICULAR RATE: 60
WBC # BLD AUTO: 7.31 K/UL (ref 3.8–10.5)

## 2022-04-21 PROCEDURE — 93005 ELECTROCARDIOGRAM TRACING: CPT

## 2022-04-21 PROCEDURE — 63700000 HC SELF-ADMINISTRABLE DRUG: Performed by: STUDENT IN AN ORGANIZED HEALTH CARE EDUCATION/TRAINING PROGRAM

## 2022-04-21 PROCEDURE — 21400000 HC ROOM AND CARE CCU/INTERMEDIATE

## 2022-04-21 PROCEDURE — 93010 ELECTROCARDIOGRAM REPORT: CPT | Performed by: INTERNAL MEDICINE

## 2022-04-21 PROCEDURE — 36415 COLL VENOUS BLD VENIPUNCTURE: CPT

## 2022-04-21 PROCEDURE — 93010 ELECTROCARDIOGRAM REPORT: CPT | Mod: 76 | Performed by: INTERNAL MEDICINE

## 2022-04-21 PROCEDURE — 83735 ASSAY OF MAGNESIUM: CPT

## 2022-04-21 PROCEDURE — 80048 BASIC METABOLIC PNL TOTAL CA: CPT

## 2022-04-21 PROCEDURE — 99232 SBSQ HOSP IP/OBS MODERATE 35: CPT

## 2022-04-21 PROCEDURE — 85025 COMPLETE CBC W/AUTO DIFF WBC: CPT

## 2022-04-21 RX ADMIN — DOFETILIDE 250 MCG: 250 CAPSULE ORAL at 08:38

## 2022-04-21 RX ADMIN — CEFUROXIME AXETIL 500 MG: 250 TABLET ORAL at 20:40

## 2022-04-21 RX ADMIN — APIXABAN 5 MG: 5 TABLET, FILM COATED ORAL at 20:41

## 2022-04-21 RX ADMIN — CEFUROXIME AXETIL 500 MG: 250 TABLET ORAL at 08:37

## 2022-04-21 RX ADMIN — APIXABAN 5 MG: 5 TABLET, FILM COATED ORAL at 08:38

## 2022-04-21 RX ADMIN — METOPROLOL SUCCINATE 25 MG: 25 TABLET, FILM COATED, EXTENDED RELEASE ORAL at 20:42

## 2022-04-21 RX ADMIN — VENLAFAXINE HYDROCHLORIDE 75 MG: 75 CAPSULE, EXTENDED RELEASE ORAL at 08:38

## 2022-04-21 RX ADMIN — PANTOPRAZOLE SODIUM 40 MG: 40 TABLET, DELAYED RELEASE ORAL at 08:38

## 2022-04-21 RX ADMIN — AMLODIPINE BESYLATE 5 MG: 5 TABLET ORAL at 20:41

## 2022-04-21 RX ADMIN — DOFETILIDE 250 MCG: 250 CAPSULE ORAL at 20:40

## 2022-04-21 RX ADMIN — PREDNISONE 10 MG: 10 TABLET ORAL at 18:32

## 2022-04-21 RX ADMIN — AMLODIPINE BESYLATE 5 MG: 5 TABLET ORAL at 08:38

## 2022-04-21 RX ADMIN — METOPROLOL SUCCINATE 25 MG: 25 TABLET, FILM COATED, EXTENDED RELEASE ORAL at 08:38

## 2022-04-21 RX ADMIN — ATORVASTATIN CALCIUM 40 MG: 40 TABLET, FILM COATED ORAL at 18:32

## 2022-04-21 NOTE — PATIENT CARE CONFERENCE
Care Progression Rounds Note  Date: 4/21/2022  Time: 11:54 AM     Patient Name: Angeles Cardoza     Medical Record Number: [id number]   YOB: 1945  Sex: Female      Room/Bed: 0371    Admitting Diagnosis: Persistent atrial fibrillation (CMS/HCC) [I48.19]   Admit Date/Time: 4/20/2022  5:25 PM    Primary Diagnosis: Persistent atrial fibrillation (CMS/HCC)  Principal Problem: Persistent atrial fibrillation (CMS/HCC)    GMLOS: pending  Anticipated Discharge Date: 4/24/2022    AM-PAC:  Mobility Score:      Discharge Planning:  Concerns to be Addressed: care coordination/care conferences, discharge planning  Anticipated Discharge Disposition: home without assistance or services    Barriers to Discharge:  Medical issues not resolved    Comments:       Participants:  , physical therapy, physician, nursing, social work/services

## 2022-04-21 NOTE — PLAN OF CARE
Problem: Adult Inpatient Plan of Care  Goal: Readiness for Transition of Care  Intervention: Mutually Develop Transition Plan  Flowsheets (Taken 4/21/2022 3828)  Anticipated Discharge Disposition: home without assistance or services  Equipment Needed After Discharge: none  Assistive Device/Animal Currently Used at Home: none  Anticipated Changes Related to Illness: none  Readmission Within the Last 30 Days: no previous admission in last 30 days  Patient/Family Anticipated Services at Transition: none  Patient/Family Anticipates Transition to:   home   home with family  Transportation Anticipated: family or friend will provide  Concerns to be Addressed:   care coordination/care conferences   discharge planning    SW s/w pt bedside to complete mutually develop transition plan. Pt stated prior to admission- pt was independent with her ADL's. Pt stated she lives with her daughter. Pt does not use DME, has never had home care, and has never been to SNF. Pt stated her sister will transport her home when stable for d/c. Pt confirmed PCP, pharmacy, demographic information, and emergency contact information. Pt received two doses of the Moderna vaccine (see vaccine information).

## 2022-04-21 NOTE — PLAN OF CARE
Plan of Care Review  Plan of Care Reviewed With: patient  Progress: no change  Outcome Summary: Pt stable with no complaints t/o the shift. Ambulating well in room. Remains a-paced on monitor. Recevied tikosyn dose this AM--okay to take PM dose as ordered per MD. Pt has belongings and call bell at bedside.

## 2022-04-21 NOTE — PLAN OF CARE
Problem: Adult Inpatient Plan of Care  Goal: Plan of Care Review  4/21/2022 0617 by Kane Reyes RN  Outcome: Progressing  Flowsheets (Taken 4/21/2022 0617)  Progress: improving  Plan of Care Reviewed With: patient  Outcome Summary: Pt received  the first dose of tikosyn  and ekg obtained per hospital protocol. Patient tolerated the medication well with no issues. VSS within reach. Safety check maintained. Call light within reach. The iv site flushed and patent.  4/21/2022 0617 by Kane Reyes RN  Outcome: Progressing  4/21/2022 0608 by Kane Reyes RN  Outcome: Progressing  4/21/2022 0607 by Kane Reyes RN  Outcome: Progressing   Plan of Care Review  Plan of Care Reviewed With: patient  Progress: improving  Outcome Summary: Pt received  the first dose of tikosyn  and ekg obtained per hospital protocol. Patient tolerated the medication well with no issues. VSS within reach. Safety check maintained. Call light within reach. The iv site flushed and patent.

## 2022-04-21 NOTE — PROGRESS NOTES
Electrophysiology -  Daily Progress Note       SUBJECTIVE     Patient seen and examined. No acute events overnight. EKG reviewed from last night. Continue with current dose of Tikosyn 250 mcg with EKG 2 hours post.      OBJECTIVE      Vital signs in last 24 hours:  Temp:  [36.7 °C (98 °F)-36.9 °C (98.5 °F)] 36.9 °C (98.5 °F)  Heart Rate:  [59-65] 59  Resp:  [16-18] 18  BP: (115-140)/(56-64) 135/64  Physical Exam  Constitutional:       General: She is not in acute distress.     Appearance: Normal appearance. She is normal weight.   HENT:      Head: Normocephalic and atraumatic.   Cardiovascular:      Rate and Rhythm: Normal rate and regular rhythm.      Pulses: Normal pulses.      Heart sounds: Normal heart sounds.   Pulmonary:      Effort: Pulmonary effort is normal.      Breath sounds: Normal breath sounds.   Musculoskeletal:      Right lower leg: No edema.      Left lower leg: No edema.   Skin:     General: Skin is warm and dry.      Comments: Left chest wall pacemaker site with skin glue and minor swelling.    Neurological:      Mental Status: She is alert and oriented to person, place, and time.         •  acetaminophen, 650 mg, oral, q4h PRN  •  amLODIPine, 5 mg, oral, BID  •  apixaban, 5 mg, oral, BID  •  atorvastatin, 40 mg, oral, Daily  •  atropine, 0.5 mg, intravenous, q5 min PRN  •  cefUROXime, 500 mg, oral, q12h [place]  •  glucose, 16-32 g of dextrose, oral, PRN **OR** dextrose, 15-30 g of dextrose, oral, PRN **OR** glucagon, 1 mg, intramuscular, PRN **OR** dextrose in water, 25 mL, intravenous, PRN  •  dofetilide, 250 mcg, oral, q12h [place]  •  magnesium oxide, 400 mg, oral, 2x daily PRN  •  magnesium sulfate, 1 g, intravenous, PRN **OR** magnesium sulfate, 2 g, intravenous, PRN  •  metoprolol succinate XL, 25 mg, oral, BID  •  nitroglycerin, 0.4 mg, sublingual, q5 min PRN  •  pantoprazole, 40 mg, oral, Daily  •  potassium chloride, 20 mEq, oral, PRN  •  potassium chloride, 40 mEq, oral, PRN  •  predniSONE,  10 mg, oral, Daily  •  venlafaxine XR, 75 mg, oral, Daily   LABS / IMAGING / TELE        Results from last 7 days   Lab Units 04/21/22  0453 04/20/22  1837   WBC K/uL 7.31 12.19*   HEMOGLOBIN g/dL 10.7* 12.3   HEMATOCRIT % 34.6* 37.7   PLATELETS K/uL 353 399*     0   Lab Value Date/Time    INR 0.9 11/13/2016 1856    INR 1.0 01/14/2015 1130     0   Lab Value Date/Time    TROPONINI <0.03 01/25/2019 1359    TROPONINI  11/13/2016 1856     <0.02  A rise or fall of troponin with at least one abnormal value is consistent with myocardial injury or necrosis in the presence of appropriate clinical, ECG, and/or imaging abnormalities.       Results from last 7 days   Lab Units 04/21/22  0453 04/20/22  1837   SODIUM mEQ/L 140 137   POTASSIUM mEQ/L 4.6 4.1   CHLORIDE mEQ/L 108 102   CO2 mEQ/L 22 23   BUN mg/dL 24* 26*   CREATININE mg/dL 0.9 0.8   CALCIUM mg/dL 9.4 9.7   GLUCOSE mg/dL 127* 94         Cardiac Imaging    ECHOCARDIOGRAM - EXTERNAL RESULT     Narrative  Ordered by an unspecified provider.      Telemetry: Atrial paced, w/ PVCs     ECG: Atrial paced at 60 bpm, QT interval~ 450 msec    ASSESSMENT AND PLAN      Sick sinus syndrome (CMS/HCC)  Assessment & Plan  Patient is status post dual-chamber pacemaker implantation on 4/12/2022. Pacemaker site is well approximated with skin glue. There is minimal swelling at the site.     Patient should continue to maintain pacemaker site precautions for 1 month since implant. Patient will be seen in our office in 1 month.     Essential hypertension  Assessment & Plan  She is maintained on amlodipine 5 mg twice daily.    Hyperlipidemia  Assessment & Plan  She is maintained on atorvastatin 40 mg daily.    * Persistent atrial fibrillation (CMS/HCC)  Assessment & Plan  She has a history of atrial fibrillation that was first diagnosed in member of 2021 when she presented to an outside hospital for an acute diverticulitis/colitis flareup.  At that time she was started on Eliquis 5 mg twice  daily and amiodarone.  Her EF was 60 to 65% with normal bilateral atria size.  She converted back to sinus rhythm on her own.    She followed up with her primary cardiologist Dr. Boateng and was unable to tolerate amiodarone due to GI side effects.  Amiodarone was stopped and flecainide 50 mg twice daily was started.  She underwent M cot and the results showed an elevated atrial fibrillation burden around 40%.  Also showed prolonged and frequent conversion pauses.  She underwent dual-chamber pacemaker implantation on 4/12/2022 to prevent conversion pauses.  Implantation of pacemaker also last for more options for rhythm control for this patient.    Patient receiving dose #2 of Tiksoyn 250 mcg this morning. Will evaluate EKG 2 hours after dose. Will continue to monitor electrolytes and kidney function.     PLAN:   - Continue Tikosyn 250 mcg BID, with EKG 2 hours after each dose   - Continue Eliquis 5 mg twice daily  - Continue on metoprolol 25 mg twice daily  - Daily BMP/mag to evaluate electrolytes and kidney function  - Replete electrolytes to keep K above 4 and mag above 2           Code Status: Full Code

## 2022-04-21 NOTE — TELEPHONE ENCOUNTER
Pt called requesting to move appt on 5/13 to the week prior or later in the day. Please advise.     Pt can be reached at [phone]

## 2022-04-22 LAB
ANION GAP SERPL CALC-SCNC: 12 MEQ/L (ref 3–15)
ATRIAL RATE: 60
ATRIAL RATE: 60
BASOPHILS # BLD: 0.06 K/UL (ref 0.01–0.1)
BASOPHILS NFR BLD: 0.7 %
BUN SERPL-MCNC: 19 MG/DL (ref 8–20)
CALCIUM SERPL-MCNC: 9.5 MG/DL (ref 8.9–10.3)
CHLORIDE SERPL-SCNC: 105 MEQ/L (ref 98–109)
CO2 SERPL-SCNC: 24 MEQ/L (ref 22–32)
CREAT SERPL-MCNC: 0.8 MG/DL (ref 0.6–1.1)
DIFFERENTIAL METHOD BLD: ABNORMAL
EOSINOPHIL # BLD: 0.05 K/UL (ref 0.04–0.36)
EOSINOPHIL NFR BLD: 0.6 %
ERYTHROCYTE [DISTWIDTH] IN BLOOD BY AUTOMATED COUNT: 14.8 % (ref 11.7–14.4)
GFR SERPL CREATININE-BSD FRML MDRD: >60 ML/MIN/1.73M*2
GLUCOSE SERPL-MCNC: 123 MG/DL (ref 70–99)
HCT VFR BLDCO AUTO: 36.5 % (ref 35–45)
HGB BLD-MCNC: 11.7 G/DL (ref 11.8–15.7)
IMM GRANULOCYTES # BLD AUTO: 0.03 K/UL (ref 0–0.08)
IMM GRANULOCYTES NFR BLD AUTO: 0.4 %
LYMPHOCYTES # BLD: 1.39 K/UL (ref 1.2–3.5)
LYMPHOCYTES NFR BLD: 16.5 %
MAGNESIUM SERPL-MCNC: 2.1 MG/DL (ref 1.8–2.5)
MCH RBC QN AUTO: 28.9 PG (ref 28–33.2)
MCHC RBC AUTO-ENTMCNC: 32.1 G/DL (ref 32.2–35.5)
MCV RBC AUTO: 90.1 FL (ref 83–98)
MONOCYTES # BLD: 0.58 K/UL (ref 0.28–0.8)
MONOCYTES NFR BLD: 6.9 %
NEUTROPHILS # BLD: 6.32 K/UL (ref 1.7–7)
NEUTS SEG NFR BLD: 74.9 %
NRBC BLD-RTO: 0 %
P AXIS: -5
P AXIS: -6
PDW BLD AUTO: 9.9 FL (ref 9.4–12.3)
PLATELET # BLD AUTO: 394 K/UL (ref 150–369)
POTASSIUM SERPL-SCNC: 4.7 MEQ/L (ref 3.6–5.1)
PR INTERVAL: 192
PR INTERVAL: 196
QRS DURATION: 70
QRS DURATION: 74
QT INTERVAL: 440
QT INTERVAL: 474
QTC CALCULATION(BAZETT): 440
QTC CALCULATION(BAZETT): 474
R AXIS: 0
R AXIS: 3
RBC # BLD AUTO: 4.05 M/UL (ref 3.93–5.22)
SODIUM SERPL-SCNC: 141 MEQ/L (ref 136–144)
T WAVE AXIS: 33
T WAVE AXIS: 37
VENTRICULAR RATE: 60
VENTRICULAR RATE: 60
WBC # BLD AUTO: 8.43 K/UL (ref 3.8–10.5)

## 2022-04-22 PROCEDURE — 83735 ASSAY OF MAGNESIUM: CPT

## 2022-04-22 PROCEDURE — 93010 ELECTROCARDIOGRAM REPORT: CPT | Performed by: INTERNAL MEDICINE

## 2022-04-22 PROCEDURE — 80048 BASIC METABOLIC PNL TOTAL CA: CPT

## 2022-04-22 PROCEDURE — 93005 ELECTROCARDIOGRAM TRACING: CPT

## 2022-04-22 PROCEDURE — 93010 ELECTROCARDIOGRAM REPORT: CPT | Mod: 76 | Performed by: INTERNAL MEDICINE

## 2022-04-22 PROCEDURE — 36415 COLL VENOUS BLD VENIPUNCTURE: CPT

## 2022-04-22 PROCEDURE — 21400000 HC ROOM AND CARE CCU/INTERMEDIATE

## 2022-04-22 PROCEDURE — 85025 COMPLETE CBC W/AUTO DIFF WBC: CPT

## 2022-04-22 PROCEDURE — 99232 SBSQ HOSP IP/OBS MODERATE 35: CPT

## 2022-04-22 PROCEDURE — 63700000 HC SELF-ADMINISTRABLE DRUG: Performed by: STUDENT IN AN ORGANIZED HEALTH CARE EDUCATION/TRAINING PROGRAM

## 2022-04-22 RX ADMIN — ATORVASTATIN CALCIUM 40 MG: 40 TABLET, FILM COATED ORAL at 20:14

## 2022-04-22 RX ADMIN — DOFETILIDE 250 MCG: 250 CAPSULE ORAL at 09:21

## 2022-04-22 RX ADMIN — APIXABAN 5 MG: 5 TABLET, FILM COATED ORAL at 20:14

## 2022-04-22 RX ADMIN — AMLODIPINE BESYLATE 5 MG: 5 TABLET ORAL at 09:20

## 2022-04-22 RX ADMIN — DOFETILIDE 250 MCG: 250 CAPSULE ORAL at 20:14

## 2022-04-22 RX ADMIN — METOPROLOL SUCCINATE 25 MG: 25 TABLET, FILM COATED, EXTENDED RELEASE ORAL at 20:14

## 2022-04-22 RX ADMIN — AMLODIPINE BESYLATE 5 MG: 5 TABLET ORAL at 20:14

## 2022-04-22 RX ADMIN — PANTOPRAZOLE SODIUM 40 MG: 40 TABLET, DELAYED RELEASE ORAL at 09:20

## 2022-04-22 RX ADMIN — METOPROLOL SUCCINATE 25 MG: 25 TABLET, FILM COATED, EXTENDED RELEASE ORAL at 09:20

## 2022-04-22 RX ADMIN — VENLAFAXINE HYDROCHLORIDE 75 MG: 75 CAPSULE, EXTENDED RELEASE ORAL at 09:20

## 2022-04-22 RX ADMIN — APIXABAN 5 MG: 5 TABLET, FILM COATED ORAL at 09:20

## 2022-04-22 NOTE — PLAN OF CARE
Problem: Adult Inpatient Plan of Care  Goal: Plan of Care Review  Outcome: Progressing  Flowsheets (Taken 4/22/2022 2178)  Progress: improving  Outcome Summary: Per info in medical rounds today, pt is not stable for d/c. RAMY tomorrow. Plan for pt to d/c home no needs when stable for d/c.    SW will remain available for emotional support and dispo planning. Karina Stephenson MSW [id number]

## 2022-04-22 NOTE — PLAN OF CARE
Problem: Adult Inpatient Plan of Care  Goal: Plan of Care Review  Outcome: Progressing  Flowsheets (Taken 4/22/2022 1707)  Progress: no change  Outcome Summary: Pt stable t/o shift. Tikosyn dose given, EKG obtained two hours post-administration. Per c/w EP CRNP, OK to receive scheduled dose tonight. Pt offers no complaints or pain or SOB. Remains a-paced on monitor.   Plan of Care Review  Progress: no change  Outcome Summary: Pt stable t/o shift. Tikosyn dose given, EKG obtained two hours post-administration. Per c/w EP CRNP, OK to receive scheduled dose tonight. Pt offers no complaints or pain or SOB. Remains a-paced on monitor.

## 2022-04-22 NOTE — PROGRESS NOTES
Electrophysiology -  Daily Progress Note       SUBJECTIVE     Patient is awake, alert, and sitting comfortably in her chair reading a book. She is noted to be in atrial paced at 60 bpm and hemodynamically stable. She offers not complaints at this time other than being bored. Patient denies chest pain, palpitations, shortness or breath, or dyspnea on exertion. She does not report any dizziness, lightheadedness, or syncope. She is tolerating her current medication regimen.      OBJECTIVE      Vital signs in last 24 hours:  Temp:  [36.6 °C (97.9 °F)-36.8 °C (98.3 °F)] 36.7 °C (98.1 °F)  Heart Rate:  [59-60] 60  Resp:  [15-18] 16  BP: (121-170)/(59-70) 170/70  Physical Exam  Constitutional:       General: She is not in acute distress.  HENT:      Head: Normocephalic.      Mouth/Throat:      Mouth: Mucous membranes are dry.      Pharynx: Oropharynx is clear.   Eyes:      Pupils: Pupils are equal, round, and reactive to light.   Cardiovascular:      Rate and Rhythm: Normal rate and regular rhythm.      Pulses: Normal pulses.      Heart sounds: Normal heart sounds.      Comments: Atrial paced  Pulmonary:      Effort: Pulmonary effort is normal.      Breath sounds: Normal breath sounds.   Abdominal:      General: Abdomen is flat. Bowel sounds are normal.      Palpations: Abdomen is soft.   Musculoskeletal:         General: No swelling. Normal range of motion.      Cervical back: Normal range of motion.      Right lower leg: No edema.      Left lower leg: No edema.   Skin:     General: Skin is warm and dry.      Capillary Refill: Capillary refill takes less than 2 seconds.   Neurological:      General: No focal deficit present.      Mental Status: She is alert and oriented to person, place, and time. Mental status is at baseline.   Psychiatric:         Mood and Affect: Mood normal.         Behavior: Behavior normal.         •  acetaminophen, 650 mg, oral, q4h PRN  •  amLODIPine, 5 mg, oral, BID  •  apixaban, 5 mg, oral,  BID  •  atorvastatin, 40 mg, oral, Daily  •  atropine, 0.5 mg, intravenous, q5 min PRN  •  glucose, 16-32 g of dextrose, oral, PRN **OR** dextrose, 15-30 g of dextrose, oral, PRN **OR** glucagon, 1 mg, intramuscular, PRN **OR** dextrose in water, 25 mL, intravenous, PRN  •  dofetilide, 250 mcg, oral, q12h [place]  •  magnesium oxide, 400 mg, oral, 2x daily PRN  •  magnesium sulfate, 1 g, intravenous, PRN **OR** magnesium sulfate, 2 g, intravenous, PRN  •  metoprolol succinate XL, 25 mg, oral, BID  •  nitroglycerin, 0.4 mg, sublingual, q5 min PRN  •  pantoprazole, 40 mg, oral, Daily  •  potassium chloride, 20 mEq, oral, PRN  •  potassium chloride, 40 mEq, oral, PRN  •  venlafaxine XR, 75 mg, oral, Daily   LABS / IMAGING / TELE        Results from last 7 days   Lab Units 04/22/22  0446 04/21/22  0453 04/20/22  1837   WBC K/uL 8.43 7.31 12.19*   HEMOGLOBIN g/dL 11.7* 10.7* 12.3   HEMATOCRIT % 36.5 34.6* 37.7   PLATELETS K/uL 394* 353 399*     0   Lab Value Date/Time    INR 0.9 11/13/2016 1856    INR 1.0 01/14/2015 1130     0   Lab Value Date/Time    TROPONINI <0.03 01/25/2019 1359    TROPONINI  11/13/2016 1856     <0.02  A rise or fall of troponin with at least one abnormal value is consistent with myocardial injury or necrosis in the presence of appropriate clinical, ECG, and/or imaging abnormalities.       Results from last 7 days   Lab Units 04/22/22  0446 04/21/22 0453 04/20/22  1837   SODIUM mEQ/L 141 140 137   POTASSIUM mEQ/L 4.7 4.6 4.1   CHLORIDE mEQ/L 105 108 102   CO2 mEQ/L 24 22 23   BUN mg/dL 19 24* 26*   CREATININE mg/dL 0.8 0.9 0.8   CALCIUM mg/dL 9.5 9.4 9.7   GLUCOSE mg/dL 123* 127* 94         Cardiac Imaging    ECHOCARDIOGRAM - EXTERNAL RESULT     Narrative  Ordered by an unspecified provider.      Telemetry: Atrial paced at 60 bpm     ECG: Atrial paced at 60 bpm, stable QT interval     ASSESSMENT AND PLAN      Sick sinus syndrome (CMS/HCC)  Assessment & Plan  Patient is status post dual-chamber pacemaker  implantation on 4/12/2022 that was placed in the setting of long conversion pauses associated with atrial fibrillation. Pacemaker site is well approximated with skin glue. There is minimal swelling at the site.     Patient should continue to maintain pacemaker site precautions. She has a follow up appointment scheduled for May 2, 2022.     Essential hypertension  Assessment & Plan  She is maintained on amlodipine 5 mg twice daily.    Hyperlipidemia  Assessment & Plan  She is maintained on atorvastatin 40 mg daily.    * Persistent atrial fibrillation (CMS/HCC)  Assessment & Plan  She has a history of atrial fibrillation that was first diagnosed in member of 2021 when she presented to an outside hospital for an acute diverticulitis/colitis flareup.  At that time she was started on Eliquis 5 mg twice daily and amiodarone.  Her EF was 60 to 65% with normal bilateral atria size.  She converted back to sinus rhythm on her own.    She followed up with her primary cardiologist Dr. Boateng and was unable to tolerate amiodarone due to GI side effects.  Amiodarone was stopped and flecainide 50 mg twice daily was started.  She underwent M cot and the results showed an elevated atrial fibrillation burden around 40%.  Also showed prolonged and frequent conversion pauses.  She underwent dual-chamber pacemaker implantation on 4/12/2022 to prevent conversion pauses and permits more aggressive management of her atrial fibrillation.    Patient received doses 4 of Tiksoyn 250 mcg this morning. EKG after administration demonstrated a stable QT interval. She will receive her fifth dose of Tikosyn this evening and plan for discharge home tomorrow.     PLAN:   - Continue Tikosyn 250 mcg BID, plan for 5th dose this evening. Please obtained EKG 2 hours after  - Continue Eliquis 5 mg twice daily  - Continue on metoprolol 25 mg twice daily  - Continue telemetry monitoring while hospitalized   - Monitor and replete electrolytes to maintain K  above 4 and Mg above 2  - Plan for discharge tomorrow            Code Status: Full Code     SHOBHA Morocho   Electrophysiology   April 22, 2022

## 2022-04-22 NOTE — PATIENT CARE CONFERENCE
Care Progression Rounds Note  Date: 4/22/2022  Time: 7:37 AM     Patient Name: Angeles Cardoza     Medical Record Number: [id number]   YOB: 1945  Sex: Female      Room/Bed: 0371    Admitting Diagnosis: Persistent atrial fibrillation (CMS/HCC) [I48.19]   Admit Date/Time: 4/20/2022  5:25 PM    Primary Diagnosis: Persistent atrial fibrillation (CMS/HCC)  Principal Problem: Persistent atrial fibrillation (CMS/HCC)    GMLOS: 1.9  Anticipated Discharge Date: 4/24/2022    AM-PAC:  Mobility Score:      Discharge Planning:  Concerns to be Addressed: care coordination/care conferences, discharge planning  Anticipated Discharge Disposition: home without assistance or services    Barriers to Discharge:  Medical issues not resolved    Comments:       Participants:  , physical therapy, physician, nursing, social work/services

## 2022-04-22 NOTE — PLAN OF CARE
Pt receiving Tikosyn load. Tikosyn dose given as ordered and Ekg performed 2 hour post dose. Pt without complaints. Pt A-paced at 59-60 bpm, no events overnight.   Problem: Adult Inpatient Plan of Care  Goal: Plan of Care Review  Outcome: Progressing  Flowsheets (Taken 4/22/2022 0649)  Progress: improving  Plan of Care Reviewed With: patient  Goal: Patient-Specific Goal (Individualized)  Outcome: Progressing  Goal: Absence of Hospital-Acquired Illness or Injury  Outcome: Progressing  Goal: Optimal Comfort and Wellbeing  Outcome: Progressing   Plan of Care Review  Plan of Care Reviewed With: patient  Progress: improving

## 2022-04-23 VITALS
OXYGEN SATURATION: 97 % | DIASTOLIC BLOOD PRESSURE: 59 MMHG | HEIGHT: 60 IN | WEIGHT: 118 LBS | TEMPERATURE: 97.8 F | BODY MASS INDEX: 23.16 KG/M2 | SYSTOLIC BLOOD PRESSURE: 130 MMHG | HEART RATE: 60 BPM | RESPIRATION RATE: 16 BRPM

## 2022-04-23 PROBLEM — Z82.49 FAMILY HISTORY OF CORONARY ARTERY DISEASE: Status: RESOLVED | Noted: 2018-05-16 | Resolved: 2022-04-23

## 2022-04-23 PROBLEM — I10: Status: RESOLVED | Noted: 2018-05-16 | Resolved: 2022-04-23

## 2022-04-23 PROBLEM — R06.02 SHORTNESS OF BREATH: Status: RESOLVED | Noted: 2022-02-21 | Resolved: 2022-04-23

## 2022-04-23 PROBLEM — R06.09 DOE (DYSPNEA ON EXERTION): Status: RESOLVED | Noted: 2022-01-19 | Resolved: 2022-04-23

## 2022-04-23 PROBLEM — I11.9 HYPERTENSIVE HEART DISEASE WITHOUT HEART FAILURE: Status: RESOLVED | Noted: 2022-01-19 | Resolved: 2022-04-23

## 2022-04-23 PROBLEM — I48.0 PAROXYSMAL ATRIAL FIBRILLATION (CMS/HCC): Status: RESOLVED | Noted: 2022-01-19 | Resolved: 2022-04-23

## 2022-04-23 PROBLEM — I16.1 HYPERTENSIVE EMERGENCY: Status: RESOLVED | Noted: 2019-01-28 | Resolved: 2022-04-23

## 2022-04-23 LAB
ANION GAP SERPL CALC-SCNC: 12 MEQ/L (ref 3–15)
ATRIAL RATE: 59
BASOPHILS # BLD: 0.11 K/UL (ref 0.01–0.1)
BASOPHILS NFR BLD: 1.4 %
BUN SERPL-MCNC: 17 MG/DL (ref 8–20)
CALCIUM SERPL-MCNC: 9.2 MG/DL (ref 8.9–10.3)
CHLORIDE SERPL-SCNC: 105 MEQ/L (ref 98–109)
CO2 SERPL-SCNC: 24 MEQ/L (ref 22–32)
CREAT SERPL-MCNC: 1 MG/DL (ref 0.6–1.1)
DIFFERENTIAL METHOD BLD: ABNORMAL
EOSINOPHIL # BLD: 0.25 K/UL (ref 0.04–0.36)
EOSINOPHIL NFR BLD: 3.2 %
ERYTHROCYTE [DISTWIDTH] IN BLOOD BY AUTOMATED COUNT: 14.7 % (ref 11.7–14.4)
GFR SERPL CREATININE-BSD FRML MDRD: 53.6 ML/MIN/1.73M*2
GLUCOSE SERPL-MCNC: 94 MG/DL (ref 70–99)
HCT VFR BLDCO AUTO: 41.2 % (ref 35–45)
HGB BLD-MCNC: 13.2 G/DL (ref 11.8–15.7)
IMM GRANULOCYTES # BLD AUTO: 0.02 K/UL (ref 0–0.08)
IMM GRANULOCYTES NFR BLD AUTO: 0.3 %
LYMPHOCYTES # BLD: 2.39 K/UL (ref 1.2–3.5)
LYMPHOCYTES NFR BLD: 30.3 %
MAGNESIUM SERPL-MCNC: 2.1 MG/DL (ref 1.8–2.5)
MCH RBC QN AUTO: 28.8 PG (ref 28–33.2)
MCHC RBC AUTO-ENTMCNC: 32 G/DL (ref 32.2–35.5)
MCV RBC AUTO: 90 FL (ref 83–98)
MONOCYTES # BLD: 0.6 K/UL (ref 0.28–0.8)
MONOCYTES NFR BLD: 7.6 %
NEUTROPHILS # BLD: 4.53 K/UL (ref 1.7–7)
NEUTS SEG NFR BLD: 57.2 %
NRBC BLD-RTO: 0 %
P AXIS: 154
PDW BLD AUTO: 9.5 FL (ref 9.4–12.3)
PLATELET # BLD AUTO: 396 K/UL (ref 150–369)
POTASSIUM SERPL-SCNC: 4.4 MEQ/L (ref 3.6–5.1)
PR INTERVAL: 194
QRS DURATION: 72
QT INTERVAL: 430
QTC CALCULATION(BAZETT): 430
R AXIS: -2
RBC # BLD AUTO: 4.58 M/UL (ref 3.93–5.22)
SODIUM SERPL-SCNC: 141 MEQ/L (ref 136–144)
T WAVE AXIS: 29
VENTRICULAR RATE: 60
WBC # BLD AUTO: 7.9 K/UL (ref 3.8–10.5)

## 2022-04-23 PROCEDURE — 83735 ASSAY OF MAGNESIUM: CPT

## 2022-04-23 PROCEDURE — 80048 BASIC METABOLIC PNL TOTAL CA: CPT

## 2022-04-23 PROCEDURE — 63700000 HC SELF-ADMINISTRABLE DRUG: Performed by: STUDENT IN AN ORGANIZED HEALTH CARE EDUCATION/TRAINING PROGRAM

## 2022-04-23 PROCEDURE — 93010 ELECTROCARDIOGRAM REPORT: CPT | Performed by: INTERNAL MEDICINE

## 2022-04-23 PROCEDURE — 99238 HOSP IP/OBS DSCHRG MGMT 30/<: CPT | Performed by: INTERNAL MEDICINE

## 2022-04-23 PROCEDURE — 85025 COMPLETE CBC W/AUTO DIFF WBC: CPT

## 2022-04-23 PROCEDURE — 36415 COLL VENOUS BLD VENIPUNCTURE: CPT

## 2022-04-23 RX ORDER — DOFETILIDE 0.25 MG/1
250 CAPSULE ORAL EVERY 12 HOURS
Qty: 60 CAPSULE | Refills: 3 | Status: SHIPPED | OUTPATIENT
Start: 2022-04-23 | End: 2022-04-23 | Stop reason: SDUPTHER

## 2022-04-23 RX ORDER — LANOLIN ALCOHOL/MO/W.PET/CERES
400 CREAM (GRAM) TOPICAL 2 TIMES DAILY PRN
Qty: 90 TABLET | Refills: 3 | Status: SHIPPED | OUTPATIENT
Start: 2022-04-23 | End: 2024-02-12 | Stop reason: SDUPTHER

## 2022-04-23 RX ORDER — DOFETILIDE 0.25 MG/1
250 CAPSULE ORAL EVERY 12 HOURS
Qty: 180 CAPSULE | Refills: 3 | Status: SHIPPED | OUTPATIENT
Start: 2022-04-23 | End: 2023-03-31

## 2022-04-23 RX ADMIN — VENLAFAXINE HYDROCHLORIDE 75 MG: 75 CAPSULE, EXTENDED RELEASE ORAL at 08:12

## 2022-04-23 RX ADMIN — METOPROLOL SUCCINATE 25 MG: 25 TABLET, FILM COATED, EXTENDED RELEASE ORAL at 08:12

## 2022-04-23 RX ADMIN — DOFETILIDE 250 MCG: 250 CAPSULE ORAL at 09:04

## 2022-04-23 RX ADMIN — AMLODIPINE BESYLATE 5 MG: 5 TABLET ORAL at 08:12

## 2022-04-23 RX ADMIN — PANTOPRAZOLE SODIUM 40 MG: 40 TABLET, DELAYED RELEASE ORAL at 08:12

## 2022-04-23 RX ADMIN — APIXABAN 5 MG: 5 TABLET, FILM COATED ORAL at 08:12

## 2022-04-23 NOTE — PLAN OF CARE
Plan of Care Review  Plan of Care Reviewed With: patient  Progress: no change  Outcome Summary: Pt stable t/o shift. Tikosyn administered, EKG obtained 2 hours post administration. A paced on monitor. FSP maintained, call bell in reach.

## 2022-04-23 NOTE — DISCHARGE INSTRUCTIONS
Only new medication is Tikosyn (dofetilide) 250 mcg twice daily.  A 30-day prescription was sent to her local pharmacy and a 90-day prescription has been sent to [facility].  Continue all of your current medications without any changes.  Continue all physical activity as previously instructed.  You have an appointment scheduled with one of the nurse practitioners in our office in approximately 2 weeks.

## 2022-04-23 NOTE — NURSING NOTE
Tele d/c'd.. dom d/c'd.. Discharge papers and instructions given to pt. Pt has no complaints.. Pt d/c'd with all the belongings.. Tikosyn supply for home to be provided

## 2022-04-23 NOTE — PROGRESS NOTES
Electrophysiology -  Daily Progress Note       SUBJECTIVE     She is feeling well with no chest pains, palpitations or shortness of breath.     OBJECTIVE      Vital signs in last 24 hours:  Temp:  [36.6 °C (97.8 °F)-36.7 °C (98 °F)] 36.6 °C (97.8 °F)  Heart Rate:  [59-60] 60  Resp:  [16-18] 16  BP: (113-170)/(56-90) 130/59  Physical Exam  Constitutional:       General: She is not in acute distress.     Appearance: She is well-developed.   HENT:      Head: Atraumatic.   Eyes:      General: No scleral icterus.  Neck:      Thyroid: No thyromegaly.   Cardiovascular:      Rate and Rhythm: Regular rhythm.      Heart sounds: No murmur heard.  Pulmonary:      Effort: No respiratory distress.      Breath sounds: Normal breath sounds.   Abdominal:      Palpations: Abdomen is soft.      Tenderness: There is no abdominal tenderness.   Musculoskeletal:         General: No deformity.   Skin:     Findings: No rash.   Neurological:      Mental Status: She is alert.      Motor: No abnormal muscle tone.         •  acetaminophen, 650 mg, oral, q4h PRN  •  amLODIPine, 5 mg, oral, BID  •  apixaban, 5 mg, oral, BID  •  atorvastatin, 40 mg, oral, Daily  •  atropine, 0.5 mg, intravenous, q5 min PRN  •  glucose, 16-32 g of dextrose, oral, PRN **OR** dextrose, 15-30 g of dextrose, oral, PRN **OR** glucagon, 1 mg, intramuscular, PRN **OR** dextrose in water, 25 mL, intravenous, PRN  •  dofetilide, 250 mcg, oral, q12h [place]  •  magnesium oxide, 400 mg, oral, 2x daily PRN  •  magnesium sulfate, 1 g, intravenous, PRN **OR** magnesium sulfate, 2 g, intravenous, PRN  •  metoprolol succinate XL, 25 mg, oral, BID  •  nitroglycerin, 0.4 mg, sublingual, q5 min PRN  •  pantoprazole, 40 mg, oral, Daily  •  potassium chloride, 20 mEq, oral, PRN  •  potassium chloride, 40 mEq, oral, PRN  •  venlafaxine XR, 75 mg, oral, Daily  •  magnesium oxide  •  dofetilide   LABS / IMAGING / TELE        Results from last 7 days   Lab Units 04/23/22  0701  04/22/22  0446 04/21/22  0453   WBC K/uL 7.90 8.43 7.31   HEMOGLOBIN g/dL 13.2 11.7* 10.7*   HEMATOCRIT % 41.2 36.5 34.6*   PLATELETS K/uL 396* 394* 353     0   Lab Value Date/Time    INR 0.9 11/13/2016 1856    INR 1.0 01/14/2015 1130     No results found for: HSTROPONINI  Results from last 7 days   Lab Units 04/23/22  0701 04/22/22  0446 04/21/22  0453   SODIUM mEQ/L 141 141 140   POTASSIUM mEQ/L 4.4 4.7 4.6   CHLORIDE mEQ/L 105 105 108   CO2 mEQ/L 24 24 22   BUN mg/dL 17 19 24*   CREATININE mg/dL 1.0 0.8 0.9   CALCIUM mg/dL 9.2 9.5 9.4   GLUCOSE mg/dL 94 123* 127*         Cardiac Imaging    ECHOCARDIOGRAM - EXTERNAL RESULT     Narrative  Ordered by an unspecified provider.      Telemetry: Atrial paced rhythm    ECG: Atrial paced rhythm with a corrected QT interval of 430 ms.    ASSESSMENT AND PLAN      * Persistent atrial fibrillation (CMS/HCC)  Assessment & Plan  She has a history of atrial fibrillation that was first diagnosed in member of 2021 when she presented to an outside hospital for an acute diverticulitis/colitis flareup.  At that time she was started on Eliquis 5 mg twice daily and amiodarone.  Her EF was 60 to 65% with normal bilateral atria size.  She converted back to sinus rhythm on her own.    She followed up with her primary cardiologist Dr. Boateng and was unable to tolerate amiodarone due to GI side effects.  Amiodarone was stopped and flecainide 50 mg twice daily was started.  She underwent M cot and the results showed an elevated atrial fibrillation burden around 40%.  Also showed prolonged and frequent conversion pauses.  She underwent dual-chamber pacemaker implantation on 4/12/2022 to prevent conversion pauses and permits more aggressive management of her atrial fibrillation.    She has received 5 doses of Tikosyn without excessive QT prolongation and with maintenance of sinus rhythm.  She is stable for discharge today.    PLAN:   - Continue Tikosyn 250 mcg BID, plan for 5th dose this  evening. Please obtained EKG 2 hours after  - Continue Eliquis 5 mg twice daily  - Continue on metoprolol 25 mg twice daily  - Continue telemetry monitoring while hospitalized   - Monitor and replete electrolytes to maintain K above 4 and Mg above 2  - Discharge today        Sick sinus syndrome (CMS/HCC)  Assessment & Plan  Patient is status post dual-chamber pacemaker implantation on 4/12/2022 that was placed in the setting of long conversion pauses associated with atrial fibrillation. Pacemaker site is well approximated with skin glue. There is minimal swelling at the site.     Patient should continue to maintain pacemaker site precautions. She has a follow up appointment scheduled for May 2, 2022.     Essential hypertension  Assessment & Plan  She is maintained on amlodipine 5 mg twice daily.    Hyperlipidemia  Assessment & Plan  She is maintained on atorvastatin 40 mg daily.       Code Status: Full Code

## 2022-04-25 ENCOUNTER — DOCUMENTATION (OUTPATIENT)
Dept: CARDIOLOGY | Facility: CLINIC | Age: 77
End: 2022-04-25
Payer: MEDICARE

## 2022-04-25 NOTE — DISCHARGE SUMMARY
Cardiology Discharge Summary    BRIEF OVERVIEW    Admitting Provider: Neel Pizano MD  AttendingProvider: No att. providers found  Primary Care Physician at Discharge: José Antonio Leroy  [phone]   H&P Notes 3/26/2022 to 4/25/2022         Date of Service   Author Author Type Status Note Type File Time    04/20/22 1591  Savi Ricardo MD Fellow Signed H&P 04/20/22 9604        Admission Date: 4/20/2022     Discharge Date: 4/25/2022    Primary Discharge Diagnosis  Persistent atrial fibrillation (CMS/HCC)    Secondary Discharge Diagnosis  Patient Active Problem List    Diagnosis Date Noted   • Persistent atrial fibrillation (CMS/HCC) 04/20/2022   • Sick sinus syndrome (CMS/HCC) 04/12/2022   • Essential hypertension 05/16/2018   • Nonrheumatic aortic valve insufficiency 05/16/2018   • Non-rheumatic mitral regurgitation 05/16/2018   • Hyperlipidemia 08/27/2012   • Nocturia 08/27/2012       DETAILS OF HOSPITAL STAY    Operative Procedures Performed      Consults:       Consult Orders During Admission:  None     Procedures: None  Pertinent Test Results: None    Imaging  X-RAY CHEST 1 VIEW    Result Date: 4/12/2022  Narrative: CLINICAL HISTORY: pacemaker or ICD insertion; in EP Lab recovery area COMMENT: COMPARISON: 2/23/2022. TECHNIQUE: One view of the chest was obtained. FINDINGS: Lines/tubes/foreign bodies:  Left chest cardiopacer lead is noted. Lungs:  Mildly prominent pulmonary vasculature. Hazy opacity seen at left lung base. Pleura:  No evidence for pneumothorax or pleural effusion. Heart / mediastinum:  Stable cardiomediastinal silhouette. Bones / soft tissues:  Thoracic scoliosis is noted.     Impression: IMPRESSION: Mildly prominent pulmonary vasculature, may represent mild pulmonary edema. Hazy opacity seen at left lung base, may represent atelectasis, aspiration or pneumonia.     Electrophysiology - Implantable Device    Addendum Date: 4/14/2022 Addendum:   Summary: Successful limplantation of a  Medtronic Dual Chamber Pacemaker with active RA and RV leads (LBAP). Recommendations: - avoid heparin or heparin related products for 72 hours. - resume Eliquis tomorrow morning. - resume all other oral medications. - follow up in the clinic in a week for wound check. - follow up in the clinic in a month for device check. - Patient will be admitted for initiation of Tikosyn.     Result Date: 4/12/2022  Narrative: Summary: Successful limplantation of a Medtronic Dual Chamber Pacemaker with active RA and RV leads (LBAP). Recommendations: - avoid heparin or heparin related products for 72 hours. - resume Eliquis tomorrow morning. - resume all other oral medications. - follow up in the clinic in a week for wound check. - follow up in the clinic in a month for device check. - Patient will be admitted for initiation of Tikosyn.     Presenting Problem/History of Present Illness  Persistent atrial fibrillation (CMS/HCC) [I48.19]     Angeles Cardoza is a 76 y.o. female who was admitted for dofetilide initiation. She has a past medical history significant for hyperlipidemia, hypertension, and a recent diagnosis of atrial fibrillation in November of 2021.      Patient was originally diagnosed with atrial fibrillation in November of 2021 when she presented to an OSH for acute diverticulitis/colitis flare up. She was initiated on Eliquis 5 mg BID and amiodarone at that time. ECHO demonstrated normal LV function with an EF of 60-65% and normal bi-lateral atria size. She spontaneously converted back to normal rhythm. She followed up with Dr. Boateng where it was noted that she was unable to tolerate amiodarone due to GI side effects of nausea and vomiting. Amiodarone was discontinued and the patient was initiated on flecainide 50 mg BID. A stress test was completed which was unremarkable and a MCOT was ordered to assess the true burden of atrial fibrillation. The patient reported minimal signs or symptoms associated with the  arrhythmia, but does note that she had intermittent shortness of breath at times and reports that the vein in her neck would rapidly pulsate. She did not endorse any chest pain or palpitations. The MCOT results did demonstrate an elevated burden of atrial fibrillation around 40% but what was more concerning was the prolonged and frequent conversion pauses. The patient was noted to have 26 pauses on her MCOT with some last up to 6 seconds.      She was seen by Dr. Pizano for management of her atrial fibrillation and pauses.  He underwent dual-chamber pacemaker implantation by Dr. Pizano on 4/12/2022 to prevent conversion pauses. She presented for dofetilide initiation.    Hospital Course  Patient seen and examined on day of discharge.    Patient was started on Tikosyn 250 mcg BID. Tolerated all 6 doses with appropriate QTc < 500 ms. Patient was discharged home with no issues.     Discharge Orders  Released Discharge Orders  Current as of patient's discharge on: 04/23/22    Order Details Provider Status    magnesium oxide (MAG-OX) 400 mg (241.3 mg magnesium) tablet Take 1 tablet (400 mg total) by mouth 2 (two) times a day as needed (serum mag less than 2 mEq/L). Dereck Scott MD Prescribed    dofetilide (TIKOSYN) 250 mcg capsule Take 1 capsule (250 mcg total) by mouth every 12 (twelve) hours. Dereck Scott MD Discontinued    acetaminophen (TylenoL) 325 mg tablet Take 2 tablets (650 mg total) by mouth every 4 (four) hours as needed for mild pain. Dereck Scott MD Resume at Discharge (Prescription)    amLODIPine 5 mg tablet Take 1 tablet (5 mg total) by mouth 2 (two) times a day. Dereck Scott MD Resume at Discharge (Prescription)    ascorbic acid (VITAMIN C) 250 mg tablet Take 250 mg by mouth daily. eDreck Scott MD Resume at Discharge (Patient Reported)    atorvastatin (LIPITOR) 40 mg tablet Take 40 mg by mouth daily.   Dereck Scott MD Resume at Discharge (Patient Reported)     calcium carbonate (CALCIUM 500 ORAL) Take by mouth daily. Dereck Scott MD Resume at Discharge (Patient Reported)    cholecalciferol, vitamin D3, 1,000 unit (25 mcg) tablet Take 2,000 Units by mouth daily.  Dereck Scott MD Resume at Discharge (Patient Reported)    cyanocobalamin (VITAMIN B12) 100 mcg tablet Take 100 mcg by mouth daily. Dereck Scott MD Resume at Discharge (Patient Reported)    dofetilide (TIKOSYN) 250 mcg capsule Take 1 capsule (250 mcg total) by mouth every 12 (twelve) hours. Dereck Scott MD Resume at Discharge (Prescription)    ELIQUIS 5 mg tablet Take 1 tablet (5 mg total) by mouth 2 (two) times a day. Dereck Scott MD Resume at Discharge (Prescription)    metoprolol succinate XL (TOPROL XL) 50 mg 24 hr tablet Take 0.5 tablets (25 mg total) by mouth 2 (two) times a day. Dereck Scott MD Resume at Discharge (Prescription)    omeprazole (PriLOSEC) 40 mg capsule Take 40 mg by mouth daily and an additional dose as needed.   Dereck Scott MD Resume at Discharge (Patient Reported)    venlafaxine XR (EFFEXOR-XR) 75 mg 24 hr capsule Take 75 mg by mouth daily.   Dereck Scott MD Resume at Discharge (Patient Reported)    NOT IN DATABASE [place]   Two once daily Dereck Scott MD Do Not Resume at Discharge    Post-Discharge Activity: Normal activity as tolerated. RoutineNormal activity as tolerated. Dereck Scott MD New at Discharge    Follow-up with provider Referral By - ANNABELLA STRAUSS 1 visitAs scheduled with Elisha Díaz on May 2, 2022 Dereck Scott MD New at Discharge    Discharge diet Routine, Normal Dereck Scott MD New at Discharge    Call provider for:  persistent nausea or vomiting Routine Dereck Scott MD New at Discharge    Call provider for:  difficulty breathing, headache or visual disturbances Routine Dereck Scott MD New at Discharge    Call provider for:  rash Routine Dereck Scott MD New at Discharge    Call  provider for:  persistent dizziness or light-headedness Routine Dereck Scott MD New at Discharge    Call provider for:  extreme fatigue Routine Dereck Scott MD New at Discharge          Outpatient Follow-Ups            In 1 week [facility]    In 1 week SHOBHA Galloway [facility] Electrophysiology at [facility]    In 1 week Chano Boateng DO [facility] General Cardiology at [facility]        Referrals:  No orders of the defined types were placed in this encounter.      Active Issues Requiring Follow-up  Issue: None  What is Needed: None      Test Results Pending at Discharge    None    Discharge Disposition  Home   Code Status at Discharge: Prior

## 2022-05-02 ENCOUNTER — TELEPHONE (OUTPATIENT)
Dept: CARDIOLOGY | Facility: CLINIC | Age: 77
End: 2022-05-02
Payer: MEDICARE

## 2022-05-02 ENCOUNTER — HOSPITAL ENCOUNTER (OUTPATIENT)
Dept: CARDIOLOGY | Facility: HOSPITAL | Age: 77
Discharge: HOME | End: 2022-05-02
Attending: INTERNAL MEDICINE
Payer: MEDICARE

## 2022-05-02 ENCOUNTER — OFFICE VISIT (OUTPATIENT)
Dept: CARDIOLOGY | Facility: CLINIC | Age: 77
End: 2022-05-02
Payer: MEDICARE

## 2022-05-02 VITALS
RESPIRATION RATE: 16 BRPM | BODY MASS INDEX: 22.78 KG/M2 | WEIGHT: 116 LBS | DIASTOLIC BLOOD PRESSURE: 80 MMHG | HEIGHT: 60 IN | SYSTOLIC BLOOD PRESSURE: 124 MMHG | HEART RATE: 60 BPM

## 2022-05-02 VITALS
SYSTOLIC BLOOD PRESSURE: 120 MMHG | HEIGHT: 60 IN | DIASTOLIC BLOOD PRESSURE: 42 MMHG | HEART RATE: 60 BPM | BODY MASS INDEX: 23.16 KG/M2 | WEIGHT: 118 LBS

## 2022-05-02 DIAGNOSIS — I48.19 PERSISTENT ATRIAL FIBRILLATION (CMS/HCC): ICD-10-CM

## 2022-05-02 DIAGNOSIS — I10 ESSENTIAL HYPERTENSION: ICD-10-CM

## 2022-05-02 DIAGNOSIS — I49.5 SICK SINUS SYNDROME (CMS/HCC): Primary | ICD-10-CM

## 2022-05-02 PROBLEM — Z95.0 PACEMAKER: Status: ACTIVE | Noted: 2022-05-02

## 2022-05-02 LAB
AORTIC ROOT ANNULUS - M-MODE: 2.9 CM
AORTIC VALVE MEAN VELOCITY: 1.26 M/S
AORTIC VALVE VELOCITY TIME INTEGRAL: 42.5 CM
AV MEAN GRADIENT: 8 MMHG
AV PEAK GRADIENT: 17 MMHG
AV PEAK VELOCITY-S: 2.04 M/S
AV REG PEAK VEL: 3.66 M/S
AV REGURGITATION PRESSURE HALF TIME: 467 MS
BSA FOR ECHO PROCEDURE: 1.51 M2
CUSP SEPARATION: 1.7 CM
DOP CALC LVOT STROKE VOLUME: 88.28 ML
E WAVE DECELERATION TIME: 259 MS
E/A RATIO: 1.1
E/E' RATIO: 7.9
E/LAT E' RATIO: 6
EDV (BP): 67.7 CM3
EDV (MM-TEICH): 116 CM3
EF (A4C): 67.4 %
EF (MM-TEICH): 70.8 %
EF A2C: 67.8 %
EJECTION FRACTION: 68.4 %
ESV (BP): 21.4 CM3
ESV (MM-TEICH): 33.9 CM3
FRACTIONAL SHORTENING: 40.2 %
INTERVENTRICULAR SEPTUM: 0.97 CM
LA ESV (BP): 59.2 CM3
LA ESV INDEX (A2C): 35.36 CM3/M2
LA ESV INDEX (BP): 39.21 CM3/M2
LA/AORTA RATIO: 1.41
LAAS-AP2: 19.8 CM2
LAAS-AP4: 21 CM2
LAD 2D - M-MODE: 4.1 CM
LALD A4C: 5.79 CM
LALD A4C: 6.08 CM
LAV-S: 53.4 CM3
LEFT ATRIUM VOLUME INDEX: 41.52 CM3/M2
LEFT ATRIUM VOLUME: 62.7 CM3
LEFT INTERNAL DIMENSION IN SYSTOLE: 2.62 CM (ref 2.23–3.38)
LEFT VENTRICLE DIASTOLIC VOLUME INDEX: 37.95 CM3/M2
LEFT VENTRICLE DIASTOLIC VOLUME: 57.3 CM3
LEFT VENTRICLE SYSTOLIC VOLUME INDEX: 12.38 CM3/M2
LEFT VENTRICLE SYSTOLIC VOLUME: 18.7 CM3
LEFT VENTRICULAR INTERNAL DIMENSION IN DIASTOLE: 4.38 CM (ref 3.75–5.21)
LEFT VENTRICULAR POSTERIOR WALL IN END DIASTOLE: 0.96 CM (ref 0.48–0.89)
LV DIASTOLIC VOLUME: 76.3 CM3
LV ESV (APICAL 2 CHAMBER): 24.5 CM3
LVAD-AP2: 24.6 CM2
LVAD-AP4: 21.2 CM2
LVAS-AP2: 12.5 CM2
LVAS-AP4: 10.9 CM2
LVEDVI(A2C): 50.53 CM3/M2
LVEDVI(BP): 44.83 CM3/M2
LVESVI(A2C): 16.23 CM3/M2
LVESVI(BP): 14.17 CM3/M2
LVLD-AP2: 6.86 CM
LVLD-AP4: 6.45 CM
LVLS-AP2: 5.49 CM
LVLS-AP4: 5.44 CM
LVOT 2D: 2.1 CM
LVOT A: 3.46 CM2
LVOT MG: 2 MMHG
LVOT MV: 0.69 M/S
LVOT PEAK VELOCITY: 1.21 M/S
LVOT VTI: 25.5 CM
M MODE - INTERVENTRICULAR SEPTUM IN END DIASTOLE: 0.77 CM
M MODE - LEFT INTERNAL DIMENSION IN SYSTOLE: 2.96 CM
M MODE - LEFT VENTRICULAR INTERNAL DIMENSION IN DIASTOLE: 4.96 CM
M MODE - LEFT VENTRICULAR POSTERIOR WALL IN END DIASTOLE: 0.88 CM
M MODE - LEFT VENTRICULAR POSTERIOR WALL IN END DIASTOLE: 0.88 CM
MV D: 3.7 CM
MV E'TISSUE VEL-LAT: 0.12 M/S
MV E'TISSUE VEL-MED: 0.09 M/S
MV PEAK A VEL: 0.68 M/S
MV PEAK E VEL: 0.72 M/S
MV VTI: 19.7 CM
PI PEAK VELOCITY: 1.59 M/S
POSTERIOR WALL: 0.96 CM
PULM VEIN S/D RATIO: 1.1
PULMONARY REGURGITATION LATE DIASTOLIC VELOCITY: 1.05 M/S
PV PEAK D VEL: 0.56 M/S
PV PEAK S VEL: 0.59 M/S
RVOT VMAX: 0.57 M/S
RVOT VTI: 14.2 CM
TR MAX PG: 24.8 MMHG
TRICUSPID VALVE PEAK REGURGITATION VELOCITY: 2.49 M/S
Z-SCORE OF LEFT VENTRICULAR DIMENSION IN END DIASTOLE: -0.09
Z-SCORE OF LEFT VENTRICULAR DIMENSION IN END SYSTOLE: -0.37
Z-SCORE OF LEFT VENTRICULAR POSTERIOR WALL IN END DIASTOLE: 2.04

## 2022-05-02 PROCEDURE — 93306 TTE W/DOPPLER COMPLETE: CPT | Mod: 26 | Performed by: INTERNAL MEDICINE

## 2022-05-02 PROCEDURE — 99214 OFFICE O/P EST MOD 30 MIN: CPT

## 2022-05-02 PROCEDURE — 93306 TTE W/DOPPLER COMPLETE: CPT

## 2022-05-02 PROCEDURE — G8754 DIAS BP LESS 90: HCPCS

## 2022-05-02 PROCEDURE — G8752 SYS BP LESS 140: HCPCS

## 2022-05-02 PROCEDURE — 93000 ELECTROCARDIOGRAM COMPLETE: CPT

## 2022-05-02 RX ORDER — AMLODIPINE BESYLATE 5 MG/1
5 TABLET ORAL DAILY
Qty: 90 TABLET | Refills: 1 | COMMUNITY
Start: 2022-05-02 | End: 2022-05-03 | Stop reason: SDUPTHER

## 2022-05-02 RX ORDER — METOPROLOL SUCCINATE 50 MG/1
75 TABLET, EXTENDED RELEASE ORAL DAILY
Qty: 30 TABLET | Refills: 3 | COMMUNITY
Start: 2022-05-02 | End: 2022-10-03 | Stop reason: SDUPTHER

## 2022-05-02 ASSESSMENT — ENCOUNTER SYMPTOMS
CARDIOVASCULAR NEGATIVE: 1
GASTROINTESTINAL NEGATIVE: 1
CONSTITUTIONAL NEGATIVE: 1
ENDOCRINE NEGATIVE: 1
PSYCHIATRIC NEGATIVE: 1
MUSCULOSKELETAL NEGATIVE: 1
RESPIRATORY NEGATIVE: 1
NEUROLOGICAL NEGATIVE: 1
EYES NEGATIVE: 1
ALLERGIC/IMMUNOLOGIC NEGATIVE: 1
HEMATOLOGIC/LYMPHATIC NEGATIVE: 1

## 2022-05-02 NOTE — PROGRESS NOTES
Electrophysiology       Reason for visit: Follow-up  Chief Complaint   Patient presents with   • Device Check      HPI   Angeles Cardoza is a 76 y.o. female who comes to the office today for follow-up of her device and related arrhythmia issues.  She has a past medical history of hyperlipidemia, hypertension, and paroxysmal atrial fibrillation.    She was first diagnosed with atrial fibrillation in November 2021 when she presented to an outside hospital for acute diverticulitis/colitis flareup.  She was started on Eliquis 5 mg twice daily and amiodarone.  Her echo at that time demonstrated normal LV function with an EF of 60 to 65% and normal bilateral atria size.  She converted to sinus rhythm spontaneously.  She did not tolerate amiodarone due to GI side effects of nausea and vomiting.  Amiodarone was discontinued and the patient was started on flecainide 50 mg twice daily.  Her stress test was unremarkable and an M cot was ordered to assess true burden of atrial fibrillation.  She reported minimal signs associated with the arrhythmia.  The M cot showed an A. fib burden of 40% and prolonged and frequent conversion pauses.  The patient was noted to have 26 pauses on her M cot that lasted for about 6 seconds.  It was decided that she would undergo implantation of a dual-chamber pacemaker for further management of her arrhythmia.    She underwent implantation of a dual-chamber pacemaker on 4/12/2022 secondary to conversion pauses and to allow for adequate medical management of her arrhythmia.  She then was admitted to the hospital from 4/20/2022 for Tikosyn initiation.    She presents to the office today for follow-up.  She reports some episodes of shortness of breath and palpitations when she is just sitting.  She denies chest pain, palpitations, lightheadedness, dizziness, or syncope.      Past Medical History:   Diagnosis Date   • Acid reflux    • Atrial fibrillation (CMS/HCC)    • Hypertension    • Lipid  disorder    • Sick sinus syndrome (CMS/HCC)      Past Surgical History:   Procedure Laterality Date   • BREAST SURGERY  2003    breast reduction   • CARDIAC PACEMAKER PLACEMENT Left 04/12/2022    Medtronic   • RENAL ANGIOPLASTY     • TOE SURGERY Left    • TONSILLECTOMY     • WISDOM TOOTH EXTRACTION       Allergies:  Penicillins and Ditropan [oxybutynin chloride]    Current Outpatient Medications on File Prior to Visit   Medication Sig Dispense Refill   • acetaminophen (TylenoL) 325 mg tablet Take 2 tablets (650 mg total) by mouth every 4 (four) hours as needed for mild pain.     • amLODIPine (NORVASC) 5 mg tablet Take 1 tablet (5 mg total) by mouth daily. 90 tablet 1   • atorvastatin (LIPITOR) 40 mg tablet Take 40 mg by mouth daily.       • cholecalciferol, vitamin D3, 1,000 unit (25 mcg) tablet Take 2,000 Units by mouth daily.      • cyanocobalamin (VITAMIN B12) 100 mcg tablet Take 100 mcg by mouth daily.     • dofetilide (TIKOSYN) 250 mcg capsule Take 1 capsule (250 mcg total) by mouth every 12 (twelve) hours. 180 capsule 3   • ELIQUIS 5 mg tablet Take 1 tablet (5 mg total) by mouth 2 (two) times a day. 60 tablet 0   • magnesium oxide (MAG-OX) 400 mg (241.3 mg magnesium) tablet Take 1 tablet (400 mg total) by mouth 2 (two) times a day as needed (serum mag less than 2 mEq/L). 90 tablet 3   • metoprolol succinate XL (TOPROL XL) 50 mg 24 hr tablet Take 1 tablet (50 mg total) by mouth 2 (two) times a day. Take 25 mg in the morning and 50 mg at night. 30 tablet 3   • omeprazole (PriLOSEC) 40 mg capsule Take 40 mg by mouth daily and an additional dose as needed.    3   • venlafaxine XR (EFFEXOR-XR) 75 mg 24 hr capsule Take 75 mg by mouth daily.         No current facility-administered medications on file prior to visit.     Social History     Socioeconomic History   • Marital status:      Spouse name: None   • Number of children: None   • Years of education: None   • Highest education level: None   Tobacco Use    • Smoking status: Never Smoker   • Smokeless tobacco: Never Used   Vaping Use   • Vaping Use: Never used   Substance and Sexual Activity   • Alcohol use: Yes     Comment: occasional     Family History   Problem Relation Age of Onset   • Stroke Biological Mother    • Heart failure Biological Father      Review of Systems   Constitutional: Negative.    HENT: Negative.    Eyes: Negative.    Respiratory: Negative.    Cardiovascular: Negative.    Gastrointestinal: Negative.    Endocrine: Negative.    Genitourinary: Negative.    Musculoskeletal: Negative.    Skin: Negative.    Allergic/Immunologic: Negative.    Neurological: Negative.    Hematological: Negative.    Psychiatric/Behavioral: Negative.      Vitals:    05/02/22 1412   BP: 124/80   Pulse: 60   Resp: 16     Wt Readings from Last 3 Encounters:   05/02/22 52.6 kg (116 lb)   05/02/22 53.5 kg (118 lb)   04/20/22 53.5 kg (118 lb)     Physical Exam  Constitutional:       General: She is not in acute distress.     Appearance: Normal appearance. She is normal weight.   Cardiovascular:      Rate and Rhythm: Normal rate and regular rhythm.      Pulses: Normal pulses.      Heart sounds: Normal heart sounds.   Pulmonary:      Effort: Pulmonary effort is normal.      Breath sounds: Normal breath sounds.   Musculoskeletal:      Right lower leg: No edema.      Left lower leg: No edema.   Skin:     General: Skin is warm and dry.      Comments: Left chest wall pacemaker incision healed   Neurological:      Mental Status: She is alert and oriented to person, place, and time.        Lab Results   Component Value Date    WBC 7.90 04/23/2022    HGB 13.2 04/23/2022    HCT 41.2 04/23/2022     (H) 04/23/2022    ALT 25 01/21/2022    AST 30 01/21/2022     04/23/2022    K 4.4 04/23/2022    CREATININE 1.0 04/23/2022    BUN 17 04/23/2022    TSH 2.05 01/21/2022    INR 0.9 11/13/2016       Cardiac Imaging    ECHOCARDIOGRAM - EXTERNAL RESULT     Narrative  Ordered by an  unspecified provider.    Most recent stress test results:    CV NM STRESS EXERCISE WITH MYOC PERF SPECT MULTI 01/20/2022 (Final) 1/20/2022    Interpretation Summary  1. Exercise technetium tetrofosmin shows no evidence of scar or ischemia.  2. ECG shows no ST changes diagnostic of ischemia. Intermittent atrial fibrillation though the study at rest, exercise and recovery.  3. Gated SPECT obtained in the resting state post stress shows left ventricular ejection fraction of 72%.  All walls show normal thickening and endocardial excursion. No regional wall motion abnormalities are noted.  4. Duke Treadmill score of 7 which correlates with a low risk study. The patient exercised for 7 minutes and 10 seconds achieving 10.1 METS.  5. No prior study for comparison.  6. This is a normal study.        ECG: Atrial pacing w/ ventricular sensing     Other Cardiac Studies: As above     Essential hypertension  Her blood pressure is well controlled.  I am increasing her metoprolol and will decrease her amlodipine to 5 mg once daily.  She will monitor her blood pressure closely.    Sick sinus syndrome (CMS/HCC)  She is asymptomatic in the setting of a normally functioning dual-chamber pacemaker.    Persistent atrial fibrillation (CMS/HCC)  She has a history of atrial fibrillation that was first diagnosed in November 2021 when she presented to an outside hospital for an acute diverticulitis/colitis flareup.  She was started on Eliquis 5 mg twice daily and amiodarone.  Her EF at that time was 60 to 65% with normal bilateral atria size.  She spontaneously converted on her own.  She was unable to tolerate amiodarone due to GI side effects.  She was noted on [facility] to have a high burden of atrial fibrillation with frequent conversion pauses. She is status post dual-chamber pacemaker implant.  She also was admitted to the hospital for Tikosyn initiation.  He is maintained on Tikosyn 250 mcg twice daily.  Her EKG is within normal limits  today.    Since startingTikosyn, she reports she does not notice her atrial fibrillation.  She does have brief episodes of palpitations. Her device shows multiple episodes of atrial fibrillation that are well rate controlled. The longest being 2 hours for which she was asymptomatic.      She is compliant with her Eliquis 5 mg twice daily. I will plan to increase her metoprolol to 25 mg in the morning and 50 mg at night.    Pacemaker  She has a normally functioning Medtronic dual-chamber pacemaker was implanted on 4/12/2022 in the setting of sick sinus syndrome to assist with arrhythmia management.  The battery life is estimated at 12-1/2 years until RRT.  The device is set to mode of AAI<==> DDD with a lower rate of 60 and an upper tracking rate of 120.  She paces in the atrium 69% of the time. Her A. fib burden is noted to be 5.6%.  She has multiple episodes of atrial fibrillation on interrogation with the longest being 2 hours.  These episodes are well rate controlled and she is asymptomatic.      The atrial lead measures a P wave of 2.1 mV, pacing threshold of 0.75 V at 0.4 ms, and a lead impedance o 399 ohms.  The RV lead measures a R wave of 14.8 mV, pacing threshold 0.75 V at 0.4 ms, and a lead impedance of 456 ohms.  I adjusted her programmed amplitude of her atrial lead to 2.0 V at 0.4 ms and the programmed amplitude of the ventricular lead to 2.0 V at 0.4 ms in an effort to conserve battery while maintaining a 2 times safety margin.    Her pacemaker incision is well approximated with no signs or symptoms of infection.      New Medications Ordered This Visit   Medications   • metoprolol succinate XL (TOPROL XL) 50 mg 24 hr tablet     Sig: Take 1 tablet (50 mg total) by mouth 2 (two) times a day. Take 25 mg in the morning and 50 mg at night.     Dispense:  30 tablet     Refill:  3   • amLODIPine (NORVASC) 5 mg tablet     Sig: Take 1 tablet (5 mg total) by mouth daily.     Dispense:  90 tablet     Refill:  1      Getting dizzy on 10 mg daily; switch to 5 mg bid     Medications Discontinued During This Encounter   Medication Reason   • ascorbic acid (VITAMIN C) 250 mg tablet Drug interaction   • calcium carbonate (CALCIUM 500 ORAL) Drug interaction   • amLODIPine 5 mg tablet    • metoprolol succinate XL (TOPROL XL) 50 mg 24 hr tablet      She will follow-up in 3 months.    This letter was generated using speech recognition software. Please excuse any typographical errors.       SHOBHA Galloway  5/2/2022

## 2022-05-02 NOTE — ASSESSMENT & PLAN NOTE
She has a history of atrial fibrillation that was first diagnosed in November 2021 when she presented to an outside hospital for an acute diverticulitis/colitis flareup.  She was started on Eliquis 5 mg twice daily and amiodarone.  Her EF at that time was 60 to 65% with normal bilateral atria size.  She spontaneously converted on her own.  She was unable to tolerate amiodarone due to GI side effects.  She was noted on [facility] to have a high burden of atrial fibrillation with frequent conversion pauses. She is status post dual-chamber pacemaker implant.  She also was admitted to the hospital for Tikosyn initiation.  He is maintained on Tikosyn 250 mcg twice daily.  Her EKG is within normal limits today.    Since startingTikosyn, she reports she does not notice her atrial fibrillation.  She does have brief episodes of palpitations. Her device shows multiple episodes of atrial fibrillation that are well rate controlled. The longest being 2 hours for which she was asymptomatic.      She is compliant with her Eliquis 5 mg twice daily. I will plan to increase her metoprolol to 25 mg in the morning and 50 mg at night.

## 2022-05-02 NOTE — ASSESSMENT & PLAN NOTE
She has a normally functioning Medtronic dual-chamber pacemaker was implanted on 4/12/2022 in the setting of sick sinus syndrome to assist with arrhythmia management.  The battery life is estimated at 12-1/2 years until RRT.  The device is set to mode of AAI<==> DDD with a lower rate of 60 and an upper tracking rate of 120.  She paces in the atrium 69% of the time. Her A. fib burden is noted to be 5.6%.  She has multiple episodes of atrial fibrillation on interrogation with the longest being 2 hours.  These episodes are well rate controlled and she is asymptomatic.      The atrial lead measures a P wave of 2.1 mV, pacing threshold of 0.75 V at 0.4 ms, and a lead impedance o 399 ohms.  The RV lead measures a R wave of 14.8 mV, pacing threshold 0.75 V at 0.4 ms, and a lead impedance of 456 ohms.  I adjusted her programmed amplitude of her atrial lead to 2.0 V at 0.4 ms and the programmed amplitude of the ventricular lead to 2.0 V at 0.4 ms in an effort to conserve battery while maintaining a 2 times safety margin.    Her pacemaker incision is well approximated with no signs or symptoms of infection.   [No Acute Distress] : no acute distress [Well Developed] : well developed [Well Nourished] : well nourished [Well-Appearing] : well-appearing [Non Tender] : non-tender [Normal Bowel Sounds] : normal bowel sounds [No Joint Swelling] : no joint swelling [Grossly Normal Strength/Tone] : grossly normal strength/tone [Speech Grossly Normal] : speech grossly normal [Memory Grossly Normal] : memory grossly normal [Normal Affect] : the affect was normal [Alert and Oriented x3] : oriented to person, place, and time [Normal Mood] : the mood was normal [Normal Insight/Judgement] : insight and judgment were intact

## 2022-05-02 NOTE — TELEPHONE ENCOUNTER
Pt called to clarity her Metoprolol dose. I instructed her to take 25 mg on the am and 50 mg in the pm.   The med list has two different sets of instructions. The pharmacy may reject the order.     Pt may be reached at [phone] if needed

## 2022-05-02 NOTE — ASSESSMENT & PLAN NOTE
Her blood pressure is well controlled.  I am increasing her metoprolol and will decrease her amlodipine to 5 mg once daily.  She will monitor her blood pressure closely.

## 2022-05-03 ENCOUNTER — OFFICE VISIT (OUTPATIENT)
Dept: CARDIOLOGY | Facility: CLINIC | Age: 77
End: 2022-05-03
Payer: MEDICARE

## 2022-05-03 VITALS
TEMPERATURE: 98.6 F | OXYGEN SATURATION: 98 % | HEIGHT: 60 IN | BODY MASS INDEX: 22.58 KG/M2 | HEART RATE: 60 BPM | WEIGHT: 115 LBS | RESPIRATION RATE: 16 BRPM | SYSTOLIC BLOOD PRESSURE: 106 MMHG | DIASTOLIC BLOOD PRESSURE: 70 MMHG

## 2022-05-03 DIAGNOSIS — I10 ESSENTIAL HYPERTENSION: Primary | ICD-10-CM

## 2022-05-03 PROCEDURE — 99214 OFFICE O/P EST MOD 30 MIN: CPT | Performed by: INTERNAL MEDICINE

## 2022-05-03 PROCEDURE — 93000 ELECTROCARDIOGRAM COMPLETE: CPT | Performed by: INTERNAL MEDICINE

## 2022-05-03 PROCEDURE — G8752 SYS BP LESS 140: HCPCS | Performed by: INTERNAL MEDICINE

## 2022-05-03 PROCEDURE — G8754 DIAS BP LESS 90: HCPCS | Performed by: INTERNAL MEDICINE

## 2022-05-03 RX ORDER — AMLODIPINE BESYLATE 5 MG/1
5 TABLET ORAL DAILY
Qty: 30 TABLET | Refills: 1 | Status: SHIPPED | OUTPATIENT
Start: 2022-05-03 | End: 2022-11-08 | Stop reason: SDUPTHER

## 2022-05-03 ASSESSMENT — ENCOUNTER SYMPTOMS
NEUROLOGICAL NEGATIVE: 1
EYES NEGATIVE: 1
ALLERGIC/IMMUNOLOGIC NEGATIVE: 1
HEMATOLOGIC/LYMPHATIC NEGATIVE: 1
CONSTITUTIONAL NEGATIVE: 1
GASTROINTESTINAL NEGATIVE: 1
PALPITATIONS: 1
RESPIRATORY NEGATIVE: 1
PSYCHIATRIC NEGATIVE: 1
ENDOCRINE NEGATIVE: 1

## 2022-05-03 NOTE — LETTER
May 3, 2022     José Antonio REAGAN DO  [address] [facility] [address]    Patient: Angeles Cardoza  YOB: 1945  Date of Visit: 5/3/2022      Dear Dr. Leroy:    Thank you for referring Angeles Cardoza to me for evaluation. Below are my notes for this consultation.    If you have questions, please do not hesitate to call me. I look forward to following your patient along with you.         Sincerely,        Chano Boateng DO        CC: No Recipients  Chano Boateng DO  5/3/2022  1:21 PM  Signed      Chief Complaint: I saw Ms. Cardoza in the office today on a routine visit status post pacemaker implantation and medical therapy for her tachybradycardia syndrome.  She is doing much better she still is somewhat short of breath with exertion but this seems to be improving.  She still short of breath climbing a flight of stairs but not as badly as it has been.  She can walk further distances without shortness of breath.  She has no chest pressure chest tightness with exertion, anxiety, cold weather, postprandially, at rest or nocturnally.  She denies proximal nocturnal dyspnea orthopnea palpitations syncope still has some lower extremity edema that goes away by the next day and she has nocturia.  She was seen by EP yesterday and they decreased her amlodipine to 5 mg daily but increased her Metroprolol to 25 mg in the morning and 50 at night and she remains on dofetilide.       Medications:  Current Outpatient Medications   Medication Sig Dispense Refill   • acetaminophen (TylenoL) 325 mg tablet Take 2 tablets (650 mg total) by mouth every 4 (four) hours as needed for mild pain.     • amLODIPine (NORVASC) 5 mg tablet Take 1 tablet (5 mg total) by mouth daily. 90 tablet 1   • atorvastatin (LIPITOR) 40 mg tablet Take 40 mg by mouth daily.       • cholecalciferol, vitamin D3, 1,000 unit (25 mcg) tablet Take 2,000 Units by mouth daily.      • cyanocobalamin (VITAMIN B12) 100 mcg tablet Take 100 mcg by  mouth daily.     • dofetilide (TIKOSYN) 250 mcg capsule Take 1 capsule (250 mcg total) by mouth every 12 (twelve) hours. 180 capsule 3   • ELIQUIS 5 mg tablet Take 1 tablet (5 mg total) by mouth 2 (two) times a day. 60 tablet 0   • magnesium oxide (MAG-OX) 400 mg (241.3 mg magnesium) tablet Take 1 tablet (400 mg total) by mouth 2 (two) times a day as needed (serum mag less than 2 mEq/L). 90 tablet 3   • metoprolol succinate XL (TOPROL-XL) 50 mg 24 hr tablet Take 75 mg by mouth daily. Take 25 mg in the morning and 50 mg at night. 30 tablet 3   • omeprazole (PriLOSEC) 40 mg capsule Take 40 mg by mouth daily and an additional dose as needed.    3   • venlafaxine XR (EFFEXOR-XR) 75 mg 24 hr capsule Take 75 mg by mouth daily.         No current facility-administered medications for this visit.       Review of Systems:  Review of Systems   Constitutional: Negative.   HENT: Negative.    Eyes: Negative.    Cardiovascular: Positive for palpitations.   Respiratory: Negative.    Endocrine: Negative.    Hematologic/Lymphatic: Negative.    Skin: Negative.    Musculoskeletal: Positive for joint pain.   Gastrointestinal: Negative.    Genitourinary: Positive for nocturia.   Neurological: Negative.    Psychiatric/Behavioral: Negative.    Allergic/Immunologic: Negative.        Physical Exam:  Vitals:    05/03/22 1108   BP: 106/70   Pulse: 60   Resp: 16   Temp: 37 °C (98.6 °F)   SpO2: 98%     Physical Exam  Constitutional:       Appearance: She is well-developed.   HENT:      Head: Normocephalic and atraumatic.   Eyes:      Conjunctiva/sclera: Conjunctivae normal.      Pupils: Pupils are equal, round, and reactive to light.   Cardiovascular:      Rate and Rhythm: Regular rhythm. Bradycardia present.      Pulses: Intact distal pulses.      Heart sounds:     Gallop present.   Pulmonary:      Effort: Pulmonary effort is normal.      Breath sounds: Normal breath sounds.      Comments: PacerIi nleft chest  Abdominal:      General: Bowel  sounds are normal.      Palpations: Abdomen is soft.   Musculoskeletal:         General: Normal range of motion.      Cervical back: Normal range of motion and neck supple.   Skin:     General: Skin is warm and dry.   Neurological:      Mental Status: She is alert and oriented to person, place, and time.      Deep Tendon Reflexes: Reflexes are normal and symmetric.   Psychiatric:         Speech: Speech normal.         Behavior: Behavior normal.         Thought Content: Thought content normal.         Judgment: Judgment normal.       EKG:Paced rhythm    Assessment and Plan:  Impression:  Hypertension controlled.  Tachybradycardia syndrome status post pacemaker.  Paroxysmal atrial fibrillation now pacer rhythm.  Aortic regurgitation mild.  Mitral regurgitation mild.  Tricuspid regurgitation mild.  Dyspnea secondary to deconditioning.  Kyphoscoliosis.  Recommendation: She appears to be hemodynamically stable I did not make any changes in her meds they were done yesterday.  We will see her in 8 weeks time, if there is a problem she knows to call me she can get a hold of me quite readily.  Thank you for allowing us see this very nice lady in the office today.    Chano Boateng, DO

## 2022-05-03 NOTE — PROGRESS NOTES
Chief Complaint: I saw Ms. Cardoza in the office today on a routine visit status post pacemaker implantation and medical therapy for her tachybradycardia syndrome.  She is doing much better she still is somewhat short of breath with exertion but this seems to be improving.  She still short of breath climbing a flight of stairs but not as badly as it has been.  She can walk further distances without shortness of breath.  She has no chest pressure chest tightness with exertion, anxiety, cold weather, postprandially, at rest or nocturnally.  She denies proximal nocturnal dyspnea orthopnea palpitations syncope still has some lower extremity edema that goes away by the next day and she has nocturia.  She was seen by EP yesterday and they decreased her amlodipine to 5 mg daily but increased her Metroprolol to 25 mg in the morning and 50 at night and she remains on dofetilide.       Medications:  Current Outpatient Medications   Medication Sig Dispense Refill   • acetaminophen (TylenoL) 325 mg tablet Take 2 tablets (650 mg total) by mouth every 4 (four) hours as needed for mild pain.     • amLODIPine (NORVASC) 5 mg tablet Take 1 tablet (5 mg total) by mouth daily. 90 tablet 1   • atorvastatin (LIPITOR) 40 mg tablet Take 40 mg by mouth daily.       • cholecalciferol, vitamin D3, 1,000 unit (25 mcg) tablet Take 2,000 Units by mouth daily.      • cyanocobalamin (VITAMIN B12) 100 mcg tablet Take 100 mcg by mouth daily.     • dofetilide (TIKOSYN) 250 mcg capsule Take 1 capsule (250 mcg total) by mouth every 12 (twelve) hours. 180 capsule 3   • ELIQUIS 5 mg tablet Take 1 tablet (5 mg total) by mouth 2 (two) times a day. 60 tablet 0   • magnesium oxide (MAG-OX) 400 mg (241.3 mg magnesium) tablet Take 1 tablet (400 mg total) by mouth 2 (two) times a day as needed (serum mag less than 2 mEq/L). 90 tablet 3   • metoprolol succinate XL (TOPROL-XL) 50 mg 24 hr tablet Take 75 mg by mouth daily. Take 25 mg in the morning and 50 mg at  night. 30 tablet 3   • omeprazole (PriLOSEC) 40 mg capsule Take 40 mg by mouth daily and an additional dose as needed.    3   • venlafaxine XR (EFFEXOR-XR) 75 mg 24 hr capsule Take 75 mg by mouth daily.         No current facility-administered medications for this visit.       Review of Systems:  Review of Systems   Constitutional: Negative.   HENT: Negative.    Eyes: Negative.    Cardiovascular: Positive for palpitations.   Respiratory: Negative.    Endocrine: Negative.    Hematologic/Lymphatic: Negative.    Skin: Negative.    Musculoskeletal: Positive for joint pain.   Gastrointestinal: Negative.    Genitourinary: Positive for nocturia.   Neurological: Negative.    Psychiatric/Behavioral: Negative.    Allergic/Immunologic: Negative.        Physical Exam:  Vitals:    05/03/22 1108   BP: 106/70   Pulse: 60   Resp: 16   Temp: 37 °C (98.6 °F)   SpO2: 98%     Physical Exam  Constitutional:       Appearance: She is well-developed.   HENT:      Head: Normocephalic and atraumatic.   Eyes:      Conjunctiva/sclera: Conjunctivae normal.      Pupils: Pupils are equal, round, and reactive to light.   Cardiovascular:      Rate and Rhythm: Regular rhythm. Bradycardia present.      Pulses: Intact distal pulses.      Heart sounds:     Gallop present.   Pulmonary:      Effort: Pulmonary effort is normal.      Breath sounds: Normal breath sounds.      Comments: PacerIi nleft chest  Abdominal:      General: Bowel sounds are normal.      Palpations: Abdomen is soft.   Musculoskeletal:         General: Normal range of motion.      Cervical back: Normal range of motion and neck supple.   Skin:     General: Skin is warm and dry.   Neurological:      Mental Status: She is alert and oriented to person, place, and time.      Deep Tendon Reflexes: Reflexes are normal and symmetric.   Psychiatric:         Speech: Speech normal.         Behavior: Behavior normal.         Thought Content: Thought content normal.         Judgment: Judgment  normal.       EKG:Paced rhythm    Assessment and Plan:  Impression:  Hypertension controlled.  Tachybradycardia syndrome status post pacemaker.  Paroxysmal atrial fibrillation now pacer rhythm.  Aortic regurgitation mild.  Mitral regurgitation mild.  Tricuspid regurgitation mild.  Dyspnea secondary to deconditioning.  Kyphoscoliosis.  Recommendation: She appears to be hemodynamically stable I did not make any changes in her meds they were done yesterday.  We will see her in 8 weeks time, if there is a problem she knows to call me she can get a hold of me quite readily.  Thank you for allowing us see this very nice lady in the office today.    Chano Boateng, DO

## 2022-07-06 ENCOUNTER — OFFICE VISIT (OUTPATIENT)
Dept: CARDIOLOGY | Facility: CLINIC | Age: 77
End: 2022-07-06
Payer: MEDICARE

## 2022-07-06 VITALS
HEART RATE: 60 BPM | HEIGHT: 60 IN | WEIGHT: 115 LBS | BODY MASS INDEX: 22.58 KG/M2 | RESPIRATION RATE: 16 BRPM | OXYGEN SATURATION: 99 %

## 2022-07-06 DIAGNOSIS — I10 ESSENTIAL HYPERTENSION: Primary | ICD-10-CM

## 2022-07-06 PROCEDURE — G8756 NO BP MEASURE DOC: HCPCS | Performed by: INTERNAL MEDICINE

## 2022-07-06 PROCEDURE — 99214 OFFICE O/P EST MOD 30 MIN: CPT | Performed by: INTERNAL MEDICINE

## 2022-07-06 PROCEDURE — 93000 ELECTROCARDIOGRAM COMPLETE: CPT | Performed by: INTERNAL MEDICINE

## 2022-07-06 ASSESSMENT — ENCOUNTER SYMPTOMS
HEMATOLOGIC/LYMPHATIC NEGATIVE: 1
ALLERGIC/IMMUNOLOGIC NEGATIVE: 1
MUSCULOSKELETAL NEGATIVE: 1
DIZZINESS: 1
EYES NEGATIVE: 1
GASTROINTESTINAL NEGATIVE: 1
CONSTITUTIONAL NEGATIVE: 1
RESPIRATORY NEGATIVE: 1
PSYCHIATRIC NEGATIVE: 1
ENDOCRINE NEGATIVE: 1
DYSPNEA ON EXERTION: 1

## 2022-07-06 NOTE — PROGRESS NOTES
Chief Complaint: I saw Angeles in the office today on a routine visit for her hypertension A. fib and tachybradycardia syndrome.  She is doing well denies chest discomfort occasionally gets short of breath with exertion not anxiety cold weather postprandially rarely at rest or nocturnally.  She for the most part can climb a flight of stairs without getting short of breath, she denies proximal nocturnal dyspnea orthopnea has rare palpitations without lightheadedness or dizziness denies syncope denies ankle edema she has nocturia.       Medications:  Current Outpatient Medications   Medication Sig Dispense Refill   • amLODIPine (NORVASC) 5 mg tablet Take 1 tablet (5 mg total) by mouth daily. 30 tablet 1   • atorvastatin (LIPITOR) 40 mg tablet Take 40 mg by mouth daily.       • cholecalciferol, vitamin D3, 1,000 unit (25 mcg) tablet Take 2,000 Units by mouth daily.      • cyanocobalamin (VITAMIN B12) 100 mcg tablet Take 100 mcg by mouth daily.     • dofetilide (TIKOSYN) 250 mcg capsule Take 1 capsule (250 mcg total) by mouth every 12 (twelve) hours. 180 capsule 3   • ELIQUIS 5 mg tablet Take 1 tablet (5 mg total) by mouth 2 (two) times a day. 60 tablet 0   • magnesium oxide (MAG-OX) 400 mg (241.3 mg magnesium) tablet Take 1 tablet (400 mg total) by mouth 2 (two) times a day as needed (serum mag less than 2 mEq/L). 90 tablet 3   • metoprolol succinate XL (TOPROL-XL) 50 mg 24 hr tablet Take 75 mg by mouth daily. Take 25 mg in the morning and 50 mg at night. 30 tablet 3   • omeprazole (PriLOSEC) 40 mg capsule Take 40 mg by mouth daily and an additional dose as needed.    3   • venlafaxine XR (EFFEXOR-XR) 75 mg 24 hr capsule Take 75 mg by mouth daily.         No current facility-administered medications for this visit.       Review of Systems:  Review of Systems   Constitutional: Negative.   HENT: Negative.    Eyes: Negative.    Cardiovascular: Positive for dyspnea on exertion.   Respiratory: Negative.    Endocrine:  Negative.    Hematologic/Lymphatic: Negative.    Skin: Positive for rash.   Musculoskeletal: Negative.    Gastrointestinal: Negative.    Genitourinary: Positive for nocturia.   Neurological: Positive for dizziness.   Psychiatric/Behavioral: Negative.    Allergic/Immunologic: Negative.        Physical Exam:  Vitals:    07/06/22 1145   Pulse: 60   Resp: 16   SpO2: 99%     Physical Exam  Constitutional:       Appearance: She is well-developed.   HENT:      Head: Normocephalic and atraumatic.   Eyes:      Conjunctiva/sclera: Conjunctivae normal.      Pupils: Pupils are equal, round, and reactive to light.   Cardiovascular:      Rate and Rhythm: Normal rate and regular rhythm.      Pulses: Intact distal pulses.      Heart sounds:     Gallop present.   Pulmonary:      Effort: Pulmonary effort is normal.      Breath sounds: Normal breath sounds.   Abdominal:      General: Bowel sounds are normal.      Palpations: Abdomen is soft.   Musculoskeletal:         General: Normal range of motion.      Cervical back: Normal range of motion and neck supple.   Skin:     General: Skin is warm and dry.   Neurological:      Mental Status: She is alert and oriented to person, place, and time.      Deep Tendon Reflexes: Reflexes are normal and symmetric.   Psychiatric:         Speech: Speech normal.         Behavior: Behavior normal.         Thought Content: Thought content normal.         Judgment: Judgment normal.     Paced rhtyhm:     Assessment and Plan:  Impression:  Hypertension controlled.  Tachybradycardia syndrome status post DDD pacemaker.  A. fib now sinus rhythm or pacer rhythm.  Aortic regurgitation mild.  Mitral regurgitation mild.  Dermatitis.  Recommendation:  She appears to be hemodynamically stable I did not make any changes in her medications today.  We answered all her questions about her blood pressure her rhythm or shortness of breath she seems to be satisfied with these answers.  I will see her again in the end of  October or early November.  If there is a problem we will see her sooner.  Thank you for allowing us to see this very nice woman in the office today.    Chano Boateng, DO

## 2022-07-06 NOTE — LETTER
July 6, 2022     José Antonio REAGAN DO  [address] [facility] [address]    Patient: Angeles Cardoza  YOB: 1945  Date of Visit: 7/6/2022      Dear Dr. Leroy:    Thank you for referring Angeles Cardoza to me for evaluation. Below are my notes for this consultation.    If you have questions, please do not hesitate to call me. I look forward to following your patient along with you.         Sincerely,        Chano Boateng DO        CC: No Recipients  Chano Boateng DO  7/6/2022 12:34 PM  Signed      Chief Complaint: I saw Angeles in the office today on a routine visit for her hypertension A. fib and tachybradycardia syndrome.  She is doing well denies chest discomfort occasionally gets short of breath with exertion not anxiety cold weather postprandially rarely at rest or nocturnally.  She for the most part can climb a flight of stairs without getting short of breath, she denies proximal nocturnal dyspnea orthopnea has rare palpitations without lightheadedness or dizziness denies syncope denies ankle edema she has nocturia.       Medications:  Current Outpatient Medications   Medication Sig Dispense Refill   • amLODIPine (NORVASC) 5 mg tablet Take 1 tablet (5 mg total) by mouth daily. 30 tablet 1   • atorvastatin (LIPITOR) 40 mg tablet Take 40 mg by mouth daily.       • cholecalciferol, vitamin D3, 1,000 unit (25 mcg) tablet Take 2,000 Units by mouth daily.      • cyanocobalamin (VITAMIN B12) 100 mcg tablet Take 100 mcg by mouth daily.     • dofetilide (TIKOSYN) 250 mcg capsule Take 1 capsule (250 mcg total) by mouth every 12 (twelve) hours. 180 capsule 3   • ELIQUIS 5 mg tablet Take 1 tablet (5 mg total) by mouth 2 (two) times a day. 60 tablet 0   • magnesium oxide (MAG-OX) 400 mg (241.3 mg magnesium) tablet Take 1 tablet (400 mg total) by mouth 2 (two) times a day as needed (serum mag less than 2 mEq/L). 90 tablet 3   • metoprolol succinate XL (TOPROL-XL) 50 mg 24 hr tablet Take 75 mg by  mouth daily. Take 25 mg in the morning and 50 mg at night. 30 tablet 3   • omeprazole (PriLOSEC) 40 mg capsule Take 40 mg by mouth daily and an additional dose as needed.    3   • venlafaxine XR (EFFEXOR-XR) 75 mg 24 hr capsule Take 75 mg by mouth daily.         No current facility-administered medications for this visit.       Review of Systems:  Review of Systems   Constitutional: Negative.   HENT: Negative.    Eyes: Negative.    Cardiovascular: Positive for dyspnea on exertion.   Respiratory: Negative.    Endocrine: Negative.    Hematologic/Lymphatic: Negative.    Skin: Positive for rash.   Musculoskeletal: Negative.    Gastrointestinal: Negative.    Genitourinary: Positive for nocturia.   Neurological: Positive for dizziness.   Psychiatric/Behavioral: Negative.    Allergic/Immunologic: Negative.        Physical Exam:  Vitals:    07/06/22 1145   Pulse: 60   Resp: 16   SpO2: 99%     Physical Exam  Constitutional:       Appearance: She is well-developed.   HENT:      Head: Normocephalic and atraumatic.   Eyes:      Conjunctiva/sclera: Conjunctivae normal.      Pupils: Pupils are equal, round, and reactive to light.   Cardiovascular:      Rate and Rhythm: Normal rate and regular rhythm.      Pulses: Intact distal pulses.      Heart sounds:     Gallop present.   Pulmonary:      Effort: Pulmonary effort is normal.      Breath sounds: Normal breath sounds.   Abdominal:      General: Bowel sounds are normal.      Palpations: Abdomen is soft.   Musculoskeletal:         General: Normal range of motion.      Cervical back: Normal range of motion and neck supple.   Skin:     General: Skin is warm and dry.   Neurological:      Mental Status: She is alert and oriented to person, place, and time.      Deep Tendon Reflexes: Reflexes are normal and symmetric.   Psychiatric:         Speech: Speech normal.         Behavior: Behavior normal.         Thought Content: Thought content normal.         Judgment: Judgment normal.      Paced rhtyhm:     Assessment and Plan:  Impression:  Hypertension controlled.  Tachybradycardia syndrome status post DDD pacemaker.  A. fib now sinus rhythm or pacer rhythm.  Aortic regurgitation mild.  Mitral regurgitation mild.  Dermatitis.  Recommendation:  She appears to be hemodynamically stable I did not make any changes in her medications today.  We answered all her questions about her blood pressure her rhythm or shortness of breath she seems to be satisfied with these answers.  I will see her again in the end of October or early November.  If there is a problem we will see her sooner.  Thank you for allowing us to see this very nice woman in the office today.    Chano Boateng, DO

## 2022-08-03 NOTE — PROGRESS NOTES
Electrophysiology         Reason for visit: Atrial fibrillation with prolonged conversion pauses   Referred by : Dr. Boateng        History of Present Illness:  Ms. Angeles Cardoza is a 76 year old female with a past medical history significant for hyperlipidemia, hypertension, and a paroxymal atrial fibrillation with long conversion pauses s/p implantation of Medtronic dual chamber pacemaker on 4/12/2022 who is here for follow up.     Patient has done extremely well since implant. She denies any recurrent syncope. She has noticed one episode of atrial fibrillation since implant that she was aware of when reading her book and assessing her pulse. She had no associated symptoms. She did tell me that she has experienced an itchy skin rash that started in May/Jessica, requiring urgent care assessment. She was treated with oral steroids and topical benadryl with relief but she still notes intermittent itchiness. She will follow up with her PCP and reevaluate persistency of itchiness. She has excellent arrhythmia suppression on Tikosyn noted on interrogation, so we will rule out other possible causes before discontinuing medication.     Brief EP history:  - pAF in Nov 2021 during hospitalization for acute diverticulits. Started on Eliquis 5 mg BID. Started on amiodarone   - Could not tolerate amiodarone due to NV. Transitioned to Flecainide 50 mg BID.   - MCOT in early 2022, 40% burden of AF with frequent long conversion pauses (some symptomatic).   - Flecainide was stopped.   - dcPPM implantation on 4/12/2022 and started on Tikosyn 250 mcg BID.    A comprehensive 15-point review of systems was performed and was negative unless specifically mentioned in the History of Present Illness.        Past Medical History:   Diagnosis Date   • Acid reflux    • Atrial fibrillation (CMS/HCC)    • Hypertension    • Irregular heart rhythm    • Lipid disorder    • Sick sinus syndrome (CMS/HCC)        Past Surgical History:    Procedure Laterality Date   • BREAST SURGERY  2003    breast reduction   • CARDIAC PACEMAKER PLACEMENT Left 04/12/2022    Medtronic   • RENAL ANGIOPLASTY     • TOE SURGERY Left    • TONSILLECTOMY     • WISDOM TOOTH EXTRACTION         Current Outpatient Medications on File Prior to Visit   Medication Sig Dispense Refill   • amLODIPine (NORVASC) 5 mg tablet Take 1 tablet (5 mg total) by mouth daily. 30 tablet 1   • atorvastatin (LIPITOR) 40 mg tablet Take 40 mg by mouth daily.       • cholecalciferol, vitamin D3, 1,000 unit (25 mcg) tablet Take 2,000 Units by mouth daily.      • cyanocobalamin (VITAMIN B12) 100 mcg tablet Take 100 mcg by mouth daily.     • dofetilide (TIKOSYN) 250 mcg capsule Take 1 capsule (250 mcg total) by mouth every 12 (twelve) hours. 180 capsule 3   • ELIQUIS 5 mg tablet Take 1 tablet (5 mg total) by mouth 2 (two) times a day. 60 tablet 0   • magnesium oxide (MAG-OX) 400 mg (241.3 mg magnesium) tablet Take 1 tablet (400 mg total) by mouth 2 (two) times a day as needed (serum mag less than 2 mEq/L). 90 tablet 3   • metoprolol succinate XL (TOPROL-XL) 50 mg 24 hr tablet Take 75 mg by mouth daily. Take 25 mg in the morning and 50 mg at night. 30 tablet 3   • omeprazole (PriLOSEC) 40 mg capsule Take 40 mg by mouth daily and an additional dose as needed.    3   • venlafaxine XR (EFFEXOR-XR) 75 mg 24 hr capsule Take 75 mg by mouth daily.         No current facility-administered medications on file prior to visit.       Allergies, Social History and Family History were reviewed and updated in EMR.     Physical Examination:  Vitals:    08/04/22 1115   BP: 140/73   Pulse: 60   Resp: 16     Constitutional: The patient appeared physically well and in no acute distress.  Respiratory: Clear to auscultation, no wheezes or rubs.  Cardiovascular: A comprehensive cardiovascular examination was performed. Highlights include normal jugular venous pressure, 2+ carotids, no bruits. The precordium is quiet. Regular  rhythm, rate, S1 and S2 normal, no rubs, or gallops were appreciated. Murmurs: No pathologic murmurs appreciated.  Musculoskeletal/ Extremities: No edema, normal peripheral pulses.  Skin: No rashes or lesions apparent.       Lab Results   Component Value Date    WBC 7.90 04/23/2022    HGB 13.2 04/23/2022    HCT 41.2 04/23/2022     (H) 04/23/2022    ALT 25 01/21/2022    AST 30 01/21/2022     04/23/2022    K 4.4 04/23/2022    CREATININE 1.0 04/23/2022    BUN 17 04/23/2022    TSH 2.05 01/21/2022    INR 0.9 11/13/2016       Cardiac Imaging    ECHOCARDIOGRAM - EXTERNAL RESULT     Narrative  Ordered by an unspecified provider.    Most recent stress test results:    CV NM STRESS EXERCISE WITH MYOC PERF SPECT MULTI 01/20/2022 (Final) 1/20/2022    Interpretation Summary  1. Exercise technetium tetrofosmin shows no evidence of scar or ischemia.  2. ECG shows no ST changes diagnostic of ischemia. Intermittent atrial fibrillation though the study at rest, exercise and recovery.  3. Gated SPECT obtained in the resting state post stress shows left ventricular ejection fraction of 72%.  All walls show normal thickening and endocardial excursion. No regional wall motion abnormalities are noted.  4. Duke Treadmill score of 7 which correlates with a low risk study. The patient exercised for 7 minutes and 10 seconds achieving 10.1 METS.  5. No prior study for comparison.  6. This is a normal study.    ECG: A pace, V sense, HR 60 bpm, QTc stable     Dual Chamber Pacemaker Evaluation     Site Evaluation: The site of implantation in the upper left chest shows a well healed scar and is without ecchymosis, redness, drainage or hematoma. The skin is mobile over the device. The patient denies pain at the implantation site.     Device Information  Device : PLYmedia  Device Model:  [id number]  Date of Implant: April 12, 2022     Battery Data:   Estimated time to replacement:  15.8 years     Rhythm Data:  Underlying  Rhythm: Sinus bradycardia, HR in the 50s   High Rate Episodes: 0  Ventricular High Rate  0  AF burden noted to be 0.6%, ventricular rates controled in AF. Longest episode noted to be 19 minutes      Lead Performance Data: RA  RV  Lead Impedance (ohms) 475  437  Sensing (mV)   2.9  14.9  Capture (V@ms)  0.5 @ 0.4 0.5 @ 0.4  % Pacing   79.2 %  0.2 %     Final Programmed Values:  Mode:    AAI <-> DDD (MVP/RhythmIQ.AAI-Safe Mode)  Paced Lower-Upper Rate: 60 - 120 bpm     Assessment and plan:  Ms. Angeles Cardoza is a 76 year old female with a past medical history significant for hyperlipidemia, hypertension, and a paroxymal atrial fibrillation with long conversion pauses s/p implantation of Medtronic dual chamber pacemaker on 4/12/2022 who is here for follow up.     #Dual Chamber pacemaker   - Normal device evaluation and function.    - Lead function is appropriate, with capture and sensing thresholds are as expected.   - Decreased atrial threshold to 2.0 V @ 0.4 msec  - Decreased ventricular threshold to 2.5 V @ 0.4 msec   - Rate response turned on, device programmed to AAIR <=> DDDR   - There were no significant arrhythmias noted (see Rhythm Data above), and no device therapies were delivered since the last evaluation.  - The counters and diagnostics were reset.  - Follow up will be in 6 months, with remote follow up every 3 months until scheduled clinic visit.    #Atrial fibrillation:  - Continue Eliquis 5 mg BID for systemic anticoagulation. Unfortunately the patient notes that she has missed a few doses of Eliquis since initiation and I reviewed the  importance of anticoagulation compliance to prevent stroke.  - Continue Tikosyn 250 mcg BID, if itching persists she will notify our office   - Continue metoprolol 75 mg daily.   - labs are all up to date and WNL    I spent 35 minutes with the patient for record review, examination, care coordination and counseling.     This letter was generated using speech  recognition software. Please excuse any typographical errors.       Neel Pizano MD, [facility]  Cardiac Electrophysiology  [facility]  8/4/2022

## 2022-08-04 ENCOUNTER — OFFICE VISIT (OUTPATIENT)
Dept: CARDIOLOGY | Facility: CLINIC | Age: 77
End: 2022-08-04
Payer: MEDICARE

## 2022-08-04 VITALS
HEIGHT: 60 IN | HEART RATE: 60 BPM | DIASTOLIC BLOOD PRESSURE: 73 MMHG | BODY MASS INDEX: 22.78 KG/M2 | SYSTOLIC BLOOD PRESSURE: 140 MMHG | WEIGHT: 116 LBS | RESPIRATION RATE: 16 BRPM

## 2022-08-04 DIAGNOSIS — I48.19 PERSISTENT ATRIAL FIBRILLATION (CMS/HCC): ICD-10-CM

## 2022-08-04 DIAGNOSIS — I49.5 SICK SINUS SYNDROME (CMS/HCC): Primary | ICD-10-CM

## 2022-08-04 PROCEDURE — G8753 SYS BP > OR = 140: HCPCS | Performed by: INTERNAL MEDICINE

## 2022-08-04 PROCEDURE — 99214 OFFICE O/P EST MOD 30 MIN: CPT | Performed by: INTERNAL MEDICINE

## 2022-08-04 PROCEDURE — G8754 DIAS BP LESS 90: HCPCS | Performed by: INTERNAL MEDICINE

## 2022-08-04 PROCEDURE — 93000 ELECTROCARDIOGRAM COMPLETE: CPT | Performed by: INTERNAL MEDICINE

## 2022-08-04 NOTE — PATIENT INSTRUCTIONS
No medication changes. Continue with home remote monitoring. Contact our office with any questions or concerns; [phone]. Follow up in 6 months

## 2022-08-04 NOTE — LETTER
August 4, 2022     José Antonio REAGAN DO  [address]  [facility] [address]    Patient: Angeles Cardoza  YOB: 1945  Date of Visit: 8/4/2022      Dear Dr. Leroy:    Thank you for referring Angeles Cardoza to me for evaluation. Below are my notes for this consultation.    If you have questions, please do not hesitate to call me. I look forward to following your patient along with you.         Sincerely,        Neel Pizano MD        CC: DO Harinder Morrsi Ali R, MD  8/4/2022  1:16 PM  Signed       Electrophysiology         Reason for visit: Atrial fibrillation with prolonged conversion pauses   Referred by : Dr. Boateng        History of Present Illness:  Ms. Angeles Cardoza is a 76 year old female with a past medical history significant for hyperlipidemia, hypertension, and a paroxymal atrial fibrillation with long conversion pauses s/p implantation of Medtronic dual chamber pacemaker on 4/12/2022 who is here for follow up.     Patient has done extremely well since implant. She denies any recurrent syncope. She has noticed one episode of atrial fibrillation since implant that she was aware of when reading her book and assessing her pulse. She had no associated symptoms. She did tell me that she has experienced an itchy skin rash that started in May/June, requiring urgent care assessment. She was treated with oral steroids and topical benadryl with relief but she still notes intermittent itchiness. She will follow up with her PCP and reevaluate persistency of itchiness. She has excellent arrhythmia suppression on Tikosyn noted on interrogation, so we will rule out other possible causes before discontinuing medication.     Brief EP history:  - pAF in Nov 2021 during hospitalization for acute diverticulits. Started on Eliquis 5 mg BID. Started on amiodarone   - Could not tolerate amiodarone due to NV. Transitioned to Flecainide 50 mg BID.   - MCOT in early 2022, 40% burden of AF  with frequent long conversion pauses (some symptomatic).   - Flecainide was stopped.   - dcPPM implantation on 4/12/2022 and started on Tikosyn 250 mcg BID.    A comprehensive 15-point review of systems was performed and was negative unless specifically mentioned in the History of Present Illness.        Past Medical History:   Diagnosis Date   • Acid reflux    • Atrial fibrillation (CMS/HCC)    • Hypertension    • Irregular heart rhythm    • Lipid disorder    • Sick sinus syndrome (CMS/HCC)        Past Surgical History:   Procedure Laterality Date   • BREAST SURGERY  2003    breast reduction   • CARDIAC PACEMAKER PLACEMENT Left 04/12/2022    Medtronic   • RENAL ANGIOPLASTY     • TOE SURGERY Left    • TONSILLECTOMY     • WISDOM TOOTH EXTRACTION         Current Outpatient Medications on File Prior to Visit   Medication Sig Dispense Refill   • amLODIPine (NORVASC) 5 mg tablet Take 1 tablet (5 mg total) by mouth daily. 30 tablet 1   • atorvastatin (LIPITOR) 40 mg tablet Take 40 mg by mouth daily.       • cholecalciferol, vitamin D3, 1,000 unit (25 mcg) tablet Take 2,000 Units by mouth daily.      • cyanocobalamin (VITAMIN B12) 100 mcg tablet Take 100 mcg by mouth daily.     • dofetilide (TIKOSYN) 250 mcg capsule Take 1 capsule (250 mcg total) by mouth every 12 (twelve) hours. 180 capsule 3   • ELIQUIS 5 mg tablet Take 1 tablet (5 mg total) by mouth 2 (two) times a day. 60 tablet 0   • magnesium oxide (MAG-OX) 400 mg (241.3 mg magnesium) tablet Take 1 tablet (400 mg total) by mouth 2 (two) times a day as needed (serum mag less than 2 mEq/L). 90 tablet 3   • metoprolol succinate XL (TOPROL-XL) 50 mg 24 hr tablet Take 75 mg by mouth daily. Take 25 mg in the morning and 50 mg at night. 30 tablet 3   • omeprazole (PriLOSEC) 40 mg capsule Take 40 mg by mouth daily and an additional dose as needed.    3   • venlafaxine XR (EFFEXOR-XR) 75 mg 24 hr capsule Take 75 mg by mouth daily.         No current facility-administered  medications on file prior to visit.       Allergies, Social History and Family History were reviewed and updated in EMR.     Physical Examination:  Vitals:    08/04/22 1115   BP: 140/73   Pulse: 60   Resp: 16     Constitutional: The patient appeared physically well and in no acute distress.  Respiratory: Clear to auscultation, no wheezes or rubs.  Cardiovascular: A comprehensive cardiovascular examination was performed. Highlights include normal jugular venous pressure, 2+ carotids, no bruits. The precordium is quiet. Regular rhythm, rate, S1 and S2 normal, no rubs, or gallops were appreciated. Murmurs: No pathologic murmurs appreciated.  Musculoskeletal/ Extremities: No edema, normal peripheral pulses.  Skin: No rashes or lesions apparent.       Lab Results   Component Value Date    WBC 7.90 04/23/2022    HGB 13.2 04/23/2022    HCT 41.2 04/23/2022     (H) 04/23/2022    ALT 25 01/21/2022    AST 30 01/21/2022     04/23/2022    K 4.4 04/23/2022    CREATININE 1.0 04/23/2022    BUN 17 04/23/2022    TSH 2.05 01/21/2022    INR 0.9 11/13/2016       Cardiac Imaging    ECHOCARDIOGRAM - EXTERNAL RESULT     Narrative  Ordered by an unspecified provider.    Most recent stress test results:    CV NM STRESS EXERCISE WITH MYOC PERF SPECT MULTI 01/20/2022 (Final) 1/20/2022    Interpretation Summary  1. Exercise technetium tetrofosmin shows no evidence of scar or ischemia.  2. ECG shows no ST changes diagnostic of ischemia. Intermittent atrial fibrillation though the study at rest, exercise and recovery.  3. Gated SPECT obtained in the resting state post stress shows left ventricular ejection fraction of 72%.  All walls show normal thickening and endocardial excursion. No regional wall motion abnormalities are noted.  4. Duke Treadmill score of 7 which correlates with a low risk study. The patient exercised for 7 minutes and 10 seconds achieving 10.1 METS.  5. No prior study for comparison.  6. This is a normal  study.    ECG: A pace, V sense, HR 60 bpm, QTc stable     Dual Chamber Pacemaker Evaluation     Site Evaluation: The site of implantation in the upper left chest shows a well healed scar and is without ecchymosis, redness, drainage or hematoma. The skin is mobile over the device. The patient denies pain at the implantation site.     Device Information  Device : Medtronic  Device Model:  [id number]  Date of Implant: April 12, 2022     Battery Data:   Estimated time to replacement:  15.8 years     Rhythm Data:  Underlying Rhythm: Sinus bradycardia, HR in the 50s   High Rate Episodes: 0  Ventricular High Rate  0  AF burden noted to be 0.6%, ventricular rates controled in AF. Longest episode noted to be 19 minutes      Lead Performance Data: RA  RV  Lead Impedance (ohms) 475  437  Sensing (mV)   2.9  14.9  Capture (V@ms)  0.5 @ 0.4 0.5 @ 0.4  % Pacing   79.2 %  0.2 %     Final Programmed Values:  Mode:    AAI <-> DDD (MVP/RhythmIQ.AAI-Safe Mode)  Paced Lower-Upper Rate: 60 - 120 bpm     Assessment and plan:  Ms. Angeles Cardoza is a 76 year old female with a past medical history significant for hyperlipidemia, hypertension, and a paroxymal atrial fibrillation with long conversion pauses s/p implantation of Medtronic dual chamber pacemaker on 4/12/2022 who is here for follow up.     #Dual Chamber pacemaker   - Normal device evaluation and function.    - Lead function is appropriate, with capture and sensing thresholds are as expected.   - Decreased atrial threshold to 2.0 V @ 0.4 msec  - Decreased ventricular threshold to 2.5 V @ 0.4 msec   - Rate response turned on, device programmed to AAIR <=> DDDR   - There were no significant arrhythmias noted (see Rhythm Data above), and no device therapies were delivered since the last evaluation.  - The counters and diagnostics were reset.  - Follow up will be in 6 months, with remote follow up every 3 months until scheduled clinic visit.    #Atrial fibrillation:  -  Continue Eliquis 5 mg BID for systemic anticoagulation. Unfortunately the patient notes that she has missed a few doses of Eliquis since initiation and I reviewed the  importance of anticoagulation compliance to prevent stroke.  - Continue Tikosyn 250 mcg BID, if itching persists she will notify our office   - Continue metoprolol 75 mg daily.   - labs are all up to date and WNL    I spent 35 minutes with the patient for record review, examination, care coordination and counseling.     This letter was generated using speech recognition software. Please excuse any typographical errors.       Neel Pizano MD, [facility]  Cardiac Electrophysiology  [facility]  8/4/2022

## 2022-08-30 ENCOUNTER — TELEPHONE (OUTPATIENT)
Dept: SCHEDULING | Facility: CLINIC | Age: 77
End: 2022-08-30
Payer: MEDICARE

## 2022-08-30 NOTE — TELEPHONE ENCOUNTER
Spoke with pt. She has had this itcy rash on arms, legs, and back since May. Tikosyn was started in April.   She did see her pcp who reccommended Claritin but has not helped. Denies using any new detergents, lotions, or foods.   I advised her to f/u with pcp and in the meantime see what your thoughts are on Tikosyn being the reason for her rash. Please advise

## 2022-08-30 NOTE — TELEPHONE ENCOUNTER
Pt called said she had on OV on 8/4 with Monie regarding a rash on her legs and arms. Pt called to speak with Monie because it has spread to her back and wants to know what she should do     Best contact

## 2022-08-30 NOTE — TELEPHONE ENCOUNTER
Ned Alegria,     Lets stop Tikosyn and see if she feels any better.   If there is recurrence of AF, we should use amiodarone or ablation as the next step.     Thanks  RADHA.

## 2022-08-31 NOTE — TELEPHONE ENCOUNTER
Spoke to pt. She is waiting to hear back from her pcp  She is hesitant to stop her tikosyn. She will call us back with what she decides

## 2022-09-06 ENCOUNTER — APPOINTMENT (RX ONLY)
Dept: URBAN - METROPOLITAN AREA CLINIC 28 | Facility: CLINIC | Age: 77
Setting detail: DERMATOLOGY
End: 2022-09-06

## 2022-09-06 ENCOUNTER — RX ONLY (OUTPATIENT)
Age: 77
Setting detail: RX ONLY
End: 2022-09-06

## 2022-09-06 DIAGNOSIS — L27.0 GENERALIZED SKIN ERUPTION DUE TO DRUGS AND MEDICAMENTS TAKEN INTERNALLY: ICD-10-CM | Status: INADEQUATELY CONTROLLED

## 2022-09-06 PROCEDURE — ? DIAGNOSIS COMMENT

## 2022-09-06 PROCEDURE — 99214 OFFICE O/P EST MOD 30 MIN: CPT

## 2022-09-06 PROCEDURE — ? PRESCRIPTION

## 2022-09-06 PROCEDURE — ? COUNSELING

## 2022-09-06 PROCEDURE — ? PRESCRIPTION MEDICATION MANAGEMENT

## 2022-09-06 RX ORDER — SALICYLIC ACID 3 G/100G
1 LOTION TOPICAL QAM
Qty: 30 | Refills: 3 | Status: ERX

## 2022-09-06 RX ORDER — EMOLLIENT COMBINATION NO.32
1 EMULSION, EXTENDED RELEASE TOPICAL
Qty: 225 | Refills: 3 | Status: ERX | COMMUNITY
Start: 2022-09-06

## 2022-09-06 RX ADMIN — Medication 90: at 00:00

## 2022-09-06 ASSESSMENT — LOCATION ZONE DERM
LOCATION ZONE: TRUNK
LOCATION ZONE: LEG
LOCATION ZONE: ARM

## 2022-09-06 ASSESSMENT — LOCATION DETAILED DESCRIPTION DERM
LOCATION DETAILED: LEFT PROXIMAL DORSAL FOREARM
LOCATION DETAILED: PERIUMBILICAL SKIN
LOCATION DETAILED: RIGHT PROXIMAL PRETIBIAL REGION
LOCATION DETAILED: RIGHT PROXIMAL DORSAL FOREARM
LOCATION DETAILED: LEFT DISTAL PRETIBIAL REGION

## 2022-09-06 ASSESSMENT — LOCATION SIMPLE DESCRIPTION DERM
LOCATION SIMPLE: RIGHT FOREARM
LOCATION SIMPLE: RIGHT PRETIBIAL REGION
LOCATION SIMPLE: LEFT PRETIBIAL REGION
LOCATION SIMPLE: LEFT FOREARM
LOCATION SIMPLE: ABDOMEN

## 2022-09-06 NOTE — HPI: PRURITUS
How Did Your Itching Occur?: sudden in onset (over a period of weeks to a few months)
How Severe Is Your Itching?: mild
Additional History: Pt started tikosyn and shorty after developed itching throughout

## 2022-09-06 NOTE — PROCEDURE: PRESCRIPTION MEDICATION MANAGEMENT
Initiate Treatment: Allegra Allergy 180 mg tablet: Take one tab po QAM\\nEpiCeram topical emulsion, extended release: Apply a thin layer to the body QDAY
Detail Level: Zone
Render In Strict Bullet Format?: No

## 2022-09-16 ENCOUNTER — RX ONLY (OUTPATIENT)
Age: 77
Setting detail: RX ONLY
End: 2022-09-16

## 2022-09-16 RX ORDER — HYDROXYZINE HYDROCHLORIDE 25 MG/1
1 TABLET, FILM COATED ORAL
Qty: 30 | Refills: 1 | Status: ERX | COMMUNITY
Start: 2022-09-16

## 2022-10-03 ENCOUNTER — TELEPHONE (OUTPATIENT)
Dept: SCHEDULING | Facility: CLINIC | Age: 77
End: 2022-10-03
Payer: MEDICARE

## 2022-10-03 DIAGNOSIS — I48.19 PERSISTENT ATRIAL FIBRILLATION (CMS/HCC): ICD-10-CM

## 2022-10-03 RX ORDER — METOPROLOL SUCCINATE 50 MG/1
75 TABLET, EXTENDED RELEASE ORAL DAILY
Qty: 135 TABLET | Refills: 1 | Status: SHIPPED | OUTPATIENT
Start: 2022-10-03 | End: 2022-11-11

## 2022-10-03 NOTE — TELEPHONE ENCOUNTER
Cardiac Clearance     Name of caller: Reina    Relationship to patient:     Name of patient: Angeles Cardoza    Name of physician: Francisco Jacome MD    Date of Procedure/Surgery: 10/7    Type of Procedure/Surgery: tooth extracted    Name of surgeon: Dr Re Gilliland      Office contact number: [phone]      Office fax number: [phone]    Addendums  [facility] IS FAXING A FORM -[phone] NOW.    Additional notes:

## 2022-10-03 NOTE — TELEPHONE ENCOUNTER
See previous ccv note.    Accidentally sent to wrong doctor.     Dr Boateng saw pt on 7/6.     Please watch for form and fax back to [phone]. ty

## 2022-10-03 NOTE — TELEPHONE ENCOUNTER
Medicine Refill Request    Last Office: Visit date not found   Last Consult Visit: Visit date not found  Last Telemedicine Visit: Visit date not found  escribe metoprolol succinate 25 mg take 1 pill in am and 2 pills in pm to [facility].     Next Appointment: Visit date not found      Current Outpatient Medications:     amLODIPine (NORVASC) 5 mg tablet, Take 1 tablet (5 mg total) by mouth daily., Disp: 30 tablet, Rfl: 1    atorvastatin (LIPITOR) 40 mg tablet, Take 40 mg by mouth daily.  , Disp: , Rfl:     cholecalciferol, vitamin D3, 1,000 unit (25 mcg) tablet, Take 2,000 Units by mouth daily. , Disp: , Rfl:     cyanocobalamin (VITAMIN B12) 100 mcg tablet, Take 100 mcg by mouth daily., Disp: , Rfl:     dofetilide (TIKOSYN) 250 mcg capsule, Take 1 capsule (250 mcg total) by mouth every 12 (twelve) hours., Disp: 180 capsule, Rfl: 3    ELIQUIS 5 mg tablet, Take 1 tablet (5 mg total) by mouth 2 (two) times a day., Disp: 60 tablet, Rfl: 0    magnesium oxide (MAG-OX) 400 mg (241.3 mg magnesium) tablet, Take 1 tablet (400 mg total) by mouth 2 (two) times a day as needed (serum mag less than 2 mEq/L)., Disp: 90 tablet, Rfl: 3    metoprolol succinate XL (TOPROL-XL) 50 mg 24 hr tablet, Take 75 mg by mouth daily. Take 25 mg in the morning and 50 mg at night., Disp: 30 tablet, Rfl: 3    omeprazole (PriLOSEC) 40 mg capsule, Take 40 mg by mouth daily and an additional dose as needed.  , Disp: , Rfl: 3    venlafaxine XR (EFFEXOR-XR) 75 mg 24 hr capsule, Take 75 mg by mouth daily.  , Disp: , Rfl:       BP Readings from Last 3 Encounters:   08/04/22 140/73   05/03/22 106/70   05/02/22 (!) 120/42       Recent Lab results:  No results found for: CHOL, No results found for: HDL, No results found for: LDLCALC, No results found for: TRIG     Lab Results   Component Value Date    GLUCOSE 94 04/23/2022   , No results found for: HGBA1C      Lab Results   Component Value Date    CREATININE 1.0 04/23/2022       Lab Results    Component Value Date    TSH 2.05 01/21/2022

## 2022-10-04 ENCOUNTER — APPOINTMENT (RX ONLY)
Dept: URBAN - METROPOLITAN AREA CLINIC 28 | Facility: CLINIC | Age: 77
Setting detail: DERMATOLOGY
End: 2022-10-04

## 2022-10-04 ENCOUNTER — RX ONLY (OUTPATIENT)
Age: 77
Setting detail: RX ONLY
End: 2022-10-04

## 2022-10-04 DIAGNOSIS — L27.0 GENERALIZED SKIN ERUPTION DUE TO DRUGS AND MEDICAMENTS TAKEN INTERNALLY: ICD-10-CM

## 2022-10-04 PROCEDURE — ? COUNSELING

## 2022-10-04 PROCEDURE — ? DIAGNOSIS COMMENT

## 2022-10-04 PROCEDURE — ? PRESCRIPTION MEDICATION MANAGEMENT

## 2022-10-04 PROCEDURE — ? ADDITIONAL NOTES

## 2022-10-04 PROCEDURE — 99214 OFFICE O/P EST MOD 30 MIN: CPT

## 2022-10-04 RX ORDER — HYDROXYZINE HYDROCHLORIDE 25 MG/1
1 TABLET, FILM COATED ORAL
Qty: 60 | Refills: 2 | Status: ERX

## 2022-10-04 ASSESSMENT — LOCATION DETAILED DESCRIPTION DERM
LOCATION DETAILED: LEFT DISTAL PRETIBIAL REGION
LOCATION DETAILED: LEFT PROXIMAL DORSAL FOREARM
LOCATION DETAILED: PERIUMBILICAL SKIN
LOCATION DETAILED: RIGHT PROXIMAL PRETIBIAL REGION
LOCATION DETAILED: RIGHT PROXIMAL DORSAL FOREARM

## 2022-10-04 ASSESSMENT — LOCATION SIMPLE DESCRIPTION DERM
LOCATION SIMPLE: ABDOMEN
LOCATION SIMPLE: RIGHT PRETIBIAL REGION
LOCATION SIMPLE: LEFT PRETIBIAL REGION
LOCATION SIMPLE: LEFT FOREARM
LOCATION SIMPLE: RIGHT FOREARM

## 2022-10-04 ASSESSMENT — LOCATION ZONE DERM
LOCATION ZONE: LEG
LOCATION ZONE: TRUNK
LOCATION ZONE: ARM

## 2022-10-04 NOTE — PROCEDURE: ADDITIONAL NOTES
Detail Level: Simple
Render Risk Assessment In Note?: no
Additional Notes: Saw nephrologist and kidneys are fine, is following up in November
Additional Notes: Stop Lipitor for one month

## 2022-10-04 NOTE — PROCEDURE: PRESCRIPTION MEDICATION MANAGEMENT
Detail Level: Zone
Continue Regimen: Allegra Allergy 180 mg tablet: Take one tab po QAM\\nEpiCeram topical emulsion, extended release: Apply a thin layer to the body QDAY\\nHydroxyzine 25mg: take one to two tabs po QHS
Render In Strict Bullet Format?: No

## 2022-10-17 ENCOUNTER — TELEPHONE (OUTPATIENT)
Dept: SCHEDULING | Facility: CLINIC | Age: 77
End: 2022-10-17
Payer: MEDICARE

## 2022-10-17 NOTE — TELEPHONE ENCOUNTER
Angeles states that she has had a rash since May that started on arms and legs and has progressively been getting worse.     Pt has visited PCP and Dermatologist and rash is still present.     Pt is asking if rash may be side effect of Tikosyn?    Pt can be reached at [phone].     Ty.

## 2022-10-18 NOTE — TELEPHONE ENCOUNTER
Spoke to Angeles at this time. She has an appointment scheduled with her dermatologist in 2-3 weeks and would like to evaluate other possible treatments for rash/itch. She would like to continue Tikosyn at this time until she sees her dermatologist because she has had excellent arrhythmia suppression and feels great despite this rash. She denies any respiratory compromise - no wheezing, throat itching/swelling. Explained that if she can tolerate symptoms, that is fine to continue Tikosyn. She will let us know how things go after visiting the dermatologist with further decisions. Thanks.

## 2022-10-18 NOTE — TELEPHONE ENCOUNTER
"We saw this patient in the office in August. At that time she was status post PPM placement and Tikosyn initiation in May for AF. During OV we noted:  \"She did tell me that she has experienced an itchy skin rash that started in May/June, requiring urgent care assessment. She was treated with oral steroids and topical benadryl with relief but she still notes intermittent itchiness. She will follow up with her PCP and reevaluate persistency of itchiness. She has excellent arrhythmia suppression on Tikosyn noted on interrogation, so we will rule out other possible causes before discontinuing medication.\"    At this point in time, she has been evaluated by PCP and Derm with no relief in symptoms. Thoughts about it being related to Tikosyn?   "

## 2022-10-18 NOTE — TELEPHONE ENCOUNTER
Thank you Misty,     Yes, this has been an ongoing issue for her.   Lets stop Tikosyn and see how she feels. Please let her know that there is high chance that she will start having atrial fibrillation.   She failed Flecainide and she could not tolerate amiodarone.     RADHA

## 2022-11-08 ENCOUNTER — APPOINTMENT (RX ONLY)
Dept: URBAN - METROPOLITAN AREA CLINIC 28 | Facility: CLINIC | Age: 77
Setting detail: DERMATOLOGY
End: 2022-11-08

## 2022-11-08 ENCOUNTER — OFFICE VISIT (OUTPATIENT)
Dept: CARDIOLOGY | Facility: CLINIC | Age: 77
End: 2022-11-08
Payer: MEDICARE

## 2022-11-08 ENCOUNTER — TELEPHONE (OUTPATIENT)
Dept: CARDIOLOGY | Facility: CLINIC | Age: 77
End: 2022-11-08

## 2022-11-08 VITALS
OXYGEN SATURATION: 98 % | BODY MASS INDEX: 22.78 KG/M2 | DIASTOLIC BLOOD PRESSURE: 70 MMHG | HEIGHT: 60 IN | RESPIRATION RATE: 16 BRPM | TEMPERATURE: 98.6 F | WEIGHT: 116 LBS | SYSTOLIC BLOOD PRESSURE: 124 MMHG | HEART RATE: 66 BPM

## 2022-11-08 DIAGNOSIS — L82.1 OTHER SEBORRHEIC KERATOSIS: ICD-10-CM

## 2022-11-08 DIAGNOSIS — D18.0 HEMANGIOMA: ICD-10-CM

## 2022-11-08 DIAGNOSIS — Z71.89 OTHER SPECIFIED COUNSELING: ICD-10-CM

## 2022-11-08 DIAGNOSIS — L81.4 OTHER MELANIN HYPERPIGMENTATION: ICD-10-CM

## 2022-11-08 DIAGNOSIS — L27.0 GENERALIZED SKIN ERUPTION DUE TO DRUGS AND MEDICAMENTS TAKEN INTERNALLY: ICD-10-CM | Status: IMPROVED

## 2022-11-08 DIAGNOSIS — L57.8 OTHER SKIN CHANGES DUE TO CHRONIC EXPOSURE TO NONIONIZING RADIATION: ICD-10-CM

## 2022-11-08 DIAGNOSIS — I10 ESSENTIAL HYPERTENSION: Primary | ICD-10-CM

## 2022-11-08 DIAGNOSIS — D22 MELANOCYTIC NEVI: ICD-10-CM

## 2022-11-08 PROBLEM — D22.71 MELANOCYTIC NEVI OF RIGHT LOWER LIMB, INCLUDING HIP: Status: ACTIVE | Noted: 2022-11-08

## 2022-11-08 PROBLEM — D22.61 MELANOCYTIC NEVI OF RIGHT UPPER LIMB, INCLUDING SHOULDER: Status: ACTIVE | Noted: 2022-11-08

## 2022-11-08 PROBLEM — D22.72 MELANOCYTIC NEVI OF LEFT LOWER LIMB, INCLUDING HIP: Status: ACTIVE | Noted: 2022-11-08

## 2022-11-08 PROBLEM — D22.5 MELANOCYTIC NEVI OF TRUNK: Status: ACTIVE | Noted: 2022-11-08

## 2022-11-08 PROBLEM — D22.62 MELANOCYTIC NEVI OF LEFT UPPER LIMB, INCLUDING SHOULDER: Status: ACTIVE | Noted: 2022-11-08

## 2022-11-08 PROBLEM — D18.01 HEMANGIOMA OF SKIN AND SUBCUTANEOUS TISSUE: Status: ACTIVE | Noted: 2022-11-08

## 2022-11-08 PROCEDURE — 99214 OFFICE O/P EST MOD 30 MIN: CPT | Performed by: INTERNAL MEDICINE

## 2022-11-08 PROCEDURE — G8754 DIAS BP LESS 90: HCPCS | Performed by: INTERNAL MEDICINE

## 2022-11-08 PROCEDURE — 93000 ELECTROCARDIOGRAM COMPLETE: CPT | Performed by: INTERNAL MEDICINE

## 2022-11-08 PROCEDURE — 99213 OFFICE O/P EST LOW 20 MIN: CPT

## 2022-11-08 PROCEDURE — ? FULL BODY SKIN EXAM

## 2022-11-08 PROCEDURE — ? SUNSCREEN RECOMMENDATIONS

## 2022-11-08 PROCEDURE — ? COUNSELING

## 2022-11-08 PROCEDURE — G8752 SYS BP LESS 140: HCPCS | Performed by: INTERNAL MEDICINE

## 2022-11-08 PROCEDURE — ? ADDITIONAL NOTES

## 2022-11-08 PROCEDURE — ? PRESCRIPTION MEDICATION MANAGEMENT

## 2022-11-08 PROCEDURE — ? DIAGNOSIS COMMENT

## 2022-11-08 RX ORDER — AMLODIPINE BESYLATE 5 MG/1
5 TABLET ORAL DAILY
Qty: 90 TABLET | Refills: 3 | Status: SHIPPED | OUTPATIENT
Start: 2022-11-08 | End: 2022-11-08 | Stop reason: SDUPTHER

## 2022-11-08 RX ORDER — HYDROXYZINE HYDROCHLORIDE 25 MG/1
25-50 TABLET, FILM COATED ORAL NIGHTLY
COMMUNITY
Start: 2022-10-31 | End: 2023-03-09

## 2022-11-08 RX ORDER — FEXOFENADINE HYDROCHLORIDE 180 MG/1
180 TABLET, FILM COATED ORAL EVERY MORNING
COMMUNITY
Start: 2022-10-17 | End: 2025-01-08 | Stop reason: ALTCHOICE

## 2022-11-08 RX ORDER — AMLODIPINE BESYLATE 5 MG/1
5 TABLET ORAL DAILY
Qty: 90 TABLET | Refills: 3 | Status: SHIPPED | OUTPATIENT
Start: 2022-11-08 | End: 2022-11-10 | Stop reason: SDUPTHER

## 2022-11-08 ASSESSMENT — LOCATION SIMPLE DESCRIPTION DERM
LOCATION SIMPLE: RIGHT PRETIBIAL REGION
LOCATION SIMPLE: LEFT THIGH
LOCATION SIMPLE: CHEST
LOCATION SIMPLE: LEFT FOREARM
LOCATION SIMPLE: LEFT CHEEK
LOCATION SIMPLE: RIGHT FOREARM
LOCATION SIMPLE: RIGHT THIGH
LOCATION SIMPLE: RIGHT POSTERIOR UPPER ARM
LOCATION SIMPLE: UPPER BACK
LOCATION SIMPLE: ABDOMEN
LOCATION SIMPLE: LEFT POSTERIOR UPPER ARM
LOCATION SIMPLE: LEFT PRETIBIAL REGION

## 2022-11-08 ASSESSMENT — LOCATION ZONE DERM
LOCATION ZONE: LEG
LOCATION ZONE: FACE
LOCATION ZONE: ARM
LOCATION ZONE: TRUNK

## 2022-11-08 ASSESSMENT — SEVERITY ASSESSMENT: SEVERITY: MILD

## 2022-11-08 ASSESSMENT — LOCATION DETAILED DESCRIPTION DERM
LOCATION DETAILED: LEFT CENTRAL MALAR CHEEK
LOCATION DETAILED: INFERIOR THORACIC SPINE
LOCATION DETAILED: LEFT LATERAL ABDOMEN
LOCATION DETAILED: LEFT PROXIMAL PRETIBIAL REGION
LOCATION DETAILED: RIGHT PROXIMAL DORSAL FOREARM
LOCATION DETAILED: LEFT DISTAL PRETIBIAL REGION
LOCATION DETAILED: LEFT DISTAL DORSAL FOREARM
LOCATION DETAILED: PERIUMBILICAL SKIN
LOCATION DETAILED: LEFT PROXIMAL DORSAL FOREARM
LOCATION DETAILED: RIGHT DISTAL DORSAL FOREARM
LOCATION DETAILED: LEFT DISTAL POSTERIOR UPPER ARM
LOCATION DETAILED: RIGHT PROXIMAL PRETIBIAL REGION
LOCATION DETAILED: EPIGASTRIC SKIN
LOCATION DETAILED: LEFT ANTERIOR DISTAL THIGH
LOCATION DETAILED: RIGHT DISTAL POSTERIOR UPPER ARM
LOCATION DETAILED: LEFT MEDIAL SUPERIOR CHEST
LOCATION DETAILED: RIGHT ANTERIOR PROXIMAL THIGH

## 2022-11-08 ASSESSMENT — ENCOUNTER SYMPTOMS
DYSPNEA ON EXERTION: 1
PSYCHIATRIC NEGATIVE: 1
CONSTITUTIONAL NEGATIVE: 1
ENDOCRINE NEGATIVE: 1
EYES NEGATIVE: 1
HEMATOLOGIC/LYMPHATIC NEGATIVE: 1
DIZZINESS: 1
GASTROINTESTINAL NEGATIVE: 1
RESPIRATORY NEGATIVE: 1

## 2022-11-08 ASSESSMENT — PAIN INTENSITY VAS: HOW INTENSE IS YOUR PAIN 0 BEING NO PAIN, 10 BEING THE MOST SEVERE PAIN POSSIBLE?: NO PAIN

## 2022-11-08 ASSESSMENT — BSA RASH: BSA RASH: 8

## 2022-11-08 NOTE — TELEPHONE ENCOUNTER
Patient saw Dr. Boateng today. Dr. Boateng requests an appointment next week with NP to interrogate patient's device to see if she has any arhythmia's occurring. She has SOB and Dr. Boateng feels she may be jumping in and out of Afib.  Please call patient for scheduling

## 2022-11-08 NOTE — PROCEDURE: ADDITIONAL NOTES
Additional Notes: Stop Lipitor for one month
Render Risk Assessment In Note?: no
Detail Level: Simple

## 2022-11-08 NOTE — PROGRESS NOTES
Chief Complaint: I saw Angeles in the office today on a routine visit for her A. fib tachybradycardia syndrome hypertension and now she has a new rash.  She denies chest discomfort but occasionally does get short of breath with exertion and lasts for few minutes and goes away it does not [employer] come with just exertion can come in anytime and I am suspicious she is starting to flip in and out of A. fib again and when she flips into A. fib she loses her atrial contribution and go get short of breath.  She denies chest pressure chest tightness but occasionally has a shortness of breath intermittently.  She denies chest discomfort or shortness of breath with anxiety cold weather postprandially at rest or nocturnally.  She can climb a flight of stairs without getting short of breath she denies proximal nocturnal dyspnea orthopnea denies palpitations syncope ankle edema she has nocturia and she has this annoying rash.  She is under the care of a dermatologist.       Medications:  Current Outpatient Medications   Medication Sig Dispense Refill    ALLERGY RELIEF, FEXOFENADINE, 180 mg tablet Take 180 mg by mouth every morning.      amLODIPine (NORVASC) 5 mg tablet Take 1 tablet (5 mg total) by mouth daily. 90 tablet 3    atorvastatin (LIPITOR) 40 mg tablet Take 40 mg by mouth daily.        cholecalciferol, vitamin D3, 1,000 unit (25 mcg) tablet Take 2,000 Units by mouth daily.       cyanocobalamin (VITAMIN B12) 100 mcg tablet Take 100 mcg by mouth daily.      dofetilide (TIKOSYN) 250 mcg capsule Take 1 capsule (250 mcg total) by mouth every 12 (twelve) hours. 180 capsule 3    ELIQUIS 5 mg tablet Take 1 tablet (5 mg total) by mouth 2 (two) times a day. 60 tablet 0    hydrOXYzine (ATARAX) 25 mg tablet Take 25-50 mg by mouth nightly.      magnesium oxide (MAG-OX) 400 mg (241.3 mg magnesium) tablet Take 1 tablet (400 mg total) by mouth 2 (two) times a day as needed (serum mag less than 2 mEq/L). 90 tablet 3     metoprolol succinate XL (TOPROL-XL) 50 mg 24 hr tablet Take 1.5 tablets (75 mg total) by mouth daily. Take 25 mg in the morning and 50 mg at night. 135 tablet 1    omeprazole (PriLOSEC) 40 mg capsule Take 40 mg by mouth daily and an additional dose as needed.    3    venlafaxine XR (EFFEXOR-XR) 75 mg 24 hr capsule Take 75 mg by mouth daily.         No current facility-administered medications for this visit.       Review of Systems:  Review of Systems   Constitutional: Negative.   HENT: Negative.    Eyes: Negative.    Cardiovascular: Positive for dyspnea on exertion.   Respiratory: Negative.    Endocrine: Negative.    Hematologic/Lymphatic: Negative.    Skin: Negative.    Musculoskeletal: Positive for joint pain.   Gastrointestinal: Negative.    Genitourinary: Positive for nocturia.   Neurological: Positive for dizziness.   Psychiatric/Behavioral: Negative.    Allergic/Immunologic: Positive for environmental allergies.       Physical Exam:  Vitals:    11/08/22 1110   BP: 124/70   Pulse: 66   Resp: 16   Temp: 37 °C (98.6 °F)   SpO2: 98%     Physical Exam  Constitutional:       Appearance: She is well-developed and well-nourished.   HENT:      Head: Normocephalic and atraumatic.   Eyes:      Extraocular Movements: EOM normal.      Conjunctiva/sclera: Conjunctivae normal.      Pupils: Pupils are equal, round, and reactive to light.   Cardiovascular:      Rate and Rhythm: Normal rate and regular rhythm.      Pulses: Intact distal pulses.      Heart sounds:     Gallop present.   Pulmonary:      Effort: Pulmonary effort is normal.      Breath sounds: Normal breath sounds.   Abdominal:      General: Bowel sounds are normal.      Palpations: Abdomen is soft.   Musculoskeletal:         General: Normal range of motion.      Cervical back: Normal range of motion and neck supple.   Skin:     General: Skin is warm, dry and intact.   Neurological:      Mental Status: She is alert and oriented to person, place, and time.       Motor: Motor strength is normal.      Deep Tendon Reflexes: Reflexes are normal and symmetric.   Psychiatric:         Mood and Affect: Mood and affect normal.         Speech: Speech normal.         Behavior: Behavior normal.         Thought Content: Thought content normal.         Cognition and Memory: Cognition and memory normal.         Judgment: Judgment normal.       EKG: Atrial paced ventricular ensed [facility]    Assessment and Plan:  Impression:  Tachybradycardia syndrome status post pacemaker.  History of atrial fibrillation.  Hypertension.  Rash.  Aortic regurgitation.  Mitral regurgitation.  Recommendation:  Her blood pressure is well controlled there are no signs of heart failure whatsoever I am going to have her pacemaker interrogated so we can see if she is flipping in and out of atrial fibrillation to correlate with her symptomatology.  We will wait to hear from EP based on interrogation then we will make the appropriate changes and decide when to bring her back to the office.  Thank you for allowing us see this very nice lady in the office today.    Chano Boateng, DO

## 2022-11-08 NOTE — LETTER
November 8, 2022     José Antonio REAGAN DO  [address] [facility] [address]    Patient: Angeles Cardoza  YOB: 1945  Date of Visit: 11/8/2022      Dear Dr. Leroy:    Thank you for referring Angeles Cardoza to me for evaluation. Below are my notes for this consultation.    If you have questions, please do not hesitate to call me. I look forward to following your patient along with you.         Sincerely,        Chano Boateng DO        CC: No Recipients  Chano Boateng DO  11/8/2022 12:01 PM  Signed      Chief Complaint: I saw Angeles in the office today on a routine visit for her A. fib tachybradycardia syndrome hypertension and now she has a new rash.  She denies chest discomfort but occasionally does get short of breath with exertion and lasts for few minutes and goes away it does not [employer] come with just exertion can come in anytime and I am suspicious she is starting to flip in and out of A. fib again and when she flips into A. fib she loses her atrial contribution and go get short of breath.  She denies chest pressure chest tightness but occasionally has a shortness of breath intermittently.  She denies chest discomfort or shortness of breath with anxiety cold weather postprandially at rest or nocturnally.  She can climb a flight of stairs without getting short of breath she denies proximal nocturnal dyspnea orthopnea denies palpitations syncope ankle edema she has nocturia and she has this annoying rash.  She is under the care of a dermatologist.       Medications:  Current Outpatient Medications   Medication Sig Dispense Refill    ALLERGY RELIEF, FEXOFENADINE, 180 mg tablet Take 180 mg by mouth every morning.      amLODIPine (NORVASC) 5 mg tablet Take 1 tablet (5 mg total) by mouth daily. 90 tablet 3    atorvastatin (LIPITOR) 40 mg tablet Take 40 mg by mouth daily.        cholecalciferol, vitamin D3, 1,000 unit (25 mcg) tablet Take 2,000 Units by mouth daily.        cyanocobalamin (VITAMIN B12) 100 mcg tablet Take 100 mcg by mouth daily.      dofetilide (TIKOSYN) 250 mcg capsule Take 1 capsule (250 mcg total) by mouth every 12 (twelve) hours. 180 capsule 3    ELIQUIS 5 mg tablet Take 1 tablet (5 mg total) by mouth 2 (two) times a day. 60 tablet 0    hydrOXYzine (ATARAX) 25 mg tablet Take 25-50 mg by mouth nightly.      magnesium oxide (MAG-OX) 400 mg (241.3 mg magnesium) tablet Take 1 tablet (400 mg total) by mouth 2 (two) times a day as needed (serum mag less than 2 mEq/L). 90 tablet 3    metoprolol succinate XL (TOPROL-XL) 50 mg 24 hr tablet Take 1.5 tablets (75 mg total) by mouth daily. Take 25 mg in the morning and 50 mg at night. 135 tablet 1    omeprazole (PriLOSEC) 40 mg capsule Take 40 mg by mouth daily and an additional dose as needed.    3    venlafaxine XR (EFFEXOR-XR) 75 mg 24 hr capsule Take 75 mg by mouth daily.         No current facility-administered medications for this visit.       Review of Systems:  Review of Systems   Constitutional: Negative.   HENT: Negative.    Eyes: Negative.    Cardiovascular: Positive for dyspnea on exertion.   Respiratory: Negative.    Endocrine: Negative.    Hematologic/Lymphatic: Negative.    Skin: Negative.    Musculoskeletal: Positive for joint pain.   Gastrointestinal: Negative.    Genitourinary: Positive for nocturia.   Neurological: Positive for dizziness.   Psychiatric/Behavioral: Negative.    Allergic/Immunologic: Positive for environmental allergies.       Physical Exam:  Vitals:    11/08/22 1110   BP: 124/70   Pulse: 66   Resp: 16   Temp: 37 °C (98.6 °F)   SpO2: 98%     Physical Exam  Constitutional:       Appearance: She is well-developed and well-nourished.   HENT:      Head: Normocephalic and atraumatic.   Eyes:      Extraocular Movements: EOM normal.      Conjunctiva/sclera: Conjunctivae normal.      Pupils: Pupils are equal, round, and reactive to light.   Cardiovascular:      Rate and Rhythm: Normal rate and  regular rhythm.      Pulses: Intact distal pulses.      Heart sounds:     Gallop present.   Pulmonary:      Effort: Pulmonary effort is normal.      Breath sounds: Normal breath sounds.   Abdominal:      General: Bowel sounds are normal.      Palpations: Abdomen is soft.   Musculoskeletal:         General: Normal range of motion.      Cervical back: Normal range of motion and neck supple.   Skin:     General: Skin is warm, dry and intact.   Neurological:      Mental Status: She is alert and oriented to person, place, and time.      Motor: Motor strength is normal.      Deep Tendon Reflexes: Reflexes are normal and symmetric.   Psychiatric:         Mood and Affect: Mood and affect normal.         Speech: Speech normal.         Behavior: Behavior normal.         Thought Content: Thought content normal.         Cognition and Memory: Cognition and memory normal.         Judgment: Judgment normal.       EKG: Atrial paced ventricular ensed [facility]    Assessment and Plan:  Impression:  Tachybradycardia syndrome status post pacemaker.  History of atrial fibrillation.  Hypertension.  Rash.  Aortic regurgitation.  Mitral regurgitation.  Recommendation:  Her blood pressure is well controlled there are no signs of heart failure whatsoever I am going to have her pacemaker interrogated so we can see if she is flipping in and out of atrial fibrillation to correlate with her symptomatology.  We will wait to hear from EP based on interrogation then we will make the appropriate changes and decide when to bring her back to the office.  Thank you for allowing us see this very nice lady in the office today.    Chano Boateng DO

## 2022-11-10 ENCOUNTER — TELEPHONE (OUTPATIENT)
Dept: SCHEDULING | Facility: CLINIC | Age: 77
End: 2022-11-10
Payer: MEDICARE

## 2022-11-10 ENCOUNTER — TELEPHONE (OUTPATIENT)
Dept: CARDIOLOGY | Facility: CLINIC | Age: 77
End: 2022-11-10
Payer: MEDICARE

## 2022-11-10 DIAGNOSIS — I10 ESSENTIAL HYPERTENSION: ICD-10-CM

## 2022-11-10 RX ORDER — AMLODIPINE BESYLATE 5 MG/1
5 TABLET ORAL DAILY
Qty: 90 TABLET | Refills: 3 | Status: SHIPPED | OUTPATIENT
Start: 2022-11-10 | End: 2023-01-06 | Stop reason: SDUPTHER

## 2022-11-10 NOTE — TELEPHONE ENCOUNTER
Spoke with Angeles Meneses has only been taking amlodipine 5 mg daily since July.  BP well-controlled at OV yesterday on reported dose of amlodipine.    Will resend to pharmacy as amlodipine 5 mg daily unless you prefer her to be on the higher dose.  Thank you.

## 2022-11-10 NOTE — TELEPHONE ENCOUNTER
Patient Name: Angeles Cardoza    Caller name: Katie    Relationship: Delfina    Reason for call: Pharmacist has questions regarding dosing clarification on amlodipine script sent over    Callback number: [phone]

## 2022-11-11 ENCOUNTER — TELEPHONE (OUTPATIENT)
Dept: CARDIOLOGY | Facility: CLINIC | Age: 77
End: 2022-11-11
Payer: MEDICARE

## 2022-11-11 ENCOUNTER — OFFICE VISIT (OUTPATIENT)
Dept: CARDIOLOGY | Facility: CLINIC | Age: 77
End: 2022-11-11
Payer: MEDICARE

## 2022-11-11 VITALS
DIASTOLIC BLOOD PRESSURE: 80 MMHG | WEIGHT: 118 LBS | HEIGHT: 60 IN | BODY MASS INDEX: 23.16 KG/M2 | SYSTOLIC BLOOD PRESSURE: 142 MMHG | RESPIRATION RATE: 16 BRPM | HEART RATE: 64 BPM

## 2022-11-11 DIAGNOSIS — I48.19 PERSISTENT ATRIAL FIBRILLATION (CMS/HCC): ICD-10-CM

## 2022-11-11 DIAGNOSIS — I49.5 SICK SINUS SYNDROME (CMS/HCC): Primary | ICD-10-CM

## 2022-11-11 PROCEDURE — G8753 SYS BP > OR = 140: HCPCS

## 2022-11-11 PROCEDURE — 99214 OFFICE O/P EST MOD 30 MIN: CPT

## 2022-11-11 PROCEDURE — 93000 ELECTROCARDIOGRAM COMPLETE: CPT

## 2022-11-11 PROCEDURE — G8754 DIAS BP LESS 90: HCPCS

## 2022-11-11 RX ORDER — METOPROLOL SUCCINATE 50 MG/1
50 TABLET, EXTENDED RELEASE ORAL 2 TIMES DAILY
Qty: 180 TABLET | Refills: 3 | Status: SHIPPED | OUTPATIENT
Start: 2022-11-11 | End: 2022-11-11 | Stop reason: SDUPTHER

## 2022-11-11 RX ORDER — METOPROLOL SUCCINATE 50 MG/1
50 TABLET, EXTENDED RELEASE ORAL 2 TIMES DAILY
Qty: 180 TABLET | Refills: 3 | Status: SHIPPED | OUTPATIENT
Start: 2022-11-11 | End: 2022-12-23 | Stop reason: SDUPTHER

## 2022-11-11 ASSESSMENT — ENCOUNTER SYMPTOMS
PSYCHIATRIC NEGATIVE: 1
ENDOCRINE NEGATIVE: 1
PALPITATIONS: 0
WHEEZING: 0
HEMATOLOGIC/LYMPHATIC NEGATIVE: 1
LIGHT-HEADEDNESS: 0
COUGH: 0
FATIGUE: 0
SHORTNESS OF BREATH: 1
WEAKNESS: 0
ACTIVITY CHANGE: 0
MUSCULOSKELETAL NEGATIVE: 1
DIZZINESS: 0
ALLERGIC/IMMUNOLOGIC NEGATIVE: 1
GASTROINTESTINAL NEGATIVE: 1

## 2022-11-11 NOTE — ASSESSMENT & PLAN NOTE
Her blood pressure is slightly elevated today but she tells me at home she is getting acceptable readings.  She also follows with Dr. Boateng.

## 2022-11-11 NOTE — ASSESSMENT & PLAN NOTE
She has a normally functioning Medtronic dual-chamber pacemaker that was implanted on 4/12/2022.  Battery life estimated at 13.5 years until QUETA.  Mode is AAIR-DDDR with a lower rate of 60 and upper tracking rate of 120.  There is atrial pacing 88% of the time and ventricular pacing less than 1% of the time.  Her AT/AF burden is 0.4%.  The atrial lead measures a P wave of 2.3 mV, pacing threshold of 0.75 V at 0.4 ms, and a lead impedance of 570 ohms.  The RV lead has a measured R wave of 14.0 mV, pacing threshold of 0.75 V at 0.4 ms, and a lead impedance of 456 ohms.  She is enrolled in remote monitoring.  I did not make any changes to the device.

## 2022-11-11 NOTE — PATIENT INSTRUCTIONS
Follow up in 4 months.     Increase metoprolol 50 mg BID.     Consider sleep apnea testing.     Consider ablation procedure for management of atrial fibrillation.

## 2022-11-11 NOTE — ASSESSMENT & PLAN NOTE
She has a history of paroxysmal atrial fibrillation that was first diagnosed in November 2021 when she presented to an outside hospital for acute diverticulitis/colitis flareup.  She was placed on amiodarone and Eliquis 5 mg twice daily.  Transthoracic echocardiogram at the time demonstrated an EF of 60 to 65% with normal atrial size.  She spontaneously converted to sinus rhythm.  She could not tolerate amiodarone due to nausea and vomiting.  She then underwent M cot to evaluate burden of atrial fibrillation.  She was noted have a 40% burden of atrial fibrillation with frequent conversion pauses which prompted implantation of a dual-chamber pacemaker.  Following pacemaker implantation, she was admitted to the hospital in April 2022 for Tikosyn initiation to manage her atrial fibrillation.  She was discharged on Tikosyn 250 mcg twice daily.    On interrogation today, she is noted to have an AT/AF burden of 0.4%.  Her longest episode of atrial fibrillation lasted approximately an hour.  On review of the events, many of these episodes occur at nighttime.  She reports no known history of obstructive sleep apnea but does report she wakes up often in the middle of the night.  I encouraged her to be tested for sleep apnea with her PCP.  She reports being asymptomatic but does report these episodes 2-3 times per day of brief shortness of breath that lasted 1 minute.  She reports that this occurred today however she did not have any atrial fibrillation.  On review, her ventricular rates can be anywhere from 80s to 100s.  I will increase her metoprolol to 50 mg twice daily to see if this helps with her shortness of breath.  Due to limitations with medications we can use, we did discuss the ablation procedure for further management of her atrial fibrillation should Tikosyn be ineffective. She will consider this as an option.

## 2022-11-11 NOTE — PROGRESS NOTES
Electrophysiology       Reason for visit:   Chief Complaint   Patient presents with    Device Check      HPI   Angeles Cardoza is a 77 y.o. female who comes to the office today for follow-up of her device and arrhythmia issues.  She has a past medical history of hyperlipidemia, hypertension and paroxysmal atrial fibrillation with long conversion pauses and is now status post Medtronic dual-chamber pacemaker implantation on 4/12/2022.    She was diagnosed with paroxysmal atrial fibrillation in November 2021 during a hospitalization for acute diverticulitis.  She was started on Eliquis 5 mg twice daily and amiodarone.  She did not tolerate amiodarone due to nausea and vomiting and was transition to flecainide 50 mg twice daily.  She underwent M cot in early 2022 and had a 40% burden of atrial fibrillation with long conversion pauses which were sometimes symptomatic.  Flecainide was stopped and she underwent implantation of a dual-chamber pacemaker on 4/12/2022.  She was then admitted in April 2022 for Tikosyn initiations. She was discharged on Tikosyn 250 mcg twice daily.     She presents today for follow-up.  She reports some periods of shortness of breath that last approximately 1 minute and can occur at any point in the day.  This occurs 2-3 times per day.  She reports she had an episode this morning but it has since resolved.  She also reports she developed a rash on her legs that started in May.  She denies chest pain, palpitations, lightheadedness, dizziness, or syncope.  She reports she continues to do aerobic swimming multiple times a week for exercise.      Past Medical History:   Diagnosis Date    Acid reflux     Atrial fibrillation (CMS/HCC)     Hypertension     Irregular heart rhythm     Lipid disorder     Sick sinus syndrome (CMS/HCC)      Past Surgical History:   Procedure Laterality Date    BREAST SURGERY  2003    breast reduction    CARDIAC PACEMAKER PLACEMENT Left 04/12/2022    Medtronic     RENAL ANGIOPLASTY      TOE SURGERY Left     TONSILLECTOMY      WISDOM TOOTH EXTRACTION       Allergies:  Penicillins and Ditropan [oxybutynin chloride]    Current Outpatient Medications on File Prior to Visit   Medication Sig Dispense Refill    ALLERGY RELIEF, FEXOFENADINE, 180 mg tablet Take 180 mg by mouth every morning.      amLODIPine (NORVASC) 5 mg tablet Take 1 tablet (5 mg total) by mouth daily. 90 tablet 3    atorvastatin (LIPITOR) 40 mg tablet Take 40 mg by mouth daily.        cholecalciferol, vitamin D3, 1,000 unit (25 mcg) tablet Take 2,000 Units by mouth daily.       cyanocobalamin (VITAMIN B12) 100 mcg tablet Take 100 mcg by mouth daily.      dofetilide (TIKOSYN) 250 mcg capsule Take 1 capsule (250 mcg total) by mouth every 12 (twelve) hours. 180 capsule 3    ELIQUIS 5 mg tablet Take 1 tablet (5 mg total) by mouth 2 (two) times a day. 60 tablet 0    hydrOXYzine (ATARAX) 25 mg tablet Take 25-50 mg by mouth nightly.      magnesium oxide (MAG-OX) 400 mg (241.3 mg magnesium) tablet Take 1 tablet (400 mg total) by mouth 2 (two) times a day as needed (serum mag less than 2 mEq/L). 90 tablet 3    omeprazole (PriLOSEC) 40 mg capsule Take 40 mg by mouth daily and an additional dose as needed.    3    venlafaxine XR (EFFEXOR-XR) 75 mg 24 hr capsule Take 75 mg by mouth daily.         No current facility-administered medications on file prior to visit.     Social History     Socioeconomic History    Marital status:      Spouse name: None    Number of children: None    Years of education: None    Highest education level: None   Tobacco Use    Smoking status: Never    Smokeless tobacco: Never   Vaping Use    Vaping Use: Never used   Substance and Sexual Activity    Alcohol use: Yes     Comment: occasional     Family History   Problem Relation Age of Onset    Stroke Biological Mother     Heart failure Biological Father      Review of Systems   Constitutional: Negative for activity  change and fatigue.   HENT: Negative.    Respiratory: Positive for shortness of breath. Negative for cough and wheezing.    Cardiovascular: Negative for chest pain, palpitations and leg swelling.   Gastrointestinal: Negative.    Endocrine: Negative.    Genitourinary: Negative.    Musculoskeletal: Negative.    Skin: Positive for rash.        On bilateral legs   Allergic/Immunologic: Negative.    Neurological: Negative for dizziness, syncope, weakness and light-headedness.   Hematological: Negative.    Psychiatric/Behavioral: Negative.      Vitals:    11/11/22 0916   BP: (!) 142/80   Pulse: 64   Resp: 16     Wt Readings from Last 3 Encounters:   11/11/22 53.5 kg (118 lb)   11/08/22 52.6 kg (116 lb)   08/04/22 52.6 kg (116 lb)     Physical Exam  Constitutional:       General: She is not in acute distress.  HENT:      Head: Normocephalic.   Cardiovascular:      Rate and Rhythm: Normal rate and regular rhythm.      Pulses: Normal pulses.      Heart sounds: Normal heart sounds.   Pulmonary:      Effort: Pulmonary effort is normal. No respiratory distress.      Breath sounds: Normal breath sounds.   Musculoskeletal:      Right lower leg: No edema.      Left lower leg: No edema.   Skin:     General: Skin is warm and dry.      Capillary Refill: Capillary refill takes more than 3 seconds.      Findings: Rash present.   Neurological:      Mental Status: She is alert and oriented to person, place, and time.        Lab Results   Component Value Date    WBC 7.90 04/23/2022    HGB 13.2 04/23/2022    HCT 41.2 04/23/2022     (H) 04/23/2022    ALT 25 01/21/2022    AST 30 01/21/2022     04/23/2022    K 4.4 04/23/2022    CREATININE 1.0 04/23/2022    BUN 17 04/23/2022    TSH 2.05 01/21/2022    INR 0.9 11/13/2016       Cardiac Imaging    TTE 5/2/2022   Tricuspid valve structure is grossly normal. Mild tricuspid valve regurgitation. The regurgitation jet is central.   Normal-sized LV. Normal LV systolic function. Estimated  EF 65%. Normal LV septal wall motion. No regional wall motion abnormalities. Normal LV wall thickness.   Normal-sized RV. Normal RV systolic function. Pacer wire present in the RV. Normal RV wall thickness.   Mildly dilated LA. No patent foramen ovale present as seen in the LA. Intact LA septum present.   Normal-sized RA. Pacemaker wire present in the RA.   Aortic root sclerotic. Sinuses of Valsalva sclerotic. Ascending aorta sclerotic. Aortic arch grossly normal.   Tricuspid aortic valve. Sclerotic aortic valve leaflets. Mild aortic valve regurgitation. Mild aortic valve stenosis.   Sclerotic mitral valve.   Mild to moderate mitral valve regurgitation. The jet is centrally directed.   Pulmonic valve not well visualized.   IVC is a small caliber vessel (<1.7cm). IVC collapses >50% during inspiration.   Normal pericardial structure. No evidence of pericardial effusion. No cardiac tamponade    Most recent stress test results:    CV NM STRESS EXERCISE WITH MYOC PERF SPECT MULTI 01/20/2022 (Final) 1/20/2022    Interpretation Summary  1. Exercise technetium tetrofosmin shows no evidence of scar or ischemia.  2. ECG shows no ST changes diagnostic of ischemia. Intermittent atrial fibrillation though the study at rest, exercise and recovery.  3. Gated SPECT obtained in the resting state post stress shows left ventricular ejection fraction of 72%.  All walls show normal thickening and endocardial excursion. No regional wall motion abnormalities are noted.  4. Duke Treadmill score of 7 which correlates with a low risk study. The patient exercised for 7 minutes and 10 seconds achieving 10.1 METS.  5. No prior study for comparison.  6. This is a normal study.      ECG: Atrial pacing, ventricular sensing    Other Cardiac Studies: As above     Essential hypertension  Her blood pressure is slightly elevated today but she tells me at home she is getting acceptable readings.  She also follows with Dr. Boateng.    Sick  sinus syndrome (CMS/HCC)  She is asymptomatic in the setting of a normally functioning Medtronic dual-chamber pacemaker that was implanted on 4/12/2022.    Pacemaker  She has a normally functioning Medtronic dual-chamber pacemaker that was implanted on 4/12/2022.  Battery life estimated at 13.5 years until QUETA.  Mode is AAIR-DDDR with a lower rate of 60 and upper tracking rate of 120.  There is atrial pacing 88% of the time and ventricular pacing less than 1% of the time.  Her AT/AF burden is 0.4%.  The atrial lead measures a P wave of 2.3 mV, pacing threshold of 0.75 V at 0.4 ms, and a lead impedance of 570 ohms.  The RV lead has a measured R wave of 14.0 mV, pacing threshold of 0.75 V at 0.4 ms, and a lead impedance of 456 ohms.  She is enrolled in remote monitoring.  I did not make any changes to the device.    Persistent atrial fibrillation (CMS/HCC)  She has a history of paroxysmal atrial fibrillation that was first diagnosed in November 2021 when she presented to an outside hospital for acute diverticulitis/colitis flareup.  She was placed on amiodarone and Eliquis 5 mg twice daily.  Transthoracic echocardiogram at the time demonstrated an EF of 60 to 65% with normal atrial size.  She spontaneously converted to sinus rhythm.  She could not tolerate amiodarone due to nausea and vomiting.  She then underwent M cot to evaluate burden of atrial fibrillation.  She was noted have a 40% burden of atrial fibrillation with frequent conversion pauses which prompted implantation of a dual-chamber pacemaker.  Following pacemaker implantation, she was admitted to the hospital in April 2022 for Tikosyn initiation to manage her atrial fibrillation.  She was discharged on Tikosyn 250 mcg twice daily.    On interrogation today, she is noted to have an AT/AF burden of 0.4%.  Her longest episode of atrial fibrillation lasted approximately an hour.  On review of the events, many of these episodes occur at nighttime.  She reports  no known history of obstructive sleep apnea but does report she wakes up often in the middle of the night.  I encouraged her to be tested for sleep apnea with her PCP.  She reports being asymptomatic but does report these episodes 2-3 times per day of brief shortness of breath that lasted 1 minute.  She reports that this occurred today however she did not have any atrial fibrillation.  On review, her ventricular rates can be anywhere from 80s to 100s.  I will increase her metoprolol to 50 mg twice daily to see if this helps with her shortness of breath.  Due to limitations with medications we can use, we did discuss the ablation procedure for further management of her atrial fibrillation should Tikosyn be ineffective. She will consider this as an option.      New Medications Ordered This Visit   Medications    metoprolol succinate XL (TOPROL-XL) 50 mg 24 hr tablet     Sig: Take 1 tablet (50 mg total) by mouth 2 (two) times a day. Take 25 mg in the morning and 50 mg at night.     Dispense:  180 tablet     Refill:  3     Medications Discontinued During This Encounter   Medication Reason    metoprolol succinate XL (TOPROL-XL) 50 mg 24 hr tablet        This letter was generated using speech recognition software. Please excuse any typographical errors.       SHOBHA Galloway  11/11/2022

## 2022-11-11 NOTE — ASSESSMENT & PLAN NOTE
She is asymptomatic in the setting of a normally functioning Medtronic dual-chamber pacemaker that was implanted on 4/12/2022.

## 2022-11-11 NOTE — TELEPHONE ENCOUNTER
"Ned Tello,    I received a call from Katie at [facility] asking for clarification on Metoprolol script sent today.    It has two different sig directions: \" Take 1 tablet (50 mg total) by mouth 2 (two) times a day. Take 25 mg in the morning and 50 mg at night.\"    I reviewed your office visit note and can see dosing was increased so I updated directions for use to 50mg BID and re sent it to the pharmacy.    Nothing needed on your part unless I'm not correct with what I've outlined above.    Thanks.  "

## 2022-11-21 ENCOUNTER — TELEPHONE (OUTPATIENT)
Dept: SCHEDULING | Facility: CLINIC | Age: 77
End: 2022-11-21
Payer: MEDICARE

## 2022-11-21 NOTE — TELEPHONE ENCOUNTER
Patient Name: Angeles Cardoza    Caller name: Angeles    Relationship: self    Reason for call: Pt confused about appts-has appt 2/7 with Monie and 3/9 with Dr Pizano.    Pt does not understand why she needs both appts.    Callback number: [phone]

## 2022-11-22 ENCOUNTER — TELEPHONE (OUTPATIENT)
Dept: SCHEDULING | Facility: CLINIC | Age: 77
End: 2022-11-22
Payer: MEDICARE

## 2022-11-22 NOTE — TELEPHONE ENCOUNTER
ROLDAN - Pt of Dr. Pizano traveling to [place] this week.     Takes Tikosyn and Eliquis at 0800 and 2000, and wondering what time she needs to take medications in [place] to remain on the same schedule.    I advised pt that as [place] is three hours behind , 0800 here would be 0500 there. Advised pt, however, if 0500 is too early, she can take medications at 0600 and 1800 while she is in [place] and resume her 0800 and 2000 schedule when she returns home to [place].    Thank you.

## 2022-12-23 DIAGNOSIS — I48.19 PERSISTENT ATRIAL FIBRILLATION (CMS/HCC): ICD-10-CM

## 2022-12-23 RX ORDER — METOPROLOL SUCCINATE 50 MG/1
50 TABLET, EXTENDED RELEASE ORAL 2 TIMES DAILY
Qty: 180 TABLET | Refills: 3 | Status: SHIPPED | OUTPATIENT
Start: 2022-12-23 | End: 2023-11-05 | Stop reason: SDUPTHER

## 2022-12-23 NOTE — TELEPHONE ENCOUNTER
Medicine Refill Insert    Name of Patient: Angeles Cardoza    Caller's name/Relationship: Angeles Starks number: [phone]    Medication Name, Dosage, Supply:   Metoprolol Succinate XL 50 mg BID  90 day supply    Quantity left: 1 week    Pharmacy: [facility]    Medicine Refill Request    Last Office: Visit date not found   Last Consult Visit: Visit date not found  Last Telemedicine Visit: Visit date not found    Next Appointment: Visit date not found      Current Outpatient Medications:   •  ALLERGY RELIEF, FEXOFENADINE, 180 mg tablet, Take 180 mg by mouth every morning., Disp: , Rfl:   •  amLODIPine (NORVASC) 5 mg tablet, Take 1 tablet (5 mg total) by mouth daily., Disp: 90 tablet, Rfl: 3  •  atorvastatin (LIPITOR) 40 mg tablet, Take 40 mg by mouth daily.  , Disp: , Rfl:   •  cholecalciferol, vitamin D3, 1,000 unit (25 mcg) tablet, Take 2,000 Units by mouth daily. , Disp: , Rfl:   •  cyanocobalamin (VITAMIN B12) 100 mcg tablet, Take 100 mcg by mouth daily., Disp: , Rfl:   •  dofetilide (TIKOSYN) 250 mcg capsule, Take 1 capsule (250 mcg total) by mouth every 12 (twelve) hours., Disp: 180 capsule, Rfl: 3  •  ELIQUIS 5 mg tablet, Take 1 tablet (5 mg total) by mouth 2 (two) times a day., Disp: 60 tablet, Rfl: 0  •  hydrOXYzine (ATARAX) 25 mg tablet, Take 25-50 mg by mouth nightly., Disp: , Rfl:   •  magnesium oxide (MAG-OX) 400 mg (241.3 mg magnesium) tablet, Take 1 tablet (400 mg total) by mouth 2 (two) times a day as needed (serum mag less than 2 mEq/L)., Disp: 90 tablet, Rfl: 3  •  metoprolol succinate XL (TOPROL-XL) 50 mg 24 hr tablet, Take 1 tablet (50 mg total) by mouth 2 (two) times a day., Disp: 180 tablet, Rfl: 3  •  omeprazole (PriLOSEC) 40 mg capsule, Take 40 mg by mouth daily and an additional dose as needed.  , Disp: , Rfl: 3  •  venlafaxine XR (EFFEXOR-XR) 75 mg 24 hr capsule, Take 75 mg by mouth daily.  , Disp: , Rfl:       BP Readings from Last 3 Encounters:   11/11/22 (!) 142/80   11/08/22  124/70   08/04/22 140/73       Recent Lab results:  No results found for: CHOL, No results found for: HDL, No results found for: LDLCALC, No results found for: TRIG     Lab Results   Component Value Date    GLUCOSE 94 04/23/2022   , No results found for: HGBA1C      Lab Results   Component Value Date    CREATININE 1.0 04/23/2022       Lab Results   Component Value Date    TSH 2.05 01/21/2022

## 2023-01-06 ENCOUNTER — TELEPHONE (OUTPATIENT)
Dept: CARDIOLOGY | Facility: CLINIC | Age: 78
End: 2023-01-06
Payer: MEDICARE

## 2023-01-06 DIAGNOSIS — I10 ESSENTIAL HYPERTENSION: ICD-10-CM

## 2023-01-06 RX ORDER — APIXABAN 5 MG/1
TABLET, FILM COATED ORAL
Qty: 180 TABLET | Refills: 3 | Status: SHIPPED | OUTPATIENT
Start: 2023-01-06 | End: 2023-01-06

## 2023-01-06 RX ORDER — AMLODIPINE BESYLATE 5 MG/1
5 TABLET ORAL DAILY
Qty: 90 TABLET | Refills: 3 | Status: SHIPPED | OUTPATIENT
Start: 2023-01-06 | End: 2024-01-30

## 2023-01-06 NOTE — TELEPHONE ENCOUNTER
Eliquis 5 mg BID script renewed.  
Just BURKEI:  Eliquis 5 mg BID may need decreased   dose in September based on age and weight.  Thank you.  
New script sent.  Eliquis 2.5 mg BID.  
No

## 2023-01-06 NOTE — TELEPHONE ENCOUNTER
Eliquis 5 mg BID discontinued.  Eliquis 2.5 mg BID ordered.  Included Name Brand necessary on script.    VM message left.  Informed patient of change in dose.  Asked she call office to confirm received message and understands decreased to Eliquis 2.5 mg BID.

## 2023-02-08 ENCOUNTER — RX ONLY (OUTPATIENT)
Age: 78
Setting detail: RX ONLY
End: 2023-02-08

## 2023-02-08 RX ORDER — SALICYLIC ACID 3 G/100G
1 LOTION TOPICAL QAM
Qty: 30 | Refills: 3 | Status: ERX

## 2023-02-13 ENCOUNTER — TELEPHONE (OUTPATIENT)
Dept: CARDIOLOGY | Facility: CLINIC | Age: 78
End: 2023-02-13
Payer: MEDICARE

## 2023-02-13 NOTE — TELEPHONE ENCOUNTER
Reviewed. Patient with known h/o PAF on Eliquis. Metoprolol was increased at time of last OV in November. Longest episode is 21 minutes. Overall AF burden is stable. Continue to monitor.

## 2023-02-13 NOTE — TELEPHONE ENCOUNTER
"Medtronic acemaker Report   Monitoring period: 11/11/22-02/10/23    Battery/Lead Status:  13.2 years    Ap:  86.9%   :  0.1%    Device Defined Counters:    Atrial Fibrillation/Flutter: 208  EGMs illustrate AF/AFL with RVR, longest available 21 minutes 45 seconds in duration.    AF Nelson:  0.5%      Supraventricular Events: 3  EGMs illustrate SVT during \"active\" status, longest available 31 seconds in duration and SVT, unable to confirm in duration.     Ventricular Events:  8  EGMs illustrate AF/AFL with RVR, longest available 11 seconds in duration and nsSVT, longest available 15 seconds in duration. SVT, unable to confirm in duration.    Unable to confirm true duration of SVT events escalated to follow up tab on February 13th, 2023, 1:35 pm CST.  Assign to Physician after review.    Addendum:  Ms. Cardoza has a history of PAF and SSS.  Pt. has an appointment with Dr. Boateng on 3/8/23 and Dr. Pizano on 3/9/23.    Pt. medications include Eliquis and Tikosyn.              "

## 2023-03-06 NOTE — PROGRESS NOTES
Electrophysiology         Reason for visit: atrial fibrillation  Referred by :Dr Chano Boateng     History of Present Illness:  Ms. Angeles Cardoza is a 76 year old female with a past medical history significant for hyperlipidemia, hypertension, and a paroxymal atrial fibrillation with long conversion pauses s/p implantation of Medtronic dual chamber pacemaker on 4/12/2022 and on Tikosyn 250 mcg BID who is here for follow up.     Since last OV, she has done well. She continues to have episodes of PAF for which she is asymptomatic. She does complain of SOB at times with rest and also could be with activity. She continues to take Metoprolol 75 mg daily. She has been compliant with Tikosyn and Eliquis.     Brief EP history:  - pAF in Nov 2021 during hospitalization for acute diverticulits. Started on Eliquis 5 mg BID. Started on amiodarone   - Could not tolerate amiodarone due to NV. Transitioned to Flecainide 50 mg BID.   - MCOT in early 2022, 40% burden of AF with frequent long conversion pauses (some symptomatic).   - Flecainide was stopped.   - dcPPM implantation on 4/12/2022 and started on Tikosyn 250 mcg BID.    A comprehensive 15-point review of systems was performed and was negative unless specifically mentioned in the History of Present Illness.        Past Medical History:   Diagnosis Date   • Acid reflux    • Atrial fibrillation (CMS/HCC)    • Hypertension    • Irregular heart rhythm    • Lipid disorder    • Sick sinus syndrome (CMS/HCC)        Past Surgical History:   Procedure Laterality Date   • BREAST SURGERY  2003    breast reduction   • CARDIAC PACEMAKER PLACEMENT Left 04/12/2022    Medtronic   • RENAL ANGIOPLASTY     • TOE SURGERY Left    • TONSILLECTOMY     • WISDOM TOOTH EXTRACTION         Current Outpatient Medications on File Prior to Visit   Medication Sig Dispense Refill   • ALLERGY RELIEF, FEXOFENADINE, 180 mg tablet Take 180 mg by mouth every morning.     • amLODIPine (NORVASC) 5 mg  tablet Take 1 tablet (5 mg total) by mouth daily. 90 tablet 3   • apixaban (ELIQUIS) 2.5 mg tablet Take 1 tablet (2.5 mg total) by mouth 2 (two) times a day. 180 tablet 1   • ascorbic acid, vitamin C, 500 mg tablet,chewable Take by mouth daily.     • atorvastatin (LIPITOR) 40 mg tablet Take 40 mg by mouth daily.       • cholecalciferol, vitamin D3, 1,000 unit (25 mcg) tablet Take 2,000 Units by mouth daily.      • clobetasoL (TEMOVATE) 0.05 % external solution as needed. APPLY TO SCALP AS NEEDED FOR FLARES     • cyanocobalamin (VITAMIN B12) 100 mcg tablet Take 100 mcg by mouth daily.     • dofetilide (TIKOSYN) 250 mcg capsule Take 1 capsule (250 mcg total) by mouth every 12 (twelve) hours. 180 capsule 3   • fluocinolone acetonide oiL 0.01 % drops 2 (two) times a day. APPLY TWICE DAILY TO EARS FOR 2-4 WEEKS FOR ECZEMA FLARES     • FREESTYLE LITE STRIPS strip as needed.     • ketoconazole (NIZORAL) 2 % shampoo Shampoo two to three times weekly to scalp. Keep on for 5-10 minutes and then rinse out     • magnesium oxide (MAG-OX) 400 mg (241.3 mg magnesium) tablet Take 1 tablet (400 mg total) by mouth 2 (two) times a day as needed (serum mag less than 2 mEq/L). 90 tablet 3   • metoprolol succinate XL (TOPROL-XL) 50 mg 24 hr tablet Take 1 tablet (50 mg total) by mouth 2 (two) times a day. 180 tablet 3   • multivit-min/ferrous fumarate (MULTI VITAMIN ORAL) Take by mouth daily.     • omeprazole (PriLOSEC) 40 mg capsule Take 40 mg by mouth daily and an additional dose as needed.    3   • triamcinolone (KENALOG) 0.1 % cream Shampoo two to three times weekly to scalp. Keep on for 5-10 minutes and then rinse out     • venlafaxine XR (EFFEXOR-XR) 75 mg 24 hr capsule Take 75 mg by mouth daily.         No current facility-administered medications on file prior to visit.       Allergies, Social History and Family History were reviewed and updated in EMR.     Physical Examination:  Vitals:    03/09/23 1331   BP: 120/64   Pulse: 63    Resp: 16     Constitutional: The patient appeared physically well and in no acute distress.  Respiratory: Clear to auscultation, no wheezes or rubs.  Cardiovascular: A comprehensive cardiovascular examination was performed. Highlights include normal jugular venous pressure, 2+ carotids, no bruits. The precordium is quiet. Regular rhythm, rate, S1 and S2 normal, no rubs, or gallops were appreciated. Murmurs: No pathologic murmurs appreciated.  Musculoskeletal/ Extremities: No edema, normal peripheral pulses.  Skin: No rashes or lesions apparent.       Lab Results   Component Value Date    WBC 7.90 04/23/2022    HGB 13.2 04/23/2022    HCT 41.2 04/23/2022     (H) 04/23/2022    ALT 25 01/21/2022    AST 30 01/21/2022     04/23/2022    K 4.4 04/23/2022    CREATININE 1.0 04/23/2022    BUN 17 04/23/2022    TSH 2.05 01/21/2022    INR 0.9 11/13/2016       Cardiac Imaging    ECHOCARDIOGRAM - 5/2/2022  • Tricuspid valve structure is grossly normal. Mild tricuspid valve regurgitation. The regurgitation jet is central.  • Normal-sized LV. Normal LV systolic function. Estimated EF 65%. Normal LV septal wall motion. No regional wall motion abnormalities. Normal LV wall thickness.  • Normal-sized RV. Normal RV systolic function. Pacer wire present in the RV. Normal RV wall thickness.  • Mildly dilated LA. No patent foramen ovale present as seen in the LA. Intact LA septum present.  • Normal-sized RA. Pacemaker wire present in the RA.  • Aortic root sclerotic. Sinuses of Valsalva sclerotic. Ascending aorta sclerotic. Aortic arch grossly normal.  • Tricuspid aortic valve. Sclerotic aortic valve leaflets. Mild aortic valve regurgitation. Mild aortic valve stenosis.  • Sclerotic mitral valve.  • Mild to moderate mitral valve regurgitation. The jet is centrally directed.  • Pulmonic valve not well visualized.  • IVC is a small caliber vessel (<1.7cm). IVC collapses >50% during inspiration.  • Normal pericardial structure.  No evidence of pericardial effusion. No cardiac tamponade.     Dual Chamber Pacemaker Evaluation     Site Evaluation: The site of implantation in the upper left chest shows a well healed scar and is without ecchymosis, redness, drainage or hematoma. The skin is mobile over the device. The patient denies pain at the implantation site.     Device Information  Device :            Medtronic  Device Model:                        [id number]  Date of Implant:            April 12, 2022     Battery Data:     Estimated time to replacement:  13.1 years     Rhythm Data:  Underlying Rhythm:            Sinus bradycardia, HR in the 50s   High Rate Episodes:            0  Ventricular High Rate                        0  AF burden noted to be 0.5%. Longest episode noted to be 48 minutes       Lead Performance Data:     RA                   RV  Lead Impedance (ohms)            551                  456  Sensing (mV)                           2.8                    >20  Capture (V@ms)                        1.0 @ 0.4            0.75 @ 0.4  % Pacing                                    86.6 %                    0.1 %     Final Programmed Values:  Mode:                                                AAI <-> DDD (MVP/RhythmIQ.AAI-Safe Mode)  Paced Lower-Upper Rate:            60 - 120 bpm     ECG  Atrial paced, V sensed, QT is appropriate and 430 ms      Assessment and plan:  Ms. Angeles Carodza is a 77 year old female with a past medical history significant for hyperlipidemia, hypertension, and a paroxymal atrial fibrillation with long conversion pauses s/p implantation of Medtronic dual chamber pacemaker on 4/12/2022 who is here for follow up.      #Dual Chamber pacemaker   - Normal device evaluation and function.    - Lead function is appropriate, with capture and sensing thresholds are as expected.   - There were no significant arrhythmias noted (see Rhythm Data above), and no device therapies were delivered since the last  evaluation.  - The counters and diagnostics were reset.  - Follow up will be in 6 months, with remote follow up every 3 months until scheduled clinic visit.     #Atrial fibrillation:  - Continue Eliquis 2.5 mg BID for systemic anticoagulation.   - Continue Tikosyn 250 mcg BID. QTc is appropriate. Cr reportedly a month ago was 0.88.   - Increase Metoprolol to 50 mg BID given her HR tends to run in 120s-130 when she is in atrial fibrillation.     I spent 30 minutes with the patient for record review, examination, care coordination and counseling.     This letter was generated using speech recognition software. Please excuse any typographical errors.       Neel Pizano MD, [facility]  Cardiac Electrophysiology  [facility]  3/9/2023

## 2023-03-08 ENCOUNTER — OFFICE VISIT (OUTPATIENT)
Dept: CARDIOLOGY | Facility: CLINIC | Age: 78
End: 2023-03-08
Payer: MEDICARE

## 2023-03-08 VITALS
HEIGHT: 60 IN | TEMPERATURE: 98.6 F | RESPIRATION RATE: 16 BRPM | OXYGEN SATURATION: 99 % | SYSTOLIC BLOOD PRESSURE: 116 MMHG | HEART RATE: 62 BPM | WEIGHT: 114 LBS | DIASTOLIC BLOOD PRESSURE: 64 MMHG | BODY MASS INDEX: 22.38 KG/M2

## 2023-03-08 DIAGNOSIS — I10 ESSENTIAL HYPERTENSION: Primary | ICD-10-CM

## 2023-03-08 PROCEDURE — G8752 SYS BP LESS 140: HCPCS | Performed by: INTERNAL MEDICINE

## 2023-03-08 PROCEDURE — 93000 ELECTROCARDIOGRAM COMPLETE: CPT | Performed by: INTERNAL MEDICINE

## 2023-03-08 PROCEDURE — 99214 OFFICE O/P EST MOD 30 MIN: CPT | Performed by: INTERNAL MEDICINE

## 2023-03-08 PROCEDURE — G8754 DIAS BP LESS 90: HCPCS | Performed by: INTERNAL MEDICINE

## 2023-03-08 RX ORDER — ASCORBIC ACID 500 MG
TABLET,CHEWABLE ORAL DAILY
COMMUNITY

## 2023-03-08 RX ORDER — BLOOD-GLUCOSE METER
KIT MISCELLANEOUS AS NEEDED
COMMUNITY
Start: 2023-01-20

## 2023-03-08 RX ORDER — FLUOCINOLONE ACETONIDE 0.11 MG/ML
OIL AURICULAR (OTIC) 2 TIMES DAILY
COMMUNITY
Start: 2023-01-16

## 2023-03-08 RX ORDER — CLOBETASOL PROPIONATE 0.5 MG/ML
SOLUTION TOPICAL AS NEEDED
COMMUNITY
Start: 2023-01-16 | End: 2025-01-08 | Stop reason: ALTCHOICE

## 2023-03-08 RX ORDER — KETOCONAZOLE 20 MG/ML
SHAMPOO, SUSPENSION TOPICAL
COMMUNITY
Start: 2023-03-03 | End: 2024-11-07

## 2023-03-08 RX ORDER — TRIAMCINOLONE ACETONIDE 1 MG/G
CREAM TOPICAL
COMMUNITY
Start: 2023-02-03 | End: 2025-01-08 | Stop reason: ALTCHOICE

## 2023-03-08 ASSESSMENT — ENCOUNTER SYMPTOMS
RESPIRATORY NEGATIVE: 1
NERVOUS/ANXIOUS: 1
ENDOCRINE NEGATIVE: 1
GASTROINTESTINAL NEGATIVE: 1
DYSPNEA ON EXERTION: 1
HEMATOLOGIC/LYMPHATIC NEGATIVE: 1
CONSTITUTIONAL NEGATIVE: 1
EYES NEGATIVE: 1
NEUROLOGICAL NEGATIVE: 1

## 2023-03-08 NOTE — PROGRESS NOTES
Chief Complaint: I saw Angeles in the office today on a routine visit for her atrial fibrillation and tachybradycardia syndrome as well as her valvular heart disease.  No recent denies any form of chest discomfort but has intermittent short episodes of shortness of breath secondary occur at any time.  She does not have any chest pain or palpitations associated with it whatsoever.  Usually she denies any form of chest discomfort or shortness of breath with exertion, anxiety, cold weather, postprandially, at rest, or nocturnally.  Most the time she can climb a flight of stairs without getting short of breath she denies proximal nocturnal dyspnea orthopnea denies palpitations syncope has intermittent ankle edema and nocturia.       Medications:  Current Outpatient Medications   Medication Sig Dispense Refill   • ALLERGY RELIEF, FEXOFENADINE, 180 mg tablet Take 180 mg by mouth every morning.     • amLODIPine (NORVASC) 5 mg tablet Take 1 tablet (5 mg total) by mouth daily. 90 tablet 3   • apixaban (ELIQUIS) 2.5 mg tablet Take 1 tablet (2.5 mg total) by mouth 2 (two) times a day. 180 tablet 1   • ascorbic acid, vitamin C, 500 mg tablet,chewable Take by mouth daily.     • atorvastatin (LIPITOR) 40 mg tablet Take 40 mg by mouth daily.       • cholecalciferol, vitamin D3, 1,000 unit (25 mcg) tablet Take 2,000 Units by mouth daily.      • clobetasoL (TEMOVATE) 0.05 % external solution as needed. APPLY TO SCALP AS NEEDED FOR FLARES     • cyanocobalamin (VITAMIN B12) 100 mcg tablet Take 100 mcg by mouth daily.     • dofetilide (TIKOSYN) 250 mcg capsule Take 1 capsule (250 mcg total) by mouth every 12 (twelve) hours. 180 capsule 3   • fluocinolone acetonide oiL 0.01 % drops 2 (two) times a day. APPLY TWICE DAILY TO EARS FOR 2-4 WEEKS FOR ECZEMA FLARES     • FREESTYLE LITE STRIPS strip as needed.     • ketoconazole (NIZORAL) 2 % shampoo Shampoo two to three times weekly to scalp. Keep on for 5-10 minutes and then rinse out      • magnesium oxide (MAG-OX) 400 mg (241.3 mg magnesium) tablet Take 1 tablet (400 mg total) by mouth 2 (two) times a day as needed (serum mag less than 2 mEq/L). 90 tablet 3   • metoprolol succinate XL (TOPROL-XL) 50 mg 24 hr tablet Take 1 tablet (50 mg total) by mouth 2 (two) times a day. 180 tablet 3   • multivit-min/ferrous fumarate (MULTI VITAMIN ORAL) Take by mouth daily.     • omeprazole (PriLOSEC) 40 mg capsule Take 40 mg by mouth daily and an additional dose as needed.    3   • triamcinolone (KENALOG) 0.1 % cream Shampoo two to three times weekly to scalp. Keep on for 5-10 minutes and then rinse out     • venlafaxine XR (EFFEXOR-XR) 75 mg 24 hr capsule Take 75 mg by mouth daily.       • hydrOXYzine (ATARAX) 25 mg tablet Take 25-50 mg by mouth nightly.       No current facility-administered medications for this visit.       Review of Systems:  Review of Systems   Constitutional: Negative.   HENT: Negative.    Eyes: Negative.    Cardiovascular: Positive for dyspnea on exertion.   Respiratory: Negative.    Endocrine: Negative.    Hematologic/Lymphatic: Negative.    Skin: Positive for rash.   Musculoskeletal: Positive for joint pain.   Gastrointestinal: Negative.    Genitourinary: Positive for nocturia.   Neurological: Negative.    Psychiatric/Behavioral: The patient is nervous/anxious.    Allergic/Immunologic: Positive for environmental allergies.       Physical Exam:  Vitals:    03/08/23 1123   BP: 116/64   Pulse: 62   Resp: 16   Temp: 37 °C (98.6 °F)   SpO2: 99%     Physical Exam  Constitutional:       Appearance: She is well-developed.   HENT:      Head: Normocephalic and atraumatic.   Eyes:      Conjunctiva/sclera: Conjunctivae normal.      Pupils: Pupils are equal, round, and reactive to light.   Cardiovascular:      Rate and Rhythm: Normal rate and regular rhythm.      Pulses: Intact distal pulses.      Heart sounds:      Gallop present.   Pulmonary:      Effort: Pulmonary effort is normal.      Breath  sounds: Normal breath sounds.      Comments: Pacer in left chest  Abdominal:      General: Bowel sounds are normal.      Palpations: Abdomen is soft.   Musculoskeletal:         General: Normal range of motion.      Cervical back: Normal range of motion and neck supple.   Skin:     General: Skin is warm and dry.   Neurological:      Mental Status: She is alert and oriented to person, place, and time.      Deep Tendon Reflexes: Reflexes are normal and symmetric.   Psychiatric:         Speech: Speech normal.         Behavior: Behavior normal.         Thought Content: Thought content normal.         Judgment: Judgment normal.       EKG: DDD rhythm no change    Assessment and Plan:  Tachybradycardia syndrome status post DDD pacemaker.  Hypertension controlled.  Mild aortic regurgitation.  Mild mitral regurgitation.  Hyperlipidemia.  And kyphoscoliosis of thoracic spine.  Recommendation: She is going to see the EP people tomorrow I asked him to look at her telemetry unit in her pacemaker to make sure she is not having runs of atrial fibrillation or arrhythmia that is causing her shortness of breath.  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  Her cardiovascular status is stable she will be seen by Dr. Gwyn Boateng at our [place] office to follow her mild valvular heart disease and tachybradycardia syndrome.  Thank you for allowing us to see this very light nice woman in the office today.    Chano Boateng, DO

## 2023-03-08 NOTE — LETTER
March 8, 2023     José Antonio REAGAN DO  [address] [facility] [address]    Patient: Angeles Cardoza  YOB: 1945  Date of Visit: 3/8/2023      Dear Dr. Leroy:    Thank you for referring Angeles Cardoza to me for evaluation. Below are my notes for this consultation.    If you have questions, please do not hesitate to call me. I look forward to following your patient along with you.         Sincerely,        Chano Boateng DO        CC: No Recipients    Chano Boateng DO  3/8/2023 12:59 PM  Signed      Chief Complaint: I saw Angeles in the office today on a routine visit for her atrial fibrillation and tachybradycardia syndrome as well as her valvular heart disease.  No recent denies any form of chest discomfort but has intermittent short episodes of shortness of breath secondary occur at any time.  She does not have any chest pain or palpitations associated with it whatsoever.  Usually she denies any form of chest discomfort or shortness of breath with exertion, anxiety, cold weather, postprandially, at rest, or nocturnally.  Most the time she can climb a flight of stairs without getting short of breath she denies proximal nocturnal dyspnea orthopnea denies palpitations syncope has intermittent ankle edema and nocturia.       Medications:  Current Outpatient Medications   Medication Sig Dispense Refill   • ALLERGY RELIEF, FEXOFENADINE, 180 mg tablet Take 180 mg by mouth every morning.     • amLODIPine (NORVASC) 5 mg tablet Take 1 tablet (5 mg total) by mouth daily. 90 tablet 3   • apixaban (ELIQUIS) 2.5 mg tablet Take 1 tablet (2.5 mg total) by mouth 2 (two) times a day. 180 tablet 1   • ascorbic acid, vitamin C, 500 mg tablet,chewable Take by mouth daily.     • atorvastatin (LIPITOR) 40 mg tablet Take 40 mg by mouth daily.       • cholecalciferol, vitamin D3, 1,000 unit (25 mcg) tablet Take 2,000 Units by mouth daily.      • clobetasoL (TEMOVATE) 0.05 % external solution as needed. APPLY  TO SCALP AS NEEDED FOR FLARES     • cyanocobalamin (VITAMIN B12) 100 mcg tablet Take 100 mcg by mouth daily.     • dofetilide (TIKOSYN) 250 mcg capsule Take 1 capsule (250 mcg total) by mouth every 12 (twelve) hours. 180 capsule 3   • fluocinolone acetonide oiL 0.01 % drops 2 (two) times a day. APPLY TWICE DAILY TO EARS FOR 2-4 WEEKS FOR ECZEMA FLARES     • FREESTYLE LITE STRIPS strip as needed.     • ketoconazole (NIZORAL) 2 % shampoo Shampoo two to three times weekly to scalp. Keep on for 5-10 minutes and then rinse out     • magnesium oxide (MAG-OX) 400 mg (241.3 mg magnesium) tablet Take 1 tablet (400 mg total) by mouth 2 (two) times a day as needed (serum mag less than 2 mEq/L). 90 tablet 3   • metoprolol succinate XL (TOPROL-XL) 50 mg 24 hr tablet Take 1 tablet (50 mg total) by mouth 2 (two) times a day. 180 tablet 3   • multivit-min/ferrous fumarate (MULTI VITAMIN ORAL) Take by mouth daily.     • omeprazole (PriLOSEC) 40 mg capsule Take 40 mg by mouth daily and an additional dose as needed.    3   • triamcinolone (KENALOG) 0.1 % cream Shampoo two to three times weekly to scalp. Keep on for 5-10 minutes and then rinse out     • venlafaxine XR (EFFEXOR-XR) 75 mg 24 hr capsule Take 75 mg by mouth daily.       • hydrOXYzine (ATARAX) 25 mg tablet Take 25-50 mg by mouth nightly.       No current facility-administered medications for this visit.       Review of Systems:  Review of Systems   Constitutional: Negative.   HENT: Negative.    Eyes: Negative.    Cardiovascular: Positive for dyspnea on exertion.   Respiratory: Negative.    Endocrine: Negative.    Hematologic/Lymphatic: Negative.    Skin: Positive for rash.   Musculoskeletal: Positive for joint pain.   Gastrointestinal: Negative.    Genitourinary: Positive for nocturia.   Neurological: Negative.    Psychiatric/Behavioral: The patient is nervous/anxious.    Allergic/Immunologic: Positive for environmental allergies.       Physical Exam:  Vitals:    03/08/23  1123   BP: 116/64   Pulse: 62   Resp: 16   Temp: 37 °C (98.6 °F)   SpO2: 99%     Physical Exam  Constitutional:       Appearance: She is well-developed.   HENT:      Head: Normocephalic and atraumatic.   Eyes:      Conjunctiva/sclera: Conjunctivae normal.      Pupils: Pupils are equal, round, and reactive to light.   Cardiovascular:      Rate and Rhythm: Normal rate and regular rhythm.      Pulses: Intact distal pulses.      Heart sounds:      Gallop present.   Pulmonary:      Effort: Pulmonary effort is normal.      Breath sounds: Normal breath sounds.      Comments: Pacer in left chest  Abdominal:      General: Bowel sounds are normal.      Palpations: Abdomen is soft.   Musculoskeletal:         General: Normal range of motion.      Cervical back: Normal range of motion and neck supple.   Skin:     General: Skin is warm and dry.   Neurological:      Mental Status: She is alert and oriented to person, place, and time.      Deep Tendon Reflexes: Reflexes are normal and symmetric.   Psychiatric:         Speech: Speech normal.         Behavior: Behavior normal.         Thought Content: Thought content normal.         Judgment: Judgment normal.       EKG: DDD rhythm no change    Assessment and Plan:  Tachybradycardia syndrome status post DDD pacemaker.  Hypertension controlled.  Mild aortic regurgitation.  Mild mitral regurgitation.  Hyperlipidemia.  And kyphoscoliosis of thoracic spine.  Recommendation: She is going to see the EP people tomorrow I asked him to look at her telemetry unit in her pacemaker to make sure she is not having runs of atrial fibrillation or arrhythmia that is causing her shortness of breath.  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  Her cardiovascular status is stable she will  be seen by Dr. Gwyn Boateng at our [place] office to follow her mild valvular heart disease and tachybradycardia syndrome.  Thank you for allowing us to see this very light nice woman in the office today.    Chano Boateng, DO

## 2023-03-09 ENCOUNTER — OFFICE VISIT (OUTPATIENT)
Dept: CARDIOLOGY | Facility: CLINIC | Age: 78
End: 2023-03-09
Payer: MEDICARE

## 2023-03-09 VITALS
HEIGHT: 60 IN | DIASTOLIC BLOOD PRESSURE: 64 MMHG | HEART RATE: 63 BPM | WEIGHT: 118 LBS | BODY MASS INDEX: 23.16 KG/M2 | SYSTOLIC BLOOD PRESSURE: 120 MMHG | RESPIRATION RATE: 16 BRPM

## 2023-03-09 DIAGNOSIS — I48.19 PERSISTENT ATRIAL FIBRILLATION (CMS/HCC): Primary | ICD-10-CM

## 2023-03-09 PROCEDURE — G8754 DIAS BP LESS 90: HCPCS | Performed by: INTERNAL MEDICINE

## 2023-03-09 PROCEDURE — 93000 ELECTROCARDIOGRAM COMPLETE: CPT | Performed by: INTERNAL MEDICINE

## 2023-03-09 PROCEDURE — 99214 OFFICE O/P EST MOD 30 MIN: CPT | Performed by: INTERNAL MEDICINE

## 2023-03-09 PROCEDURE — G8752 SYS BP LESS 140: HCPCS | Performed by: INTERNAL MEDICINE

## 2023-03-09 NOTE — LETTER
March 9, 2023     José Antonio REAGAN DO  [address]  [facility] [address]    Patient: Angeles Cardoza  YOB: 1945  Date of Visit: 3/9/2023      Dear Dr. Leroy:    Thank you for referring Angeles Cardoza to me for evaluation. Below are my notes for this consultation.    If you have questions, please do not hesitate to call me. I look forward to following your patient along with you.         Sincerely,        Neel Pizano MD        CC: DO Harinder Morris Ali R, MD  3/9/2023  2:54 PM  Signed       Electrophysiology         Reason for visit: atrial fibrillation  Referred by :Dr Chano Boateng     History of Present Illness:  Ms. Angeles Cardoza is a 76 year old female with a past medical history significant for hyperlipidemia, hypertension, and a paroxymal atrial fibrillation with long conversion pauses s/p implantation of Medtronic dual chamber pacemaker on 4/12/2022 and on Tikosyn 250 mcg BID who is here for follow up.     Since last OV, she has done well. She continues to have episodes of PAF for which she is asymptomatic. She does complain of SOB at times with rest and also could be with activity. She continues to take Metoprolol 75 mg daily. She has been compliant with Tikosyn and Eliquis.     Brief EP history:  - pAF in Nov 2021 during hospitalization for acute diverticulits. Started on Eliquis 5 mg BID. Started on amiodarone   - Could not tolerate amiodarone due to NV. Transitioned to Flecainide 50 mg BID.   - MCOT in early 2022, 40% burden of AF with frequent long conversion pauses (some symptomatic).   - Flecainide was stopped.   - dcPPM implantation on 4/12/2022 and started on Tikosyn 250 mcg BID.    A comprehensive 15-point review of systems was performed and was negative unless specifically mentioned in the History of Present Illness.        Past Medical History:   Diagnosis Date   • Acid reflux    • Atrial fibrillation (CMS/HCC)    • Hypertension    • Irregular heart  rhythm    • Lipid disorder    • Sick sinus syndrome (CMS/HCC)        Past Surgical History:   Procedure Laterality Date   • BREAST SURGERY  2003    breast reduction   • CARDIAC PACEMAKER PLACEMENT Left 04/12/2022    Medtronic   • RENAL ANGIOPLASTY     • TOE SURGERY Left    • TONSILLECTOMY     • WISDOM TOOTH EXTRACTION         Current Outpatient Medications on File Prior to Visit   Medication Sig Dispense Refill   • ALLERGY RELIEF, FEXOFENADINE, 180 mg tablet Take 180 mg by mouth every morning.     • amLODIPine (NORVASC) 5 mg tablet Take 1 tablet (5 mg total) by mouth daily. 90 tablet 3   • apixaban (ELIQUIS) 2.5 mg tablet Take 1 tablet (2.5 mg total) by mouth 2 (two) times a day. 180 tablet 1   • ascorbic acid, vitamin C, 500 mg tablet,chewable Take by mouth daily.     • atorvastatin (LIPITOR) 40 mg tablet Take 40 mg by mouth daily.       • cholecalciferol, vitamin D3, 1,000 unit (25 mcg) tablet Take 2,000 Units by mouth daily.      • clobetasoL (TEMOVATE) 0.05 % external solution as needed. APPLY TO SCALP AS NEEDED FOR FLARES     • cyanocobalamin (VITAMIN B12) 100 mcg tablet Take 100 mcg by mouth daily.     • dofetilide (TIKOSYN) 250 mcg capsule Take 1 capsule (250 mcg total) by mouth every 12 (twelve) hours. 180 capsule 3   • fluocinolone acetonide oiL 0.01 % drops 2 (two) times a day. APPLY TWICE DAILY TO EARS FOR 2-4 WEEKS FOR ECZEMA FLARES     • FREESTYLE LITE STRIPS strip as needed.     • ketoconazole (NIZORAL) 2 % shampoo Shampoo two to three times weekly to scalp. Keep on for 5-10 minutes and then rinse out     • magnesium oxide (MAG-OX) 400 mg (241.3 mg magnesium) tablet Take 1 tablet (400 mg total) by mouth 2 (two) times a day as needed (serum mag less than 2 mEq/L). 90 tablet 3   • metoprolol succinate XL (TOPROL-XL) 50 mg 24 hr tablet Take 1 tablet (50 mg total) by mouth 2 (two) times a day. 180 tablet 3   • multivit-min/ferrous fumarate (MULTI VITAMIN ORAL) Take by mouth daily.     • omeprazole  (PriLOSEC) 40 mg capsule Take 40 mg by mouth daily and an additional dose as needed.    3   • triamcinolone (KENALOG) 0.1 % cream Shampoo two to three times weekly to scalp. Keep on for 5-10 minutes and then rinse out     • venlafaxine XR (EFFEXOR-XR) 75 mg 24 hr capsule Take 75 mg by mouth daily.         No current facility-administered medications on file prior to visit.       Allergies, Social History and Family History were reviewed and updated in EMR.     Physical Examination:  Vitals:    03/09/23 1331   BP: 120/64   Pulse: 63   Resp: 16     Constitutional: The patient appeared physically well and in no acute distress.  Respiratory: Clear to auscultation, no wheezes or rubs.  Cardiovascular: A comprehensive cardiovascular examination was performed. Highlights include normal jugular venous pressure, 2+ carotids, no bruits. The precordium is quiet. Regular rhythm, rate, S1 and S2 normal, no rubs, or gallops were appreciated. Murmurs: No pathologic murmurs appreciated.  Musculoskeletal/ Extremities: No edema, normal peripheral pulses.  Skin: No rashes or lesions apparent.       Lab Results   Component Value Date    WBC 7.90 04/23/2022    HGB 13.2 04/23/2022    HCT 41.2 04/23/2022     (H) 04/23/2022    ALT 25 01/21/2022    AST 30 01/21/2022     04/23/2022    K 4.4 04/23/2022    CREATININE 1.0 04/23/2022    BUN 17 04/23/2022    TSH 2.05 01/21/2022    INR 0.9 11/13/2016       Cardiac Imaging    ECHOCARDIOGRAM - 5/2/2022  • Tricuspid valve structure is grossly normal. Mild tricuspid valve regurgitation. The regurgitation jet is central.  • Normal-sized LV. Normal LV systolic function. Estimated EF 65%. Normal LV septal wall motion. No regional wall motion abnormalities. Normal LV wall thickness.  • Normal-sized RV. Normal RV systolic function. Pacer wire present in the RV. Normal RV wall thickness.  • Mildly dilated LA. No patent foramen ovale present as seen in the LA. Intact LA septum  present.  • Normal-sized RA. Pacemaker wire present in the RA.  • Aortic root sclerotic. Sinuses of Valsalva sclerotic. Ascending aorta sclerotic. Aortic arch grossly normal.  • Tricuspid aortic valve. Sclerotic aortic valve leaflets. Mild aortic valve regurgitation. Mild aortic valve stenosis.  • Sclerotic mitral valve.  • Mild to moderate mitral valve regurgitation. The jet is centrally directed.  • Pulmonic valve not well visualized.  • IVC is a small caliber vessel (<1.7cm). IVC collapses >50% during inspiration.  • Normal pericardial structure. No evidence of pericardial effusion. No cardiac tamponade.     Dual Chamber Pacemaker Evaluation     Site Evaluation: The site of implantation in the upper left chest shows a well healed scar and is without ecchymosis, redness, drainage or hematoma. The skin is mobile over the device. The patient denies pain at the implantation site.     Device Information  Device :            [employer]  Device Model:                        [id number]  Date of Implant:            April 12, 2022     Battery Data:     Estimated time to replacement:  13.1 years     Rhythm Data:  Underlying Rhythm:            Sinus bradycardia, HR in the 50s   High Rate Episodes:            0  Ventricular High Rate                        0  AF burden noted to be 0.5%. Longest episode noted to be 48 minutes       Lead Performance Data:     RA                   RV  Lead Impedance (ohms)            551                  456  Sensing (mV)                           2.8                    >20  Capture (V@ms)                        1.0 @ 0.4            0.75 @ 0.4  % Pacing                                    86.6 %                    0.1 %     Final Programmed Values:  Mode:                                                AAI <-> DDD (MVP/RhythmIQ.AAI-Safe Mode)  Paced Lower-Upper Rate:            60 - 120 bpm     ECG  Atrial paced, V sensed, QT is appropriate and 430 ms      Assessment and plan:  MsDuglas Chiuen  JORGE Cardoza is a 77 year old female with a past medical history significant for hyperlipidemia, hypertension, and a paroxymal atrial fibrillation with long conversion pauses s/p implantation of Medtronic dual chamber pacemaker on 4/12/2022 who is here for follow up.      #Dual Chamber pacemaker   - Normal device evaluation and function.    - Lead function is appropriate, with capture and sensing thresholds are as expected.   - There were no significant arrhythmias noted (see Rhythm Data above), and no device therapies were delivered since the last evaluation.  - The counters and diagnostics were reset.  - Follow up will be in 6 months, with remote follow up every 3 months until scheduled clinic visit.     #Atrial fibrillation:  - Continue Eliquis 2.5 mg BID for systemic anticoagulation.   - Continue Tikosyn 250 mcg BID. QTc is appropriate. Cr reportedly a month ago was 0.88.   - Increase Metoprolol to 50 mg BID given her HR tends to run in 120s-130 when she is in atrial fibrillation.     I spent 30 minutes with the patient for record review, examination, care coordination and counseling.     This letter was generated using speech recognition software. Please excuse any typographical errors.       Neel Pizano MD, [facility]  Cardiac Electrophysiology  [facility]  3/9/2023

## 2023-05-15 ENCOUNTER — TELEPHONE (OUTPATIENT)
Dept: CARDIOLOGY | Facility: CLINIC | Age: 78
End: 2023-05-15
Payer: MEDICARE

## 2023-05-15 NOTE — TELEPHONE ENCOUNTER
[place] Alert     [place] Patient    Medtronic     Pacemaker Report  Monitoring period: 3/9/23-5/11/23    Battery/Lead Status: 12.9 years    Ap: 92.6%  : 0.2%    Device Defined Counters:  Atrial Fibrillation/Flutter: 95  EGMs illustrate AF/AFL and AF/AFL with RVR and intermittent undersensing, longest on trending approximately 60 min in duration.  AF burden 0.3%      AF episodes of any duration escalated to follow up tab on May 14th, 2023, 9:27 am CST.     Addendum:     Patient history of PAF and SSS.    Patient anticoagulated with Eliquis. Other medications include amlodipine and metoprolol succinate XL.    Next office visit with Monie Evans CRNP 9/21/2023.    Arrhythmia Summary:

## 2023-05-15 NOTE — TELEPHONE ENCOUNTER
Metoprolol was increased to 50mg BID at last OV on 3/9.   AF burden remains low at 0.3%. Some episodes are better rate controlled then others.   Overall rates look better on the increased does of BB. She has a f/u on September. Continue to monitor. Or advise of any other changes.

## 2023-06-19 RX ORDER — APIXABAN 2.5 MG/1
2.5 TABLET, FILM COATED ORAL 2 TIMES DAILY
Qty: 180 TABLET | Refills: 1 | Status: SHIPPED | OUTPATIENT
Start: 2023-06-19 | End: 2023-10-16 | Stop reason: SDUPTHER

## 2023-07-10 ENCOUNTER — TELEPHONE (OUTPATIENT)
Dept: SCHEDULING | Facility: CLINIC | Age: 78
End: 2023-07-10
Payer: MEDICARE

## 2023-07-10 NOTE — TELEPHONE ENCOUNTER
Patient called wants to R/S 09/22/23 OV 6mo F/U no appt available till Oct patient  stated she is available Terrence Ko Thur    Patient can be reached at:[phone]

## 2023-08-10 ENCOUNTER — TELEPHONE (OUTPATIENT)
Dept: CARDIOLOGY | Facility: CLINIC | Age: 78
End: 2023-08-10
Payer: MEDICARE

## 2023-08-10 NOTE — TELEPHONE ENCOUNTER
Pt with history of PAF. She is AC on Eliquis and on metoprolol for rate control and Dofetilide for rhythm control. Metoprolol was increased to 50mg BID in May. She continues with brief episodes of AF with RVR. Do you think we need to increase dose? Routing to you as Dr. Pizano is out and you see pt next month. Thanks

## 2023-08-10 NOTE — TELEPHONE ENCOUNTER
I would first call the patient and see if she is symptomatic or aware of any of these episodes because it appears they are happening quite frequently but do not last that long.  If she is asymptomatic I would do nothing.  If she has symptoms we have a few options.  She has a normal ejection fraction and normal kidney function we could potentially decrease or stop beta-blocker and start low-dose diltiazem that would likely increase the effectiveness of the Tikosyn, we also can consider stopping Tikosyn and starting her on amiodarone if she has not had this medication in the past.

## 2023-08-10 NOTE — TELEPHONE ENCOUNTER
Medtronic Pacemaker Report  Monitoring period:5/11/23-8/9/23    Dr. Pizano    Battery/Lead Status: 12.6 years    Ap: 92.6%  : 0.1%    Device Defined Counters:  Atrial Fibrillation/Flutter:125  EGMs illustrate AF/AFL, AF with RVR,longest on trending 28mins in duration.  AF Golden:0.2%    Supraventricular Events:3  EGMs illustrate AF with RVR, longest available 47secs in duration.    Ventricular Events:6  EGMs illustrate AF with RVR, 3 beat run of nsVT for 1.2secs, longest available 3secs in duration.  EGM illustrate nsSVT longest available 7.3secs in duration    AF any duration escalated to follow up tab on August 10th, 2023, escalated to follow up tab on August 10th, 2023.     Addendum:  Ms. Cardoza has a history of PAF, SSS, and Essential Hypertension.     Pt. medications include Eliquis and Metoprolol.  Pt. has an OV  with Fredi YEAGER on 9/18/23.

## 2023-08-11 NOTE — TELEPHONE ENCOUNTER
Ned Felton ,    I spoke with Ms. Angeles and explained how to use the phone bere.   Pt. would like a call-back regarding the conversion earlier today.

## 2023-08-11 NOTE — TELEPHONE ENCOUNTER
Device Team- Are you able to call pt and let her know how to send a download on her own. Not urgent. Thanks

## 2023-08-11 NOTE — TELEPHONE ENCOUNTER
I called and spoke to pt. She tells me she has SOB but this is not new for her.This happens with activity and at rest. She is not terribly symptomatic. I did let her know of your suggestions of stopping BB and starting Dilt. I suggested she see you next month and see how she is dong at that time. We can possibly switch to Diltiazem at that time if she wishes.

## 2023-08-11 NOTE — TELEPHONE ENCOUNTER
Received call from patient asking which medications she and FELIPE Alegria discussed earlier today.     Discussed Cardizem vs. Metoprolol and indications for each. Informed patient this will be discussed further at OV 9/18 with SHOBHA Rai, unless she becomes symptomatic prior to then in which case she should notify us. Patient verbalized understanding and had no further questions regarding medications. Denied any symptoms at time of call.     Patient is asking if she is able to submit reports from pacemaker herself and how to do so.     Patient can be reached at [phone].

## 2023-09-07 NOTE — PROGRESS NOTES
Cardiology  Office Progress Note       Reason for visit:   Chief Complaint   Patient presents with    Cardiac Evaluation     HPI     Angeles Cardoza is a 78 y.o. female who presents to the office for cardiovascular follow up and management of HTN, Afib, SSS s/p PPM 4/20/22, nonrheumatic aortic valve insufficiency, HLD and non rheumatic mitral regurgitation.     She was previously followed by Dr. Chano Boateng. Her most recent visit occurred on 3/8/23. At that visit, she reported intermittent episodes of shortness of breath. She also experienced intermittent LE edema and nocturia. She denied any additional cardiac complaints.     She was recommended to follow up with EP in order to ensure that she was not having any runs of Afib or arrhythmias that were causing her respiratory symptoms. She was also encouraged to request PFT's and possibly seeing a pulmonologist if her studies were abnormal.     She saw Dr. Pizano on 3/9. Her device was functioning properly. No significant arrhythmias noted and no device therapies ad been delivered. Her Metoprolol was increased to 50 mg BID given her HR trends in the 120-130's while she was in Afib. No changes were made and she was asked to follow up in 6 months.     She followed up with Dr. Doss on 4/20. She was feeling well and exercising regularly without cardiac complaints. Her blood pressures were well controlled. Her creatinine had returned to baseline with an eGFR of 76. No changes were made.     [place] alert 5/15/23 -     95 EGMs illustrate AF/AFL and AF/AFL with RVR and intermittent undersensing, longest on trending approximately 60 min in duration.AF burden 0.3%. Current regimen continued per Dr. Pizano.     [place] alert 8/10/23 -     125 EGMs illustrate AF/AFL, AF with RVR,longest on trending 28mins in duration. AF [place]:0.2%.    Supraventricular Events:3 EGMs illustrate AF with RVR, longest available 47secs in duration.     Ventricular Events:6 EGMs  illustrate AF with RVR, 3 beat run of nsVT for 1.2secs, longest available 3secs in duration. EGM illustrate nsSVT longest available 7.3secs in duration. She reported that she was asymptomatic during those episode. Recommended to continue current regimen and call Dr. Pizano if she became symptomatic.     She followed up with Dr. Pizano's office on 9/18. She complained of SOB at times with rest and activity. She denied any additional cardiac complaints. Pacemaker functioning properly. No changes were made.     Today she reports feeling about the same. Her SOB/DICKSON has remained unchanged from previous reports. She is open to seeing a pulmonologist if cardiac etiology can be ruled out. She is active on a daily basis, performing water aerobics without cardiac complaints. She notes DICKSON with stairs and shortness of breath on occasion when lying or sitting down at rest. She has occasional palpitations that she notices in her right jugular area. She denies any chest pain, lightheadedness, dizziness or syncopal events.     Past Medical History:   Diagnosis Date    Acid reflux     Atrial fibrillation (CMS/HCC)     Hypertension     Irregular heart rhythm     Lipid disorder     Sick sinus syndrome (CMS/HCC)      Past Surgical History:   Procedure Laterality Date    BREAST SURGERY  2003    breast reduction    CARDIAC PACEMAKER PLACEMENT Left 04/12/2022    Medtronic    RENAL ANGIOPLASTY      TOE SURGERY Left     TONSILLECTOMY      WISDOM TOOTH EXTRACTION       Social History     Tobacco Use    Smoking status: Never    Smokeless tobacco: Never   Vaping Use    Vaping Use: Never used   Substance Use Topics    Alcohol use: Yes     Comment: occasional     Family History   Problem Relation Age of Onset    Stroke Biological Mother     Heart failure Biological Father      Allergies:  Penicillins, Penicillamine, and Ditropan [oxybutynin chloride]    Current Outpatient Medications   Medication Sig Dispense Refill     ALLERGY RELIEF, FEXOFENADINE, 180 mg tablet Take 180 mg by mouth every morning.      amLODIPine (NORVASC) 5 mg tablet Take 1 tablet (5 mg total) by mouth daily. 90 tablet 3    atorvastatin (LIPITOR) 40 mg tablet Take 40 mg by mouth daily.        clobetasoL (TEMOVATE) 0.05 % external solution as needed. APPLY TO SCALP AS NEEDED FOR FLARES      dofetilide (TIKOSYN) 250 mcg capsule Take 1 capsule (250 mcg total) by mouth 2 (two) times a day. 180 capsule 1    ELIQUIS 2.5 mg tablet TAKE 1 TABLET TWICE A  tablet 1    fluocinolone acetonide oiL 0.01 % drops 2 (two) times a day. APPLY TWICE DAILY TO EARS FOR 2-4 WEEKS FOR ECZEMA FLARES      FREESTYLE LITE STRIPS strip as needed.      ketoconazole (NIZORAL) 2 % shampoo Shampoo two to three times weekly to scalp. Keep on for 5-10 minutes and then rinse out      magnesium oxide (MAG-OX) 400 mg (241.3 mg magnesium) tablet Take 1 tablet (400 mg total) by mouth 2 (two) times a day as needed (serum mag less than 2 mEq/L). 90 tablet 3    metoprolol succinate XL (TOPROL-XL) 50 mg 24 hr tablet Take 1 tablet (50 mg total) by mouth 2 (two) times a day. 180 tablet 3    multivit-min/ferrous fumarate (MULTI VITAMIN ORAL) Take by mouth daily.      omeprazole (PriLOSEC) 40 mg capsule Take 40 mg by mouth daily and an additional dose as needed.    3    venlafaxine XR (EFFEXOR-XR) 75 mg 24 hr capsule Take 75 mg by mouth daily.        ascorbic acid, vitamin C, 500 mg tablet,chewable Take by mouth daily.      cholecalciferol, vitamin D3, 1,000 unit (25 mcg) tablet Take 2,000 Units by mouth daily.       cyanocobalamin (VITAMIN B12) 100 mcg tablet Take 100 mcg by mouth daily.      triamcinolone (KENALOG) 0.1 % cream Shampoo two to three times weekly to scalp. Keep on for 5-10 minutes and then rinse out       No current facility-administered medications for this visit.     Review of Systems   Constitutional: Negative. Negative for decreased appetite, malaise/fatigue, weight  gain and weight loss.   HENT: Negative.    Eyes: Negative.    Cardiovascular: Positive for dyspnea on exertion. Negative for chest pain, leg swelling (Occasional after being on feet for several hours) and palpitations.   Respiratory: Positive for cough and shortness of breath. Negative for snoring.    Endocrine: Negative.    Hematologic/Lymphatic: Negative.  Does not bruise/bleed easily.   Skin: Negative for rash.   Musculoskeletal: Positive for joint pain. Negative for arthritis.   Gastrointestinal: Negative.  Negative for heartburn and melena.   Genitourinary: Positive for nocturia. Negative for hematuria.   Neurological: Negative.  Negative for dizziness and light-headedness.   Psychiatric/Behavioral: The patient does not have insomnia.    Allergic/Immunologic: Positive for environmental allergies.     Objective     Vitals:    09/19/23 1104   BP: 120/70   Pulse: 70   SpO2: 99%     Wt Readings from Last 5 Encounters:   09/19/23 53.1 kg (117 lb)   09/18/23 53.5 kg (118 lb)   03/09/23 53.5 kg (118 lb)   03/08/23 51.7 kg (114 lb)   11/11/22 53.5 kg (118 lb)     Body mass index is 22.85 kg/m².    Physical Exam  Constitutional:       Appearance: She is well-developed.   HENT:      Head: Normocephalic and atraumatic.   Eyes:      Conjunctiva/sclera: Conjunctivae normal.      Pupils: Pupils are equal, round, and reactive to light.   Cardiovascular:      Rate and Rhythm: Normal rate and regular rhythm.      Pulses: Intact distal pulses.           Radial pulses are 2+ on the right side and 2+ on the left side.      Heart sounds:      Gallop present.   Pulmonary:      Effort: Pulmonary effort is normal.      Breath sounds: Normal breath sounds.      Comments: Pacer in left chest  Abdominal:      General: Bowel sounds are normal.      Palpations: Abdomen is soft.   Musculoskeletal:         General: Normal range of motion.      Cervical back: Normal range of motion and neck supple.      Right lower leg: No edema.      Left  "lower leg: No edema.   Skin:     General: Skin is warm and dry.   Neurological:      Mental Status: She is alert and oriented to person, place, and time.      Deep Tendon Reflexes: Reflexes are normal and symmetric.   Psychiatric:         Speech: Speech normal.         Behavior: Behavior normal.         Thought Content: Thought content normal.         Judgment: Judgment normal.       ECG   sinus rhythm, I have independently reviewed the patient's ECG results.  Significant abnormals are :.   Atrial-paced rhythm with Premature atrial complexes 70bpm  Abnormal ECG   When compared with ECG of 22-APR-2022 22:15,   Premature atrial complexes are now Present   Confirmed by Gwyn Boateng ([id number]) on 9/19/2023 11:11:40 AM    Hematology  Lab Results   Component Value Date    WBC 7.90 04/23/2022    HGB 13.2 04/23/2022    HCT 41.2 04/23/2022     (H) 04/23/2022    INR 0.9 11/13/2016       Chemistries  Lab Results   Component Value Date     04/23/2022    K 4.4 04/23/2022     04/23/2022    CREATININE 1.0 04/23/2022    BUN 17 04/23/2022    CO2 24 04/23/2022    GLUCOSE 94 04/23/2022    CALCIUM 9.2 04/23/2022    MG 2.1 04/23/2022    ALT 25 01/21/2022    AST 30 01/21/2022     Cholesterol  No results found for: \"CHOL\", \"TRIG\", \"HDL\", \"LDLCALC\", \"NONHDLCALC\"    Endocrine  Lab Results   Component Value Date    TSH 2.05 01/21/2022     Cardiac Imaging    ECHOCARDIOGRAM - EXTERNAL RESULT     Narrative  Ordered by an unspecified provider.    Assessment/Plan     Essential hypertension  - target BP < 130/80  .  - Currently at goal  - Continue Amlodipine 5mg q day  - Continue Toprol XL 50mg BID.     Pacemaker  - Status post dual-chamber Medtronic PPM implant on 4/12/2022 for SSS/Tachybrady syndrome  - Continue f/u and management with Dr. Pizano    Persistent atrial fibrillation (CMS/HCC)  - CHADS VASC 4  - Persistent  .  - PAF episodes typically last seconds to minutes for which she thinks she is asymptomatic.  - Continue " Eliquis 2.5mg BID  - Continue Tikosyn 250mcg BID  - Continue Toprol XL BID  - The patient denies any evidence or signs/sx suggestive of bleeding.   - Continue f/u and management with Dr. Pizano    Valvular heart disease  - Echo 5/2022: EF 65% Mild-moderate MR. Mild AI. Mild TR.   .  - Continue same meds  - echo with next visit.     SOB (shortness of breath)  - This is happening intermittently  - It doesn't occur with activity usually, as she is fine at water aerobic, but will notice it going up the stairs or just sitting around doing nothing.   - it doesn't appear to be from a primary cardiac etiology. No evidence to suggest this is correlating with arrhythmias at this time.   - Possibly related to kyphosis, but somehow I think that is less likely.   - Consider evaluation with pulmonary, given names of Dr. Swanson's group.    Mixed hyperlipidemia  - Target LDL < 100  .  - Continue Atorvastatin 40mg q pm     Follow Up Plans:  Return in about 6 months (around 3/19/2024).          I, Dre Morales, am scribing for, and in the presence of, Gwyn Boateng DO.    I, Gwyn Boateng DO, personally performed the services described in this documentation as scribed by Dre Morales in my presence, and it is both accurate and complete.       Gwyn Boateng DO  9/19/2023

## 2023-09-18 ENCOUNTER — OFFICE VISIT (OUTPATIENT)
Dept: CARDIOLOGY | Facility: CLINIC | Age: 78
End: 2023-09-18
Payer: MEDICARE

## 2023-09-18 VITALS
DIASTOLIC BLOOD PRESSURE: 72 MMHG | WEIGHT: 118 LBS | HEIGHT: 60 IN | BODY MASS INDEX: 23.16 KG/M2 | HEART RATE: 73 BPM | SYSTOLIC BLOOD PRESSURE: 118 MMHG

## 2023-09-18 DIAGNOSIS — I49.5 SICK SINUS SYNDROME (CMS/HCC): ICD-10-CM

## 2023-09-18 DIAGNOSIS — Z95.0 PACEMAKER: ICD-10-CM

## 2023-09-18 DIAGNOSIS — R06.02 SOB (SHORTNESS OF BREATH): ICD-10-CM

## 2023-09-18 DIAGNOSIS — I48.19 PERSISTENT ATRIAL FIBRILLATION (CMS/HCC): Primary | ICD-10-CM

## 2023-09-18 DIAGNOSIS — I10 ESSENTIAL HYPERTENSION: ICD-10-CM

## 2023-09-18 PROCEDURE — G8754 DIAS BP LESS 90: HCPCS | Performed by: NURSE PRACTITIONER

## 2023-09-18 PROCEDURE — G8752 SYS BP LESS 140: HCPCS | Performed by: NURSE PRACTITIONER

## 2023-09-18 PROCEDURE — 99213 OFFICE O/P EST LOW 20 MIN: CPT | Performed by: NURSE PRACTITIONER

## 2023-09-18 PROCEDURE — 93000 ELECTROCARDIOGRAM COMPLETE: CPT | Performed by: NURSE PRACTITIONER

## 2023-09-18 ASSESSMENT — ENCOUNTER SYMPTOMS
RESPIRATORY NEGATIVE: 1
COUGH: 0
PSYCHIATRIC NEGATIVE: 1
MUSCULOSKELETAL NEGATIVE: 1
EYES NEGATIVE: 1
ENDOCRINE NEGATIVE: 1
GASTROINTESTINAL NEGATIVE: 1
CARDIOVASCULAR NEGATIVE: 1
ALLERGIC/IMMUNOLOGIC NEGATIVE: 1
HEMATOLOGIC/LYMPHATIC NEGATIVE: 1
CONSTITUTIONAL NEGATIVE: 1
PALPITATIONS: 0
NEUROLOGICAL NEGATIVE: 1
SHORTNESS OF BREATH: 0

## 2023-09-18 NOTE — ASSESSMENT & PLAN NOTE
She has sick sinus syndrome that is well controlled in setting of a normally functioning dual-chamber pacemaker.

## 2023-09-18 NOTE — PATIENT INSTRUCTIONS
See Dr. Hannah or Dr. Moreira for Pulmonary.  No med changes.  Follow up with Dr. Pizano in 6 months.

## 2023-09-18 NOTE — ASSESSMENT & PLAN NOTE
She continues to complain of chronic shortness of breath at rest and activity.  It is somewhat inconsistent but there all the time.  She has had a normal echo and stress test in the past 1-1/2 years.  She is euvolemic on exam today.  She has normal pacemaker function with an overall low burden of atrial fibrillation.  She is seeing her cardiologist Dr. Boateng tomorrow and if there is not a cardiac reason for her shortness of breath that he can find I think she should follow-up with a pulmonologist.

## 2023-09-18 NOTE — ASSESSMENT & PLAN NOTE
She has persistent atrial fibrillation that is somewhat well controlled on Tikosyn 250 mcg twice daily.  Her overall A-fib burden is 0.5%.  Her episodes typically last seconds to minutes for which she thinks she is asymptomatic.  She is anticoagulated appropriately with Eliquis 2.5 mg twice daily.  Adjusted for age and weight.

## 2023-09-18 NOTE — ASSESSMENT & PLAN NOTE
She has a normally functioning Medtronic dual-chamber pacemaker.  She has 12.5 years remaining on battery.  Her mode is AAIR/DDDR 60 to 120 bpm.  She had several brief episodes for atrial fibrillation with an overall burden of 0.5%.  Her episodes typically last seconds to minutes.  She had a few brief episodes of nonsustained ventricular tachycardia.  Her atrial lead has an impedance of 532 ohms, threshold of 0.75 V at 0.4 ms, P wave of 2.6 mV.  The RV lead is in the left bundle position has an impedance of 456 ohms, threshold is 0.5 V at 0.4 ms, R wave greater than 20.    I did not make any changes to her device today.  Patient can follow-up with Dr. Pizano in 6 months.

## 2023-09-18 NOTE — PROGRESS NOTES
Electrophysiology       Reason for visit: Follow up    HPI   Angeles Cardoza is a 78 y.o. female who comes to the office today for routine follow-up of her Medtronic dual-chamber pacemaker placed on 4/12/2022 due to a history of sick sinus syndrome.    She has a past medical history significant for hyperlipidemia, hypertension, and a paroxymal atrial fibrillation with long conversion pauses s/p implantation of Medtronic dual chamber pacemaker on 4/12/2022 and on Tikosyn 250 mcg BID who is here for follow up.      Since last OV, she has done well. She continues to have episodes of PAF for which she is asymptomatic. She does complain of SOB at times with rest and also could be with activity.  She has had a normal echo other than mild to moderate mitral regurgitation and normal stress test.  She has been compliant with Tikosyn, metoprolol and Eliquis.   She denies chest pain, heart palpitations, lightheadedness, or syncope.     Brief EP history:  - pAF in Nov 2021 during hospitalization for acute diverticulits. Started on Eliquis 5 mg BID. Started on amiodarone   - Could not tolerate amiodarone due to NV. Transitioned to Flecainide 50 mg BID.   - MCOT in early 2022, 40% burden of AF with frequent long conversion pauses (some symptomatic).   - Flecainide was stopped.   - dcPPM implantation on 4/12/2022 and started on Tikosyn 250 mcg BID.      Past Medical History:   Diagnosis Date    Acid reflux     Atrial fibrillation (CMS/HCC)     Hypertension     Irregular heart rhythm     Lipid disorder     Sick sinus syndrome (CMS/HCC)      Past Surgical History:   Procedure Laterality Date    BREAST SURGERY  2003    breast reduction    CARDIAC PACEMAKER PLACEMENT Left 04/12/2022    Medtronic    RENAL ANGIOPLASTY      TOE SURGERY Left     TONSILLECTOMY      WISDOM TOOTH EXTRACTION       Allergies:  Penicillins, Penicillamine, and Ditropan [oxybutynin chloride]    Current Outpatient Medications on File Prior to  Visit   Medication Sig Dispense Refill    ALLERGY RELIEF, FEXOFENADINE, 180 mg tablet Take 180 mg by mouth every morning.      amLODIPine (NORVASC) 5 mg tablet Take 1 tablet (5 mg total) by mouth daily. 90 tablet 3    ascorbic acid, vitamin C, 500 mg tablet,chewable Take by mouth daily.      atorvastatin (LIPITOR) 40 mg tablet Take 40 mg by mouth daily.        cholecalciferol, vitamin D3, 1,000 unit (25 mcg) tablet Take 2,000 Units by mouth daily.       clobetasoL (TEMOVATE) 0.05 % external solution as needed. APPLY TO SCALP AS NEEDED FOR FLARES      cyanocobalamin (VITAMIN B12) 100 mcg tablet Take 100 mcg by mouth daily.      dofetilide (TIKOSYN) 250 mcg capsule Take 1 capsule (250 mcg total) by mouth 2 (two) times a day. 180 capsule 1    ELIQUIS 2.5 mg tablet TAKE 1 TABLET TWICE A  tablet 1    fluocinolone acetonide oiL 0.01 % drops 2 (two) times a day. APPLY TWICE DAILY TO EARS FOR 2-4 WEEKS FOR ECZEMA FLARES      FREESTYLE LITE STRIPS strip as needed.      ketoconazole (NIZORAL) 2 % shampoo Shampoo two to three times weekly to scalp. Keep on for 5-10 minutes and then rinse out      magnesium oxide (MAG-OX) 400 mg (241.3 mg magnesium) tablet Take 1 tablet (400 mg total) by mouth 2 (two) times a day as needed (serum mag less than 2 mEq/L). 90 tablet 3    metoprolol succinate XL (TOPROL-XL) 50 mg 24 hr tablet Take 1 tablet (50 mg total) by mouth 2 (two) times a day. 180 tablet 3    multivit-min/ferrous fumarate (MULTI VITAMIN ORAL) Take by mouth daily.      omeprazole (PriLOSEC) 40 mg capsule Take 40 mg by mouth daily and an additional dose as needed.    3    triamcinolone (KENALOG) 0.1 % cream Shampoo two to three times weekly to scalp. Keep on for 5-10 minutes and then rinse out      venlafaxine XR (EFFEXOR-XR) 75 mg 24 hr capsule Take 75 mg by mouth daily.         No current facility-administered medications on file prior to visit.     Social History     Socioeconomic History    Marital  status:    Tobacco Use    Smoking status: Never    Smokeless tobacco: Never   Vaping Use    Vaping Use: Never used   Substance and Sexual Activity    Alcohol use: Yes     Comment: occasional     Family History   Problem Relation Age of Onset    Stroke Biological Mother     Heart failure Biological Father      Review of Systems   Constitutional: Negative.    HENT: Negative.    Eyes: Negative.    Respiratory: Negative.  Negative for cough and shortness of breath.    Cardiovascular: Negative.  Negative for chest pain, palpitations and leg swelling.   Gastrointestinal: Negative.    Endocrine: Negative.    Genitourinary: Negative.    Musculoskeletal: Negative.    Skin: Negative.    Allergic/Immunologic: Negative.    Neurological: Negative.    Hematological: Negative.    Psychiatric/Behavioral: Negative.      Vitals:    09/18/23 1538   BP: 118/72   Pulse: 73     Wt Readings from Last 3 Encounters:   09/18/23 53.5 kg (118 lb)   03/09/23 53.5 kg (118 lb)   03/08/23 51.7 kg (114 lb)     Physical Exam  Vitals and nursing note reviewed.   Constitutional:       Appearance: She is well-developed.   HENT:      Head: Normocephalic and atraumatic.   Eyes:      Conjunctiva/sclera: Conjunctivae normal.      Pupils: Pupils are equal, round, and reactive to light.   Cardiovascular:      Rate and Rhythm: Normal rate and regular rhythm.      Pulses: Normal pulses and intact distal pulses.      Heart sounds: Normal heart sounds.   Pulmonary:      Effort: Pulmonary effort is normal.      Breath sounds: Normal breath sounds.   Abdominal:      General: Bowel sounds are normal.      Palpations: Abdomen is soft.   Musculoskeletal:         General: Normal range of motion.      Cervical back: Normal range of motion and neck supple.   Skin:     General: Skin is warm and dry.   Neurological:      General: No focal deficit present.      Mental Status: She is alert and oriented to person, place, and time.   Psychiatric:          Behavior: Behavior normal.         Thought Content: Thought content normal.         Judgment: Judgment normal.        Lab Results   Component Value Date    WBC 7.90 04/23/2022    HGB 13.2 04/23/2022    HCT 41.2 04/23/2022     (H) 04/23/2022    ALT 25 01/21/2022    AST 30 01/21/2022     04/23/2022    K 4.4 04/23/2022    CREATININE 1.0 04/23/2022    BUN 17 04/23/2022    TSH 2.05 01/21/2022    INR 0.9 11/13/2016       Cardiac Imaging    ECHOCARDIOGRAM - EXTERNAL RESULT     Narrative  Ordered by an unspecified provider.          ECG: Atrial pacing with PACs    Other Cardiac Studies: As listed     Sick sinus syndrome (CMS/HCC)  She has sick sinus syndrome that is well controlled in setting of a normally functioning dual-chamber pacemaker.    Persistent atrial fibrillation (CMS/HCC)  She has persistent atrial fibrillation that is somewhat well controlled on Tikosyn 250 mcg twice daily.  Her overall A-fib burden is 0.5%.  Her episodes typically last seconds to minutes for which she thinks she is asymptomatic.  She is anticoagulated appropriately with Eliquis 2.5 mg twice daily.  Adjusted for age and weight.    Pacemaker  She has a normally functioning Medtronic dual-chamber pacemaker.  She has 12.5 years remaining on battery.  Her mode is AAIR/DDDR 60 to 120 bpm.  She had several brief episodes for atrial fibrillation with an overall burden of 0.5%.  Her episodes typically last seconds to minutes.  She had a few brief episodes of nonsustained ventricular tachycardia.  Her atrial lead has an impedance of 532 ohms, threshold of 0.75 V at 0.4 ms, P wave of 2.6 mV.  The RV lead is in the left bundle position has an impedance of 456 ohms, threshold is 0.5 V at 0.4 ms, R wave greater than 20.    I did not make any changes to her device today.  Patient can follow-up with Dr. Pizano in 6 months.    Essential hypertension  Her blood pressure is well controlled today did not make any changes to her medications  today.    SOB (shortness of breath)  She continues to complain of chronic shortness of breath at rest and activity.  It is somewhat inconsistent but there all the time.  She has had a normal echo and stress test in the past 1-1/2 years.  She is euvolemic on exam today.  She has normal pacemaker function with an overall low burden of atrial fibrillation.  She is seeing her cardiologist Dr. Boateng tomorrow and if there is not a cardiac reason for her shortness of breath that he can find I think she should follow-up with a pulmonologist.      No orders of the defined types were placed in this encounter.    There are no discontinued medications.    This letter was generated using speech recognition software. Please excuse any typographical errors.       SHOBHA Griffin  9/18/2023

## 2023-09-19 ENCOUNTER — OFFICE VISIT (OUTPATIENT)
Dept: CARDIOLOGY | Facility: CLINIC | Age: 78
End: 2023-09-19
Payer: MEDICARE

## 2023-09-19 VITALS
WEIGHT: 117 LBS | SYSTOLIC BLOOD PRESSURE: 120 MMHG | HEIGHT: 60 IN | DIASTOLIC BLOOD PRESSURE: 70 MMHG | BODY MASS INDEX: 22.97 KG/M2 | OXYGEN SATURATION: 99 % | HEART RATE: 70 BPM

## 2023-09-19 DIAGNOSIS — R06.02 SOB (SHORTNESS OF BREATH): ICD-10-CM

## 2023-09-19 DIAGNOSIS — I38 VALVULAR HEART DISEASE: Chronic | ICD-10-CM

## 2023-09-19 DIAGNOSIS — E78.2 MIXED HYPERLIPIDEMIA: ICD-10-CM

## 2023-09-19 DIAGNOSIS — Z95.0 PACEMAKER: ICD-10-CM

## 2023-09-19 DIAGNOSIS — I10 ESSENTIAL HYPERTENSION: Primary | ICD-10-CM

## 2023-09-19 DIAGNOSIS — I48.19 PERSISTENT ATRIAL FIBRILLATION (CMS/HCC): ICD-10-CM

## 2023-09-19 PROBLEM — I35.1 NONRHEUMATIC AORTIC VALVE INSUFFICIENCY: Status: RESOLVED | Noted: 2018-05-16 | Resolved: 2023-09-19

## 2023-09-19 LAB
ATRIAL RATE: 70
P AXIS: 0
PR INTERVAL: 186
QRS DURATION: 74
QT INTERVAL: 402
QTC CALCULATION(BAZETT): 434
R AXIS: 18
T WAVE AXIS: 43
VENTRICULAR RATE: 70

## 2023-09-19 PROCEDURE — 93000 ELECTROCARDIOGRAM COMPLETE: CPT | Performed by: INTERNAL MEDICINE

## 2023-09-19 PROCEDURE — 99214 OFFICE O/P EST MOD 30 MIN: CPT | Performed by: INTERNAL MEDICINE

## 2023-09-19 PROCEDURE — G8752 SYS BP LESS 140: HCPCS | Performed by: INTERNAL MEDICINE

## 2023-09-19 PROCEDURE — G8754 DIAS BP LESS 90: HCPCS | Performed by: INTERNAL MEDICINE

## 2023-09-19 ASSESSMENT — ENCOUNTER SYMPTOMS
INSOMNIA: 0
DIZZINESS: 0
ENDOCRINE NEGATIVE: 1
SHORTNESS OF BREATH: 1
CONSTITUTIONAL NEGATIVE: 1
EYES NEGATIVE: 1
HEARTBURN: 0
NEUROLOGICAL NEGATIVE: 1
HEMATOLOGIC/LYMPHATIC NEGATIVE: 1
WEIGHT GAIN: 0
DECREASED APPETITE: 0
LIGHT-HEADEDNESS: 0
COUGH: 1
GASTROINTESTINAL NEGATIVE: 1
DYSPNEA ON EXERTION: 1
WEIGHT LOSS: 0
PALPITATIONS: 0
SNORING: 0
BRUISES/BLEEDS EASILY: 0
HEMATURIA: 0

## 2023-09-19 ASSESSMENT — CHADS2 SCORE
AGE: 75+ (+2 PT.)
CHF: NO
HYPERTENSION: YES (+1 PT.)
VASCULAR DISEASE: NO
PRIOR STROKE OR TIA OR THROMBOEMBOLISM: NO
DIABETES: NO
CHADS2 SCORE: 4
SEX: FEMALE (+1PT.)

## 2023-09-19 NOTE — ASSESSMENT & PLAN NOTE
- Status post dual-chamber Medtronic PPM implant on 4/12/2022 for SSS/Tachybrady syndrome  - Continue f/u and management with Dr. Pizano

## 2023-09-19 NOTE — ASSESSMENT & PLAN NOTE
- Echo 5/2022: EF 65% Mild-moderate MR. Mild AI. Mild TR.   .  - Continue same meds  - echo with next visit.

## 2023-09-19 NOTE — LETTER
September 19, 2023     José Antonio REAGAN DO  [address]  [place] [facility] [address]    Patient: Angeles Cardoza  YOB: 1945  Date of Visit: 9/19/2023      Dear Dr. Leroy:    Thank you for referring Angeles Cardoza to me for evaluation. Below are my notes for this consultation.    If you have questions, please do not hesitate to call me. I look forward to following your patient along with you.         Sincerely,        Gwyn Boateng,         CC: MD Tasneem Montes De Oca William N, DO  9/19/2023 12:19 PM  Sign when Signing Visit       Cardiology  Office Progress Note       Reason for visit:   Chief Complaint   Patient presents with    Cardiac Evaluation     HPI     Angeles Cardoza is a 78 y.o. female who presents to the office for cardiovascular follow up and management of HTN, Afib, SSS s/p PPM 4/20/22, nonrheumatic aortic valve insufficiency, HLD and non rheumatic mitral regurgitation.     She was previously followed by Dr. Chano Boateng. Her most recent visit occurred on 3/8/23. At that visit, she reported intermittent episodes of shortness of breath. She also experienced intermittent LE edema and nocturia. She denied any additional cardiac complaints.     She was recommended to follow up with EP in order to ensure that she was not having any runs of Afib or arrhythmias that were causing her respiratory symptoms. She was also encouraged to request PFT's and possibly seeing a pulmonologist if her studies were abnormal.     She saw Dr. Pizano on 3/9. Her device was functioning properly. No significant arrhythmias noted and no device therapies ad been delivered. Her Metoprolol was increased to 50 mg BID given her HR trends in the 120-130's while she was in Afib. No changes were made and she was asked to follow up in 6 months.     She followed up with Dr. Doss on 4/20. She was feeling well and exercising regularly without cardiac complaints. Her blood pressures were well  controlled. Her creatinine had returned to baseline with an eGFR of 76. No changes were made.     [facility] alert 5/15/23 -     95 EGMs illustrate AF/AFL and AF/AFL with RVR and intermittent undersensing, longest on trending approximately 60 min in duration.AF burden 0.3%. Current regimen continued per Dr. Pizano.     [facility] alert 8/10/23 -     125 EGMs illustrate AF/AFL, AF with RVR,longest on trending 28mins in duration. AF [place]:0.2%.    Supraventricular Events:3 EGMs illustrate AF with RVR, longest available 47secs in duration.     Ventricular Events:6 EGMs illustrate AF with RVR, 3 beat run of nsVT for 1.2secs, longest available 3secs in duration. EGM illustrate nsSVT longest available 7.3secs in duration. She reported that she was asymptomatic during those episode. Recommended to continue current regimen and call Dr. Pizano if she became symptomatic.     She followed up with Dr. Pizano's office on 9/18. She complained of SOB at times with rest and activity. She denied any additional cardiac complaints. Pacemaker functioning properly. No changes were made.     Today she reports feeling about the same. Her SOB/DICKSON has remained unchanged from previous reports. She is open to seeing a pulmonologist if cardiac etiology can be ruled out. She is active on a daily basis, performing water aerobics without cardiac complaints. She notes DICKSON with stairs and shortness of breath on occasion when lying or sitting down at rest. She has occasional palpitations that she notices in her right jugular area. She denies any chest pain, lightheadedness, dizziness or syncopal events.     Past Medical History:   Diagnosis Date    Acid reflux     Atrial fibrillation (CMS/HCC)     Hypertension     Irregular heart rhythm     Lipid disorder     Sick sinus syndrome (CMS/HCC)      Past Surgical History:   Procedure Laterality Date    BREAST SURGERY  2003    breast reduction    CARDIAC PACEMAKER PLACEMENT Left 04/12/2022     [employer]    RENAL ANGIOPLASTY      TOE SURGERY Left     TONSILLECTOMY      WISDOM TOOTH EXTRACTION       Social History     Tobacco Use    Smoking status: Never    Smokeless tobacco: Never   Vaping Use    Vaping Use: Never used   Substance Use Topics    Alcohol use: Yes     Comment: occasional     Family History   Problem Relation Age of Onset    Stroke Biological Mother     Heart failure Biological Father      Allergies:  Penicillins, Penicillamine, and Ditropan [oxybutynin chloride]    Current Outpatient Medications   Medication Sig Dispense Refill    ALLERGY RELIEF, FEXOFENADINE, 180 mg tablet Take 180 mg by mouth every morning.      amLODIPine (NORVASC) 5 mg tablet Take 1 tablet (5 mg total) by mouth daily. 90 tablet 3    atorvastatin (LIPITOR) 40 mg tablet Take 40 mg by mouth daily.        clobetasoL (TEMOVATE) 0.05 % external solution as needed. APPLY TO SCALP AS NEEDED FOR FLARES      dofetilide (TIKOSYN) 250 mcg capsule Take 1 capsule (250 mcg total) by mouth 2 (two) times a day. 180 capsule 1    ELIQUIS 2.5 mg tablet TAKE 1 TABLET TWICE A  tablet 1    fluocinolone acetonide oiL 0.01 % drops 2 (two) times a day. APPLY TWICE DAILY TO EARS FOR 2-4 WEEKS FOR ECZEMA FLARES      FREESTYLE LITE STRIPS strip as needed.      ketoconazole (NIZORAL) 2 % shampoo Shampoo two to three times weekly to scalp. Keep on for 5-10 minutes and then rinse out      magnesium oxide (MAG-OX) 400 mg (241.3 mg magnesium) tablet Take 1 tablet (400 mg total) by mouth 2 (two) times a day as needed (serum mag less than 2 mEq/L). 90 tablet 3    metoprolol succinate XL (TOPROL-XL) 50 mg 24 hr tablet Take 1 tablet (50 mg total) by mouth 2 (two) times a day. 180 tablet 3    multivit-min/ferrous fumarate (MULTI VITAMIN ORAL) Take by mouth daily.      omeprazole (PriLOSEC) 40 mg capsule Take 40 mg by mouth daily and an additional dose as needed.    3    venlafaxine XR (EFFEXOR-XR) 75 mg 24 hr capsule Take 75 mg  by mouth daily.        ascorbic acid, vitamin C, 500 mg tablet,chewable Take by mouth daily.      cholecalciferol, vitamin D3, 1,000 unit (25 mcg) tablet Take 2,000 Units by mouth daily.       cyanocobalamin (VITAMIN B12) 100 mcg tablet Take 100 mcg by mouth daily.      triamcinolone (KENALOG) 0.1 % cream Shampoo two to three times weekly to scalp. Keep on for 5-10 minutes and then rinse out       No current facility-administered medications for this visit.     Review of Systems   Constitutional: Negative. Negative for decreased appetite, malaise/fatigue, weight gain and weight loss.   HENT: Negative.    Eyes: Negative.    Cardiovascular: Positive for dyspnea on exertion. Negative for chest pain, leg swelling (Occasional after being on feet for several hours) and palpitations.   Respiratory: Positive for cough and shortness of breath. Negative for snoring.    Endocrine: Negative.    Hematologic/Lymphatic: Negative.  Does not bruise/bleed easily.   Skin: Negative for rash.   Musculoskeletal: Positive for joint pain. Negative for arthritis.   Gastrointestinal: Negative.  Negative for heartburn and melena.   Genitourinary: Positive for nocturia. Negative for hematuria.   Neurological: Negative.  Negative for dizziness and light-headedness.   Psychiatric/Behavioral: The patient does not have insomnia.    Allergic/Immunologic: Positive for environmental allergies.     Objective     Vitals:    09/19/23 1104   BP: 120/70   Pulse: 70   SpO2: 99%     Wt Readings from Last 5 Encounters:   09/19/23 53.1 kg (117 lb)   09/18/23 53.5 kg (118 lb)   03/09/23 53.5 kg (118 lb)   03/08/23 51.7 kg (114 lb)   11/11/22 53.5 kg (118 lb)     Body mass index is 22.85 kg/m².    Physical Exam  Constitutional:       Appearance: She is well-developed.   HENT:      Head: Normocephalic and atraumatic.   Eyes:      Conjunctiva/sclera: Conjunctivae normal.      Pupils: Pupils are equal, round, and reactive to light.   Cardiovascular:      Rate  "and Rhythm: Normal rate and regular rhythm.      Pulses: Intact distal pulses.           Radial pulses are 2+ on the right side and 2+ on the left side.      Heart sounds:      Gallop present.   Pulmonary:      Effort: Pulmonary effort is normal.      Breath sounds: Normal breath sounds.      Comments: Pacer in left chest  Abdominal:      General: Bowel sounds are normal.      Palpations: Abdomen is soft.   Musculoskeletal:         General: Normal range of motion.      Cervical back: Normal range of motion and neck supple.      Right lower leg: No edema.      Left lower leg: No edema.   Skin:     General: Skin is warm and dry.   Neurological:      Mental Status: She is alert and oriented to person, place, and time.      Deep Tendon Reflexes: Reflexes are normal and symmetric.   Psychiatric:         Speech: Speech normal.         Behavior: Behavior normal.         Thought Content: Thought content normal.         Judgment: Judgment normal.       ECG   sinus rhythm, I have independently reviewed the patient's ECG results.  Significant abnormals are :.   Atrial-paced rhythm with Premature atrial complexes 70bpm  Abnormal ECG   When compared with ECG of 22-APR-2022 22:15,   Premature atrial complexes are now Present   Confirmed by Gwyn Boateng ([id number]) on 9/19/2023 11:11:40 AM    Hematology  Lab Results   Component Value Date    WBC 7.90 04/23/2022    HGB 13.2 04/23/2022    HCT 41.2 04/23/2022     (H) 04/23/2022    INR 0.9 11/13/2016       Chemistries  Lab Results   Component Value Date     04/23/2022    K 4.4 04/23/2022     04/23/2022    CREATININE 1.0 04/23/2022    BUN 17 04/23/2022    CO2 24 04/23/2022    GLUCOSE 94 04/23/2022    CALCIUM 9.2 04/23/2022    MG 2.1 04/23/2022    ALT 25 01/21/2022    AST 30 01/21/2022     Cholesterol  No results found for: \"CHOL\", \"TRIG\", \"HDL\", \"LDLCALC\", \"NONHDLCALC\"    Endocrine  Lab Results   Component Value Date    TSH 2.05 01/21/2022     Cardiac " Imaging    ECHOCARDIOGRAM - EXTERNAL RESULT     Narrative  Ordered by an unspecified provider.    Assessment/Plan     Essential hypertension  - target BP < 130/80  .  - Currently at goal  - Continue Amlodipine 5mg q day  - Continue Toprol XL 50mg BID.     Pacemaker  - Status post dual-chamber Medtronic PPM implant on 4/12/2022 for SSS/Tachybrady syndrome  - Continue f/u and management with Dr. Pizano    Persistent atrial fibrillation (CMS/HCC)  - CHADS VASC 4  - Persistent  .  - PAF episodes typically last seconds to minutes for which she thinks she is asymptomatic.  - Continue Eliquis 2.5mg BID  - Continue Tikosyn 250mcg BID  - Continue Toprol XL BID  - The patient denies any evidence or signs/sx suggestive of bleeding.   - Continue f/u and management with Dr. Pizano    Valvular heart disease  - Echo 5/2022: EF 65% Mild-moderate MR. Mild AI. Mild TR.   .  - Continue same meds  - echo with next visit.     SOB (shortness of breath)  - This is happening intermittently  - It doesn't occur with activity usually, as she is fine at water aerobic, but will notice it going up the stairs or just sitting around doing nothing.   - it doesn't appear to be from a primary cardiac etiology. No evidence to suggest this is correlating with arrhythmias at this time.   - Possibly related to kyphosis, but somehow I think that is less likely.   - Consider evaluation with pulmonary, given names of Dr. Swanson's group.    Mixed hyperlipidemia  - Target LDL < 100  .  - Continue Atorvastatin 40mg q pm     Follow Up Plans:  Return in about 6 months (around 3/19/2024).         I, Dre Morales, am scribing for, and in the presence of, Gwyn Boateng DO.    I, Gwyn Boateng DO, personally performed the services described in this documentation as scribed by Dre Morales in my presence, and it is both accurate and complete.       Gwyn Boateng DO  9/19/2023

## 2023-09-19 NOTE — ASSESSMENT & PLAN NOTE
- CHADS VASC 4  - Persistent  .  - PAF episodes typically last seconds to minutes for which she thinks she is asymptomatic.  - Continue Eliquis 2.5mg BID  - Continue Tikosyn 250mcg BID  - Continue Toprol XL BID  - The patient denies any evidence or signs/sx suggestive of bleeding.   - Continue f/u and management with Dr. Pizano

## 2023-09-19 NOTE — PATIENT INSTRUCTIONS
Follow up in 6 months in [place]     Echo with next visit.     Pulmonologist - Dr. Reinaldo Swanson, Dr. Robert Dupree and Dr. Rolan Zayas - all pulmonologist's here at [place].     No changes.

## 2023-09-19 NOTE — ASSESSMENT & PLAN NOTE
- This is happening intermittently  - It doesn't occur with activity usually, as she is fine at water aerobic, but will notice it going up the stairs or just sitting around doing nothing.   - it doesn't appear to be from a primary cardiac etiology. No evidence to suggest this is correlating with arrhythmias at this time.   - Possibly related to kyphosis, but somehow I think that is less likely.   - Consider evaluation with pulmonary, given names of Dr. Swanson's group.

## 2023-09-19 NOTE — ASSESSMENT & PLAN NOTE
- target BP < 130/80  .  - Currently at goal  - Continue Amlodipine 5mg q day  - Continue Toprol XL 50mg BID.

## 2023-10-16 RX ORDER — APIXABAN 2.5 MG/1
2.5 TABLET, FILM COATED ORAL 2 TIMES DAILY
Qty: 180 TABLET | Refills: 1 | Status: SHIPPED | OUTPATIENT
Start: 2023-10-16 | End: 2024-01-15

## 2023-10-16 NOTE — TELEPHONE ENCOUNTER
"[facility] at [facility] Refill Request      MA Notes:      Nurse Notes:      Last Office Visit: Visit date not found  Last Telemedicine Visit: Visit date not found    Next Office Visit: Visit date not found  Next Telemedicine Visit: Visit date not found     Most Recent BP Readings:  BP Readings from Last 3 Encounters:   09/19/23 120/70   09/18/23 118/72   03/09/23 120/64       Most recent Lab results:  No results found for: \"CHOL\", \"HDL\", \"TRIG\", \"NONHDLCALC\"    Lab Results   Component Value Date    AST 30 01/21/2022    ALT 25 01/21/2022       Lab Results   Component Value Date     04/23/2022    K 4.4 04/23/2022    BUN 17 04/23/2022    CREATININE 1.0 04/23/2022       Current Medications:    Current Outpatient Medications:     ALLERGY RELIEF, FEXOFENADINE, 180 mg tablet, Take 180 mg by mouth every morning., Disp: , Rfl:     amLODIPine (NORVASC) 5 mg tablet, Take 1 tablet (5 mg total) by mouth daily., Disp: 90 tablet, Rfl: 3    ascorbic acid, vitamin C, 500 mg tablet,chewable, Take by mouth daily., Disp: , Rfl:     atorvastatin (LIPITOR) 40 mg tablet, Take 40 mg by mouth daily.  , Disp: , Rfl:     cholecalciferol, vitamin D3, 1,000 unit (25 mcg) tablet, Take 2,000 Units by mouth daily. , Disp: , Rfl:     clobetasoL (TEMOVATE) 0.05 % external solution, as needed. APPLY TO SCALP AS NEEDED FOR FLARES, Disp: , Rfl:     cyanocobalamin (VITAMIN B12) 100 mcg tablet, Take 100 mcg by mouth daily., Disp: , Rfl:     dofetilide (TIKOSYN) 250 mcg capsule, TAKE 1 CAPSULE TWICE A DAY, Disp: 180 capsule, Rfl: 1    ELIQUIS 2.5 mg tablet, TAKE 1 TABLET TWICE A DAY, Disp: 180 tablet, Rfl: 1    fluocinolone acetonide oiL 0.01 % drops, 2 (two) times a day. APPLY TWICE DAILY TO EARS FOR 2-4 WEEKS FOR ECZEMA FLARES, Disp: , Rfl:     FREESTYLE LITE STRIPS strip, as needed., Disp: , Rfl:     ketoconazole (NIZORAL) 2 % shampoo, Shampoo two to three times weekly to scalp. Keep on for 5-10 minutes and then " rinse out, Disp: , Rfl:     magnesium oxide (MAG-OX) 400 mg (241.3 mg magnesium) tablet, Take 1 tablet (400 mg total) by mouth 2 (two) times a day as needed (serum mag less than 2 mEq/L)., Disp: 90 tablet, Rfl: 3    metoprolol succinate XL (TOPROL-XL) 50 mg 24 hr tablet, Take 1 tablet (50 mg total) by mouth 2 (two) times a day., Disp: 180 tablet, Rfl: 3    multivit-min/ferrous fumarate (MULTI VITAMIN ORAL), Take by mouth daily., Disp: , Rfl:     omeprazole (PriLOSEC) 40 mg capsule, Take 40 mg by mouth daily and an additional dose as needed.  , Disp: , Rfl: 3    triamcinolone (KENALOG) 0.1 % cream, Shampoo two to three times weekly to scalp. Keep on for 5-10 minutes and then rinse out, Disp: , Rfl:     venlafaxine XR (EFFEXOR-XR) 75 mg 24 hr capsule, Take 75 mg by mouth daily.  , Disp: , Rfl:

## 2023-11-05 DIAGNOSIS — I48.19 PERSISTENT ATRIAL FIBRILLATION (CMS/HCC): ICD-10-CM

## 2023-11-05 RX ORDER — METOPROLOL SUCCINATE 50 MG/1
50 TABLET, EXTENDED RELEASE ORAL 2 TIMES DAILY
Qty: 180 TABLET | Refills: 3 | Status: SHIPPED | OUTPATIENT
Start: 2023-11-05 | End: 2023-11-07

## 2023-11-09 ENCOUNTER — TELEPHONE (OUTPATIENT)
Dept: CARDIOLOGY | Facility: CLINIC | Age: 78
End: 2023-11-09
Payer: MEDICARE

## 2023-11-09 NOTE — TELEPHONE ENCOUNTER
[employer] Pacemaker Report  Monitoring period: 9/18/23-11/8/23    Dr. Harinder Boateng    Battery/Lead Status:  12.4 years    Ap:  90.7%  :  0.3%    Atrial Events: EGMs illustrate AF/AFL, AF/AFL w/ RVR, and intermittent atrial undersensing, longest per trending approx. 2 hours in duration.   Atrial [place]:  0.7%    Ventricular Events: 2  EGMs illustrate nsSVT and AF/AFL w/ RVR, longest available unable to confirm true duration, no termination noted on EGM.     DOT: 11/8/23 ; AF/AFL with unknown OAC escalated to follow up tab  on November 9th, 2023.    Addendum:  Ms. Cardoza has an appointment with Dr. Boateng on 4/10/24 and Dr. Pizano on 4/11/24.  Pt. has a history of PAF, SOS, SSS, and Hypertension.  Pt. medications include Tikosyn, Eliquis, and Metoprolol.

## 2024-01-15 ENCOUNTER — TELEPHONE (OUTPATIENT)
Dept: CARDIOLOGY | Facility: CLINIC | Age: 79
End: 2024-01-15
Payer: MEDICARE

## 2024-01-15 RX ORDER — APIXABAN 2.5 MG/1
2.5 TABLET, FILM COATED ORAL 2 TIMES DAILY
Qty: 180 TABLET | Refills: 0 | Status: SHIPPED | OUTPATIENT
Start: 2024-01-15 | End: 2024-02-12 | Stop reason: SDUPTHER

## 2024-01-15 NOTE — TELEPHONE ENCOUNTER
Read prior note that pt is at her [place], [place] home for 3 weeks and that she is using a different pharmacy.    Called and left msg for her to please please give our office a call call back to see if she only wants a month supply or less called to [place], [place] pharmacy?     Await call back.

## 2024-01-15 NOTE — TELEPHONE ENCOUNTER
"[facility] at [facility] Refill Request      MA Notes:      Nurse Notes:      Last Office Visit: 9/19/2023  Last Telemedicine Visit: Visit date not found    Next Office Visit: 4/10/2024  Next Telemedicine Visit: Visit date not found     Most Recent BP Readings:  BP Readings from Last 3 Encounters:   09/19/23 120/70   09/18/23 118/72   03/09/23 120/64       Most recent Lab results:  No results found for: \"CHOL\", \"HDL\", \"TRIG\", \"NONHDLCALC\"    Lab Results   Component Value Date    AST 30 01/21/2022    ALT 25 01/21/2022       Lab Results   Component Value Date     04/23/2022    K 4.4 04/23/2022    BUN 17 04/23/2022    CREATININE 1.0 04/23/2022       Current Medications:    Current Outpatient Medications:   •  ALLERGY RELIEF, FEXOFENADINE, 180 mg tablet, Take 180 mg by mouth every morning., Disp: , Rfl:   •  amLODIPine (NORVASC) 5 mg tablet, Take 1 tablet (5 mg total) by mouth daily., Disp: 90 tablet, Rfl: 3  •  ascorbic acid, vitamin C, 500 mg tablet,chewable, Take by mouth daily., Disp: , Rfl:   •  atorvastatin (LIPITOR) 40 mg tablet, Take 40 mg by mouth daily.  , Disp: , Rfl:   •  cholecalciferol, vitamin D3, 1,000 unit (25 mcg) tablet, Take 2,000 Units by mouth daily. , Disp: , Rfl:   •  clobetasoL (TEMOVATE) 0.05 % external solution, as needed. APPLY TO SCALP AS NEEDED FOR FLARES, Disp: , Rfl:   •  cyanocobalamin (VITAMIN B12) 100 mcg tablet, Take 100 mcg by mouth daily., Disp: , Rfl:   •  dofetilide (TIKOSYN) 250 mcg capsule, TAKE 1 CAPSULE TWICE A DAY, Disp: 180 capsule, Rfl: 1  •  ELIQUIS 2.5 mg tablet, Take 1 tablet (2.5 mg total) by mouth 2 (two) times a day., Disp: 180 tablet, Rfl: 1  •  fluocinolone acetonide oiL 0.01 % drops, 2 (two) times a day. APPLY TWICE DAILY TO EARS FOR 2-4 WEEKS FOR ECZEMA FLARES, Disp: , Rfl:   •  FREESTYLE LITE STRIPS strip, as needed., Disp: , Rfl:   •  ketoconazole (NIZORAL) 2 % shampoo, Shampoo two to three times weekly to scalp. Keep on for 5-10 minutes " and then rinse out, Disp: , Rfl:   •  magnesium oxide (MAG-OX) 400 mg (241.3 mg magnesium) tablet, Take 1 tablet (400 mg total) by mouth 2 (two) times a day as needed (serum mag less than 2 mEq/L)., Disp: 90 tablet, Rfl: 3  •  metoprolol succinate XL (TOPROL-XL) 50 mg 24 hr tablet, TAKE 1 TABLET TWICE A DAY, Disp: 180 tablet, Rfl: 3  •  multivit-min/ferrous fumarate (MULTI VITAMIN ORAL), Take by mouth daily., Disp: , Rfl:   •  omeprazole (PriLOSEC) 40 mg capsule, Take 40 mg by mouth daily and an additional dose as needed.  , Disp: , Rfl: 3  •  triamcinolone (KENALOG) 0.1 % cream, Shampoo two to three times weekly to scalp. Keep on for 5-10 minutes and then rinse out, Disp: , Rfl:   •  venlafaxine XR (EFFEXOR-XR) 75 mg 24 hr capsule, Take 75 mg by mouth daily.  , Disp: , Rfl:

## 2024-01-15 NOTE — TELEPHONE ENCOUNTER
Received msg earlier today from pt jeffesting 90 day supply.  This is what was sent to the pharmacy in [place], [place].  Thanks.

## 2024-01-15 NOTE — TELEPHONE ENCOUNTER
"[facility] at [facility] Refill Request      MA Notes:      Nurse Notes: pt returned my call, requested 90 day Eliquis 2.5 mg script to be sent to [facility] pharmacy.    Last Office Visit: 3/8/2023  Last Telemedicine Visit: 4/14/2020 Chano Boateng DO    Next Office Visit: Visit date not found  Next Telemedicine Visit: Visit date not found     Most Recent BP Readings:  BP Readings from Last 3 Encounters:   09/19/23 120/70   09/18/23 118/72   03/09/23 120/64       Most recent Lab results:  No results found for: \"CHOL\", \"HDL\", \"TRIG\", \"NONHDLCALC\"    Lab Results   Component Value Date    AST 30 01/21/2022    ALT 25 01/21/2022       Lab Results   Component Value Date     04/23/2022    K 4.4 04/23/2022    BUN 17 04/23/2022    CREATININE 1.0 04/23/2022       Current Medications:    Current Outpatient Medications:   •  ALLERGY RELIEF, FEXOFENADINE, 180 mg tablet, Take 180 mg by mouth every morning., Disp: , Rfl:   •  amLODIPine (NORVASC) 5 mg tablet, Take 1 tablet (5 mg total) by mouth daily., Disp: 90 tablet, Rfl: 3  •  ascorbic acid, vitamin C, 500 mg tablet,chewable, Take by mouth daily., Disp: , Rfl:   •  atorvastatin (LIPITOR) 40 mg tablet, Take 40 mg by mouth daily.  , Disp: , Rfl:   •  cholecalciferol, vitamin D3, 1,000 unit (25 mcg) tablet, Take 2,000 Units by mouth daily. , Disp: , Rfl:   •  clobetasoL (TEMOVATE) 0.05 % external solution, as needed. APPLY TO SCALP AS NEEDED FOR FLARES, Disp: , Rfl:   •  cyanocobalamin (VITAMIN B12) 100 mcg tablet, Take 100 mcg by mouth daily., Disp: , Rfl:   •  dofetilide (TIKOSYN) 250 mcg capsule, TAKE 1 CAPSULE TWICE A DAY, Disp: 180 capsule, Rfl: 1  •  ELIQUIS 2.5 mg tablet, Take 1 tablet (2.5 mg total) by mouth 2 (two) times a day., Disp: 180 tablet, Rfl: 1  •  fluocinolone acetonide oiL 0.01 % drops, 2 (two) times a day. APPLY TWICE DAILY TO EARS FOR 2-4 WEEKS FOR ECZEMA FLARES, Disp: , Rfl:   •  FREESTYLE LITE STRIPS strip, as needed., Disp: , Rfl:   •  " ketoconazole (NIZORAL) 2 % shampoo, Shampoo two to three times weekly to scalp. Keep on for 5-10 minutes and then rinse out, Disp: , Rfl:   •  magnesium oxide (MAG-OX) 400 mg (241.3 mg magnesium) tablet, Take 1 tablet (400 mg total) by mouth 2 (two) times a day as needed (serum mag less than 2 mEq/L)., Disp: 90 tablet, Rfl: 3  •  metoprolol succinate XL (TOPROL-XL) 50 mg 24 hr tablet, TAKE 1 TABLET TWICE A DAY, Disp: 180 tablet, Rfl: 3  •  multivit-min/ferrous fumarate (MULTI VITAMIN ORAL), Take by mouth daily., Disp: , Rfl:   •  omeprazole (PriLOSEC) 40 mg capsule, Take 40 mg by mouth daily and an additional dose as needed.  , Disp: , Rfl: 3  •  triamcinolone (KENALOG) 0.1 % cream, Shampoo two to three times weekly to scalp. Keep on for 5-10 minutes and then rinse out, Disp: , Rfl:   •  venlafaxine XR (EFFEXOR-XR) 75 mg 24 hr capsule, Take 75 mg by mouth daily.  , Disp: , Rfl:

## 2024-01-15 NOTE — TELEPHONE ENCOUNTER
It won't let me send it this way. Says it was [place] [facility] [place], can you try again in a new order.

## 2024-01-15 NOTE — TELEPHONE ENCOUNTER
Pt called through the weekend for a refill of eliquis. Pt called back today to make office aware she is at her [place], [place] house for the next 3 weeks so the pharmacy it needs to be sent to is different. Pt has a week left of medication.       [facility]   [address] [place] [id number]  Phone #[phone]  Fax #[phone]

## 2024-01-15 NOTE — TELEPHONE ENCOUNTER
Pt said only a month worth sent to the [facility] one and then following up from here refills sent to the [facility] home delivery.

## 2024-01-30 DIAGNOSIS — I10 ESSENTIAL HYPERTENSION: ICD-10-CM

## 2024-01-30 RX ORDER — AMLODIPINE BESYLATE 5 MG/1
5 TABLET ORAL DAILY
Qty: 90 TABLET | Refills: 3 | Status: SHIPPED | OUTPATIENT
Start: 2024-01-30 | End: 2024-02-12 | Stop reason: SDUPTHER

## 2024-01-30 NOTE — TELEPHONE ENCOUNTER
Refill request   This is Dr. Gwyn Boateng Patient.  Patient saw Dr. Gwyn Boateng on 9/19/2023 for an Office Visit.

## 2024-02-07 ENCOUNTER — TELEPHONE (OUTPATIENT)
Dept: CARDIOLOGY | Facility: CLINIC | Age: 79
End: 2024-02-07
Payer: MEDICARE

## 2024-02-09 ENCOUNTER — TELEPHONE (OUTPATIENT)
Dept: CARDIOLOGY | Facility: CLINIC | Age: 79
End: 2024-02-09
Payer: MEDICARE

## 2024-02-09 NOTE — TELEPHONE ENCOUNTER
Reviewed.  Patient with history of persistent AF.  AF burden remains low 0.8%.  She is appropriately anticoagulated on Eliquis and is on metoprolol and dofetilide.  She has an upcoming visit with Dr. Pizano on 4/25/2024.  Continue to monitor.

## 2024-02-09 NOTE — TELEPHONE ENCOUNTER
Med[employer] Pacemaker Report  Monitoring period: 11/8/23-2/6/24    Dr. Harinder Boateng    Battery/Lead Status: 12.0 years      Ap:  93.4%  :  0.2%    Atrial Events: EGMs illustrate AF/AFL and AF/AFL with RVR, longest on trending >2 hrs in duration.  Atrial [place]:  0.8%    Supraventricular Events: 5  EGMs illustrate SVT, longest available at least 20 sec in duration, with no clear onset/termination of event.  EGMs illustrate AF/AFL with RVR, longest available 30 sec in duration.    Ventricular Events: 9  EGMs illustrate SVT, longest available at least 22 sec in duration, with no clear onset/termination of event.  EGMs illustrate  AF/AFL with RVR, longest available 15 sec in duration.    DOT:  2/6/24; AF episodes of >=6 min with unknown stroke prophylaxis status escalated to follow up tab (yellow 1) on February 8th, 2024.     Addendum:  Ms. Cardoza has a history of PAF and SSS.    Pt. has an appointment with Dr. Boateng on 4/10/24 and Dr. Pizano on 4/25/24.    Pt.  medications include Eliquis , Metoprolol, and Tikosyn.

## 2024-02-12 ENCOUNTER — TELEPHONE (OUTPATIENT)
Dept: CARDIOLOGY | Facility: CLINIC | Age: 79
End: 2024-02-12
Payer: MEDICARE

## 2024-02-12 ENCOUNTER — TELEPHONE (OUTPATIENT)
Dept: SCHEDULING | Facility: CLINIC | Age: 79
End: 2024-02-12
Payer: MEDICARE

## 2024-02-12 DIAGNOSIS — I48.19 PERSISTENT ATRIAL FIBRILLATION (CMS/HCC): ICD-10-CM

## 2024-02-12 DIAGNOSIS — I10 ESSENTIAL HYPERTENSION: ICD-10-CM

## 2024-02-12 RX ORDER — LANOLIN ALCOHOL/MO/W.PET/CERES
400 CREAM (GRAM) TOPICAL 2 TIMES DAILY PRN
Qty: 90 TABLET | Refills: 3 | Status: SHIPPED | OUTPATIENT
Start: 2024-02-12

## 2024-02-12 RX ORDER — AMLODIPINE BESYLATE 5 MG/1
5 TABLET ORAL DAILY
Qty: 90 TABLET | Refills: 3 | Status: SHIPPED | OUTPATIENT
Start: 2024-02-12 | End: 2024-12-16 | Stop reason: ALTCHOICE

## 2024-02-12 RX ORDER — APIXABAN 2.5 MG/1
2.5 TABLET, FILM COATED ORAL 2 TIMES DAILY
Qty: 180 TABLET | Refills: 1 | Status: SHIPPED | OUTPATIENT
Start: 2024-02-12 | End: 2024-04-10 | Stop reason: ALTCHOICE

## 2024-02-12 RX ORDER — METOPROLOL SUCCINATE 50 MG/1
50 TABLET, EXTENDED RELEASE ORAL 2 TIMES DAILY
Qty: 180 TABLET | Refills: 1 | Status: SHIPPED | OUTPATIENT
Start: 2024-02-12 | End: 2024-12-27

## 2024-02-12 RX ORDER — DOFETILIDE 0.25 MG/1
250 CAPSULE ORAL 2 TIMES DAILY
Qty: 180 CAPSULE | Refills: 1 | Status: SHIPPED | OUTPATIENT
Start: 2024-02-12 | End: 2024-09-03

## 2024-02-12 NOTE — TELEPHONE ENCOUNTER
Patient Name: Angeles Cardoza    Caller name: Angeles Cardoza    Relationship: self    Reason for call: question about magnesium medication that was ordered    Callback number: [phone]

## 2024-02-12 NOTE — TELEPHONE ENCOUNTER
Pt requested partial Eliquis 2.5 mg BID script (14 day) be sent to local pharmacy to hold her over until mail order delivery arrives.    Thanks.

## 2024-02-12 NOTE — TELEPHONE ENCOUNTER
"[facility] [place]  Medicine Refill Request      MA Notes: Pt called and requested refills.      Nurse Notes:      Last Office Visit: 9/19/2023  Last Telemedicine Visit: Visit date not found    Next Office Visit: 4/10/2024  Next Telemedicine Visit: Visit date not found     Most Recent BP Readings:  BP Readings from Last 3 Encounters:   09/19/23 120/70   09/18/23 118/72   03/09/23 120/64       Most recent Lab results:  No results found for: \"CHOL\", \"HDL\", \"TRIG\", \"NONHDLCALC\"    Lab Results   Component Value Date    AST 30 01/21/2022    ALT 25 01/21/2022       Lab Results   Component Value Date     04/23/2022    K 4.4 04/23/2022    BUN 17 04/23/2022    CREATININE 1.0 04/23/2022       Current Medications:    Current Outpatient Medications:   •  ALLERGY RELIEF, FEXOFENADINE, 180 mg tablet, Take 180 mg by mouth every morning., Disp: , Rfl:   •  amLODIPine (NORVASC) 5 mg tablet, TAKE 1 TABLET DAILY, Disp: 90 tablet, Rfl: 3  •  ascorbic acid, vitamin C, 500 mg tablet,chewable, Take by mouth daily., Disp: , Rfl:   •  atorvastatin (LIPITOR) 40 mg tablet, Take 40 mg by mouth daily.  , Disp: , Rfl:   •  cholecalciferol, vitamin D3, 1,000 unit (25 mcg) tablet, Take 2,000 Units by mouth daily. , Disp: , Rfl:   •  clobetasoL (TEMOVATE) 0.05 % external solution, as needed. APPLY TO SCALP AS NEEDED FOR FLARES, Disp: , Rfl:   •  cyanocobalamin (VITAMIN B12) 100 mcg tablet, Take 100 mcg by mouth daily., Disp: , Rfl:   •  dofetilide (TIKOSYN) 250 mcg capsule, TAKE 1 CAPSULE TWICE A DAY, Disp: 180 capsule, Rfl: 1  •  ELIQUIS 2.5 mg tablet, Take 1 tablet (2.5 mg total) by mouth 2 (two) times a day., Disp: 180 tablet, Rfl: 0  •  fluocinolone acetonide oiL 0.01 % drops, 2 (two) times a day. APPLY TWICE DAILY TO EARS FOR 2-4 WEEKS FOR ECZEMA FLARES, Disp: , Rfl:   •  FREESTYLE LITE STRIPS strip, as needed., Disp: , Rfl:   •  ketoconazole (NIZORAL) 2 % shampoo, Shampoo two to three times weekly to scalp. Keep on for 5-10 " minutes and then rinse out, Disp: , Rfl:   •  magnesium oxide (MAG-OX) 400 mg (241.3 mg magnesium) tablet, Take 1 tablet (400 mg total) by mouth 2 (two) times a day as needed (serum mag less than 2 mEq/L)., Disp: 90 tablet, Rfl: 3  •  metoprolol succinate XL (TOPROL-XL) 50 mg 24 hr tablet, TAKE 1 TABLET TWICE A DAY, Disp: 180 tablet, Rfl: 3  •  multivit-min/ferrous fumarate (MULTI VITAMIN ORAL), Take by mouth daily., Disp: , Rfl:   •  omeprazole (PriLOSEC) 40 mg capsule, Take 40 mg by mouth daily and an additional dose as needed.  , Disp: , Rfl: 3  •  triamcinolone (KENALOG) 0.1 % cream, Shampoo two to three times weekly to scalp. Keep on for 5-10 minutes and then rinse out, Disp: , Rfl:   •  venlafaxine XR (EFFEXOR-XR) 75 mg 24 hr capsule, Take 75 mg by mouth daily.  , Disp: , Rfl:

## 2024-02-12 NOTE — TELEPHONE ENCOUNTER
"[facility] at [facility] Refill Request      MA Notes: Pt called requesting refills sent to [facility].      Nurse Notes:      Last Office Visit: 3/9/2023  Last Telemedicine Visit: Visit date not found    Next Office Visit: 4/25/2024  Next Telemedicine Visit: Visit date not found     Most Recent BP Readings:  BP Readings from Last 3 Encounters:   09/19/23 120/70   09/18/23 118/72   03/09/23 120/64       Most recent Lab results:  No results found for: \"CHOL\", \"HDL\", \"TRIG\", \"NONHDLCALC\"    Lab Results   Component Value Date    AST 30 01/21/2022    ALT 25 01/21/2022       Lab Results   Component Value Date     04/23/2022    K 4.4 04/23/2022    BUN 17 04/23/2022    CREATININE 1.0 04/23/2022       Current Medications:    Current Outpatient Medications:   •  ALLERGY RELIEF, FEXOFENADINE, 180 mg tablet, Take 180 mg by mouth every morning., Disp: , Rfl:   •  amLODIPine (NORVASC) 5 mg tablet, TAKE 1 TABLET DAILY, Disp: 90 tablet, Rfl: 3  •  ascorbic acid, vitamin C, 500 mg tablet,chewable, Take by mouth daily., Disp: , Rfl:   •  atorvastatin (LIPITOR) 40 mg tablet, Take 40 mg by mouth daily.  , Disp: , Rfl:   •  cholecalciferol, vitamin D3, 1,000 unit (25 mcg) tablet, Take 2,000 Units by mouth daily. , Disp: , Rfl:   •  clobetasoL (TEMOVATE) 0.05 % external solution, as needed. APPLY TO SCALP AS NEEDED FOR FLARES, Disp: , Rfl:   •  cyanocobalamin (VITAMIN B12) 100 mcg tablet, Take 100 mcg by mouth daily., Disp: , Rfl:   •  dofetilide (TIKOSYN) 250 mcg capsule, TAKE 1 CAPSULE TWICE A DAY, Disp: 180 capsule, Rfl: 1  •  ELIQUIS 2.5 mg tablet, Take 1 tablet (2.5 mg total) by mouth 2 (two) times a day., Disp: 180 tablet, Rfl: 0  •  fluocinolone acetonide oiL 0.01 % drops, 2 (two) times a day. APPLY TWICE DAILY TO EARS FOR 2-4 WEEKS FOR ECZEMA FLARES, Disp: , Rfl:   •  FREESTYLE LITE STRIPS strip, as needed., Disp: , Rfl:   •  ketoconazole (NIZORAL) 2 % shampoo, Shampoo two to three times weekly to " scalp. Keep on for 5-10 minutes and then rinse out, Disp: , Rfl:   •  magnesium oxide (MAG-OX) 400 mg (241.3 mg magnesium) tablet, Take 1 tablet (400 mg total) by mouth 2 (two) times a day as needed (serum mag less than 2 mEq/L)., Disp: 90 tablet, Rfl: 3  •  metoprolol succinate XL (TOPROL-XL) 50 mg 24 hr tablet, TAKE 1 TABLET TWICE A DAY, Disp: 180 tablet, Rfl: 3  •  multivit-min/ferrous fumarate (MULTI VITAMIN ORAL), Take by mouth daily., Disp: , Rfl:   •  omeprazole (PriLOSEC) 40 mg capsule, Take 40 mg by mouth daily and an additional dose as needed.  , Disp: , Rfl: 3  •  triamcinolone (KENALOG) 0.1 % cream, Shampoo two to three times weekly to scalp. Keep on for 5-10 minutes and then rinse out, Disp: , Rfl:   •  venlafaxine XR (EFFEXOR-XR) 75 mg 24 hr capsule, Take 75 mg by mouth daily.  , Disp: , Rfl:

## 2024-02-12 NOTE — TELEPHONE ENCOUNTER
Partial script for 14 day supply of Eliquis 2.5 mg twice a day sent to pharmacy in [place], [place] @ pt request.

## 2024-02-12 NOTE — TELEPHONE ENCOUNTER
Pt called and requested [facility] Pt can have this sent tomorrow to hold over for 2 weeks prior to [facility] sending it out.

## 2024-02-13 NOTE — TELEPHONE ENCOUNTER
I attempted to call pt. Left . She can take OTC or prescription magnesium. Either one would be fine

## 2024-02-13 NOTE — TELEPHONE ENCOUNTER
I called patient and left message requesting a call back to discuss previous message.   Clofazimine Pregnancy And Lactation Text: This medication is Pregnancy Category C and isn't considered safe during pregnancy. It is excreted in breast milk.

## 2024-02-13 NOTE — TELEPHONE ENCOUNTER
Pt of Dr. Pizano calling about her medication, magnesium.    Per pt, she received notice that [facility] has her magnesium RX available as ordered by Dr. Pizano, but she was previously using an OTC Magnesium supplement.    I confirmed with pt that OTC magnesium supplement is 400mg. Pt reports that she takes two chewable 400mg tablets of magnesium daily.    Advised pt that the magnesium ordered by our office would provide her the same amount of magnesium, but would be covered by her insurance.    Advised pt that I would send a message to Dr. Pizano's team to confirm whether or not it would be their preference for Ms. Cardoza to use the prescription magnesium versus the OTC version she is currently using.    [phone]. Thank you.

## 2024-02-13 NOTE — TELEPHONE ENCOUNTER
Pt of Dr. Argueta calling back office.    States that short supply Rx sent to [facility] will be $250 out of pocket.    I called pharmacy and spoke to pharmacist. He confirmed that because [facility] had already filled the order sent yesterday, when he ran the short supply script it was $250 out of pocket even with his override because insurance claims it is too soon to fill med. [facility] will not get medication to Ms. Cardoza for 10 days.    I advised pharmacy that I would send a message to Dr. Boateng's team to see if we can provide a short supply of Eliquis samples to patient to cover her, as she only has three pills remaining of Eliquis.    To further complicate issues, Ms. Cardoza is leaving for [place] on Friday and does not plan to return until mid-April. I directed pt to call [facility] to let them know and see if they can rush ship her Eliquis.    Patient requesting callback to discuss at [phone]. Thank you.

## 2024-02-27 ENCOUNTER — APPOINTMENT (RX ONLY)
Dept: URBAN - METROPOLITAN AREA CLINIC 28 | Facility: CLINIC | Age: 79
Setting detail: DERMATOLOGY
End: 2024-02-27

## 2024-02-27 DIAGNOSIS — L82.1 OTHER SEBORRHEIC KERATOSIS: ICD-10-CM

## 2024-02-27 DIAGNOSIS — L85.8 OTHER SPECIFIED EPIDERMAL THICKENING: ICD-10-CM

## 2024-02-27 DIAGNOSIS — L57.0 ACTINIC KERATOSIS: ICD-10-CM

## 2024-02-27 PROCEDURE — 99213 OFFICE O/P EST LOW 20 MIN: CPT | Mod: 25

## 2024-02-27 PROCEDURE — 17000 DESTRUCT PREMALG LESION: CPT

## 2024-02-27 PROCEDURE — ? COUNSELING

## 2024-02-27 PROCEDURE — ? PHOTO-DOCUMENTATION

## 2024-02-27 PROCEDURE — 17003 DESTRUCT PREMALG LES 2-14: CPT

## 2024-02-27 PROCEDURE — ? PREMALIGNANT DESTRUCTION

## 2024-02-27 ASSESSMENT — LOCATION SIMPLE DESCRIPTION DERM
LOCATION SIMPLE: NOSE
LOCATION SIMPLE: LEFT EAR
LOCATION SIMPLE: NECK
LOCATION SIMPLE: RIGHT EAR
LOCATION SIMPLE: RIGHT NOSE

## 2024-02-27 ASSESSMENT — LOCATION DETAILED DESCRIPTION DERM
LOCATION DETAILED: LEFT POSTERIOR EAR
LOCATION DETAILED: RIGHT NASAL ALA
LOCATION DETAILED: RIGHT INFERIOR LATERAL NECK
LOCATION DETAILED: RIGHT POSTERIOR EAR
LOCATION DETAILED: RIGHT NASAL SUPRATIP

## 2024-02-27 ASSESSMENT — LOCATION ZONE DERM
LOCATION ZONE: NECK
LOCATION ZONE: NOSE
LOCATION ZONE: EAR

## 2024-02-27 NOTE — PROCEDURE: PREMALIGNANT DESTRUCTION
Anesthesia Volume In Cc: 0.5
Consent: The patient's consent was obtained including but not limited to risks of crusting, scabbing, blistering, scarring, darker or lighter pigmentary change, recurrence, incomplete removal and infection.
Post-Care Instructions: I reviewed with the patient in detail post-care instructions. Patient is to wear sunprotection, and avoid picking at any of the treated lesions. Pt may apply Vaseline to crusted or scabbing areas.
Render Post-Care In The Note: No
Method: liquid nitrogen
Detail Level: Zone

## 2024-02-27 NOTE — HPI: SKIN LESION (ACTINIC KERATOSES)
How Severe Are They?: mild
Is This A New Presentation, Or A Follow-Up?: Skin Lesions
Additional History: Patients last full body exam was October 2023.

## 2024-04-05 NOTE — PROGRESS NOTES
Cardiology  Office Progress Note         Reason for visit:   Chief Complaint   Patient presents with    Follow-up     HPI     Angeles Cardoza is a 78 y.o. female who presents to the office for cardiovascular follow up and management of HTN, Afib, SSS s/p PPM 4/20/22, nonrheumatic aortic valve insufficiency, HLD and non rheumatic mitral regurgitation.      She was last seen in the office on 9/19/23. At that visit, she reported feeling about the same Her SOB/DICKSON remained unchanged from previous reports. She was active on a daily basis without CV limitations. She noted DICKSON with stairs and shortness of breath on occasion when lying or sitting down. She had occasional palpitations that she noticed in her right jugular area. She denied any additional cardiac complaints.     I planned for her to have an echo with her next visit and made no changes to her current regimen.     [employer]tr[employer] report 11/9/23 - normal functioning pacemaker. Known brief arrhythmias.     [employer] report 2/9/24 -  EGMs illustrate AF/AFL and AF/AFL with RVR, longest on trending >2 hrs in duration. AF burden remains low 0.8%.  She is appropriately anticoagulated on Eliquis and is on metoprolol and dofetilide.  She has an upcoming visit with Dr. Pizano on 4/25/2024.  Continue to monitor.     Today she reports feeling ok. She remains active at home, performing water aerobics for 2 hours daily without CV limitations. Her DICKSON with stairs has remained the same. Her palpitations have decreased.Her home BP's have ranged 120/70's. She has been tolerating her medications without any adverse reactions. She denies any chest pain, shortness of breath at rest, lightheadedness, dizziness or syncopal events.     Past Medical History:   Diagnosis Date    Acid reflux     Atrial fibrillation (CMS/HCC)     Hypertension     Irregular heart rhythm     Lipid disorder     Sick sinus syndrome (CMS/HCC)      Past Surgical History:   Procedure Laterality Date    BREAST  SURGERY  2003    breast reduction    CARDIAC PACEMAKER PLACEMENT Left 04/12/2022    Weeblytronic    RENAL ANGIOPLASTY      TOE SURGERY Left     TONSILLECTOMY      WISDOM TOOTH EXTRACTION       Social History     Tobacco Use    Smoking status: Never    Smokeless tobacco: Never   Vaping Use    Vaping Use: Never used   Substance Use Topics    Alcohol use: Yes     Comment: occasional     Family History   Problem Relation Age of Onset    Stroke Biological Mother     Heart failure Biological Father      Allergies:  Penicillins, Penicillamine, and Ditropan [oxybutynin chloride]    Current Outpatient Medications   Medication Sig Dispense Refill    ALLERGY RELIEF, FEXOFENADINE, 180 mg tablet Take 180 mg by mouth every morning.      amLODIPine (NORVASC) 5 mg tablet Take 1 tablet (5 mg total) by mouth daily. 90 tablet 3    apixaban (ELIQUIS) 2.5 mg tablet Take 1 tablet (2.5 mg total) by mouth 2 (two) times a day. 180 tablet 3    ascorbic acid, vitamin C, 500 mg tablet,chewable Take by mouth daily.      atorvastatin (LIPITOR) 40 mg tablet Take 40 mg by mouth daily.        cholecalciferol, vitamin D3, 1,000 unit (25 mcg) tablet Take 2,000 Units by mouth daily.       clobetasoL (TEMOVATE) 0.05 % external solution as needed. APPLY TO SCALP AS NEEDED FOR FLARES      cyanocobalamin (VITAMIN B12) 100 mcg tablet Take 100 mcg by mouth daily.      dofetilide (TIKOSYN) 250 mcg capsule Take 1 capsule (250 mcg total) by mouth 2 (two) times a day. 180 capsule 1    fluocinolone acetonide oiL 0.01 % drops 2 (two) times a day. APPLY TWICE DAILY TO EARS FOR 2-4 WEEKS FOR ECZEMA FLARES      FREESTYLE LITE STRIPS strip as needed.      ketoconazole (NIZORAL) 2 % shampoo Shampoo two to three times weekly to scalp. Keep on for 5-10 minutes and then rinse out      magnesium oxide (MAG-OX) 400 mg (241.3 mg magnesium) tablet Take 1 tablet (400 mg total) by mouth 2 (two) times a day as needed (serum mag less than 2 mEq/L). 90 tablet 3    metoprolol  succinate XL (TOPROL-XL) 50 mg 24 hr tablet Take 1 tablet (50 mg total) by mouth 2 (two) times a day. 180 tablet 1    multivit-min/ferrous fumarate (MULTI VITAMIN ORAL) Take by mouth daily.      omeprazole (PriLOSEC) 40 mg capsule Take 40 mg by mouth daily and an additional dose as needed.    3    triamcinolone (KENALOG) 0.1 % cream Shampoo two to three times weekly to scalp. Keep on for 5-10 minutes and then rinse out      venlafaxine XR (EFFEXOR-XR) 75 mg 24 hr capsule Take 75 mg by mouth daily.         No current facility-administered medications for this visit.     Review of Systems   Constitutional: Negative. Negative for decreased appetite, malaise/fatigue, weight gain and weight loss.   HENT: Negative.     Eyes: Negative.    Cardiovascular:  Positive for dyspnea on exertion. Negative for chest pain, leg swelling and palpitations.   Respiratory:  Negative for cough, shortness of breath and snoring.    Endocrine: Negative.    Hematologic/Lymphatic: Negative.  Does not bruise/bleed easily.   Skin:  Negative for rash.   Musculoskeletal:  Positive for joint pain. Negative for arthritis.   Gastrointestinal: Negative.  Negative for heartburn and melena.   Genitourinary:  Positive for nocturia. Negative for hematuria.   Neurological: Negative.  Negative for dizziness and light-headedness.   Psychiatric/Behavioral:  The patient does not have insomnia.    Allergic/Immunologic: Positive for environmental allergies.   All other systems reviewed and are negative.      Objective     Vitals:    04/10/24 1144   BP: 120/72   Pulse: 68   SpO2: 96%       Wt Readings from Last 5 Encounters:   04/10/24 53.1 kg (117 lb)   04/10/24 53.1 kg (117 lb)   09/19/23 53.1 kg (117 lb)   09/18/23 53.5 kg (118 lb)   03/09/23 53.5 kg (118 lb)       Body mass index is 22.85 kg/m².    Physical Exam  Vitals and nursing note reviewed.   Constitutional:       Appearance: Normal appearance. She is well-developed.   HENT:      Head: Normocephalic and  atraumatic.   Eyes:      Conjunctiva/sclera: Conjunctivae normal.      Pupils: Pupils are equal, round, and reactive to light.   Neck:      Vascular: No carotid bruit.   Cardiovascular:      Rate and Rhythm: Normal rate and regular rhythm.      Pulses: Normal pulses and intact distal pulses.           Carotid pulses are 2+ on the right side and 2+ on the left side.       Radial pulses are 2+ on the right side and 2+ on the left side.      Heart sounds: S1 normal and S2 normal. Murmur (II/VI ROGER at base.) heard.      No friction rub. No gallop. No S3 or S4 sounds.   Pulmonary:      Effort: Pulmonary effort is normal. No respiratory distress.      Breath sounds: Normal breath sounds. No stridor. No wheezing, rhonchi or rales.      Comments: Pacer in left chest  Chest:      Chest wall: No tenderness.   Abdominal:      General: Abdomen is flat. Bowel sounds are normal. There is no distension.      Palpations: Abdomen is soft. There is no mass.      Tenderness: There is no abdominal tenderness. There is no guarding or rebound.      Hernia: No hernia is present.   Musculoskeletal:         General: Normal range of motion.      Cervical back: Normal range of motion and neck supple.      Right lower leg: No edema.      Left lower leg: No edema.   Skin:     General: Skin is warm and dry.      Findings: No bruising or rash.   Neurological:      General: No focal deficit present.      Mental Status: She is alert and oriented to person, place, and time. Mental status is at baseline.      Deep Tendon Reflexes: Reflexes are normal and symmetric.   Psychiatric:         Mood and Affect: Mood normal.         Speech: Speech normal.         Behavior: Behavior normal.         Thought Content: Thought content normal.         Judgment: Judgment normal.       ECG   I have independently reviewed the patient's ECG results.  Significant abnormals are :.  Atrial-paced rhythm   Abnormal ECG   When compared with ECG of 19-SEP-2023 11:09,  "  Premature atrial complexes are no longer Present   Confirmed by Gwyn Boateng ([id number]) on 4/10/2024 12:10:34 PM     Hematology  Lab Results   Component Value Date    WBC 7.90 04/23/2022    HGB 13.2 04/23/2022    HCT 41.2 04/23/2022     (H) 04/23/2022    INR 0.9 11/13/2016       Chemistries  Lab Results   Component Value Date     04/23/2022    K 4.4 04/23/2022     04/23/2022    CREATININE 1.0 04/23/2022    BUN 17 04/23/2022    CO2 24 04/23/2022    GLUCOSE 94 04/23/2022    CALCIUM 9.2 04/23/2022    MG 2.1 04/23/2022    ALT 25 01/21/2022    AST 30 01/21/2022     Cholesterol  No results found for: \"CHOL\", \"TRIG\", \"HDL\", \"LDLCALC\", \"NONHDLCALC\"    Endocrine  Lab Results   Component Value Date    TSH 2.05 01/21/2022     Cardiac Imaging    ECHOCARDIOGRAM - EXTERNAL RESULT     Narrative  Ordered by an unspecified provider.    Assessment/Plan     Essential hypertension  - target BP < 130/80  .  - Currently at goal  - Continue Amlodipine 5mg q day  - Continue Toprol XL 50mg BID.     Pacemaker  - Status post dual-chamber Medtronic PPM implant on 4/12/2022 for SSS/Tachybrady syndrome  - Continue f/u and management with Dr. Pizano    Persistent atrial fibrillation (CMS/HCC)  - CHADS VASC 4  - Persistent  .  - PAF episodes typically last seconds to minutes for which she thinks she is asymptomatic.  - Continue Eliquis 2.5mg BID  - Continue Tikosyn 250mcg BID  - Continue Toprol XL BID  - The patient denies any evidence or signs/sx suggestive of bleeding.   - Continue f/u and management with Dr. Pizano    Valvular heart disease  - Echo 5/2022: EF 65% Mild-moderate MR. Mild AI. Mild TR.   - Echo 4/10/24: EF 60-65% Mild MR. Mild AI. Mild-moderate TR.   .  - Continue same meds    SOB (shortness of breath)  - This is happening intermittently  - It doesn't occur with activity usually, as she is fine at water aerobic, but will notice it going up the stairs or just sitting around doing nothing.   - it doesn't " appear to be from a primary cardiac etiology. No evidence to suggest this is correlating with arrhythmias at this time.   - continue current Tx.     Mixed hyperlipidemia  - Target LDL < 100  .  - Continue Atorvastatin 40mg q pm     Follow Up Plans:  Return in about 6 months (around 10/10/2024).          I, Dre Morales, am scribing for, and in the presence of, Gwyn Boateng DO.    IGwyn DO, personally performed the services described in this documentation as scribed by Dre Morales in my presence, and it is both accurate and complete.       Gwyn Boateng DO  4/10/2024

## 2024-04-09 NOTE — ASSESSMENT & PLAN NOTE
- This is happening intermittently  - It doesn't occur with activity usually, as she is fine at water aerobic, but will notice it going up the stairs or just sitting around doing nothing.   - it doesn't appear to be from a primary cardiac etiology. No evidence to suggest this is correlating with arrhythmias at this time.   - continue current Tx.

## 2024-04-09 NOTE — ASSESSMENT & PLAN NOTE
- Echo 5/2022: EF 65% Mild-moderate MR. Mild AI. Mild TR.   - Echo 4/10/24: EF 60-65% Mild MR. Mild AI. Mild-moderate TR.   .  - Continue same meds

## 2024-04-10 ENCOUNTER — OFFICE VISIT (OUTPATIENT)
Dept: CARDIOLOGY | Facility: CLINIC | Age: 79
End: 2024-04-10
Payer: MEDICARE

## 2024-04-10 ENCOUNTER — HOSPITAL ENCOUNTER (OUTPATIENT)
Dept: CARDIOLOGY | Facility: CLINIC | Age: 79
Discharge: HOME | End: 2024-04-10
Attending: INTERNAL MEDICINE
Payer: MEDICARE

## 2024-04-10 VITALS
DIASTOLIC BLOOD PRESSURE: 76 MMHG | HEIGHT: 60 IN | SYSTOLIC BLOOD PRESSURE: 120 MMHG | BODY MASS INDEX: 22.97 KG/M2 | WEIGHT: 117 LBS

## 2024-04-10 VITALS
OXYGEN SATURATION: 96 % | BODY MASS INDEX: 22.97 KG/M2 | WEIGHT: 117 LBS | HEIGHT: 60 IN | HEART RATE: 68 BPM | SYSTOLIC BLOOD PRESSURE: 120 MMHG | DIASTOLIC BLOOD PRESSURE: 72 MMHG

## 2024-04-10 DIAGNOSIS — I10 ESSENTIAL HYPERTENSION: Primary | Chronic | ICD-10-CM

## 2024-04-10 DIAGNOSIS — R06.02 SOB (SHORTNESS OF BREATH): ICD-10-CM

## 2024-04-10 DIAGNOSIS — I38 VALVULAR HEART DISEASE: Chronic | ICD-10-CM

## 2024-04-10 DIAGNOSIS — I48.19 PERSISTENT ATRIAL FIBRILLATION (CMS/HCC): Chronic | ICD-10-CM

## 2024-04-10 DIAGNOSIS — E78.2 MIXED HYPERLIPIDEMIA: Chronic | ICD-10-CM

## 2024-04-10 DIAGNOSIS — Z95.0 PACEMAKER: Chronic | ICD-10-CM

## 2024-04-10 LAB
AORTIC ROOT ANNULUS: 3.1 CM
AORTIC VALVE MEAN VELOCITY: 1.33 M/S
AORTIC VALVE VELOCITY TIME INTEGRAL: 43.4 CM
ASCENDING AORTA: 3.2 CM
ATRIAL RATE: 68
AV MEAN GRADIENT: 8 MMHG
AV PEAK GRADIENT: 15 MMHG
AV PEAK VELOCITY-S: 1.93 M/S
AV REG PEAK VEL: 4.99 M/S
AV REGURGITATION PRESSURE HALF TIME: 437 MS
AV VALVE AREA INDEX: 1.13
AV VALVE AREA: 1.23 CM2
AV VELOCITY RATIO: 0.67
AVA (VTI): 1.7 CM2
BSA FOR ECHO PROCEDURE: 1.5 M2
CUSP SEPARATION: 2.2 CM
DOP CALC LVOT STROKE VOLUME: 73.76 CM3
E WAVE DECELERATION TIME: 167 MS
E/A RATIO: 1
E/E' RATIO: 6.9
E/LAT E' RATIO: 7.2
EDV (BP): 45.2 CM3
EF (A4C): 61.6 %
EF A2C: 59.7 %
EJECTION FRACTION: 60.6 %
EST RIGHT VENT SYSTOLIC PRESSURE BY TRICUSPID REGURGITATION JET: 37 MMHG
ESV (BP): 17.8 CM3
FRACTIONAL SHORTENING: 31.09 %
INTERVENTRICULAR SEPTUM: 1.21 CM
LA ESV (BP): 62.7 CM3
LA ESV INDEX (A2C): 41.47 CM3/M2
LA ESV INDEX (BP): 41.8 CM3/M2
LA/AORTA RATIO: 1.35
LAAS-AP2: 20.8 CM2
LAAS-AP4: 21.2 CM2
LAD 2D: 4.2 CM
LALD A4C: 5.62 CM
LALD A4C: 5.64 CM
LAV-S: 62.2 CM3
LEFT ATRIUM VOLUME INDEX: 42.13 CM3/M2
LEFT ATRIUM VOLUME: 63.2 CM3
LEFT INTERNAL DIMENSION IN SYSTOLE: 2.15 CM (ref 2.23–3.38)
LEFT VENTRICLE DIASTOLIC VOLUME INDEX: 32 CM3/M2
LEFT VENTRICLE DIASTOLIC VOLUME: 48 CM3
LEFT VENTRICLE SYSTOLIC VOLUME INDEX: 12.27 CM3/M2
LEFT VENTRICLE SYSTOLIC VOLUME: 18.4 CM3
LEFT VENTRICULAR INTERNAL DIMENSION IN DIASTOLE: 3.12 CM (ref 3.75–5.21)
LEFT VENTRICULAR POSTERIOR WALL IN END DIASTOLE: 0.99 CM (ref 0.48–0.89)
LV DIASTOLIC VOLUME: 42.7 CM3
LV ESV (APICAL 2 CHAMBER): 17.2 CM3
LVAD-AP2: 18.8 CM2
LVAD-AP4: 20.3 CM2
LVAS-AP2: 11 CM2
LVAS-AP4: 11.4 CM2
LVEDVI(A2C): 28.47 CM3/M2
LVEDVI(BP): 30.13 CM3/M2
LVESVI(A2C): 11.47 CM3/M2
LVESVI(BP): 11.87 CM3/M2
LVLD-AP2: 7.05 CM
LVLD-AP4: 7.02 CM
LVLS-AP2: 6.2 CM
LVLS-AP4: 6.24 CM
LVOT 2D: 1.8 CM
LVOT A: 2.54 CM2
LVOT MG: 3 MMHG
LVOT MV: 0.8 M/S
LVOT PEAK VELOCITY: 1.19 M/S
LVOT PG: 6 MMHG
LVOT STROKE VOLUME INDEX: 49.17 ML/M2
LVOT VTI: 29 CM
MITRAL VALVE MEAN INFLOW VELOCITY: 4.65 M/S
MLH CV ECHO AVA INDEX VELOCITY RATIO: 0.8
MR FLOW: 0.39 ML/S
MR PISA EROA: 0.07 CM2
MR VELOCITY: 5.86 M/S
MR VTI: 217 CM
MV D: 2.4 CM
MV E'TISSUE VEL-LAT: 0.09 M/S
MV E'TISSUE VEL-MED: 0.1 M/S
MV MEAN GRADIENT: 1 MMHG
MV PEAK A VEL: 0.65 M/S
MV PEAK E VEL: 0.67 M/S
MV PEAK GRADIENT: 4 MMHG
MV REGURGITANT FRACTION: 16 %
MV REGURGITANT VOLUME: 14.51 CM3
MV STENOSIS PRESSURE HALF TIME: 49 MS
MV VALVE AREA BY CONTINUITY EQUATION: 3.62 CM2
MV VALVE AREA P 1/2 METHOD: 4.49 CM2
MV VTI: 20.4 CM
MVA (PISA): 1 CM2
PISA ALIAS VEL MV: 0.39 M/S
PISA RADIUS: 0.4 CM
POSTERIOR WALL: 0.99 CM
PR INTERVAL: 186
PULMONARY REGURGITATION LATE DIASTOLIC VELOCITY: 1.15 M/S
PV PEAK GRADIENT: 2 MMHG
PV PV: 0.69 M/S
QRS DURATION: 74
QT INTERVAL: 448
QTC CALCULATION(BAZETT): 476
R AXIS: 57
RAP: 3 MMHG
RVOT VMAX: 0.63 M/S
RVOT VTI: 13.4 CM
SEPTAL TISSUE DOPPLER FREE WALL LATE DIA VELOCITY (APICAL 4 CHAMBER VIEW): 0.14 M/S
T WAVE AXIS: 50
TR MAX PG: 34.34 MMHG
TRICUSPID VALVE PEAK REGURGITATION VELOCITY: 2.93 M/S
VENTRICULAR RATE: 68
Z-SCORE OF LEFT VENTRICULAR DIMENSION IN END DIASTOLE: -3.49
Z-SCORE OF LEFT VENTRICULAR DIMENSION IN END SYSTOLE: -1.93
Z-SCORE OF LEFT VENTRICULAR POSTERIOR WALL IN END DIASTOLE: 2.2

## 2024-04-10 PROCEDURE — G8752 SYS BP LESS 140: HCPCS | Performed by: INTERNAL MEDICINE

## 2024-04-10 PROCEDURE — G8754 DIAS BP LESS 90: HCPCS | Performed by: INTERNAL MEDICINE

## 2024-04-10 PROCEDURE — 93000 ELECTROCARDIOGRAM COMPLETE: CPT | Performed by: INTERNAL MEDICINE

## 2024-04-10 PROCEDURE — 93306 TTE W/DOPPLER COMPLETE: CPT | Performed by: INTERNAL MEDICINE

## 2024-04-10 PROCEDURE — 99214 OFFICE O/P EST MOD 30 MIN: CPT | Performed by: INTERNAL MEDICINE

## 2024-04-10 ASSESSMENT — ENCOUNTER SYMPTOMS
HEMATOLOGIC/LYMPHATIC NEGATIVE: 1
HEMATURIA: 0
DECREASED APPETITE: 0
LIGHT-HEADEDNESS: 0
WEIGHT GAIN: 0
HEARTBURN: 0
SHORTNESS OF BREATH: 0
EYES NEGATIVE: 1
COUGH: 0
INSOMNIA: 0
GASTROINTESTINAL NEGATIVE: 1
CONSTITUTIONAL NEGATIVE: 1
BRUISES/BLEEDS EASILY: 0
DYSPNEA ON EXERTION: 1
PALPITATIONS: 0
ENDOCRINE NEGATIVE: 1
NEUROLOGICAL NEGATIVE: 1
DIZZINESS: 0
WEIGHT LOSS: 0
SNORING: 0

## 2024-04-10 NOTE — LETTER
April 10, 2024     José Antonio REAGAN DO  [address]  [place] [facility] [address]    Patient: Angeles Cardoza  YOB: 1945  Date of Visit: 4/10/2024      Dear Dr. Leroy:    Thank you for referring Angeles Cardoza to me for evaluation. Below are my notes for this consultation.    If you have questions, please do not hesitate to call me. I look forward to following your patient along with you.         Sincerely,        Gwyn Boateng,         CC: MD Tasneem Montes De Oca William N, DO  4/10/2024 12:24 PM  Sign when Signing Visit       Cardiology  Office Progress Note         Reason for visit:   Chief Complaint   Patient presents with   • Follow-up     HPI    Angeles Cardoza is a 78 y.o. female who presents to the office for cardiovascular follow up and management of HTN, Afib, SSS s/p PPM 4/20/22, nonrheumatic aortic valve insufficiency, HLD and non rheumatic mitral regurgitation.      She was last seen in the office on 9/19/23. At that visit, she reported feeling about the same Her SOB/DICKSON remained unchanged from previous reports. She was active on a daily basis without CV limitations. She noted DICKSON with stairs and shortness of breath on occasion when lying or sitting down. She had occasional palpitations that she noticed in her right jugular area. She denied any additional cardiac complaints.     I planned for her to have an echo with her next visit and made no changes to her current regimen.     Medtronic report 11/9/23 - normal functioning pacemaker. Known brief arrhythmias.     [employer] report 2/9/24 -  EGMs illustrate AF/AFL and AF/AFL with RVR, longest on trending >2 hrs in duration. AF burden remains low 0.8%.  She is appropriately anticoagulated on Eliquis and is on metoprolol and dofetilide.  She has an upcoming visit with Dr. Pizano on 4/25/2024.  Continue to monitor.     Today she reports feeling ok. She remains active at home, performing water aerobics for 2 hours daily  without CV limitations. Her DICKSON with stairs has remained the same. Her palpitations have decreased.Her home BP's have ranged 120/70's. She has been tolerating her medications without any adverse reactions. She denies any chest pain, shortness of breath at rest, lightheadedness, dizziness or syncopal events.     Past Medical History:   Diagnosis Date   • Acid reflux    • Atrial fibrillation (CMS/HCC)    • Hypertension    • Irregular heart rhythm    • Lipid disorder    • Sick sinus syndrome (CMS/HCC)      Past Surgical History:   Procedure Laterality Date   • BREAST SURGERY  2003    breast reduction   • CARDIAC PACEMAKER PLACEMENT Left 04/12/2022    Medtronic   • RENAL ANGIOPLASTY     • TOE SURGERY Left    • TONSILLECTOMY     • WISDOM TOOTH EXTRACTION       Social History     Tobacco Use   • Smoking status: Never   • Smokeless tobacco: Never   Vaping Use   • Vaping Use: Never used   Substance Use Topics   • Alcohol use: Yes     Comment: occasional     Family History   Problem Relation Age of Onset   • Stroke Biological Mother    • Heart failure Biological Father      Allergies:  Penicillins, Penicillamine, and Ditropan [oxybutynin chloride]    Current Outpatient Medications   Medication Sig Dispense Refill   • ALLERGY RELIEF, FEXOFENADINE, 180 mg tablet Take 180 mg by mouth every morning.     • amLODIPine (NORVASC) 5 mg tablet Take 1 tablet (5 mg total) by mouth daily. 90 tablet 3   • apixaban (ELIQUIS) 2.5 mg tablet Take 1 tablet (2.5 mg total) by mouth 2 (two) times a day. 180 tablet 3   • ascorbic acid, vitamin C, 500 mg tablet,chewable Take by mouth daily.     • atorvastatin (LIPITOR) 40 mg tablet Take 40 mg by mouth daily.       • cholecalciferol, vitamin D3, 1,000 unit (25 mcg) tablet Take 2,000 Units by mouth daily.      • clobetasoL (TEMOVATE) 0.05 % external solution as needed. APPLY TO SCALP AS NEEDED FOR FLARES     • cyanocobalamin (VITAMIN B12) 100 mcg tablet Take 100 mcg by mouth daily.     • dofetilide  (TIKOSYN) 250 mcg capsule Take 1 capsule (250 mcg total) by mouth 2 (two) times a day. 180 capsule 1   • fluocinolone acetonide oiL 0.01 % drops 2 (two) times a day. APPLY TWICE DAILY TO EARS FOR 2-4 WEEKS FOR ECZEMA FLARES     • FREESTYLE LITE STRIPS strip as needed.     • ketoconazole (NIZORAL) 2 % shampoo Shampoo two to three times weekly to scalp. Keep on for 5-10 minutes and then rinse out     • magnesium oxide (MAG-OX) 400 mg (241.3 mg magnesium) tablet Take 1 tablet (400 mg total) by mouth 2 (two) times a day as needed (serum mag less than 2 mEq/L). 90 tablet 3   • metoprolol succinate XL (TOPROL-XL) 50 mg 24 hr tablet Take 1 tablet (50 mg total) by mouth 2 (two) times a day. 180 tablet 1   • multivit-min/ferrous fumarate (MULTI VITAMIN ORAL) Take by mouth daily.     • omeprazole (PriLOSEC) 40 mg capsule Take 40 mg by mouth daily and an additional dose as needed.    3   • triamcinolone (KENALOG) 0.1 % cream Shampoo two to three times weekly to scalp. Keep on for 5-10 minutes and then rinse out     • venlafaxine XR (EFFEXOR-XR) 75 mg 24 hr capsule Take 75 mg by mouth daily.         No current facility-administered medications for this visit.     Review of Systems   Constitutional: Negative. Negative for decreased appetite, malaise/fatigue, weight gain and weight loss.   HENT: Negative.     Eyes: Negative.    Cardiovascular:  Positive for dyspnea on exertion. Negative for chest pain, leg swelling and palpitations.   Respiratory:  Negative for cough, shortness of breath and snoring.    Endocrine: Negative.    Hematologic/Lymphatic: Negative.  Does not bruise/bleed easily.   Skin:  Negative for rash.   Musculoskeletal:  Positive for joint pain. Negative for arthritis.   Gastrointestinal: Negative.  Negative for heartburn and melena.   Genitourinary:  Positive for nocturia. Negative for hematuria.   Neurological: Negative.  Negative for dizziness and light-headedness.   Psychiatric/Behavioral:  The patient does  not have insomnia.    Allergic/Immunologic: Positive for environmental allergies.   All other systems reviewed and are negative.      Objective    Vitals:    04/10/24 1144   BP: 120/72   Pulse: 68   SpO2: 96%       Wt Readings from Last 5 Encounters:   04/10/24 53.1 kg (117 lb)   04/10/24 53.1 kg (117 lb)   09/19/23 53.1 kg (117 lb)   09/18/23 53.5 kg (118 lb)   03/09/23 53.5 kg (118 lb)       Body mass index is 22.85 kg/m².    Physical Exam  Vitals and nursing note reviewed.   Constitutional:       Appearance: Normal appearance. She is well-developed.   HENT:      Head: Normocephalic and atraumatic.   Eyes:      Conjunctiva/sclera: Conjunctivae normal.      Pupils: Pupils are equal, round, and reactive to light.   Neck:      Vascular: No carotid bruit.   Cardiovascular:      Rate and Rhythm: Normal rate and regular rhythm.      Pulses: Normal pulses and intact distal pulses.           Carotid pulses are 2+ on the right side and 2+ on the left side.       Radial pulses are 2+ on the right side and 2+ on the left side.      Heart sounds: S1 normal and S2 normal. Murmur (II/VI ROGER at base.) heard.      No friction rub. No gallop. No S3 or S4 sounds.   Pulmonary:      Effort: Pulmonary effort is normal. No respiratory distress.      Breath sounds: Normal breath sounds. No stridor. No wheezing, rhonchi or rales.      Comments: Pacer in left chest  Chest:      Chest wall: No tenderness.   Abdominal:      General: Abdomen is flat. Bowel sounds are normal. There is no distension.      Palpations: Abdomen is soft. There is no mass.      Tenderness: There is no abdominal tenderness. There is no guarding or rebound.      Hernia: No hernia is present.   Musculoskeletal:         General: Normal range of motion.      Cervical back: Normal range of motion and neck supple.      Right lower leg: No edema.      Left lower leg: No edema.   Skin:     General: Skin is warm and dry.      Findings: No bruising or rash.   Neurological:     "  General: No focal deficit present.      Mental Status: She is alert and oriented to person, place, and time. Mental status is at baseline.      Deep Tendon Reflexes: Reflexes are normal and symmetric.   Psychiatric:         Mood and Affect: Mood normal.         Speech: Speech normal.         Behavior: Behavior normal.         Thought Content: Thought content normal.         Judgment: Judgment normal.       ECG   I have independently reviewed the patient's ECG results.  Significant abnormals are :.  Atrial-paced rhythm   Abnormal ECG   When compared with ECG of 19-SEP-2023 11:09,   Premature atrial complexes are no longer Present   Confirmed by Gwyn Boateng ([id number]) on 4/10/2024 12:10:34 PM     Hematology  Lab Results   Component Value Date    WBC 7.90 04/23/2022    HGB 13.2 04/23/2022    HCT 41.2 04/23/2022     (H) 04/23/2022    INR 0.9 11/13/2016       Chemistries  Lab Results   Component Value Date     04/23/2022    K 4.4 04/23/2022     04/23/2022    CREATININE 1.0 04/23/2022    BUN 17 04/23/2022    CO2 24 04/23/2022    GLUCOSE 94 04/23/2022    CALCIUM 9.2 04/23/2022    MG 2.1 04/23/2022    ALT 25 01/21/2022    AST 30 01/21/2022     Cholesterol  No results found for: \"CHOL\", \"TRIG\", \"HDL\", \"LDLCALC\", \"NONHDLCALC\"    Endocrine  Lab Results   Component Value Date    TSH 2.05 01/21/2022     Cardiac Imaging    ECHOCARDIOGRAM - EXTERNAL RESULT     Narrative  Ordered by an unspecified provider.    Assessment/Plan    Essential hypertension  - target BP < 130/80  .  - Currently at goal  - Continue Amlodipine 5mg q day  - Continue Toprol XL 50mg BID.     Pacemaker  - Status post dual-chamber Medtronic PPM implant on 4/12/2022 for SSS/Tachybrady syndrome  - Continue f/u and management with Dr. Pizano    Persistent atrial fibrillation (CMS/HCC)  - CHADS VASC 4  - Persistent  .  - PAF episodes typically last seconds to minutes for which she thinks she is asymptomatic.  - Continue Eliquis 2.5mg BID  - " Continue Tikosyn 250mcg BID  - Continue Toprol XL BID  - The patient denies any evidence or signs/sx suggestive of bleeding.   - Continue f/u and management with Dr. Pizano    Valvular heart disease  - Echo 5/2022: EF 65% Mild-moderate MR. Mild AI. Mild TR.   - Echo 4/10/24: EF 60-65% Mild MR. Mild AI. Mild-moderate TR.   .  - Continue same meds    SOB (shortness of breath)  - This is happening intermittently  - It doesn't occur with activity usually, as she is fine at water aerobic, but will notice it going up the stairs or just sitting around doing nothing.   - it doesn't appear to be from a primary cardiac etiology. No evidence to suggest this is correlating with arrhythmias at this time.   - continue current Tx.     Mixed hyperlipidemia  - Target LDL < 100  .  - Continue Atorvastatin 40mg q pm     Follow Up Plans:  Return in about 6 months (around 10/10/2024).          I, Dre Morales, am scribing for, and in the presence of, Gwyn Boateng DO.    I, Gwyn Boateng DO, personally performed the services described in this documentation as scribed by Dre Morales in my presence, and it is both accurate and complete.       Gwyn Boateng DO  4/10/2024

## 2024-04-23 ENCOUNTER — APPOINTMENT (RX ONLY)
Dept: URBAN - METROPOLITAN AREA CLINIC 28 | Facility: CLINIC | Age: 79
Setting detail: DERMATOLOGY
End: 2024-04-23

## 2024-04-23 DIAGNOSIS — L57.0 ACTINIC KERATOSIS: ICD-10-CM

## 2024-04-23 DIAGNOSIS — L85.8 OTHER SPECIFIED EPIDERMAL THICKENING: ICD-10-CM | Status: STABLE

## 2024-04-23 DIAGNOSIS — L82.1 OTHER SEBORRHEIC KERATOSIS: ICD-10-CM

## 2024-04-23 PROBLEM — D48.5 NEOPLASM OF UNCERTAIN BEHAVIOR OF SKIN: Status: ACTIVE | Noted: 2024-04-23

## 2024-04-23 PROCEDURE — ? COUNSELING

## 2024-04-23 PROCEDURE — 99212 OFFICE O/P EST SF 10 MIN: CPT | Mod: 25

## 2024-04-23 PROCEDURE — 11102 TANGNTL BX SKIN SINGLE LES: CPT

## 2024-04-23 PROCEDURE — ? BIOPSY BY SHAVE METHOD

## 2024-04-23 ASSESSMENT — LOCATION DETAILED DESCRIPTION DERM
LOCATION DETAILED: RIGHT NASAL ALA
LOCATION DETAILED: SUPERIOR THORACIC SPINE
LOCATION DETAILED: LEFT PROXIMAL CALF
LOCATION DETAILED: RIGHT PROXIMAL CALF
LOCATION DETAILED: RIGHT POSTERIOR EAR
LOCATION DETAILED: LEFT POSTERIOR EAR
LOCATION DETAILED: EPIGASTRIC SKIN

## 2024-04-23 ASSESSMENT — LOCATION ZONE DERM
LOCATION ZONE: LEG
LOCATION ZONE: TRUNK
LOCATION ZONE: NOSE
LOCATION ZONE: EAR

## 2024-04-23 ASSESSMENT — LOCATION SIMPLE DESCRIPTION DERM
LOCATION SIMPLE: LEFT EAR
LOCATION SIMPLE: LEFT CALF
LOCATION SIMPLE: RIGHT EAR
LOCATION SIMPLE: ABDOMEN
LOCATION SIMPLE: RIGHT CALF
LOCATION SIMPLE: RIGHT NOSE
LOCATION SIMPLE: UPPER BACK

## 2024-04-23 NOTE — PROGRESS NOTES
Electrophysiology         Reason for visit: atrial fibrillation  Referred by :Dr Boateng     History of Present Illness:  Ms. Angeles Cardoza is a 76 year old female with a past medical history significant for hyperlipidemia, hypertension, and a paroxymal atrial fibrillation with long conversion pauses s/p implantation of Medtronic dual chamber pacemaker on 4/12/2022 and on Tikosyn 250 mcg BID who is here for follow up.     Since last OV, she has done well. She continues to have short episodes of PAF for which she is asymptomatic. She continues to take Metoprolol 75 mg daily. She has been compliant with Tikosyn and Eliquis.     Brief EP history:  - pAF in Nov 2021 during hospitalization for acute diverticulits. Started on Eliquis 5 mg BID. Started on amiodarone   - Could not tolerate amiodarone due to NV. Transitioned to Flecainide 50 mg BID.   - MCOT in early 2022, 40% burden of AF with frequent long conversion pauses (some symptomatic).   - Flecainide was stopped.   - dcPPM implantation on 4/12/2022 and started on Tikosyn 250 mcg BID.  -9.18.23 sees JYOTI Arteaga and is doing well. No changes were made to his regimen and device is functioning normally  -Medtronic report 2/9/24 -  EGMs showed  AF/AFL and AF/AFL with RVR, longest on trending >2 hrs in duration but AF burden 1%. no changes made    A comprehensive 15-point review of systems was performed and was negative unless specifically mentioned in the History of Present Illness.        Past Medical History:   Diagnosis Date    Acid reflux     Atrial fibrillation (CMS/HCC)     Hypertension     Irregular heart rhythm     Lipid disorder     Sick sinus syndrome (CMS/HCC)        Past Surgical History:   Procedure Laterality Date    BREAST SURGERY  2003    breast reduction    CARDIAC PACEMAKER PLACEMENT Left 04/12/2022    Medtronic    RENAL ANGIOPLASTY      TOE SURGERY Left     TONSILLECTOMY      WISDOM TOOTH EXTRACTION         Current Outpatient Medications on  File Prior to Visit   Medication Sig Dispense Refill    ALLERGY RELIEF, FEXOFENADINE, 180 mg tablet Take 180 mg by mouth every morning.      amLODIPine (NORVASC) 5 mg tablet Take 1 tablet (5 mg total) by mouth daily. 90 tablet 3    apixaban (ELIQUIS) 2.5 mg tablet Take 1 tablet (2.5 mg total) by mouth 2 (two) times a day. 180 tablet 3    ascorbic acid, vitamin C, 500 mg tablet,chewable Take by mouth daily.      atorvastatin (LIPITOR) 40 mg tablet Take 40 mg by mouth daily.        calcium acetate,phosphat bind, (PHOSLO) 667 mg capsule Take 1,334 mg by mouth once.      cholecalciferol, vitamin D3, 1,000 unit (25 mcg) tablet Take 2,000 Units by mouth daily.       clobetasoL (TEMOVATE) 0.05 % external solution as needed. APPLY TO SCALP AS NEEDED FOR FLARES      cyanocobalamin (VITAMIN B12) 100 mcg tablet Take 100 mcg by mouth daily.      dofetilide (TIKOSYN) 250 mcg capsule Take 1 capsule (250 mcg total) by mouth 2 (two) times a day. 180 capsule 1    fluocinolone acetonide oiL 0.01 % drops 2 (two) times a day. APPLY TWICE DAILY TO EARS FOR 2-4 WEEKS FOR ECZEMA FLARES      FREESTYLE LITE STRIPS strip as needed.      ketoconazole (NIZORAL) 2 % shampoo Shampoo two to three times weekly to scalp. Keep on for 5-10 minutes and then rinse out      magnesium oxide (MAG-OX) 400 mg (241.3 mg magnesium) tablet Take 1 tablet (400 mg total) by mouth 2 (two) times a day as needed (serum mag less than 2 mEq/L). 90 tablet 3    metoprolol succinate XL (TOPROL-XL) 50 mg 24 hr tablet Take 1 tablet (50 mg total) by mouth 2 (two) times a day. 180 tablet 1    omeprazole (PriLOSEC) 40 mg capsule Take 40 mg by mouth daily and an additional dose as needed.    3    triamcinolone (KENALOG) 0.1 % cream Shampoo two to three times weekly to scalp. Keep on for 5-10 minutes and then rinse out      venlafaxine XR (EFFEXOR-XR) 75 mg 24 hr capsule Take 75 mg by mouth daily.        multivit-min/ferrous fumarate (MULTI VITAMIN ORAL) Take by mouth daily.        No current facility-administered medications on file prior to visit.       Allergies, Social History and Family History were reviewed and updated in EMR.     Physical Examination:  Vitals:    04/25/24 1531   BP: 130/72   Pulse: 65   Constitutional: The patient appeared physically well and in no acute distress.  Respiratory: Clear to auscultation, no wheezes or rubs.  Cardiovascular: A comprehensive cardiovascular examination was performed. Highlights include normal jugular venous pressure, 2+ carotids, no bruits. The precordium is quiet. Regular rhythm, rate, S1 and S2 normal, no rubs, or gallops were appreciated. Murmurs: No pathologic murmurs appreciated.  Musculoskeletal/ Extremities: No edema, normal peripheral pulses.  Skin: No rashes or lesions apparent.       Lab Results   Component Value Date    WBC 7.90 04/23/2022    HGB 13.2 04/23/2022    HCT 41.2 04/23/2022     (H) 04/23/2022    ALT 25 01/21/2022    AST 30 01/21/2022     04/23/2022    K 4.4 04/23/2022    CREATININE 1.0 04/23/2022    BUN 17 04/23/2022    TSH 2.05 01/21/2022    INR 0.9 11/13/2016       Cardiac Imaging    ECHOCARDIOGRAM - 5/2/2022  Tricuspid valve structure is grossly normal. Mild tricuspid valve regurgitation. The regurgitation jet is central.  Normal-sized LV. Normal LV systolic function. Estimated EF 65%. Normal LV septal wall motion. No regional wall motion abnormalities. Normal LV wall thickness.  Normal-sized RV. Normal RV systolic function. Pacer wire present in the RV. Normal RV wall thickness.  Mildly dilated LA. No patent foramen ovale present as seen in the LA. Intact LA septum present.  Normal-sized RA. Pacemaker wire present in the RA.  Aortic root sclerotic. Sinuses of Valsalva sclerotic. Ascending aorta sclerotic. Aortic arch grossly normal.  Tricuspid aortic valve. Sclerotic aortic valve leaflets. Mild aortic valve regurgitation. Mild aortic valve stenosis.  Sclerotic mitral valve.  Mild to moderate mitral  valve regurgitation. The jet is centrally directed.  Pulmonic valve not well visualized.  IVC is a small caliber vessel (<1.7cm). IVC collapses >50% during inspiration.  Normal pericardial structure. No evidence of pericardial effusion. No cardiac tamponade.     Dual Chamber Pacemaker Evaluation     Site Evaluation: The site of implantation in the upper left chest shows a well healed scar and is without ecchymosis, redness, drainage or hematoma. The skin is mobile over the device. The patient denies pain at the implantation site.     Device Information  Device :            MedCovestor  Device Model:                        [id number]  Date of Implant:            April 12, 2022  Last session: sep 18 23     Battery Data:     Estimated time to replacement:  11.6 years     Rhythm Data:  Underlying Rhythm:            Sinus bradycardia, HR in the 50s   High Rate Episodes:            0  Ventricular High Rate                        0  AF burden noted to be 1%. Longest episode noted to be approx 1day       Lead Performance Data:     RA                   RV  Lead Impedance (ohms)            456                  475  Sensing (mV)                           3                    19  Capture (V@ms)                        0.75 @ 0.4            0.75 @ 0.4  % Pacing                                    92.7 %                    0.2 %     Final Programmed Values:  Mode:                                                AAI <-> DDD (MVP/RhythmIQ.AAI-Safe Mode)  Paced Lower-Upper Rate:            60 - 120 bpm     ECG  Atrial paced, V sensed, QT is 410 ms      Assessment and plan:  Ms. Angeles Cardoza is a 77 year old female with a past medical history significant for hyperlipidemia, hypertension, and a paroxymal atrial fibrillation with long conversion pauses s/p implantation of Medtronic dual chamber pacemaker on 4/12/2022 who is here for follow up.      #Dual Chamber pacemaker   - Normal device evaluation and function.    - Lead  function is appropriate, with capture and sensing thresholds are as expected.   - There were no significant arrhythmias noted (see Rhythm Data above), and no device therapies were delivered since the last evaluation.  - The counters and diagnostics were reset.  - Follow up will be in 6 months, with remote follow up every 3 months until scheduled clinic visit.     #Atrial fibrillation:  - Continue Eliquis 2.5 mg BID for systemic anticoagulation.   - Continue Tikosyn 250 mcg BID. QTc is appropriate.   -due for Cr check, but has been normal thus far  - continue Metoprolol 50 mg BID     I spent 35 minutes with the patient for record review, examination, care coordination and counseling.     This letter was generated using speech recognition software. Please excuse any typographical errors.     Neel Pizano MD, [facility]  Cardiac Electrophysiology  [facility]  4/25/2024

## 2024-04-23 NOTE — PROCEDURE: BIOPSY BY SHAVE METHOD
Detail Level: Detailed
Depth Of Biopsy: dermis
Was A Bandage Applied: Yes
Size Of Lesion In Cm: 0
Biopsy Type: H and E
Biopsy Method: Dermablade
Anesthesia Type: 1% lidocaine with epinephrine
Anesthesia Volume In Cc: 0.4
Hemostasis: Electrocautery and Aluminum Chloride
Wound Care: Petrolatum
Dressing: pressure dressing
Destruction After The Procedure: No
Type Of Destruction Used: Curettage
Curettage Text: The wound bed was treated with curettage after the biopsy was performed.
Cryotherapy Text: The wound bed was treated with cryotherapy after the biopsy was performed.
Electrodesiccation Text: The wound bed was treated with electrodesiccation after the biopsy was performed.
Electrodesiccation And Curettage Text: The wound bed was treated with electrodesiccation and curettage after the biopsy was performed.
Silver Nitrate Text: The wound bed was treated with silver nitrate after the biopsy was performed.
Lab: 6
Lab Facility: [id number]
Consent: Written consent was obtained and risks were reviewed including but not limited to scarring, infection, bleeding, scabbing, incomplete removal, nerve damage and allergy to anesthesia.
Post-Care Instructions: I reviewed with the patient in detail post-care instructions. Patient is to keep the biopsy site dry overnight, and then apply bacitracin twice daily until healed. Patient may apply hydrogen peroxide soaks to remove any crusting.
Notification Instructions: Patient will be notified of biopsy results. However, patient instructed to call the office if not contacted within 2 weeks.
Billing Type: Third-Party Bill
Information: Selecting Yes will display possible errors in your note based on the variables you have selected. This validation is only offered as a suggestion for you. PLEASE NOTE THAT THE VALIDATION TEXT WILL BE REMOVED WHEN YOU FINALIZE YOUR NOTE. IF YOU WANT TO FAX A PRELIMINARY NOTE YOU WILL NEED TO TOGGLE THIS TO 'NO' IF YOU DO NOT WANT IT IN YOUR FAXED NOTE.

## 2024-04-25 ENCOUNTER — OFFICE VISIT (OUTPATIENT)
Dept: CARDIOLOGY | Facility: CLINIC | Age: 79
End: 2024-04-25
Payer: MEDICARE

## 2024-04-25 VITALS
BODY MASS INDEX: 22.97 KG/M2 | WEIGHT: 117 LBS | DIASTOLIC BLOOD PRESSURE: 72 MMHG | HEART RATE: 65 BPM | HEIGHT: 60 IN | SYSTOLIC BLOOD PRESSURE: 130 MMHG

## 2024-04-25 DIAGNOSIS — I48.19 PERSISTENT ATRIAL FIBRILLATION (CMS/HCC): Primary | ICD-10-CM

## 2024-04-25 PROCEDURE — G8752 SYS BP LESS 140: HCPCS | Performed by: INTERNAL MEDICINE

## 2024-04-25 PROCEDURE — 93000 ELECTROCARDIOGRAM COMPLETE: CPT | Performed by: INTERNAL MEDICINE

## 2024-04-25 PROCEDURE — 99214 OFFICE O/P EST MOD 30 MIN: CPT | Performed by: INTERNAL MEDICINE

## 2024-04-25 PROCEDURE — G8754 DIAS BP LESS 90: HCPCS | Performed by: INTERNAL MEDICINE

## 2024-04-25 RX ORDER — CALCIUM ACETATE 667 MG/1
1334 CAPSULE ORAL ONCE
COMMUNITY

## 2024-04-25 NOTE — LETTER
April 26, 2024     José Antonio REAGAN DO  [address]  [place] [facility] [address]    Patient: Angeles Cardoza  YOB: 1945  Date of Visit: 4/25/2024      Dear Dr. Leroy:    Thank you for referring Angeles Cardzoa to me for evaluation. Below are my notes for this consultation.    If you have questions, please do not hesitate to call me. I look forward to following your patient along with you.         Sincerely,        Neel Pizano MD        CC: DO Harinder Morris Ali R, MD  4/26/2024  7:10 AM  Sign when Signing Visit       Electrophysiology         Reason for visit: atrial fibrillation  Referred by :Dr Boateng     History of Present Illness:  Ms. Angeles Cardoza is a 76 year old female with a past medical history significant for hyperlipidemia, hypertension, and a paroxymal atrial fibrillation with long conversion pauses s/p implantation of Medtronic dual chamber pacemaker on 4/12/2022 and on Tikosyn 250 mcg BID who is here for follow up.     Since last OV, she has done well. She continues to have short episodes of PAF for which she is asymptomatic. She continues to take Metoprolol 75 mg daily. She has been compliant with Tikosyn and Eliquis.     Brief EP history:  - pAF in Nov 2021 during hospitalization for acute diverticulits. Started on Eliquis 5 mg BID. Started on amiodarone   - Could not tolerate amiodarone due to NV. Transitioned to Flecainide 50 mg BID.   - MCOT in early 2022, 40% burden of AF with frequent long conversion pauses (some symptomatic).   - Flecainide was stopped.   - dcPPM implantation on 4/12/2022 and started on Tikosyn 250 mcg BID.  -9.18.23 sees JYOTI Arteaga and is doing well. No changes were made to his regimen and device is functioning normally  -Medtronic report 2/9/24 -  EGMs showed  AF/AFL and AF/AFL with RVR, longest on trending >2 hrs in duration but AF burden 1%. no changes made    A comprehensive 15-point review of systems was performed and was  negative unless specifically mentioned in the History of Present Illness.        Past Medical History:   Diagnosis Date   • Acid reflux    • Atrial fibrillation (CMS/HCC)    • Hypertension    • Irregular heart rhythm    • Lipid disorder    • Sick sinus syndrome (CMS/HCC)        Past Surgical History:   Procedure Laterality Date   • BREAST SURGERY  2003    breast reduction   • CARDIAC PACEMAKER PLACEMENT Left 04/12/2022    Medtronic   • RENAL ANGIOPLASTY     • TOE SURGERY Left    • TONSILLECTOMY     • WISDOM TOOTH EXTRACTION         Current Outpatient Medications on File Prior to Visit   Medication Sig Dispense Refill   • ALLERGY RELIEF, FEXOFENADINE, 180 mg tablet Take 180 mg by mouth every morning.     • amLODIPine (NORVASC) 5 mg tablet Take 1 tablet (5 mg total) by mouth daily. 90 tablet 3   • apixaban (ELIQUIS) 2.5 mg tablet Take 1 tablet (2.5 mg total) by mouth 2 (two) times a day. 180 tablet 3   • ascorbic acid, vitamin C, 500 mg tablet,chewable Take by mouth daily.     • atorvastatin (LIPITOR) 40 mg tablet Take 40 mg by mouth daily.       • calcium acetate,phosphat bind, (PHOSLO) 667 mg capsule Take 1,334 mg by mouth once.     • cholecalciferol, vitamin D3, 1,000 unit (25 mcg) tablet Take 2,000 Units by mouth daily.      • clobetasoL (TEMOVATE) 0.05 % external solution as needed. APPLY TO SCALP AS NEEDED FOR FLARES     • cyanocobalamin (VITAMIN B12) 100 mcg tablet Take 100 mcg by mouth daily.     • dofetilide (TIKOSYN) 250 mcg capsule Take 1 capsule (250 mcg total) by mouth 2 (two) times a day. 180 capsule 1   • fluocinolone acetonide oiL 0.01 % drops 2 (two) times a day. APPLY TWICE DAILY TO EARS FOR 2-4 WEEKS FOR ECZEMA FLARES     • FREESTYLE LITE STRIPS strip as needed.     • ketoconazole (NIZORAL) 2 % shampoo Shampoo two to three times weekly to scalp. Keep on for 5-10 minutes and then rinse out     • magnesium oxide (MAG-OX) 400 mg (241.3 mg magnesium) tablet Take 1 tablet (400 mg total) by mouth 2 (two)  times a day as needed (serum mag less than 2 mEq/L). 90 tablet 3   • metoprolol succinate XL (TOPROL-XL) 50 mg 24 hr tablet Take 1 tablet (50 mg total) by mouth 2 (two) times a day. 180 tablet 1   • omeprazole (PriLOSEC) 40 mg capsule Take 40 mg by mouth daily and an additional dose as needed.    3   • triamcinolone (KENALOG) 0.1 % cream Shampoo two to three times weekly to scalp. Keep on for 5-10 minutes and then rinse out     • venlafaxine XR (EFFEXOR-XR) 75 mg 24 hr capsule Take 75 mg by mouth daily.       • multivit-min/ferrous fumarate (MULTI VITAMIN ORAL) Take by mouth daily.       No current facility-administered medications on file prior to visit.       Allergies, Social History and Family History were reviewed and updated in EMR.     Physical Examination:  Vitals:    04/25/24 1531   BP: 130/72   Pulse: 65   Constitutional: The patient appeared physically well and in no acute distress.  Respiratory: Clear to auscultation, no wheezes or rubs.  Cardiovascular: A comprehensive cardiovascular examination was performed. Highlights include normal jugular venous pressure, 2+ carotids, no bruits. The precordium is quiet. Regular rhythm, rate, S1 and S2 normal, no rubs, or gallops were appreciated. Murmurs: No pathologic murmurs appreciated.  Musculoskeletal/ Extremities: No edema, normal peripheral pulses.  Skin: No rashes or lesions apparent.       Lab Results   Component Value Date    WBC 7.90 04/23/2022    HGB 13.2 04/23/2022    HCT 41.2 04/23/2022     (H) 04/23/2022    ALT 25 01/21/2022    AST 30 01/21/2022     04/23/2022    K 4.4 04/23/2022    CREATININE 1.0 04/23/2022    BUN 17 04/23/2022    TSH 2.05 01/21/2022    INR 0.9 11/13/2016       Cardiac Imaging    ECHOCARDIOGRAM - 5/2/2022  Tricuspid valve structure is grossly normal. Mild tricuspid valve regurgitation. The regurgitation jet is central.  Normal-sized LV. Normal LV systolic function. Estimated EF 65%. Normal LV septal wall motion. No  regional wall motion abnormalities. Normal LV wall thickness.  Normal-sized RV. Normal RV systolic function. Pacer wire present in the RV. Normal RV wall thickness.  Mildly dilated LA. No patent foramen ovale present as seen in the LA. Intact LA septum present.  Normal-sized RA. Pacemaker wire present in the RA.  Aortic root sclerotic. Sinuses of Valsalva sclerotic. Ascending aorta sclerotic. Aortic arch grossly normal.  Tricuspid aortic valve. Sclerotic aortic valve leaflets. Mild aortic valve regurgitation. Mild aortic valve stenosis.  Sclerotic mitral valve.  Mild to moderate mitral valve regurgitation. The jet is centrally directed.  Pulmonic valve not well visualized.  IVC is a small caliber vessel (<1.7cm). IVC collapses >50% during inspiration.  Normal pericardial structure. No evidence of pericardial effusion. No cardiac tamponade.     Dual Chamber Pacemaker Evaluation     Site Evaluation: The site of implantation in the upper left chest shows a well healed scar and is without ecchymosis, redness, drainage or hematoma. The skin is mobile over the device. The patient denies pain at the implantation site.     Device Information  Device :            CNZZ  Device Model:                        [id number]  Date of Implant:            April 12, 2022  Last session: sep 18 23     Battery Data:     Estimated time to replacement:  11.6 years     Rhythm Data:  Underlying Rhythm:            Sinus bradycardia, HR in the 50s   High Rate Episodes:            0  Ventricular High Rate                        0  AF burden noted to be 1%. Longest episode noted to be approx 1day       Lead Performance Data:     RA                   RV  Lead Impedance (ohms)            456                  475  Sensing (mV)                           3                    19  Capture (V@ms)                        0.75 @ 0.4            0.75 @ 0.4  % Pacing                                    92.7 %                    0.2 %     Final  Programmed Values:  Mode:                                                AAI <-> DDD (MVP/RhythmIQ.AAI-Safe Mode)  Paced Lower-Upper Rate:            60 - 120 bpm     ECG  Atrial paced, V sensed, QT is 410 ms      Assessment and plan:  Ms. Angeles Cardoza is a 77 year old female with a past medical history significant for hyperlipidemia, hypertension, and a paroxymal atrial fibrillation with long conversion pauses s/p implantation of Medtronic dual chamber pacemaker on 4/12/2022 who is here for follow up.      #Dual Chamber pacemaker   - Normal device evaluation and function.    - Lead function is appropriate, with capture and sensing thresholds are as expected.   - There were no significant arrhythmias noted (see Rhythm Data above), and no device therapies were delivered since the last evaluation.  - The counters and diagnostics were reset.  - Follow up will be in 6 months, with remote follow up every 3 months until scheduled clinic visit.     #Atrial fibrillation:  - Continue Eliquis 2.5 mg BID for systemic anticoagulation.   - Continue Tikosyn 250 mcg BID. QTc is appropriate.   -due for Cr check, but has been normal thus far  - continue Metoprolol 50 mg BID     I spent 35 minutes with the patient for record review, examination, care coordination and counseling.     This letter was generated using speech recognition software. Please excuse any typographical errors.     Neel Pizano MD, [facility]  Cardiac Electrophysiology  [facility]  4/25/2024

## 2024-04-30 LAB
BUN SERPL-MCNC: 19 MG/DL (ref 7–25)
BUN/CREAT SERPL: NORMAL (CALC) (ref 6–22)
CALCIUM SERPL-MCNC: 9.3 MG/DL (ref 8.6–10.4)
CHLORIDE SERPL-SCNC: 102 MMOL/L (ref 98–110)
CO2 SERPL-SCNC: 25 MMOL/L (ref 20–32)
CREAT SERPL-MCNC: 0.88 MG/DL (ref 0.6–0.95)
EGFRCR SERPLBLD CKD-EPI 2021: 66 ML/MIN/1.73M2
GLUCOSE SERPL-MCNC: 95 MG/DL (ref 65–99)
POTASSIUM SERPL-SCNC: 4.1 MMOL/L (ref 3.5–5.3)
SODIUM SERPL-SCNC: 138 MMOL/L (ref 135–146)

## 2024-08-09 ENCOUNTER — TELEPHONE (OUTPATIENT)
Dept: SCHEDULING | Facility: CLINIC | Age: 79
End: 2024-08-09
Payer: MEDICARE

## 2024-08-09 NOTE — TELEPHONE ENCOUNTER
[facility] calling to ask how long pt can hold her anti coag mediations. Pt is scheduled for Endoscopy 8/19  Please fax to [phone]

## 2024-11-01 NOTE — PROGRESS NOTES
Cardiology  Office Progress Note       Reason for visit:   Chief Complaint   Patient presents with    Follow-up     HPI     Angeles Cardoza is a 79 y.o. female who presents to the office for cardiovascular follow up and management of  HTN, Afib, SSS s/p PPM 4/20/22, nonrheumatic aortic valve insufficiency, HLD and non rheumatic mitral regurgitation.      She was last seen in the office on 4/10/24. At that visit, I made no changes to her current regimen.     She followed up with Dr. Pizano on 4/25/24. At that visit, her pacemaker had been functioning properly. There were no significant arrhythmias. No changes were made to her current regimen.     BMP 4/29/24     Today she reports feeling fine. She remains active, exercising for 3 hours daily without CV limitations. She continues to experience DICKSON with climbing stairs and sometimes ambulation but denies these symptoms while performing her exercise routine. She denies any associated symptoms. She will occasionally experience lightheadedness with quick position changes. She has occasional palpitations that she describes as a thumping in her chest that occurs at random. She has been tolerating her medications without any adverse reactions. She denies any chest pain, shortness of breath at rest, dizziness or syncopal events.     Past Medical History:   Diagnosis Date    Acid reflux     Atrial fibrillation (CMS/HCC)     Hypertension     Irregular heart rhythm     Lipid disorder     Sick sinus syndrome (CMS/HCC)      Past Surgical History   Procedure Laterality Date    Aortogram abdominal with serialography N/A 2/1/2019    Performed by Abelardo Garza DO at [facility] CARDIAC CATH/EP    Breast surgery  2003    breast reduction    Cardiac pacemaker placement Left 04/12/2022    Medtronic    PPM - DUAL CHAMBER NEW N/A 4/12/2022    Performed by Neel Pizano MD at [facility] CARDIAC CATH/EP    Renal angioplasty      Selective renal angiography bilateral Right 2/1/2019    [place] [facility]    Toe surgery Left     Tonsillectomy      [place] tooth extraction         Social History     Tobacco Use    Smoking status: Never    Smokeless tobacco: Never   Vaping Use    Vaping status: Never Used   Substance Use Topics    Alcohol use: Yes     Comment: occasional       Family History   Problem Relation Name Age of Onset    Stroke Biological Mother      Heart failure Biological Father         Allergies:  Penicillins, Penicillamine, and Ditropan [oxybutynin chloride]    Current Outpatient Medications   Medication Sig Dispense Refill    amLODIPine (NORVASC) 5 mg tablet Take 1 tablet (5 mg total) by mouth daily. 90 tablet 3    apixaban (ELIQUIS) 2.5 mg tablet Take 1 tablet (2.5 mg total) by mouth 2 (two) times a day. 180 tablet 3    ascorbic acid, vitamin C, 500 mg tablet,chewable Take by mouth daily.      atorvastatin (LIPITOR) 40 mg tablet Take 40 mg by mouth daily.        calcium acetate,phosphat bind, (PHOSLO) 667 mg capsule Take 1,334 mg by mouth once.      cholecalciferol, vitamin D3, 1,000 unit (25 mcg) tablet Take 2,000 Units by mouth daily.       clobetasoL (TEMOVATE) 0.05 % external solution as needed. APPLY TO SCALP AS NEEDED FOR FLARES      cyanocobalamin (VITAMIN B12) 100 mcg tablet Take 100 mcg by mouth daily.      dofetilide (TIKOSYN) 250 mcg capsule TAKE 1 CAPSULE TWICE A  capsule 1    fluocinolone acetonide oiL 0.01 % drops 2 (two) times a day. APPLY TWICE DAILY TO EARS FOR 2-4 WEEKS FOR ECZEMA FLARES      FREESTYLE LITE STRIPS strip as needed.      ketoconazole (NIZORAL) 2 % shampoo Shampoo two to three times weekly to scalp. Keep on for 5-10 minutes and then rinse out      magnesium oxide (MAG-OX) 400 mg (241.3 mg magnesium) tablet Take 1 tablet (400 mg total) by mouth 2 (two) times a day as needed (serum mag less than 2 mEq/L). 90 tablet 3    metoprolol succinate XL (TOPROL-XL) 50 mg 24 hr tablet Take 1 tablet (50 mg total) by mouth 2 (two) times  a day. 180 tablet 1    omeprazole (PriLOSEC) 40 mg capsule Take 40 mg by mouth daily and an additional dose as needed.    3    triamcinolone (KENALOG) 0.1 % cream Shampoo two to three times weekly to scalp. Keep on for 5-10 minutes and then rinse out      venlafaxine XR (EFFEXOR-XR) 75 mg 24 hr capsule Take 75 mg by mouth daily.        ALLERGY RELIEF, FEXOFENADINE, 180 mg tablet Take 180 mg by mouth every morning. (Patient not taking: Reported on 11/6/2024)      multivit-min/ferrous fumarate (MULTI VITAMIN ORAL) Take by mouth daily. (Patient not taking: Reported on 11/6/2024)       No current facility-administered medications for this visit.       Review of Systems   Constitutional: Positive for weight loss (- 2 lbs since 4/10/24). Negative for decreased appetite, malaise/fatigue and weight gain.   Cardiovascular:  Positive for dyspnea on exertion and palpitations.   Respiratory:  Negative for cough and shortness of breath.    Musculoskeletal:  Positive for arthritis and joint pain (Bilateral hips and knees). Negative for myalgias.   Gastrointestinal:  Positive for heartburn. Negative for melena.   Genitourinary:  Positive for nocturia. Negative for hematuria.   Neurological:  Positive for light-headedness. Negative for dizziness.   Psychiatric/Behavioral:  The patient does not have insomnia.        Objective     Vitals:    11/06/24 1123   BP: 130/72   Pulse: 74   SpO2: 98%       Wt Readings from Last 5 Encounters:   11/06/24 52.2 kg (115 lb)   04/25/24 53.1 kg (117 lb)   04/10/24 53.1 kg (117 lb)   04/10/24 53.1 kg (117 lb)   09/19/23 53.1 kg (117 lb)       Body mass index is 22.46 kg/m².    Physical Exam  Vitals and nursing note reviewed.   Constitutional:       Appearance: Normal appearance. She is well-developed.   HENT:      Head: Normocephalic and atraumatic.   Eyes:      Conjunctiva/sclera: Conjunctivae normal.      Pupils: Pupils are equal, round, and reactive to light.   Neck:      Vascular: No carotid  bruit.   Cardiovascular:      Rate and Rhythm: Normal rate and regular rhythm. Extrasystoles are present.     Chest Wall: PMI is not displaced.      Pulses: Normal pulses and intact distal pulses.           Carotid pulses are 2+ on the right side and 2+ on the left side.       Radial pulses are 2+ on the right side and 2+ on the left side.      Heart sounds: S1 normal and S2 normal. Murmur (II/VI ROGER at base.) heard.      No friction rub. No gallop. No S3 or S4 sounds.   Pulmonary:      Effort: Pulmonary effort is normal. No respiratory distress.      Breath sounds: Normal breath sounds. No stridor. No wheezing, rhonchi or rales.      Comments: Pacer in left chest  Chest:      Chest wall: No tenderness.   Abdominal:      General: Abdomen is flat. Bowel sounds are normal. There is no distension.      Palpations: Abdomen is soft. There is no mass.      Tenderness: There is no abdominal tenderness. There is no guarding or rebound.      Hernia: No hernia is present.   Musculoskeletal:         General: Normal range of motion.      Cervical back: Normal range of motion and neck supple.      Right lower leg: No edema.      Left lower leg: No edema.   Skin:     General: Skin is warm and dry.      Findings: No bruising or rash.   Neurological:      General: No focal deficit present.      Mental Status: She is alert and oriented to person, place, and time. Mental status is at baseline.      Deep Tendon Reflexes: Reflexes are normal and symmetric.   Psychiatric:         Mood and Affect: Mood normal.         Speech: Speech normal.         Behavior: Behavior normal.         Thought Content: Thought content normal.         Judgment: Judgment normal.       ECG   I have independently reviewed the patient's ECG results.  Significant abnormals are :.  Atrial-paced rhythm with Premature atrial complexes 72bpm  Abnormal ECG  When compared with ECG of 10-APR-2024 12:01,  Premature atrial complexes are now Present  Confirmed by Tasneem  "Gwyn ([id number]) on 11/6/2024 11:44:15 AM    Hematology  Lab Results   Component Value Date    WBC 7.90 04/23/2022    HGB 13.2 04/23/2022    HCT 41.2 04/23/2022     (H) 04/23/2022    INR 0.9 11/13/2016     Chemistries  Lab Results   Component Value Date     04/29/2024    K 4.1 04/29/2024     04/29/2024    CREATININE 0.88 04/29/2024    BUN 19 04/29/2024    CO2 25 04/29/2024    GLUCOSE 95 04/29/2024    CALCIUM 9.3 04/29/2024    MG 2.1 04/23/2022    ALT 25 01/21/2022    AST 30 01/21/2022       Cholesterol  No results found for: \"CHOL\", \"TRIG\", \"HDL\", \"LDLCALC\", \"NONHDLCALC\"    Endocrine  Lab Results   Component Value Date    TSH 2.05 01/21/2022       Cardiac Imaging    TRANSTHORACIC ECHO (TTE) COMPLETE 04/10/2024    Interpretation Summary    Left Ventricle: Normal ventricle size. Sigmoid septum left ventricular hypertrophy. Preserved systolic function. Estimated EF 60-65%. Wall motion appears grossly normal. Normal septal motion. Normal diastolic filling pattern for age.    Left Atrium: Severely dilated atrium.    Mitral Valve: Sclerotic mitral valve. Normal leaflet motion. Mild regurgitation. No stenosis. Mean gradient = 1.00.    Aortic Valve: Tricuspid valve.  Sclerotic leaflets. Mild regurgitation. No stenosis. Calculated dimensionless index = 0.67.    Aorta: Aortic root sclerotic. Ascending aorta sclerotic.    Right Atrium: Normal sized atrium. Pacemaker wire present.    Right Ventricle: Normal ventricle size. Pacer wire present. Normal systolic function.    Tricuspid Valve: Structure is grossly normal. Mild to moderate regurgitation. Estimated RVSP = 37 mmHg.    Assessment/Plan     Essential hypertension  - target BP < 130/80  .  - Currently at goal  - Continue Amlodipine 5mg q day  - Continue Toprol XL 50mg BID.     Pacemaker  - Status post dual-chamber Medtronic PPM implant on 4/12/2022 for SSS/Tachybrady syndrome  - Continue f/u and management with Dr. Pizano    Persistent atrial fibrillation " (CMS/[facility])  - CHADS VASC 4  - Persistent  .  - PAF episodes typically last seconds to minutes for which she thinks she is asymptomatic.  - Continue Eliquis 2.5mg BID  - Continue Tikosyn 250mcg BID  - Continue Toprol XL BID  - The patient denies any evidence or signs/sx suggestive of bleeding.   - Continue f/u and management with Dr. Pizano    Valvular heart disease  - Echo 5/2022: EF 65% Mild-moderate MR. Mild AI. Mild TR.   - Echo 4/10/24: EF 60-65% Mild MR. Mild AI. Mild-moderate TR.   .  - Continue same meds    SOB (shortness of breath)  - This is happening intermittently  - She feels it when going up the stairs or carrying objects up the stairs. She has no sx when at water aerobic.   - Given persistent complaint of this will check coronary CTA to evaluate for any evidence of obstructive CAD that could be causing her sx.   - If no evidence of obstructive CAD will not pursue further workup.     Mixed hyperlipidemia  - Target LDL < 100  .  - Continue Atorvastatin 40mg q pm   - repeat FLP, given Rx.     Follow Up Plans:  Return in about 6 months (around 5/6/2025).     I attest that this visit supports the complexity inherent to evaluation and management associated with medical care services that serve as the continuing focal point for all needed health care services and/or medical care services that are part of ongoing care related to this patient’s single, serious, or complex condition.         I, Dre Morales, am scribing for, and in the presence of, Gwyn Boateng DO.    I, Gwyn Boateng DO, personally performed the services described in this documentation as scribed by Dre Morales in my presence, and it is both accurate and complete.       Gwyn Boateng DO  11/6/2024

## 2024-11-05 NOTE — ASSESSMENT & PLAN NOTE
- This is happening intermittently  - She feels it when going up the stairs or carrying objects up the stairs. She has no sx when at water aerobic.   - Given persistent complaint of this will check coronary CTA to evaluate for any evidence of obstructive CAD that could be causing her sx.   - If no evidence of obstructive CAD will not pursue further workup.

## 2024-11-06 ENCOUNTER — APPOINTMENT (RX ONLY)
Dept: URBAN - METROPOLITAN AREA CLINIC 28 | Facility: CLINIC | Age: 79
Setting detail: DERMATOLOGY
End: 2024-11-06

## 2024-11-06 ENCOUNTER — OFFICE VISIT (OUTPATIENT)
Dept: CARDIOLOGY | Facility: CLINIC | Age: 79
End: 2024-11-06
Payer: MEDICARE

## 2024-11-06 VITALS
HEIGHT: 60 IN | HEART RATE: 74 BPM | BODY MASS INDEX: 22.58 KG/M2 | SYSTOLIC BLOOD PRESSURE: 130 MMHG | OXYGEN SATURATION: 98 % | DIASTOLIC BLOOD PRESSURE: 72 MMHG | WEIGHT: 115 LBS

## 2024-11-06 DIAGNOSIS — I38 VALVULAR HEART DISEASE: Chronic | ICD-10-CM

## 2024-11-06 DIAGNOSIS — L57.0 ACTINIC KERATOSIS: ICD-10-CM

## 2024-11-06 DIAGNOSIS — L57.8 OTHER SKIN CHANGES DUE TO CHRONIC EXPOSURE TO NONIONIZING RADIATION: ICD-10-CM

## 2024-11-06 DIAGNOSIS — H61.03 CHONDRITIS OF EXTERNAL EAR: ICD-10-CM

## 2024-11-06 DIAGNOSIS — D18.0 HEMANGIOMA: ICD-10-CM

## 2024-11-06 DIAGNOSIS — D22 MELANOCYTIC NEVI: ICD-10-CM

## 2024-11-06 DIAGNOSIS — E78.2 MIXED HYPERLIPIDEMIA: Chronic | ICD-10-CM

## 2024-11-06 DIAGNOSIS — Z71.89 OTHER SPECIFIED COUNSELING: ICD-10-CM

## 2024-11-06 DIAGNOSIS — Z95.0 PACEMAKER: Chronic | ICD-10-CM

## 2024-11-06 DIAGNOSIS — I48.19 PERSISTENT ATRIAL FIBRILLATION (CMS/HCC): Chronic | ICD-10-CM

## 2024-11-06 DIAGNOSIS — Z12.83 ENCOUNTER FOR SCREENING FOR MALIGNANT NEOPLASM OF SKIN: ICD-10-CM

## 2024-11-06 DIAGNOSIS — R06.02 SOB (SHORTNESS OF BREATH): ICD-10-CM

## 2024-11-06 DIAGNOSIS — L82.1 OTHER SEBORRHEIC KERATOSIS: ICD-10-CM

## 2024-11-06 DIAGNOSIS — I10 ESSENTIAL HYPERTENSION: Primary | Chronic | ICD-10-CM

## 2024-11-06 DIAGNOSIS — L21.8 OTHER SEBORRHEIC DERMATITIS: ICD-10-CM | Status: INADEQUATELY CONTROLLED

## 2024-11-06 DIAGNOSIS — L81.4 OTHER MELANIN HYPERPIGMENTATION: ICD-10-CM

## 2024-11-06 PROBLEM — H61.032 CHONDRITIS OF LEFT EXTERNAL EAR: Status: ACTIVE | Noted: 2024-11-06

## 2024-11-06 PROBLEM — D22.5 MELANOCYTIC NEVI OF TRUNK: Status: ACTIVE | Noted: 2024-11-06

## 2024-11-06 PROBLEM — D18.01 HEMANGIOMA OF SKIN AND SUBCUTANEOUS TISSUE: Status: ACTIVE | Noted: 2024-11-06

## 2024-11-06 LAB
ATRIAL RATE: 72
P AXIS: 70
PR INTERVAL: 190
QRS DURATION: 76
QT INTERVAL: 406
QTC CALCULATION(BAZETT): 444
R AXIS: 53
T WAVE AXIS: 64
VENTRICULAR RATE: 72

## 2024-11-06 PROCEDURE — 99214 OFFICE O/P EST MOD 30 MIN: CPT | Mod: 25

## 2024-11-06 PROCEDURE — G8752 SYS BP LESS 140: HCPCS | Performed by: INTERNAL MEDICINE

## 2024-11-06 PROCEDURE — G2211 COMPLEX E/M VISIT ADD ON: HCPCS | Performed by: INTERNAL MEDICINE

## 2024-11-06 PROCEDURE — 17110 DESTRUCTION B9 LES UP TO 14: CPT

## 2024-11-06 PROCEDURE — 93000 ELECTROCARDIOGRAM COMPLETE: CPT | Performed by: INTERNAL MEDICINE

## 2024-11-06 PROCEDURE — 99215 OFFICE O/P EST HI 40 MIN: CPT | Performed by: INTERNAL MEDICINE

## 2024-11-06 PROCEDURE — ? PRESCRIPTION MEDICATION MANAGEMENT

## 2024-11-06 PROCEDURE — 17000 DESTRUCT PREMALG LESION: CPT | Mod: 59

## 2024-11-06 PROCEDURE — ? PRESCRIPTION

## 2024-11-06 PROCEDURE — 17003 DESTRUCT PREMALG LES 2-14: CPT | Mod: 59

## 2024-11-06 PROCEDURE — ? LIQUID NITROGEN

## 2024-11-06 PROCEDURE — ? COUNSELING

## 2024-11-06 PROCEDURE — G8754 DIAS BP LESS 90: HCPCS | Performed by: INTERNAL MEDICINE

## 2024-11-06 RX ORDER — FLUOCINONIDE 0.5 MG/ML
SOLUTION TOPICAL QHS
Qty: 60 | Refills: 2 | Status: ERX | COMMUNITY
Start: 2024-11-06

## 2024-11-06 RX ADMIN — FLUOCINONIDE: 0.5 SOLUTION TOPICAL at 00:00

## 2024-11-06 ASSESSMENT — LOCATION SIMPLE DESCRIPTION DERM
LOCATION SIMPLE: LEFT SHOULDER
LOCATION SIMPLE: LEFT ANTERIOR NECK
LOCATION SIMPLE: RIGHT ANTERIOR NECK
LOCATION SIMPLE: RIGHT SHOULDER
LOCATION SIMPLE: RIGHT EAR
LOCATION SIMPLE: RIGHT UPPER BACK
LOCATION SIMPLE: LEFT EAR
LOCATION SIMPLE: RIGHT FOREHEAD
LOCATION SIMPLE: CHEST
LOCATION SIMPLE: LEFT FOREARM
LOCATION SIMPLE: ABDOMEN
LOCATION SIMPLE: RIGHT TEMPLE

## 2024-11-06 ASSESSMENT — LOCATION DETAILED DESCRIPTION DERM
LOCATION DETAILED: RIGHT CLAVICULAR NECK
LOCATION DETAILED: LEFT CLAVICULAR NECK
LOCATION DETAILED: RIGHT MEDIAL UPPER BACK
LOCATION DETAILED: RIGHT FOREHEAD
LOCATION DETAILED: LEFT ANTERIOR SHOULDER
LOCATION DETAILED: EPIGASTRIC SKIN
LOCATION DETAILED: STERNAL NOTCH
LOCATION DETAILED: RIGHT MEDIAL TEMPLE
LOCATION DETAILED: LEFT SCAPHA
LOCATION DETAILED: RIGHT ANTERIOR SHOULDER
LOCATION DETAILED: RIGHT ANTIHELIX
LOCATION DETAILED: LEFT PROXIMAL DORSAL FOREARM
LOCATION DETAILED: RIGHT MEDIAL SUPERIOR CHEST

## 2024-11-06 ASSESSMENT — CHADS2 SCORE
DIABETES: NO
SEX: FEMALE (+1PT.)
HYPERTENSION: YES (+1 PT.)
VASCULAR DISEASE: NO
AGE: 75+ (+2 PT.)
CHADS2 SCORE: 4
PRIOR STROKE OR TIA OR THROMBOEMBOLISM: NO
CHF: NO

## 2024-11-06 ASSESSMENT — LOCATION ZONE DERM
LOCATION ZONE: EAR
LOCATION ZONE: ARM
LOCATION ZONE: FACE
LOCATION ZONE: TRUNK
LOCATION ZONE: NECK

## 2024-11-06 ASSESSMENT — ENCOUNTER SYMPTOMS
WEIGHT LOSS: 1
DYSPNEA ON EXERTION: 1
WEIGHT GAIN: 0
MYALGIAS: 0
COUGH: 0
LIGHT-HEADEDNESS: 1
PALPITATIONS: 1
DECREASED APPETITE: 0
INSOMNIA: 0
HEARTBURN: 1
SHORTNESS OF BREATH: 0
DIZZINESS: 0
HEMATURIA: 0

## 2024-11-06 ASSESSMENT — SEVERITY ASSESSMENT: HOW SEVERE IS THIS PATIENT'S CONDITION?: MILD

## 2024-11-06 NOTE — PROCEDURE: PRESCRIPTION MEDICATION MANAGEMENT
Render In Strict Bullet Format?: No
Initiate Treatment: fluocinonide 0.05 % topical solution: Apply to external ears QD x2 weeks. Then repeat as needed for flares.
Detail Level: Simple

## 2024-11-06 NOTE — PATIENT INSTRUCTIONS
Coronary CTA here at [place]    Repeat fasting blood work prior to the CT scan of your heart.     Continue same medications    Follow up in 6 months.

## 2024-11-06 NOTE — LETTER
November 6, 2024     José Antonio REAGAN DO  [address]  [facility] [address]    Patient: Angeles Cardoza  YOB: 1945  Date of Visit: 11/6/2024      Dear Dr. Leroy:    Thank you for referring Angeels Cardoza to me for evaluation. Below are my notes for this consultation.    If you have questions, please do not hesitate to call me. I look forward to following your patient along with you.         Sincerely,        Gwyn Boateng,         CC: MD Tasneem Montes De Oca William N, DO  11/6/2024 11:57 AM  Sign when Signing Visit       Cardiology  Office Progress Note       Reason for visit:   Chief Complaint   Patient presents with   • Follow-up     HPI    Angeles Cardoza is a 79 y.o. female who presents to the office for cardiovascular follow up and management of  HTN, Afib, SSS s/p PPM 4/20/22, nonrheumatic aortic valve insufficiency, HLD and non rheumatic mitral regurgitation.      She was last seen in the office on 4/10/24. At that visit, I made no changes to her current regimen.     She followed up with Dr. Pizano on 4/25/24. At that visit, her pacemaker had been functioning properly. There were no significant arrhythmias. No changes were made to her current regimen.     BMP 4/29/24     Today she reports feeling fine. She remains active, exercising for 3 hours daily without CV limitations. She continues to experience DICKSON with climbing stairs and sometimes ambulation but denies these symptoms while performing her exercise routine. She denies any associated symptoms. She will occasionally experience lightheadedness with quick position changes. She has occasional palpitations that she describes as a thumping in her chest that occurs at random. She has been tolerating her medications without any adverse reactions. She denies any chest pain, shortness of breath at rest, dizziness or syncopal events.     Past Medical History:   Diagnosis Date   • Acid reflux    • Atrial fibrillation  (CMS/HCC)    • Hypertension    • Irregular heart rhythm    • Lipid disorder    • Sick sinus syndrome (CMS/HCC)      Past Surgical History   Procedure Laterality Date   • Aortogram abdominal with serialography N/A 2/1/2019    Performed by Abelardo Garza DO at [facility] [place] CARDIAC CATH/EP   • Breast surgery  2003    breast reduction   • Cardiac pacemaker placement Left 04/12/2022    Medtronic   • PPM - DUAL CHAMBER NEW N/A 4/12/2022    Performed by Neel Pizano MD at [facility] [place] CARDIAC CATH/EP   • Renal angioplasty     • Selective renal angiography bilateral Right 2/1/2019    Performed by Abelardo Garza DO at [facility] CARDIAC CATH/EP   • Toe surgery Left    • Tonsillectomy     • [place] tooth extraction         Social History     Tobacco Use   • Smoking status: Never   • Smokeless tobacco: Never   Vaping Use   • Vaping status: Never Used   Substance Use Topics   • Alcohol use: Yes     Comment: occasional       Family History   Problem Relation Name Age of Onset   • Stroke Biological Mother     • Heart failure Biological Father         Allergies:  Penicillins, Penicillamine, and Ditropan [oxybutynin chloride]    Current Outpatient Medications   Medication Sig Dispense Refill   • amLODIPine (NORVASC) 5 mg tablet Take 1 tablet (5 mg total) by mouth daily. 90 tablet 3   • apixaban (ELIQUIS) 2.5 mg tablet Take 1 tablet (2.5 mg total) by mouth 2 (two) times a day. 180 tablet 3   • ascorbic acid, vitamin C, 500 mg tablet,chewable Take by mouth daily.     • atorvastatin (LIPITOR) 40 mg tablet Take 40 mg by mouth daily.       • calcium acetate,phosphat bind, (PHOSLO) 667 mg capsule Take 1,334 mg by mouth once.     • cholecalciferol, vitamin D3, 1,000 unit (25 mcg) tablet Take 2,000 Units by mouth daily.      • clobetasoL (TEMOVATE) 0.05 % external solution as needed. APPLY TO SCALP AS NEEDED FOR FLARES     • cyanocobalamin (VITAMIN B12) 100 mcg tablet Take 100 mcg by mouth daily.     • dofetilide (TIKOSYN) 250 mcg capsule TAKE 1 CAPSULE TWICE A   capsule 1   • fluocinolone acetonide oiL 0.01 % drops 2 (two) times a day. APPLY TWICE DAILY TO EARS FOR 2-4 WEEKS FOR ECZEMA FLARES     • FREESTYLE LITE STRIPS strip as needed.     • ketoconazole (NIZORAL) 2 % shampoo Shampoo two to three times weekly to scalp. Keep on for 5-10 minutes and then rinse out     • magnesium oxide (MAG-OX) 400 mg (241.3 mg magnesium) tablet Take 1 tablet (400 mg total) by mouth 2 (two) times a day as needed (serum mag less than 2 mEq/L). 90 tablet 3   • metoprolol succinate XL (TOPROL-XL) 50 mg 24 hr tablet Take 1 tablet (50 mg total) by mouth 2 (two) times a day. 180 tablet 1   • omeprazole (PriLOSEC) 40 mg capsule Take 40 mg by mouth daily and an additional dose as needed.    3   • triamcinolone (KENALOG) 0.1 % cream Shampoo two to three times weekly to scalp. Keep on for 5-10 minutes and then rinse out     • venlafaxine XR (EFFEXOR-XR) 75 mg 24 hr capsule Take 75 mg by mouth daily.       • ALLERGY RELIEF, FEXOFENADINE, 180 mg tablet Take 180 mg by mouth every morning. (Patient not taking: Reported on 11/6/2024)     • multivit-min/ferrous fumarate (MULTI VITAMIN ORAL) Take by mouth daily. (Patient not taking: Reported on 11/6/2024)       No current facility-administered medications for this visit.       Review of Systems   Constitutional: Positive for weight loss (- 2 lbs since 4/10/24). Negative for decreased appetite, malaise/fatigue and weight gain.   Cardiovascular:  Positive for dyspnea on exertion and palpitations.   Respiratory:  Negative for cough and shortness of breath.    Musculoskeletal:  Positive for arthritis and joint pain (Bilateral hips and knees). Negative for myalgias.   Gastrointestinal:  Positive for heartburn. Negative for melena.   Genitourinary:  Positive for nocturia. Negative for hematuria.   Neurological:  Positive for light-headedness. Negative for dizziness.   Psychiatric/Behavioral:  The patient does not have insomnia.        Objective    Vitals:     11/06/24 1123   BP: 130/72   Pulse: 74   SpO2: 98%       Wt Readings from Last 5 Encounters:   11/06/24 52.2 kg (115 lb)   04/25/24 53.1 kg (117 lb)   04/10/24 53.1 kg (117 lb)   04/10/24 53.1 kg (117 lb)   09/19/23 53.1 kg (117 lb)       Body mass index is 22.46 kg/m².    Physical Exam  Vitals and nursing note reviewed.   Constitutional:       Appearance: Normal appearance. She is well-developed.   HENT:      Head: Normocephalic and atraumatic.   Eyes:      Conjunctiva/sclera: Conjunctivae normal.      Pupils: Pupils are equal, round, and reactive to light.   Neck:      Vascular: No carotid bruit.   Cardiovascular:      Rate and Rhythm: Normal rate and regular rhythm. Extrasystoles are present.     Chest Wall: PMI is not displaced.      Pulses: Normal pulses and intact distal pulses.           Carotid pulses are 2+ on the right side and 2+ on the left side.       Radial pulses are 2+ on the right side and 2+ on the left side.      Heart sounds: S1 normal and S2 normal. Murmur (II/VI ROGER at base.) heard.      No friction rub. No gallop. No S3 or S4 sounds.   Pulmonary:      Effort: Pulmonary effort is normal. No respiratory distress.      Breath sounds: Normal breath sounds. No stridor. No wheezing, rhonchi or rales.      Comments: Pacer in left chest  Chest:      Chest wall: No tenderness.   Abdominal:      General: Abdomen is flat. Bowel sounds are normal. There is no distension.      Palpations: Abdomen is soft. There is no mass.      Tenderness: There is no abdominal tenderness. There is no guarding or rebound.      Hernia: No hernia is present.   Musculoskeletal:         General: Normal range of motion.      Cervical back: Normal range of motion and neck supple.      Right lower leg: No edema.      Left lower leg: No edema.   Skin:     General: Skin is warm and dry.      Findings: No bruising or rash.   Neurological:      General: No focal deficit present.      Mental Status: She is alert and oriented to  "person, place, and time. Mental status is at baseline.      Deep Tendon Reflexes: Reflexes are normal and symmetric.   Psychiatric:         Mood and Affect: Mood normal.         Speech: Speech normal.         Behavior: Behavior normal.         Thought Content: Thought content normal.         Judgment: Judgment normal.       ECG   I have independently reviewed the patient's ECG results.  Significant abnormals are :.  Atrial-paced rhythm with Premature atrial complexes 72bpm  Abnormal ECG  When compared with ECG of 10-APR-2024 12:01,  Premature atrial complexes are now Present  Confirmed by Gwyn Boateng ([id number]) on 11/6/2024 11:44:15 AM    Hematology  Lab Results   Component Value Date    WBC 7.90 04/23/2022    HGB 13.2 04/23/2022    HCT 41.2 04/23/2022     (H) 04/23/2022    INR 0.9 11/13/2016     Chemistries  Lab Results   Component Value Date     04/29/2024    K 4.1 04/29/2024     04/29/2024    CREATININE 0.88 04/29/2024    BUN 19 04/29/2024    CO2 25 04/29/2024    GLUCOSE 95 04/29/2024    CALCIUM 9.3 04/29/2024    MG 2.1 04/23/2022    ALT 25 01/21/2022    AST 30 01/21/2022       Cholesterol  No results found for: \"CHOL\", \"TRIG\", \"HDL\", \"LDLCALC\", \"NONHDLCALC\"    Endocrine  Lab Results   Component Value Date    TSH 2.05 01/21/2022       Cardiac Imaging    TRANSTHORACIC ECHO (TTE) COMPLETE 04/10/2024    Interpretation Summary  •  Left Ventricle: Normal ventricle size. Sigmoid septum left ventricular hypertrophy. Preserved systolic function. Estimated EF 60-65%. Wall motion appears grossly normal. Normal septal motion. Normal diastolic filling pattern for age.  •  Left Atrium: Severely dilated atrium.  •  Mitral Valve: Sclerotic mitral valve. Normal leaflet motion. Mild regurgitation. No stenosis. Mean gradient = 1.00.  •  Aortic Valve: Tricuspid valve.  Sclerotic leaflets. Mild regurgitation. No stenosis. Calculated dimensionless index = 0.67.  •  Aorta: Aortic root sclerotic. Ascending aorta " sclerotic.  •  Right Atrium: Normal sized atrium. Pacemaker wire present.  •  Right Ventricle: Normal ventricle size. Pacer wire present. Normal systolic function.  •  Tricuspid Valve: Structure is grossly normal. Mild to moderate regurgitation. Estimated RVSP = 37 mmHg.    Assessment/Plan    Essential hypertension  - target BP < 130/80  .  - Currently at goal  - Continue Amlodipine 5mg q day  - Continue Toprol XL 50mg BID.     Pacemaker  - Status post dual-chamber Medtronic PPM implant on 4/12/2022 for SSS/Tachybrady syndrome  - Continue f/u and management with Dr. Pizano    Persistent atrial fibrillation (CMS/HCC)  - CHADS VASC 4  - Persistent  .  - PAF episodes typically last seconds to minutes for which she thinks she is asymptomatic.  - Continue Eliquis 2.5mg BID  - Continue Tikosyn 250mcg BID  - Continue Toprol XL BID  - The patient denies any evidence or signs/sx suggestive of bleeding.   - Continue f/u and management with Dr. Pizano    Valvular heart disease  - Echo 5/2022: EF 65% Mild-moderate MR. Mild AI. Mild TR.   - Echo 4/10/24: EF 60-65% Mild MR. Mild AI. Mild-moderate TR.   .  - Continue same meds    SOB (shortness of breath)  - This is happening intermittently  - She feels it when going up the stairs or carrying objects up the stairs. She has no sx when at water aerobic.   - Given persistent complaint of this will check coronary CTA to evaluate for any evidence of obstructive CAD that could be causing her sx.   - If no evidence of obstructive CAD will not pursue further workup.     Mixed hyperlipidemia  - Target LDL < 100  .  - Continue Atorvastatin 40mg q pm   - repeat FLP, given Rx.     Follow Up Plans:  Return in about 6 months (around 5/6/2025).     I attest that this visit supports the complexity inherent to evaluation and management associated with medical care services that serve as the continuing focal point for all needed health care services and/or medical care services that are part of  ongoing care related to this patient’s single, serious, or complex condition.         I, Dre Morales, am scribing for, and in the presence of, Gwyn Boateng DO.    I, Gwyn Boateng DO, personally performed the services described in this documentation as scribed by Dre Morales in my presence, and it is both accurate and complete.       Gwyn Boateng DO  11/6/2024

## 2024-11-06 NOTE — PROCEDURE: LIQUID NITROGEN
Show Applicator Variable?: Yes
Application Tool (Optional): Liquid Nitrogen Sprayer
Post-Care Instructions: I reviewed with the patient in detail post-care instructions. Patient is to wear sunprotection, and avoid picking at any of the treated lesions. Pt may apply Vaseline to crusted or scabbing areas.
Duration Of Freeze Thaw-Cycle (Seconds): 3
Number Of Freeze-Thaw Cycles: 2 freeze-thaw cycles
Detail Level: Detailed
Render Note In Bullet Format When Appropriate: No
Consent: The patient's consent was obtained including but not limited to risks of crusting, scabbing, blistering, scarring, darker or lighter pigmentary change, recurrence, incomplete removal and infection.
Detail Level: Simple
Spray Paint Text: The liquid nitrogen was applied to the skin utilizing a spray paint frosting technique.
Medical Necessity Information: It is in your best interest to select a reason for this procedure from the list below. All of these items fulfill various CMS LCD requirements except the new and changing color options.
Medical Necessity Clause: This procedure was medically necessary because the lesions that were treated were:

## 2024-11-07 ENCOUNTER — OFFICE VISIT (OUTPATIENT)
Dept: CARDIOLOGY | Facility: CLINIC | Age: 79
End: 2024-11-07
Payer: MEDICARE

## 2024-11-07 VITALS
BODY MASS INDEX: 22.38 KG/M2 | SYSTOLIC BLOOD PRESSURE: 120 MMHG | DIASTOLIC BLOOD PRESSURE: 74 MMHG | HEART RATE: 67 BPM | WEIGHT: 114 LBS | HEIGHT: 60 IN

## 2024-11-07 DIAGNOSIS — I10 ESSENTIAL HYPERTENSION: Primary | ICD-10-CM

## 2024-11-07 LAB
ATRIAL RATE: 67
P AXIS: 18
PR INTERVAL: 190
QRS DURATION: 74
QT INTERVAL: 426
QTC CALCULATION(BAZETT): 450
R AXIS: 17
T WAVE AXIS: 50
VENTRICULAR RATE: 67

## 2024-11-07 PROCEDURE — G8752 SYS BP LESS 140: HCPCS | Performed by: INTERNAL MEDICINE

## 2024-11-07 PROCEDURE — 99214 OFFICE O/P EST MOD 30 MIN: CPT | Performed by: INTERNAL MEDICINE

## 2024-11-07 PROCEDURE — G2211 COMPLEX E/M VISIT ADD ON: HCPCS | Performed by: INTERNAL MEDICINE

## 2024-11-07 PROCEDURE — 93000 ELECTROCARDIOGRAM COMPLETE: CPT | Performed by: INTERNAL MEDICINE

## 2024-11-07 PROCEDURE — G8754 DIAS BP LESS 90: HCPCS | Performed by: INTERNAL MEDICINE

## 2024-11-07 NOTE — LETTER
November 7, 2024     José Antonio REAGAN DO  [address]  [place] [facility] [address]    Patient: Angeles Cardoza  YOB: 1945  Date of Visit: 11/7/2024      Dear Dr. Leroy:    Thank you for referring Angeles Cardoza to me for evaluation. Below are my notes for this consultation.    If you have questions, please do not hesitate to call me. I look forward to following your patient along with you.         Sincerely,        Neel Pizano MD        CC: DO Harinder Gutierrez Ali R, MD  11/7/2024  1:49 PM  Sign when Signing Visit       Electrophysiology         Reason for visit: atrial fibrillation  Referred by :Dr Boateng     History of Present Illness:  Ms. Angeles Cardoza is a 79 year old female with a past medical history significant for hyperlipidemia, hypertension, and a paroxymal atrial fibrillation with long conversion pauses s/p implantation of Medtronic dual chamber pacemaker on 4/12/2022 and on Tikosyn 250 mcg BID who is here for follow up.     Since last OV, she has done well. She continues to have short episodes of PAF for which she is asymptomatic. She continues to take Metoprolol 75 mg daily. She has been compliant with Tikosyn and Eliquis.     Brief EP history:  - pAF in Nov 2021 during hospitalization for acute diverticulits. Started on Eliquis 5 mg BID. Started on amiodarone   - Could not tolerate amiodarone due to NV. Transitioned to Flecainide 50 mg BID.   - MCOT in early 2022, 40% burden of AF with frequent long conversion pauses (some symptomatic).   - Flecainide was stopped.   - dcPPM implantation on 4/12/2022 and started on Tikosyn 250 mcg BID.  -9.18.23 sees JYOTI Arteaga and is doing well. No changes were made to his regimen and device is functioning normally  -Medtr[employer] report 2/9/24 -  EGMs showed  AF/AFL and AF/AFL with RVR, longest on trending >2 hrs in duration but AF burden 1%. no changes made    A comprehensive 15-point review of systems was performed and was  negative unless specifically mentioned in the History of Present Illness.        Past Medical History:   Diagnosis Date   • Acid reflux    • Atrial fibrillation (CMS/HCC)    • Hypertension    • Irregular heart rhythm    • Lipid disorder    • Sick sinus syndrome (CMS/HCC)        Past Surgical History   Procedure Laterality Date   • Aortogram abdominal with serialography N/A 2/1/2019    [place] Abel[place] [facility] [place]   • Breast surgery  2003    breast reduction   • Cardiac pacemaker placement Left 04/12/2022    Medtronic   • PPM - DUAL CHAMBER NEW N/A 4/12/2022    [place]formed [place] Neel Pizano [place] [facility] [place]   • Renal angioplasty     • Selective renal angiography bilateral Right 2/1/2019    Performed by Abelardo Garza DO at [facility] [place]   • Toe surgery Left    • Tonsillectomy     • [place] tooth extraction         Current Outpatient Medications on File Prior to Visit   Medication Sig Dispense Refill   • amLODIPine (NORVASC) 5 mg tablet Take 1 tablet (5 mg total) by mouth daily. 90 tablet 3   • apixaban (ELIQUIS) 2.5 mg tablet Take 1 tablet (2.5 mg total) by mouth 2 (two) times a day. 180 tablet 3   • ascorbic acid, vitamin C, 500 mg tablet,chewable Take by mouth daily.     • atorvastatin (LIPITOR) 40 mg tablet Take 40 mg by mouth daily.       • calcium acetate,phosphat bind, (PHOSLO) 667 mg capsule Take 1,334 mg by mouth once.     • cholecalciferol, vitamin D3, 1,000 unit (25 mcg) tablet Take 2,000 Units by mouth daily.      • clobetasoL (TEMOVATE) 0.05 % external solution as needed. APPLY TO SCALP AS NEEDED FOR FLARES     • cyanocobalamin (VITAMIN B12) 100 mcg tablet Take 100 mcg by mouth daily.     • dofetilide (TIKOSYN) 250 mcg capsule TAKE 1 CAPSULE TWICE A  capsule 1   • fluocinolone acetonide oiL 0.01 % drops 2 (two) times a day. APPLY TWICE DAILY TO EARS FOR 2-4 WEEKS FOR ECZEMA FLARES     • FREESTYLE LITE STRIPS strip as needed.     • magnesium oxide (MAG-OX) 400  mg (241.3 mg magnesium) tablet Take 1 tablet (400 mg total) by mouth 2 (two) times a day as needed (serum mag less than 2 mEq/L). 90 tablet 3   • metoprolol succinate XL (TOPROL-XL) 50 mg 24 hr tablet Take 1 tablet (50 mg total) by mouth 2 (two) times a day. 180 tablet 1   • omeprazole (PriLOSEC) 40 mg capsule Take 40 mg by mouth daily and an additional dose as needed.    3   • triamcinolone (KENALOG) 0.1 % cream Shampoo two to three times weekly to scalp. Keep on for 5-10 minutes and then rinse out     • venlafaxine XR (EFFEXOR-XR) 75 mg 24 hr capsule Take 75 mg by mouth daily.       • ALLERGY RELIEF, FEXOFENADINE, 180 mg tablet Take 180 mg by mouth every morning. (Patient not taking: Reported on 11/6/2024)     • ketoconazole (NIZORAL) 2 % shampoo Shampoo two to three times weekly to scalp. Keep on for 5-10 minutes and then rinse out (Patient not taking: Reported on 11/7/2024)     • multivit-min/ferrous fumarate (MULTI VITAMIN ORAL) Take by mouth daily. (Patient not taking: Reported on 11/7/2024)       No current facility-administered medications on file prior to visit.       Allergies, Social History and Family History were reviewed and updated in EMR.     Physical Examination:  Vitals:    11/07/24 1330   BP: 120/74   Pulse: 67     Constitutional: The patient appeared physically well and in no acute distress.  Respiratory: Clear to auscultation, no wheezes or rubs.  Cardiovascular: A comprehensive cardiovascular examination was performed. Highlights include normal jugular venous pressure, 2+ carotids, no bruits. The precordium is quiet. Regular rhythm, rate, S1 and S2 normal, no rubs, or gallops were appreciated. Murmurs: No pathologic murmurs appreciated.  Musculoskeletal/ Extremities: No edema, normal peripheral pulses.  Skin: No rashes or lesions apparent.       Lab Results   Component Value Date    WBC 7.90 04/23/2022    HGB 13.2 04/23/2022    HCT 41.2 04/23/2022     (H) 04/23/2022    ALT 25 01/21/2022     AST 30 01/21/2022     04/29/2024    K 4.1 04/29/2024    CREATININE 0.88 04/29/2024    BUN 19 04/29/2024    TSH 2.05 01/21/2022    INR 0.9 11/13/2016       Cardiac Imaging    ECHOCARDIOGRAM - 5/2/2022  Tricuspid valve structure is grossly normal. Mild tricuspid valve regurgitation. The regurgitation jet is central.  Normal-sized LV. Normal LV systolic function. Estimated EF 65%. Normal LV septal wall motion. No regional wall motion abnormalities. Normal LV wall thickness.  Normal-sized RV. Normal RV systolic function. Pacer wire present in the RV. Normal RV wall thickness.  Mildly dilated LA. No patent foramen ovale present as seen in the LA. Intact LA septum present.  Normal-sized RA. Pacemaker wire present in the RA.  Aortic root sclerotic. Sinuses of Valsalva sclerotic. Ascending aorta sclerotic. Aortic arch grossly normal.  Tricuspid aortic valve. Sclerotic aortic valve leaflets. Mild aortic valve regurgitation. Mild aortic valve stenosis.  Sclerotic mitral valve.  Mild to moderate mitral valve regurgitation. The jet is centrally directed.  Pulmonic valve not well visualized.  IVC is a small caliber vessel (<1.7cm). IVC collapses >50% during inspiration.  Normal pericardial structure. No evidence of pericardial effusion. No cardiac tamponade.     Dual Chamber Pacemaker Evaluation     Site Evaluation: The site of implantation in the upper left chest shows a well healed scar and is without ecchymosis, redness, drainage or hematoma. The skin is mobile over the device. The patient denies pain at the implantation site.     Device Information  Device :            Paper Hunter  Device Model:                        [id number]  Date of Implant:            April 12, 2022  Last session: sep 18 23     Battery Data:     Estimated time to replacement:  11 years     Rhythm Data:  Underlying Rhythm:            Sinus bradycardia, HR in the 50s   High Rate Episodes:            0  Ventricular High Rate                         0  AF burden noted to be 1.3%. Longest episode noted to be 3 hours      Lead Performance Data:             RA                   RV  Lead Impedance (ohms)            418                  456  Sensing (mV)                              2.6                  18.8  Capture (V@ms)                        0.75 @ 0.4         0.75 @ 0.4  % Pacing                                    94.5%                    0.2 %     Final Programmed Values:  Mode:                                                AAI <-> DDD (MVP/RhythmIQ.AAI-Safe Mode)  Paced Lower-Upper Rate:            60 - 120 bpm     ECG  Atrial paced, V sensed, QT is 450 ms      Assessment and plan:  Ms. Angeles Cardoza is a 79 year old female with a past medical history significant for hyperlipidemia, hypertension, and a paroxymal atrial fibrillation with long conversion pauses s/p implantation of Medtronic dual chamber pacemaker on 4/12/2022 who is here for follow up.      #Dual Chamber pacemaker   - Normal device evaluation and function.    - Lead function is appropriate, with capture and sensing thresholds are as expected.   - There were no significant arrhythmias noted (see Rhythm Data above), and no device therapies were delivered since the last evaluation.  - The counters and diagnostics were reset.  - Follow up will be in 6 months, with remote follow up every 3 months until scheduled clinic visit.     #Atrial fibrillation:  - Continue Eliquis 2.5 mg BID for systemic anticoagulation.   - Continue Tikosyn 250 mcg BID. QTc is appropriate.   -Cr 0.88 4/2024.   - Continue Metoprolol 50 mg BID     I spent 35 minutes with the patient for record review, examination, care coordination and counseling.     This letter was generated using speech recognition software. Please excuse any typographical errors.     Neel Pizano MD, [facility]  Cardiac Electrophysiology  [facility]  11/7/2024     I attest that this visit supports the complexity inherent to  evaluation and management associated with medical care services that serve as the continuing focal point for all needed health care services and/or medical care services that are part of ongoing care related to this patient’s single, serious, or complex condition.

## 2024-11-07 NOTE — PROGRESS NOTES
Electrophysiology         Reason for visit: atrial fibrillation  Referred by :Dr Boateng     History of Present Illness:  Ms. Angeles Cardoza is a 79 year old female with a past medical history significant for hyperlipidemia, hypertension, and a paroxymal atrial fibrillation with long conversion pauses s/p implantation of Medtronic dual chamber pacemaker on 4/12/2022 and on Tikosyn 250 mcg BID who is here for follow up.     Since last OV, she has done well. She continues to have short episodes of PAF for which she is asymptomatic. She continues to take Metoprolol 75 mg daily. She has been compliant with Tikosyn and Eliquis.     Brief EP history:  - pAF in Nov 2021 during hospitalization for acute diverticulits. Started on Eliquis 5 mg BID. Started on amiodarone   - Could not tolerate amiodarone due to NV. Transitioned to Flecainide 50 mg BID.   - MCOT in early 2022, 40% burden of AF with frequent long conversion pauses (some symptomatic).   - Flecainide was stopped.   - dcPPM implantation on 4/12/2022 and started on Tikosyn 250 mcg BID.  -9.18.23 sees JYOTI Arteaga and is doing well. No changes were made to his regimen and device is functioning normally  -Medtronic report 2/9/24 -  EGMs showed  AF/AFL and AF/AFL with RVR, longest on trending >2 hrs in duration but AF burden 1%. no changes made    A comprehensive 15-point review of systems was performed and was negative unless specifically mentioned in the History of Present Illness.        Past Medical History:   Diagnosis Date    Acid reflux     Atrial fibrillation (CMS/HCC)     Hypertension     Irregular heart rhythm     Lipid disorder     Sick sinus syndrome (CMS/HCC)        Past Surgical History   Procedure Laterality Date    Aortogram abdominal with serialography N/A 2/1/2019    Performed by Abelardo Garza DO at [facility] [place] CARDIAC CATH/EP    Breast surgery  2003    breast reduction    Cardiac pacemaker placement Left 04/12/2022    Medtronic    PPM - DUAL CHAMBER  NEW N/A 4/12/2022    Performed by Neel Pizano MD at [facility] [place] CARDIAC CATH/EP    Renal angioplasty      Selective renal angiography bilateral Right 2/1/2019    Performed by Abelardo Garza DO at [facility] [place] CARDIAC CATH/EP    Toe surgery Left     Tonsillectomy      Mascot tooth extraction         Current Outpatient Medications on File Prior to Visit   Medication Sig Dispense Refill    amLODIPine (NORVASC) 5 mg tablet Take 1 tablet (5 mg total) by mouth daily. 90 tablet 3    apixaban (ELIQUIS) 2.5 mg tablet Take 1 tablet (2.5 mg total) by mouth 2 (two) times a day. 180 tablet 3    ascorbic acid, vitamin C, 500 mg tablet,chewable Take by mouth daily.      atorvastatin (LIPITOR) 40 mg tablet Take 40 mg by mouth daily.        calcium acetate,phosphat bind, (PHOSLO) 667 mg capsule Take 1,334 mg by mouth once.      cholecalciferol, vitamin D3, 1,000 unit (25 mcg) tablet Take 2,000 Units by mouth daily.       clobetasoL (TEMOVATE) 0.05 % external solution as needed. APPLY TO SCALP AS NEEDED FOR FLARES      cyanocobalamin (VITAMIN B12) 100 mcg tablet Take 100 mcg by mouth daily.      dofetilide (TIKOSYN) 250 mcg capsule TAKE 1 CAPSULE TWICE A  capsule 1    fluocinolone acetonide oiL 0.01 % drops 2 (two) times a day. APPLY TWICE DAILY TO EARS FOR 2-4 WEEKS FOR ECZEMA FLARES      FREESTYLE LITE STRIPS strip as needed.      magnesium oxide (MAG-OX) 400 mg (241.3 mg magnesium) tablet Take 1 tablet (400 mg total) by mouth 2 (two) times a day as needed (serum mag less than 2 mEq/L). 90 tablet 3    metoprolol succinate XL (TOPROL-XL) 50 mg 24 hr tablet Take 1 tablet (50 mg total) by mouth 2 (two) times a day. 180 tablet 1    omeprazole (PriLOSEC) 40 mg capsule Take 40 mg by mouth daily and an additional dose as needed.    3    triamcinolone (KENALOG) 0.1 % cream Shampoo two to three times weekly to scalp. Keep on for 5-10 minutes and then rinse out      venlafaxine XR (EFFEXOR-XR) 75 mg 24 hr capsule Take 75 mg by mouth daily.         ALLERGY RELIEF, FEXOFENADINE, 180 mg tablet Take 180 mg by mouth every morning. (Patient not taking: Reported on 11/6/2024)      ketoconazole (NIZORAL) 2 % shampoo Shampoo two to three times weekly to scalp. Keep on for 5-10 minutes and then rinse out (Patient not taking: Reported on 11/7/2024)      multivit-min/ferrous fumarate (MULTI VITAMIN ORAL) Take by mouth daily. (Patient not taking: Reported on 11/7/2024)       No current facility-administered medications on file prior to visit.       Allergies, Social History and Family History were reviewed and updated in EMR.     Physical Examination:  Vitals:    11/07/24 1330   BP: 120/74   Pulse: 67     Constitutional: The patient appeared physically well and in no acute distress.  Respiratory: Clear to auscultation, no wheezes or rubs.  Cardiovascular: A comprehensive cardiovascular examination was performed. Highlights include normal jugular venous pressure, 2+ carotids, no bruits. The precordium is quiet. Regular rhythm, rate, S1 and S2 normal, no rubs, or gallops were appreciated. Murmurs: No pathologic murmurs appreciated.  Musculoskeletal/ Extremities: No edema, normal peripheral pulses.  Skin: No rashes or lesions apparent.       Lab Results   Component Value Date    WBC 7.90 04/23/2022    HGB 13.2 04/23/2022    HCT 41.2 04/23/2022     (H) 04/23/2022    ALT 25 01/21/2022    AST 30 01/21/2022     04/29/2024    K 4.1 04/29/2024    CREATININE 0.88 04/29/2024    BUN 19 04/29/2024    TSH 2.05 01/21/2022    INR 0.9 11/13/2016       Cardiac Imaging    ECHOCARDIOGRAM - 5/2/2022  Tricuspid valve structure is grossly normal. Mild tricuspid valve regurgitation. The regurgitation jet is central.  Normal-sized LV. Normal LV systolic function. Estimated EF 65%. Normal LV septal wall motion. No regional wall motion abnormalities. Normal LV wall thickness.  Normal-sized RV. Normal RV systolic function. Pacer wire present in the RV. Normal RV wall thickness.  Mildly  dilated LA. No patent foramen ovale present as seen in the LA. Intact LA septum present.  Normal-sized RA. Pacemaker wire present in the RA.  Aortic root sclerotic. Sinuses of Valsalva sclerotic. Ascending aorta sclerotic. Aortic arch grossly normal.  Tricuspid aortic valve. Sclerotic aortic valve leaflets. Mild aortic valve regurgitation. Mild aortic valve stenosis.  Sclerotic mitral valve.  Mild to moderate mitral valve regurgitation. The jet is centrally directed.  Pulmonic valve not well visualized.  IVC is a small caliber vessel (<1.7cm). IVC collapses >50% during inspiration.  Normal pericardial structure. No evidence of pericardial effusion. No cardiac tamponade.     Dual Chamber Pacemaker Evaluation     Site Evaluation: The site of implantation in the upper left chest shows a well healed scar and is without ecchymosis, redness, drainage or hematoma. The skin is mobile over the device. The patient denies pain at the implantation site.     Device Information  Device :            [email or web address]  Device Model:                        [id number]  Date of Implant:            April 12, 2022  Last session: sep 18 23     Battery Data:     Estimated time to replacement:  11 years     Rhythm Data:  Underlying Rhythm:            Sinus bradycardia, HR in the 50s   High Rate Episodes:            0  Ventricular High Rate                        0  AF burden noted to be 1.3%. Longest episode noted to be 3 hours      Lead Performance Data:             RA                   RV  Lead Impedance (ohms)            418                  456  Sensing (mV)                              2.6                  18.8  Capture (V@ms)                        0.75 @ 0.4         0.75 @ 0.4  % Pacing                                    94.5%                    0.2 %     Final Programmed Values:  Mode:                                                AAI <-> DDD (MVP/RhythmIQ.AAI-Safe Mode)  Paced Lower-Upper Rate:            60 - 120 bpm     ECG   Atrial paced, V sensed, QT is 450 ms      Assessment and plan:  Ms. Angeles Cardoza is a 79 year old female with a past medical history significant for hyperlipidemia, hypertension, and a paroxymal atrial fibrillation with long conversion pauses s/p implantation of Medtronic dual chamber pacemaker on 4/12/2022 who is here for follow up.      #Dual Chamber pacemaker   - Normal device evaluation and function.    - Lead function is appropriate, with capture and sensing thresholds are as expected.   - There were no significant arrhythmias noted (see Rhythm Data above), and no device therapies were delivered since the last evaluation.  - The counters and diagnostics were reset.  - Follow up will be in 6 months, with remote follow up every 3 months until scheduled clinic visit.     #Atrial fibrillation:  - Continue Eliquis 2.5 mg BID for systemic anticoagulation.   - Continue Tikosyn 250 mcg BID. QTc is appropriate.   -Cr 0.88 4/2024.   - Continue Metoprolol 50 mg BID     I spent 35 minutes with the patient for record review, examination, care coordination and counseling.     This letter was generated using speech recognition software. Please excuse any typographical errors.     Neel Pizano MD, [facility]  Cardiac Electrophysiology  [facility]  11/7/2024     I attest that this visit supports the complexity inherent to evaluation and management associated with medical care services that serve as the continuing focal point for all needed health care services and/or medical care services that are part of ongoing care related to this patient’s single, serious, or complex condition.

## 2024-12-11 ENCOUNTER — TELEPHONE (OUTPATIENT)
Dept: CARDIOLOGY | Facility: CLINIC | Age: 79
End: 2024-12-11
Payer: MEDICARE

## 2024-12-11 LAB
BUN SERPL-MCNC: 24 MG/DL (ref 7–25)
BUN/CREAT SERPL: ABNORMAL (CALC) (ref 6–22)
CALCIUM SERPL-MCNC: 9.8 MG/DL (ref 8.6–10.4)
CHLORIDE SERPL-SCNC: 104 MMOL/L (ref 98–110)
CHOLEST SERPL-MCNC: 160 MG/DL
CHOLEST/HDLC SERPL: 2.8 (CALC)
CO2 SERPL-SCNC: 26 MMOL/L (ref 20–32)
CREAT SERPL-MCNC: 0.95 MG/DL (ref 0.6–0.95)
EGFRCR SERPLBLD CKD-EPI 2021: 60 ML/MIN/1.73M2
ERYTHROCYTE [DISTWIDTH] IN BLOOD BY AUTOMATED COUNT: 13 % (ref 11–15)
GLUCOSE SERPL-MCNC: 106 MG/DL (ref 65–99)
HCT VFR BLD AUTO: 41.9 % (ref 35–45)
HDLC SERPL-MCNC: 58 MG/DL
HGB BLD-MCNC: 13.6 G/DL (ref 11.7–15.5)
LDLC SERPL CALC-MCNC: 77 MG/DL (CALC)
MCH RBC QN AUTO: 30.9 PG (ref 27–33)
MCHC RBC AUTO-ENTMCNC: 32.5 G/DL (ref 32–36)
MCV RBC AUTO: 95.2 FL (ref 80–100)
NONHDLC SERPL-MCNC: 102 MG/DL (CALC)
PLATELET # BLD AUTO: 364 THOUSAND/UL (ref 140–400)
PMV BLD REES-ECKER: 11.1 FL (ref 7.5–12.5)
POTASSIUM SERPL-SCNC: 4.5 MMOL/L (ref 3.5–5.3)
RBC # BLD AUTO: 4.4 MILLION/UL (ref 3.8–5.1)
SODIUM SERPL-SCNC: 141 MMOL/L (ref 135–146)
TRIGL SERPL-MCNC: 149 MG/DL
WBC # BLD AUTO: 9.4 THOUSAND/UL (ref 3.8–10.8)

## 2024-12-11 NOTE — TELEPHONE ENCOUNTER
Pts daughter calling to discuss recent conversation with  in more detail    Dr. Escamilla [phone]

## 2024-12-11 NOTE — TELEPHONE ENCOUNTER
Dr. Boateng. Mrs. Cardoza stated she spoke with you last night, and you instructed her to stop the Amlodipine. Today her BP @ 9:30 AM was 132/80 and @ 1:10 PM her BP is 135/77 HR 87. She also stated she has a follow up appt with you on 5/7/2024. Should she make a sooner appt?  Please advise, Patient can be reached @ [phone], BB

## 2024-12-11 NOTE — TELEPHONE ENCOUNTER
I think ok to keep things as is for now. I'm happy with those BP numbers. She had Afib the other day and felt unwell, but now back in NSR. Continue to monitor vs at home and we can go from there.

## 2024-12-12 NOTE — TELEPHONE ENCOUNTER
I called and spoke to pts daughter. I would like to see her for f/u. Can you add her to my schedule Monday in [place] at 320 please and call and let her know.

## 2024-12-13 NOTE — PROGRESS NOTES
Cardiology  Office Progress Note         Reason for visit:   Chief Complaint   Patient presents with    Follow-up       HPI     Angeles Cardoza is a 79 y.o. female who presents to the office for cardiovascular follow up and management of  HTN, Afib, SSS s/p PPM 4/20/22, nonrheumatic aortic valve insufficiency, HLD and non rheumatic mitral regurgitation.      She was last seen in the office on 11/6/24. At that visit, she continued to experience DICKSON with stairs and ambulation. She noted occasional lightheadedness with quick position changes. She had occasional palpitations. She denied any additional cardiac complaints.     I ordered her a Coronary CTA, repeat fasting labs and asked her to follow up in 6 months.     She followed up with Dr. Pizano on 11/7/24. Her device was functioning properly. There were no significant arrhythmias noted and no device therapies were delivered. No changes were made.     Labs 12/10/24 - , , HDL 58, LDL 77    She called the office on 12/11/24 reporting hypotension with associated fatigue. I encouraged her to hold her Amlodipine.     Today she reports feeling fatigued. She notes difficulty climbing the stairs in her home and notes bilateral leg heaviness with activities like this. Her DICKSON has remained at her baseline. She continues to experience lightheadedness with quick position changes. She plans to have her CTA in January. She has been taking her medications as prescribed. She denies any chest pain, shortness of breath at rest or syncopal events.     Past Medical History:   Diagnosis Date    Acid reflux     Atrial fibrillation (CMS/HCC)     Hypertension     Irregular heart rhythm     Lipid disorder     Sick sinus syndrome (CMS/HCC)        Past Surgical History   Procedure Laterality Date    Aortogram abdominal with serialography N/A 2/1/2019    Performed by Abelardo Garza DO at [facility] [place] CARDIAC CATH/EP    Breast surgery  2003    breast reduction    Cardiac pacemaker  placement Left 04/12/2022    Medtronic    PPM - DUAL CHAMBER NEW N/A 4/12/2022    [place] [facility] [place]    Renal angioplasty      Selective renal angiography bilateral Right 2/1/2019    [place] [facility] [place]    Toe surgery Left     Tonsillectomy      Telephone tooth extraction         Social History     Tobacco Use    Smoking status: Never    Smokeless tobacco: Never   Vaping Use    Vaping status: Never Used   Substance Use Topics    Alcohol use: Yes     Comment: occasional       Family History   Problem Relation Name Age of Onset    Stroke Biological Mother      Heart failure Biological Father         Allergies:  Penicillins, Penicillamine, and Ditropan [oxybutynin chloride]    Current Outpatient Medications   Medication Sig Dispense Refill    apixaban (ELIQUIS) 2.5 mg tablet Take 1 tablet (2.5 mg total) by mouth 2 (two) times a day. 180 tablet 3    ascorbic acid, vitamin C, 500 mg tablet,chewable Take by mouth daily.      atorvastatin (LIPITOR) 40 mg tablet Take 40 mg by mouth daily.        calcium acetate,phosphat bind, (PHOSLO) 667 mg capsule Take 1,334 mg by mouth once.      cholecalciferol, vitamin D3, 1,000 unit (25 mcg) tablet Take 2,000 Units by mouth daily.       cyanocobalamin (VITAMIN B12) 100 mcg tablet Take 100 mcg by mouth daily.      dofetilide (TIKOSYN) 250 mcg capsule TAKE 1 CAPSULE TWICE A  capsule 1    fluocinolone acetonide oiL 0.01 % drops 2 (two) times a day. APPLY TWICE DAILY TO EARS FOR 2-4 WEEKS FOR ECZEMA FLARES      FREESTYLE LITE STRIPS strip as needed.      magnesium oxide (MAG-OX) 400 mg (241.3 mg magnesium) tablet Take 1 tablet (400 mg total) by mouth 2 (two) times a day as needed (serum mag less than 2 mEq/L). 90 tablet 3    metoprolol succinate XL (TOPROL-XL) 50 mg 24 hr tablet Take 1 tablet (50 mg total) by mouth 2 (two) times a day. 180 tablet 1    omeprazole (PriLOSEC) 40 mg capsule Take 20 mg by mouth daily  and an additional dose as needed.  3    venlafaxine XR (EFFEXOR-XR) 75 mg 24 hr capsule Take 75 mg by mouth daily.        ALLERGY RELIEF, FEXOFENADINE, 180 mg tablet Take 180 mg by mouth every morning. (Patient not taking: Reported on 11/6/2024)      clobetasoL (TEMOVATE) 0.05 % external solution as needed. APPLY TO SCALP AS NEEDED FOR FLARES (Patient not taking: Reported on 12/16/2024)      triamcinolone (KENALOG) 0.1 % cream Shampoo two to three times weekly to scalp. Keep on for 5-10 minutes and then rinse out (Patient not taking: Reported on 12/16/2024)       No current facility-administered medications for this visit.       Review of Systems   Constitutional: Positive for malaise/fatigue and weight loss (- 3 lbs since 11/6/24). Negative for decreased appetite.   Cardiovascular:  Positive for dyspnea on exertion and palpitations. Negative for chest pain, leg swelling, near-syncope and syncope.   Respiratory:  Positive for cough (non productive). Negative for shortness of breath and snoring.    Hematologic/Lymphatic: Does not bruise/bleed easily.   Musculoskeletal:  Positive for arthritis, joint pain and stiffness.   Gastrointestinal:  Negative for heartburn and melena.   Genitourinary:  Negative for hematuria and nocturia.   Neurological:  Positive for light-headedness. Negative for dizziness.   Psychiatric/Behavioral:  The patient does not have insomnia.    All other systems reviewed and are negative.      Objective     Vitals:    12/16/24 1516   BP: 120/60   Pulse: 72   SpO2: 95%       Wt Readings from Last 5 Encounters:   12/16/24 50.8 kg (112 lb)   11/07/24 51.7 kg (114 lb)   11/06/24 52.2 kg (115 lb)   04/25/24 53.1 kg (117 lb)   04/10/24 53.1 kg (117 lb)       Body mass index is 21.87 kg/m².    Physical Exam  Vitals and nursing note reviewed.   Constitutional:       Appearance: Normal appearance. She is well-developed.   HENT:      Head: Normocephalic and atraumatic.   Eyes:      Conjunctiva/sclera:  Conjunctivae normal.      Pupils: Pupils are equal, round, and reactive to light.   Neck:      Vascular: No carotid bruit.   Cardiovascular:      Rate and Rhythm: Normal rate and regular rhythm. Extrasystoles are present.     Chest Wall: PMI is not displaced.      Pulses: Normal pulses and intact distal pulses.           Carotid pulses are 2+ on the right side and 2+ on the left side.       Radial pulses are 2+ on the right side and 2+ on the left side.      Heart sounds: S1 normal and S2 normal. Murmur (II/VI ROGER at base.) heard.      No friction rub. No gallop. No S3 or S4 sounds.   Pulmonary:      Effort: Pulmonary effort is normal. No respiratory distress.      Breath sounds: Normal breath sounds. No stridor. No wheezing, rhonchi or rales.      Comments: Pacer in left chest  Chest:      Chest wall: No tenderness.   Abdominal:      General: Abdomen is flat. Bowel sounds are normal. There is no distension.      Palpations: Abdomen is soft. There is no mass.      Tenderness: There is no abdominal tenderness. There is no guarding or rebound.      Hernia: No hernia is present.   Musculoskeletal:         General: Normal range of motion.      Cervical back: Normal range of motion and neck supple.      Right lower leg: No edema.      Left lower leg: No edema.   Skin:     General: Skin is warm and dry.      Findings: No bruising or rash.   Neurological:      General: No focal deficit present.      Mental Status: She is alert and oriented to person, place, and time. Mental status is at baseline.      Deep Tendon Reflexes: Reflexes are normal and symmetric.   Psychiatric:         Mood and Affect: Mood normal.         Speech: Speech normal.         Behavior: Behavior normal.         Thought Content: Thought content normal.         Judgment: Judgment normal.         ECG   I have independently reviewed the patient's ECG results.  Significant abnormals are :.  Atrial-paced rhythm  Abnormal ECG  When compared with ECG of  07-NOV-2024 13:35,  Aberrant conduction is no longer Present  Confirmed by Gwyn Boateng ([id number]) on 12/16/2024 3:40:57 PM     Hematology  Lab Results   Component Value Date    WBC 9.4 12/10/2024    HGB 13.6 12/10/2024    HCT 41.9 12/10/2024     12/10/2024    INR 0.9 11/13/2016     Chemistries  Lab Results   Component Value Date     12/10/2024    K 4.5 12/10/2024     12/10/2024    CREATININE 0.95 12/10/2024    BUN 24 12/10/2024    CO2 26 12/10/2024    GLUCOSE 106 (H) 12/10/2024    CALCIUM 9.8 12/10/2024    MG 2.1 04/23/2022    ALT 25 01/21/2022    AST 30 01/21/2022     Cholesterol  Lab Results   Component Value Date    CHOL 160 12/10/2024    TRIG 149 12/10/2024    HDL 58 12/10/2024    LDLCALC 77 12/10/2024     Endocrine  Lab Results   Component Value Date    TSH 2.05 01/21/2022     Cardiac Imaging    TRANSTHORACIC ECHO (TTE) COMPLETE 04/10/2024    Interpretation Summary    Left Ventricle: Normal ventricle size. Sigmoid septum left ventricular hypertrophy. Preserved systolic function. Estimated EF 60-65%. Wall motion appears grossly normal. Normal septal motion. Normal diastolic filling pattern for age.    Left Atrium: Severely dilated atrium.    Mitral Valve: Sclerotic mitral valve. Normal leaflet motion. Mild regurgitation. No stenosis. Mean gradient = 1.00.    Aortic Valve: Tricuspid valve.  Sclerotic leaflets. Mild regurgitation. No stenosis. Calculated dimensionless index = 0.67.    Aorta: Aortic root sclerotic. Ascending aorta sclerotic.    Right Atrium: Normal sized atrium. Pacemaker wire present.    Right Ventricle: Normal ventricle size. Pacer wire present. Normal systolic function.    Tricuspid Valve: Structure is grossly normal. Mild to moderate regurgitation. Estimated RVSP = 37 mmHg.    Assessment/Plan     Essential hypertension  - target BP < 130/80  .  - Currently at goal  - Continue Toprol XL 50mg BID.  - Continue to hold Amlodipine 5mg q PM      Pacemaker  - Status post  dual-chamber Medtronic PPM implant on 4/12/2022 for SSS/Tachybrady syndrome  - Continue f/u and management with Dr. Pizano    Persistent atrial fibrillation (CMS/HCC)  - CHADS VASC 4  - Persistent  - When in AF she feels very poorly with fatigue/feels like she got hit by a truck  .  - PAF episodes typically last seconds to minutes for which she thinks she is asymptomatic.  - Continue Eliquis 2.5mg BID  - Continue Tikosyn 250mcg BID  - Continue Toprol XL BID  - The patient denies any evidence or signs/sx suggestive of bleeding.   - Continue f/u and management with Dr. Pizano    Valvular heart disease  - Echo 5/2022: EF 65% Mild-moderate MR. Mild AI. Mild TR.   - Echo 4/10/24: EF 60-65% Mild MR. Mild AI. Mild-moderate TR.   .  - Continue same meds    SOB (shortness of breath)  - This is happening intermittently  - She feels it when going up the stairs or carrying objects up the stairs. She has no sx when at water aerobic.   - Given persistent complaint of this will check coronary CTA to evaluate for any evidence of obstructive CAD that could be causing her sx.   - If no evidence of obstructive CAD will not pursue further workup.     Mixed hyperlipidemia  - Target LDL < 100  - 12/10/24 - , , HDL 58, LDL 77  .  - at goal.   - Continue Atorvastatin 40mg q pm     Preoperative cardiovascular examination  - Plan is for cataracts with Dr. Thornton in 1/2025   - Pt has no active cardiopulmonary symptoms  - Pt has adequate functional capacity, able to walk 2 blocks or climb a flight of stairs without cardiopulmonary limitation at recent baseline  - This procedure is not included in the NSQIP surgical database and so the Rojas and other modern non-cardiac surgical risk calculators are not applicable. However:   - In historic registries, these were found to be low risk procedures (<1% risk MACE in all comers)  - Given low risk of MACE (<1%) and absence of acute cardiac decompensation; additional testing or  intervention from our standpoint would not   - Pt is an appropriate candidate from our perspective.   - This is cardiac clearance only, I have not reviewed/ordered/been provided with any PAT labs, defer to PCP/Anesthesia/Person performing the procedure to review preop testing.      Follow Up Plans:  No follow-ups on file.     I attest that this visit supports the complexity inherent to evaluation and management associated with medical care services that serve as the continuing focal point for all needed health care services and/or medical care services that are part of ongoing care related to this patient’s single, serious, or complex condition.         I, Dre Morales, am scribing for, and in the presence of, Gwyn Boateng DO.    I, Gwyn Boateng DO, personally performed the services described in this documentation as scribed by Dre Morales in my presence, and it is both accurate and complete.       Gwyn Boateng DO  12/16/2024

## 2024-12-16 ENCOUNTER — OFFICE VISIT (OUTPATIENT)
Dept: CARDIOLOGY | Facility: CLINIC | Age: 79
End: 2024-12-16
Payer: MEDICARE

## 2024-12-16 VITALS
HEART RATE: 72 BPM | SYSTOLIC BLOOD PRESSURE: 120 MMHG | DIASTOLIC BLOOD PRESSURE: 60 MMHG | BODY MASS INDEX: 21.99 KG/M2 | WEIGHT: 112 LBS | OXYGEN SATURATION: 95 % | HEIGHT: 60 IN

## 2024-12-16 DIAGNOSIS — I38 VALVULAR HEART DISEASE: Chronic | ICD-10-CM

## 2024-12-16 DIAGNOSIS — Z95.0 PACEMAKER: Chronic | ICD-10-CM

## 2024-12-16 DIAGNOSIS — Z01.810 PREOPERATIVE CARDIOVASCULAR EXAMINATION: ICD-10-CM

## 2024-12-16 DIAGNOSIS — R06.02 SOB (SHORTNESS OF BREATH): ICD-10-CM

## 2024-12-16 DIAGNOSIS — E78.2 MIXED HYPERLIPIDEMIA: Chronic | ICD-10-CM

## 2024-12-16 DIAGNOSIS — I10 ESSENTIAL HYPERTENSION: Primary | Chronic | ICD-10-CM

## 2024-12-16 DIAGNOSIS — I48.19 PERSISTENT ATRIAL FIBRILLATION (CMS/HCC): Chronic | ICD-10-CM

## 2024-12-16 LAB
ATRIAL RATE: 72
PR INTERVAL: 180
QRS DURATION: 76
QT INTERVAL: 434
QTC CALCULATION(BAZETT): 475
R AXIS: 50
T WAVE AXIS: 62
VENTRICULAR RATE: 72

## 2024-12-16 PROCEDURE — G8754 DIAS BP LESS 90: HCPCS | Performed by: INTERNAL MEDICINE

## 2024-12-16 PROCEDURE — G2211 COMPLEX E/M VISIT ADD ON: HCPCS | Performed by: INTERNAL MEDICINE

## 2024-12-16 PROCEDURE — G8752 SYS BP LESS 140: HCPCS | Performed by: INTERNAL MEDICINE

## 2024-12-16 PROCEDURE — 93000 ELECTROCARDIOGRAM COMPLETE: CPT | Performed by: INTERNAL MEDICINE

## 2024-12-16 PROCEDURE — 99215 OFFICE O/P EST HI 40 MIN: CPT | Performed by: INTERNAL MEDICINE

## 2024-12-16 ASSESSMENT — ENCOUNTER SYMPTOMS
DIZZINESS: 0
COUGH: 1
DYSPNEA ON EXERTION: 1
HEMATURIA: 0
SHORTNESS OF BREATH: 0
STIFFNESS: 1
SYNCOPE: 0
DECREASED APPETITE: 0
HEARTBURN: 0
SNORING: 0
INSOMNIA: 0
WEIGHT LOSS: 1
LIGHT-HEADEDNESS: 1
PALPITATIONS: 1
BRUISES/BLEEDS EASILY: 0
NEAR-SYNCOPE: 0

## 2024-12-16 NOTE — LETTER
December 16, 2024     José Antonio REAGAN DO  [address]  [facility] [address]    Patient: Angeles Cardoza  YOB: 1945  Date of Visit: 12/16/2024      Dear Dr. Leroy:    Thank you for referring Angeles Cardoza to me for evaluation. Below are my notes for this consultation.    If you have questions, please do not hesitate to call me. I look forward to following your patient along with you.         Sincerely,        Gwyn Boateng,         CC: MD Daljit Montes De Oca MD Kornberg, William N, DO  12/16/2024  4:26 PM  Sign when Signing Visit       Cardiology  Office Progress Note         Reason for visit:   Chief Complaint   Patient presents with   • Follow-up       HPI    Angeles Cardoza is a 79 y.o. female who presents to the office for cardiovascular follow up and management of  HTN, Afib, SSS s/p PPM 4/20/22, nonrheumatic aortic valve insufficiency, HLD and non rheumatic mitral regurgitation.      She was last seen in the office on 11/6/24. At that visit, she continued to experience DICKSON with stairs and ambulation. She noted occasional lightheadedness with quick position changes. She had occasional palpitations. She denied any additional cardiac complaints.     I ordered her a Coronary CTA, repeat fasting labs and asked her to follow up in 6 months.     She followed up with Dr. Pizano on 11/7/24. Her device was functioning properly. There were no significant arrhythmias noted and no device therapies were delivered. No changes were made.     Labs 12/10/24 - , , HDL 58, LDL 77    She called the office on 12/11/24 reporting hypotension with associated fatigue. I encouraged her to hold her Amlodipine.     Today she reports feeling fatigued. She notes difficulty climbing the stairs in her home and notes bilateral leg heaviness with activities like this. Her DICKSON has remained at her baseline. She continues to experience lightheadedness with quick position changes.  She plans to have her CTA in January. She has been taking her medications as prescribed. She denies any chest pain, shortness of breath at rest or syncopal events.     Past Medical History:   Diagnosis Date   • Acid reflux    • Atrial fibrillation (CMS/HCC)    • Hypertension    • Irregular heart rhythm    • Lipid disorder    • Sick sinus syndrome (CMS/HCC)        Past Surgical History   Procedure Laterality Date   • Aortogram abdominal with serialography N/A 2/1/2019    [place] [facility] [place]   • Breast surgery  2003    breast reduction   • Cardiac pacemaker placement Left 04/12/2022    Medtronic   • PPM - DUAL CHAMBER NEW N/A 4/12/2022    [place] [facility] [place]   • Renal angioplasty     • Selective renal angiography bilateral Right 2/1/2019    [place] Gar[place] [facility] [place]   • Toe surgery Left    • Tonsillectomy     • [place] tooth extraction         Social History     Tobacco Use   • Smoking status: Never   • Smokeless tobacco: Never   Vaping Use   • Vaping status: Never Used   Substance Use Topics   • Alcohol use: Yes     Comment: occasional       Family History   Problem Relation Name Age of Onset   • Stroke Biological Mother     • Heart failure Biological Father         Allergies:  Penicillins, Penicillamine, and Ditropan [oxybutynin chloride]    Current Outpatient Medications   Medication Sig Dispense Refill   • apixaban (ELIQUIS) 2.5 mg tablet Take 1 tablet (2.5 mg total) by mouth 2 (two) times a day. 180 tablet 3   • ascorbic acid, vitamin C, 500 mg tablet,chewable Take by mouth daily.     • atorvastatin (LIPITOR) 40 mg tablet Take 40 mg by mouth daily.       • calcium acetate,phosphat bind, (PHOSLO) 667 mg capsule Take 1,334 mg by mouth once.     • cholecalciferol, vitamin D3, 1,000 unit (25 mcg) tablet Take 2,000 Units by mouth daily.      • cyanocobalamin (VITAMIN B12) 100 mcg tablet Take 100 mcg by mouth daily.     •  dofetilide (TIKOSYN) 250 mcg capsule TAKE 1 CAPSULE TWICE A  capsule 1   • fluocinolone acetonide oiL 0.01 % drops 2 (two) times a day. APPLY TWICE DAILY TO EARS FOR 2-4 WEEKS FOR ECZEMA FLARES     • FREESTYLE LITE STRIPS strip as needed.     • magnesium oxide (MAG-OX) 400 mg (241.3 mg magnesium) tablet Take 1 tablet (400 mg total) by mouth 2 (two) times a day as needed (serum mag less than 2 mEq/L). 90 tablet 3   • metoprolol succinate XL (TOPROL-XL) 50 mg 24 hr tablet Take 1 tablet (50 mg total) by mouth 2 (two) times a day. 180 tablet 1   • omeprazole (PriLOSEC) 40 mg capsule Take 20 mg by mouth daily and an additional dose as needed.  3   • venlafaxine XR (EFFEXOR-XR) 75 mg 24 hr capsule Take 75 mg by mouth daily.       • ALLERGY RELIEF, FEXOFENADINE, 180 mg tablet Take 180 mg by mouth every morning. (Patient not taking: Reported on 11/6/2024)     • clobetasoL (TEMOVATE) 0.05 % external solution as needed. APPLY TO SCALP AS NEEDED FOR FLARES (Patient not taking: Reported on 12/16/2024)     • triamcinolone (KENALOG) 0.1 % cream Shampoo two to three times weekly to scalp. Keep on for 5-10 minutes and then rinse out (Patient not taking: Reported on 12/16/2024)       No current facility-administered medications for this visit.       Review of Systems   Constitutional: Positive for malaise/fatigue and weight loss (- 3 lbs since 11/6/24). Negative for decreased appetite.   Cardiovascular:  Positive for dyspnea on exertion and palpitations. Negative for chest pain, leg swelling, near-syncope and syncope.   Respiratory:  Positive for cough (non productive). Negative for shortness of breath and snoring.    Hematologic/Lymphatic: Does not bruise/bleed easily.   Musculoskeletal:  Positive for arthritis, joint pain and stiffness.   Gastrointestinal:  Negative for heartburn and melena.   Genitourinary:  Negative for hematuria and nocturia.   Neurological:  Positive for light-headedness. Negative for dizziness.    Psychiatric/Behavioral:  The patient does not have insomnia.    All other systems reviewed and are negative.      Objective    Vitals:    12/16/24 1516   BP: 120/60   Pulse: 72   SpO2: 95%       Wt Readings from Last 5 Encounters:   12/16/24 50.8 kg (112 lb)   11/07/24 51.7 kg (114 lb)   11/06/24 52.2 kg (115 lb)   04/25/24 53.1 kg (117 lb)   04/10/24 53.1 kg (117 lb)       Body mass index is 21.87 kg/m².    Physical Exam  Vitals and nursing note reviewed.   Constitutional:       Appearance: Normal appearance. She is well-developed.   HENT:      Head: Normocephalic and atraumatic.   Eyes:      Conjunctiva/sclera: Conjunctivae normal.      Pupils: Pupils are equal, round, and reactive to light.   Neck:      Vascular: No carotid bruit.   Cardiovascular:      Rate and Rhythm: Normal rate and regular rhythm. Extrasystoles are present.     Chest Wall: PMI is not displaced.      Pulses: Normal pulses and intact distal pulses.           Carotid pulses are 2+ on the right side and 2+ on the left side.       Radial pulses are 2+ on the right side and 2+ on the left side.      Heart sounds: S1 normal and S2 normal. Murmur (II/VI ROGER at base.) heard.      No friction rub. No gallop. No S3 or S4 sounds.   Pulmonary:      Effort: Pulmonary effort is normal. No respiratory distress.      Breath sounds: Normal breath sounds. No stridor. No wheezing, rhonchi or rales.      Comments: Pacer in left chest  Chest:      Chest wall: No tenderness.   Abdominal:      General: Abdomen is flat. Bowel sounds are normal. There is no distension.      Palpations: Abdomen is soft. There is no mass.      Tenderness: There is no abdominal tenderness. There is no guarding or rebound.      Hernia: No hernia is present.   Musculoskeletal:         General: Normal range of motion.      Cervical back: Normal range of motion and neck supple.      Right lower leg: No edema.      Left lower leg: No edema.   Skin:     General: Skin is warm and dry.       Findings: No bruising or rash.   Neurological:      General: No focal deficit present.      Mental Status: She is alert and oriented to person, place, and time. Mental status is at baseline.      Deep Tendon Reflexes: Reflexes are normal and symmetric.   Psychiatric:         Mood and Affect: Mood normal.         Speech: Speech normal.         Behavior: Behavior normal.         Thought Content: Thought content normal.         Judgment: Judgment normal.         ECG   I have independently reviewed the patient's ECG results.  Significant abnormals are :.  Atrial-paced rhythm  Abnormal ECG  When compared with ECG of 07-NOV-2024 13:35,  Aberrant conduction is no longer Present  Confirmed by Gwyn Boateng ([id number]) on 12/16/2024 3:40:57 PM     Hematology  Lab Results   Component Value Date    WBC 9.4 12/10/2024    HGB 13.6 12/10/2024    HCT 41.9 12/10/2024     12/10/2024    INR 0.9 11/13/2016     Chemistries  Lab Results   Component Value Date     12/10/2024    K 4.5 12/10/2024     12/10/2024    CREATININE 0.95 12/10/2024    BUN 24 12/10/2024    CO2 26 12/10/2024    GLUCOSE 106 (H) 12/10/2024    CALCIUM 9.8 12/10/2024    MG 2.1 04/23/2022    ALT 25 01/21/2022    AST 30 01/21/2022     Cholesterol  Lab Results   Component Value Date    CHOL 160 12/10/2024    TRIG 149 12/10/2024    HDL 58 12/10/2024    LDLCALC 77 12/10/2024     Endocrine  Lab Results   Component Value Date    TSH 2.05 01/21/2022     Cardiac Imaging    TRANSTHORACIC ECHO (TTE) COMPLETE 04/10/2024    Interpretation Summary  •  Left Ventricle: Normal ventricle size. Sigmoid septum left ventricular hypertrophy. Preserved systolic function. Estimated EF 60-65%. Wall motion appears grossly normal. Normal septal motion. Normal diastolic filling pattern for age.  •  Left Atrium: Severely dilated atrium.  •  Mitral Valve: Sclerotic mitral valve. Normal leaflet motion. Mild regurgitation. No stenosis. Mean gradient = 1.00.  •  Aortic Valve: Tricuspid  valve.  Sclerotic leaflets. Mild regurgitation. No stenosis. Calculated dimensionless index = 0.67.  •  Aorta: Aortic root sclerotic. Ascending aorta sclerotic.  •  Right Atrium: Normal sized atrium. Pacemaker wire present.  •  Right Ventricle: Normal ventricle size. Pacer wire present. Normal systolic function.  •  Tricuspid Valve: Structure is grossly normal. Mild to moderate regurgitation. Estimated RVSP = 37 mmHg.    Assessment/Plan    Essential hypertension  - target BP < 130/80  .  - Currently at goal  - Continue Toprol XL 50mg BID.  - Continue to hold Amlodipine 5mg q PM      Pacemaker  - Status post dual-chamber Medtronic PPM implant on 4/12/2022 for SSS/Tachybrady syndrome  - Continue f/u and management with Dr. Pizano    Persistent atrial fibrillation (CMS/HCC)  - CHADS VASC 4  - Persistent  - When in AF she feels very poorly with fatigue/feels like she got hit by a truck  .  - PAF episodes typically last seconds to minutes for which she thinks she is asymptomatic.  - Continue Eliquis 2.5mg BID  - Continue Tikosyn 250mcg BID  - Continue Toprol XL BID  - The patient denies any evidence or signs/sx suggestive of bleeding.   - Continue f/u and management with Dr. Pizano    Valvular heart disease  - Echo 5/2022: EF 65% Mild-moderate MR. Mild AI. Mild TR.   - Echo 4/10/24: EF 60-65% Mild MR. Mild AI. Mild-moderate TR.   .  - Continue same meds    SOB (shortness of breath)  - This is happening intermittently  - She feels it when going up the stairs or carrying objects up the stairs. She has no sx when at water aerobic.   - Given persistent complaint of this will check coronary CTA to evaluate for any evidence of obstructive CAD that could be causing her sx.   - If no evidence of obstructive CAD will not pursue further workup.     Mixed hyperlipidemia  - Target LDL < 100  - 12/10/24 - , , HDL 58, LDL 77  .  - at goal.   - Continue Atorvastatin 40mg q pm     Preoperative cardiovascular  examination  - Plan is for cataracts with Dr. Thornton in 1/2025   - Pt has no active cardiopulmonary symptoms  - Pt has adequate functional capacity, able to walk 2 blocks or climb a flight of stairs without cardiopulmonary limitation at recent baseline  - This procedure is not included in the NSQIP surgical database and so the Rojas and other modern non-cardiac surgical risk calculators are not applicable. However:   - In historic registries, these were found to be low risk procedures (<1% risk MACE in all comers)  - Given low risk of MACE (<1%) and absence of acute cardiac decompensation; additional testing or intervention from our standpoint would not   - Pt is an appropriate candidate from our perspective.   - This is cardiac clearance only, I have not reviewed/ordered/been provided with any PAT labs, defer to PCP/Anesthesia/Person performing the procedure to review preop testing.      Follow Up Plans:  No follow-ups on file.     I attest that this visit supports the complexity inherent to evaluation and management associated with medical care services that serve as the continuing focal point for all needed health care services and/or medical care services that are part of ongoing care related to this patient’s single, serious, or complex condition.         I, Dre Morales, am scribing for, and in the presence of, Gwyn Boateng DO.    I, Gwyn Boateng DO, personally performed the services described in this documentation as scribed by Dre Morales in my presence, and it is both accurate and complete.       Gwyn Boateng DO  12/16/2024

## 2024-12-16 NOTE — ASSESSMENT & PLAN NOTE
- Target LDL < 100  - 12/10/24 - , , HDL 58, LDL 77  .  - at goal.   - Continue Atorvastatin 40mg q pm

## 2024-12-16 NOTE — ASSESSMENT & PLAN NOTE
- target BP < 130/80  .  - Currently at goal  - Continue Toprol XL 50mg BID.  - Continue to hold Amlodipine 5mg q PM

## 2024-12-16 NOTE — PATIENT INSTRUCTIONS
Pay attention to your breathing with the stairs    Lets work smarter, not harder, I think a chair on one of the landings so you can stop and catch your breath    Keep May     Flickme Device - home Afib monitor to help check your heart rhythm, can find on amaz[employer] for 70 bucks.     Follow up in May 2025

## 2024-12-16 NOTE — ASSESSMENT & PLAN NOTE
- Plan is for cataracts with Dr. Thornton in 1/2025   - Pt has no active cardiopulmonary symptoms  - Pt has adequate functional capacity, able to walk 2 blocks or climb a flight of stairs without cardiopulmonary limitation at recent baseline  - This procedure is not included in the NSQIP surgical database and so the Rojas and other modern non-cardiac surgical risk calculators are not applicable. However:   - In historic registries, these were found to be low risk procedures (<1% risk MACE in all comers)  - Given low risk of MACE (<1%) and absence of acute cardiac decompensation; additional testing or intervention from our standpoint would not   - Pt is an appropriate candidate from our perspective.   - This is cardiac clearance only, I have not reviewed/ordered/been provided with any PAT labs, defer to PCP/Anesthesia/Person performing the procedure to review preop testing.

## 2025-01-07 ENCOUNTER — TELEPHONE (OUTPATIENT)
Dept: SCHEDULING | Facility: CLINIC | Age: 80
End: 2025-01-07
Payer: MEDICARE

## 2025-01-07 NOTE — TELEPHONE ENCOUNTER
Cardiac Clearance     Name of caller: Angeles REED Nani    Relationship to patient: self     Name of patient: Angeles Cardoza    Insurance Name: medicare     Name of physician: Neel Pizano MD    Date of Procedure/Surgery: 01/28    Type of Procedure/Surgery: LT eye surgery     Name of surgeon: Dr. Quintanilla     Office contact number: [phone]    Office fax number: [phone]    Addendums  Is patient able to be cleared based on last appt on 11/07/2024  If unable to clear patient, please contact patient at [phone].    Additional notes: pt would like the CC letter send over ASAP.

## 2025-01-15 ENCOUNTER — HOSPITAL ENCOUNTER (OUTPATIENT)
Dept: RADIOLOGY | Facility: HOSPITAL | Age: 80
Discharge: HOME | End: 2025-01-15
Attending: INTERNAL MEDICINE
Payer: MEDICARE

## 2025-01-15 VITALS
SYSTOLIC BLOOD PRESSURE: 154 MMHG | RESPIRATION RATE: 16 BRPM | DIASTOLIC BLOOD PRESSURE: 57 MMHG | BODY MASS INDEX: 22.58 KG/M2 | OXYGEN SATURATION: 100 % | WEIGHT: 115 LBS | HEART RATE: 63 BPM | HEIGHT: 60 IN

## 2025-01-15 DIAGNOSIS — R06.02 SOB (SHORTNESS OF BREATH): ICD-10-CM

## 2025-01-15 PROCEDURE — 63600105 HC IODINE BASED CONTRAST: Mod: JW | Performed by: INTERNAL MEDICINE

## 2025-01-15 PROCEDURE — 63700000 HC SELF-ADMINISTRABLE DRUG: Performed by: INTERNAL MEDICINE

## 2025-01-15 PROCEDURE — 75574 CT ANGIO HRT W/3D IMAGE: CPT

## 2025-01-15 PROCEDURE — 25000000 HC PHARMACY GENERAL: Performed by: INTERNAL MEDICINE

## 2025-01-15 RX ORDER — METOPROLOL TARTRATE 1 MG/ML
5 INJECTION, SOLUTION INTRAVENOUS AS NEEDED
Status: DISCONTINUED | OUTPATIENT
Start: 2025-01-15 | End: 2025-01-16 | Stop reason: HOSPADM

## 2025-01-15 RX ORDER — NITROGLYCERIN 0.4 MG/1
0.4 TABLET SUBLINGUAL ONCE
Status: COMPLETED | OUTPATIENT
Start: 2025-01-15 | End: 2025-01-15

## 2025-01-15 RX ORDER — IOPAMIDOL 755 MG/ML
85 INJECTION, SOLUTION INTRAVASCULAR
Status: COMPLETED | OUTPATIENT
Start: 2025-01-15 | End: 2025-01-15

## 2025-01-15 RX ORDER — METOPROLOL TARTRATE 50 MG/1
50 TABLET ORAL EVERY 30 MIN PRN
Status: DISCONTINUED | OUTPATIENT
Start: 2025-01-15 | End: 2025-01-16 | Stop reason: HOSPADM

## 2025-01-15 RX ORDER — METOPROLOL TARTRATE 50 MG/1
50 TABLET ORAL ONCE
Status: ACTIVE | OUTPATIENT
Start: 2025-01-15 | End: 2025-01-15

## 2025-01-15 RX ADMIN — IOPAMIDOL 85 ML: 755 INJECTION, SOLUTION INTRAVENOUS at 11:33

## 2025-01-15 RX ADMIN — METOPROLOL TARTRATE 5 MG: 1 INJECTION, SOLUTION INTRAVENOUS at 11:20

## 2025-01-15 RX ADMIN — NITROGLYCERIN 0.4 MG: 0.4 TABLET SUBLINGUAL at 11:20

## 2025-01-15 NOTE — PROGRESS NOTES
Staff present during Radiology Nurse encounter:    Nurse: EVAN Kearns  Technologist: Leslye    Cardiologist: WILLIAMS Kidd  Other: 20 g hi dlow iv right arm 85 contrast  Cardiac CTA Worksheet    Chest pain (symptomatic patients):  Intermediate pre-test probability of Coronary Artery Disease        metoprolol tartrate (LOPRESSOR) 5mg and nitroglycerin (NITROSTAT) .4mg

## 2025-01-15 NOTE — DISCHARGE INSTRUCTIONS
Angeles Cardoza   [id number]   01/15/25    Cardiac CTA Patient Discharge Instructions      Thank you for allowing our CT staff to participate in your care today.  We would like to provide you with some easy to follow instructions before you leave.    DRINK PLENTY OF FLUIDS  Now that your scan is complete, you will need to drink plenty of fluids, preferably water.    DO NOT drink any caffeinated beverages the rest of the day (coffee, tea, caffeinated sodas).    DO NOT drink any alcoholic beverages for the next 24 hours.  We ask you to drink these fluids to dilute the x-ray dye that was used for your scan and allow it to flush through your kidneys.  Caffeine and alcohol will not allow this flushing to occur effectively.       MEDICATION CHANGES:  {YES/NO/WILD CARDS:28055}    If you have any questions about this, please contact your primary care physician.    If you need immediate medical attention, please go to the nearest Emergency Department or dial 911.    By signing this form, I acknowledge these instructions have been explained to me to my satisfaction and all my questions have been answered.  I have received a copy of this form.

## 2025-01-15 NOTE — PROGRESS NOTES
"Angeles REED Nani   [id number]    Your doctor has referred you for a CTA CORONARY ARTERY W IV CONTRAST that requires the injection of an iodinated contrast material into your bloodstream. This iodinated contrast material, sometimes referred to as \"x-ray dye\" allows for better interpretation of the x-ray films or CT images and results in a more accurate interpretation of the examination.     Without the use of iodinated contrast (x-ray dye), the examination may be less informative and result in a suboptimal examination, and possibly a delay in diagnosis and, if needed, treatment.     The iodinated contrast material is given through a small needle or catheter placed into a vein, usually on the inside of the elbow, on the back of hand, or in a vein in the foot or lower leg.    The most common, though still rare, potential reaction to an intravenous contrast injection is an allergic-like reaction. Most allergic-like reactions are mild, though a small subset of people can have more severe reactions. Mild reactions include mild / scattered hives, itching, scratchy throat, sneezing and nasal congestion. More severe reactions include facial swelling, severe difficulty breathing, wheezing and anaphylactic shock. Those with a prior history allergic-like reaction to the same class of contrast media (such as CT contrast or MRI contrast) are at the highest risk for an allergic reaction.     If you believe you had an allergic reaction to contrast in the past, please let our staff know. We can determine if this increases your risk for a future reaction and provide steps to decrease the risk. This may delay your examination, but it decreases the risk of having a new and possibly more severe reaction to the contrast injection.    People with a history of prior allergic reactions to other substances (such as unrelated medications and food) and patients with a history of asthma have slightly increased risk for an allergic reaction " from contrast material when compared with the general population, but do not require to be pretreated prior to a contrast injection.    You should notify the physician, nurse or technologist if you have ever had any of these conditions or if you have any questions.

## 2025-01-22 ENCOUNTER — TELEPHONE (OUTPATIENT)
Dept: CARDIOLOGY | Facility: CLINIC | Age: 80
End: 2025-01-22
Payer: MEDICARE

## 2025-01-22 NOTE — TELEPHONE ENCOUNTER
Sorry to clarify, I meant restart the amlodipine. And don't check your bp until 2 hours after your am meds.

## 2025-01-22 NOTE — TELEPHONE ENCOUNTER
Spoke with pt again.  Advised her to lay off the salt.   She can restart the amlodipine 5 mg daily (she has some in home area) and keep log of BP readings for 7-10 days and call w/ update.   She verbalized understanding.

## 2025-01-22 NOTE — TELEPHONE ENCOUNTER
Pt of Dr Boateng.  Last OV: 12/16/24.    Pt left vm on clinical line stating that her blood pressures have been elevated on top lately 180's - 190's. She requested a call back.    Spoke with pt.  She has a [employer] monitor at home from her PCP office and her own Omron BP cuff as well. She does use both monitors- but hasn't been recording her Omron readings.   Her other monitor readings are transmitted to her PCP office.    She gave the following readings:  2 pm today- 137/75, HR 76 (Omron)  9 am today- 137/80   1/21- 9 am - 180/102 with PCP monitoring system  States she noticed that the SBP is reading higher on the PCP monitoring system    She feels okay. Denies any cardiac sx.  She does admit to eating salty foods lately.  She is hydrating adequately.    She would like to know if she should restart taking amlodipine 5 mg daily that was d/c'd 12/16/2024?    I asked her to keep a log of her BP readings for the next week or so and call us with an update on her numbers.  She states the PCP office contacts her about once a month about their monitor readings.    Please advise.  Thanks.  Pt- [phone]

## 2025-02-14 ENCOUNTER — TELEPHONE (OUTPATIENT)
Dept: CARDIOLOGY | Facility: CLINIC | Age: 80
End: 2025-02-14

## 2025-02-14 NOTE — TELEPHONE ENCOUNTER
Patient with history of persistent AF.  AF burden has increased to 19.4% with RVR at times.  She is anticoagulated on Eliquis and is on metoprolol and dofetilide. She has a f/u 5//25. Continue to monitor for now?

## 2025-02-14 NOTE — TELEPHONE ENCOUNTER
Medtronic Pacemaker Report  Monitoring period: 11/07/24-1/28/25    Dr. Harinder Boateng    Battery/Lead Status: 10.8 years    Ap: 70.2%  : 2.2%    Atrial Events: EGMs illustrate AF/Afl with intermittent RVR, longest available >2 days in duration, as per trends.   Atrial [place]: 19.4%    Supraventricular Events: 36  EGMs illustrate AF/Afl with intermittent RVR, intermittent Atrial under sensing, longest available 1min 28secs in duration.    Ventricular Events: 15  EGMs illustrate SVT (with no clear termination of event), AF/Afl with intermittent RVR, longest available 22sec in duration.    DOT: 1/28/25; Identification of AF/AFL >24hrs with known stroke prophylaxis (yellow) and Average V rate during AF/Afl >110bpm for >2hrs (yellow) escalated to follow up tab on February 14th, 2025, 11:08 am EST.       Addendum: Ms. Donaldson has a history of Persistent AF and Essential hypertension.    Pt has an appointment with Dr. Boateng and Owen Angelo on 5/7/25.    Pt. medications include Metoprolol succinate, Tikosyn, and Eliquis.

## 2025-02-20 DIAGNOSIS — I10 ESSENTIAL HYPERTENSION: ICD-10-CM

## 2025-02-20 RX ORDER — AMLODIPINE BESYLATE 5 MG/1
5 TABLET ORAL DAILY
Qty: 90 TABLET | Refills: 3 | Status: SHIPPED | OUTPATIENT
Start: 2025-02-20 | End: 2025-02-20 | Stop reason: SDUPTHER

## 2025-02-20 NOTE — TELEPHONE ENCOUNTER
[facility] at [place]  Medicine Refill Request      MA Notes:      Nurse Notes:      Last Office Visit: 12/16/2024  Last Telemedicine Visit: Visit date not found    Next Office Visit: 5/7/2025  Next Telemedicine Visit: Visit date not found     Most Recent BP Readings:  BP Readings from Last 3 Encounters:   01/15/25 (!) 154/57   12/16/24 120/60   11/07/24 120/74       Most recent Lab results:  Lab Results   Component Value Date    CHOL 160 12/10/2024    HDL 58 12/10/2024    TRIG 149 12/10/2024       Lab Results   Component Value Date    AST 30 01/21/2022    ALT 25 01/21/2022       Lab Results   Component Value Date     12/10/2024    K 4.5 12/10/2024    BUN 24 12/10/2024    CREATININE 0.95 12/10/2024       Current Medications:    Current Outpatient Medications:     apixaban (ELIQUIS) 2.5 mg tablet, Take 1 tablet (2.5 mg total) by mouth 2 (two) times a day., Disp: 180 tablet, Rfl: 3    ascorbic acid, vitamin C, 500 mg tablet,chewable, Take by mouth daily., Disp: , Rfl:     atorvastatin (LIPITOR) 40 mg tablet, Take 40 mg by mouth daily.  , Disp: , Rfl:     calcium acetate,phosphat bind, (PHOSLO) 667 mg capsule, Take 1,334 mg by mouth once., Disp: , Rfl:     cholecalciferol, vitamin D3, 1,000 unit (25 mcg) tablet, Take 2,000 Units by mouth daily. , Disp: , Rfl:     cyanocobalamin (VITAMIN B12) 100 mcg tablet, Take 100 mcg by mouth daily., Disp: , Rfl:     dofetilide (TIKOSYN) 250 mcg capsule, TAKE 1 CAPSULE TWICE A DAY, Disp: 180 capsule, Rfl: 1    fluocinolone acetonide oiL 0.01 % drops, 2 (two) times a day. APPLY TWICE DAILY TO EARS FOR 2-4 WEEKS FOR ECZEMA FLARES, Disp: , Rfl:     FREESTYLE LITE STRIPS strip, as needed., Disp: , Rfl:     magnesium oxide (MAG-OX) 400 mg (241.3 mg magnesium) tablet, Take 1 tablet (400 mg total) by mouth 2 (two) times a day as needed (serum mag less than 2 mEq/L)., Disp: 90 tablet, Rfl: 3    metoprolol succinate XL (TOPROL-XL) 50 mg 24 hr tablet, TAKE 1 TABLET TWICE A DAY,  Disp: 180 tablet, Rfl: 3    omeprazole (PriLOSEC) 40 mg capsule, Take 20 mg by mouth daily and an additional dose as needed., Disp: , Rfl: 3    venlafaxine XR (EFFEXOR-XR) 75 mg 24 hr capsule, Take 75 mg by mouth daily.  , Disp: , Rfl:

## 2025-02-20 NOTE — TELEPHONE ENCOUNTER
[facility] at [facility] Refill Request      MA Notes  Looks like it was signed today     Nurse Notes:      Last Office Visit: 12/16/2024  Last Telemedicine Visit: Visit date not found    Next Office Visit: 05/07/2025  Next Telemedicine Visit: Visit date not found     Most Recent BP Readings:  BP Readings from Last 3 Encounters:   01/15/25 (!) 154/57   12/16/24 120/60   11/07/24 120/74       Most recent Lab results:  Lab Results   Component Value Date    CHOL 160 12/10/2024    HDL 58 12/10/2024    TRIG 149 12/10/2024       Lab Results   Component Value Date    AST 30 01/21/2022    ALT 25 01/21/2022       Lab Results   Component Value Date     12/10/2024    K 4.5 12/10/2024    BUN 24 12/10/2024    CREATININE 0.95 12/10/2024       Current Medications:    Current Outpatient Medications:     amLODIPine (NORVASC) 5 mg tablet, TAKE 1 TABLET DAILY, Disp: 90 tablet, Rfl: 3    apixaban (ELIQUIS) 2.5 mg tablet, Take 1 tablet (2.5 mg total) by mouth 2 (two) times a day., Disp: 180 tablet, Rfl: 3    ascorbic acid, vitamin C, 500 mg tablet,chewable, Take by mouth daily., Disp: , Rfl:     atorvastatin (LIPITOR) 40 mg tablet, Take 40 mg by mouth daily.  , Disp: , Rfl:     calcium acetate,phosphat bind, (PHOSLO) 667 mg capsule, Take 1,334 mg by mouth once., Disp: , Rfl:     cholecalciferol, vitamin D3, 1,000 unit (25 mcg) tablet, Take 2,000 Units by mouth daily. , Disp: , Rfl:     cyanocobalamin (VITAMIN B12) 100 mcg tablet, Take 100 mcg by mouth daily., Disp: , Rfl:     dofetilide (TIKOSYN) 250 mcg capsule, TAKE 1 CAPSULE TWICE A DAY, Disp: 180 capsule, Rfl: 1    fluocinolone acetonide oiL 0.01 % drops, 2 (two) times a day. APPLY TWICE DAILY TO EARS FOR 2-4 WEEKS FOR ECZEMA FLARES, Disp: , Rfl:     FREESTYLE LITE STRIPS strip, as needed., Disp: , Rfl:     magnesium oxide (MAG-OX) 400 mg (241.3 mg magnesium) tablet, Take 1 tablet (400 mg total) by mouth 2 (two) times a day as needed (serum mag less than 2  mEq/L)., Disp: 90 tablet, Rfl: 3    metoprolol succinate XL (TOPROL-XL) 50 mg 24 hr tablet, TAKE 1 TABLET TWICE A DAY, Disp: 180 tablet, Rfl: 3    omeprazole (PriLOSEC) 40 mg capsule, Take 20 mg by mouth daily and an additional dose as needed., Disp: , Rfl: 3    venlafaxine XR (EFFEXOR-XR) 75 mg 24 hr capsule, Take 75 mg by mouth daily.  , Disp: , Rfl:

## 2025-02-21 RX ORDER — AMLODIPINE BESYLATE 5 MG/1
5 TABLET ORAL DAILY
Qty: 90 TABLET | Refills: 3 | Status: SHIPPED | OUTPATIENT
Start: 2025-02-21

## 2025-03-03 DIAGNOSIS — I48.19 PERSISTENT ATRIAL FIBRILLATION (CMS/HCC): ICD-10-CM

## 2025-03-03 RX ORDER — DOFETILIDE 0.25 MG/1
250 CAPSULE ORAL 2 TIMES DAILY
Qty: 180 CAPSULE | Refills: 3 | Status: SHIPPED | OUTPATIENT
Start: 2025-03-03

## 2025-03-03 NOTE — TELEPHONE ENCOUNTER
[facility] at [place]  Medicine Refill Request      MA Notes:      Nurse Notes:      Last Office Visit: 12/16/2024  Last Telemedicine Visit: Visit date not found    Next Office Visit: 5/7/2025  Next Telemedicine Visit: Visit date not found     Most Recent BP Readings:  BP Readings from Last 3 Encounters:   01/15/25 (!) 154/57   12/16/24 120/60   11/07/24 120/74       Most recent Lab results:  Lab Results   Component Value Date    CHOL 160 12/10/2024    HDL 58 12/10/2024    TRIG 149 12/10/2024       Lab Results   Component Value Date    AST 30 01/21/2022    ALT 25 01/21/2022       Lab Results   Component Value Date     12/10/2024    K 4.5 12/10/2024    BUN 24 12/10/2024    CREATININE 0.95 12/10/2024       Current Medications:    Current Outpatient Medications:     amLODIPine (NORVASC) 5 mg tablet, Take 1 tablet (5 mg total) by mouth daily., Disp: 90 tablet, Rfl: 3    apixaban (ELIQUIS) 2.5 mg tablet, Take 1 tablet (2.5 mg total) by mouth 2 (two) times a day., Disp: 180 tablet, Rfl: 3    ascorbic acid, vitamin C, 500 mg tablet,chewable, Take by mouth daily., Disp: , Rfl:     atorvastatin (LIPITOR) 40 mg tablet, Take 40 mg by mouth daily.  , Disp: , Rfl:     calcium acetate,phosphat bind, (PHOSLO) 667 mg capsule, Take 1,334 mg by mouth once., Disp: , Rfl:     cholecalciferol, vitamin D3, 1,000 unit (25 mcg) tablet, Take 2,000 Units by mouth daily. , Disp: , Rfl:     cyanocobalamin (VITAMIN B12) 100 mcg tablet, Take 100 mcg by mouth daily., Disp: , Rfl:     dofetilide (TIKOSYN) 250 mcg capsule, TAKE 1 CAPSULE TWICE A DAY, Disp: 180 capsule, Rfl: 1    fluocinolone acetonide oiL 0.01 % drops, 2 (two) times a day. APPLY TWICE DAILY TO EARS FOR 2-4 WEEKS FOR ECZEMA FLARES, Disp: , Rfl:     FREESTYLE LITE STRIPS strip, as needed., Disp: , Rfl:     magnesium oxide (MAG-OX) 400 mg (241.3 mg magnesium) tablet, Take 1 tablet (400 mg total) by mouth 2 (two) times a day as needed (serum mag less than 2 mEq/L).,  Disp: 90 tablet, Rfl: 3    metoprolol succinate XL (TOPROL-XL) 50 mg 24 hr tablet, TAKE 1 TABLET TWICE A DAY, Disp: 180 tablet, Rfl: 3    omeprazole (PriLOSEC) 40 mg capsule, Take 20 mg by mouth daily and an additional dose as needed., Disp: , Rfl: 3    venlafaxine XR (EFFEXOR-XR) 75 mg 24 hr capsule, Take 75 mg by mouth daily.  , Disp: , Rfl:

## 2025-04-14 DIAGNOSIS — I48.19 PERSISTENT ATRIAL FIBRILLATION (CMS/HCC): Chronic | ICD-10-CM

## 2025-04-14 RX ORDER — APIXABAN 2.5 MG/1
2.5 TABLET, FILM COATED ORAL 2 TIMES DAILY
Qty: 180 TABLET | Refills: 3 | Status: SHIPPED | OUTPATIENT
Start: 2025-04-14

## 2025-04-14 NOTE — TELEPHONE ENCOUNTER
[facility] at [place]  Medicine Refill Request      MA Notes:      Nurse Notes:      Last Office Visit: 12/16/2024    Last Telemedicine Visit: Visit date not found    Next Office Visit: 5/7/2025  Next Telemedicine Visit: Visit date not found     Most Recent BP Readings:  BP Readings from Last 3 Encounters:   01/15/25 (!) 154/57   12/16/24 120/60   11/07/24 120/74       Most recent Lab results:  Lab Results   Component Value Date    CHOL 160 12/10/2024    HDL 58 12/10/2024    TRIG 149 12/10/2024       Lab Results   Component Value Date    AST 30 01/21/2022    ALT 25 01/21/2022       Lab Results   Component Value Date     12/10/2024    K 4.5 12/10/2024    BUN 24 12/10/2024    CREATININE 0.95 12/10/2024       Current Medications:    Current Outpatient Medications:     amLODIPine (NORVASC) 5 mg tablet, Take 1 tablet (5 mg total) by mouth daily., Disp: 90 tablet, Rfl: 3    apixaban (ELIQUIS) 2.5 mg tablet, Take 1 tablet (2.5 mg total) by mouth 2 (two) times a day., Disp: 180 tablet, Rfl: 3    ascorbic acid, vitamin C, 500 mg tablet,chewable, Take by mouth daily., Disp: , Rfl:     atorvastatin (LIPITOR) 40 mg tablet, Take 40 mg by mouth daily.  , Disp: , Rfl:     calcium acetate,phosphat bind, (PHOSLO) 667 mg capsule, Take 1,334 mg by mouth once., Disp: , Rfl:     cholecalciferol, vitamin D3, 1,000 unit (25 mcg) tablet, Take 2,000 Units by mouth daily. , Disp: , Rfl:     cyanocobalamin (VITAMIN B12) 100 mcg tablet, Take 100 mcg by mouth daily., Disp: , Rfl:     dofetilide (TIKOSYN) 250 mcg capsule, TAKE 1 CAPSULE TWICE A DAY, Disp: 180 capsule, Rfl: 3    fluocinolone acetonide oiL 0.01 % drops, 2 (two) times a day. APPLY TWICE DAILY TO EARS FOR 2-4 WEEKS FOR ECZEMA FLARES, Disp: , Rfl:     FREESTYLE LITE STRIPS strip, as needed., Disp: , Rfl:     magnesium oxide (MAG-OX) 400 mg (241.3 mg magnesium) tablet, Take 1 tablet (400 mg total) by mouth 2 (two) times a day as needed (serum mag less than 2 mEq/L).,  Disp: 90 tablet, Rfl: 3    metoprolol succinate XL (TOPROL-XL) 50 mg 24 hr tablet, TAKE 1 TABLET TWICE A DAY, Disp: 180 tablet, Rfl: 3    omeprazole (PriLOSEC) 40 mg capsule, Take 20 mg by mouth daily and an additional dose as needed., Disp: , Rfl: 3    venlafaxine XR (EFFEXOR-XR) 75 mg 24 hr capsule, Take 75 mg by mouth daily.  , Disp: , Rfl:

## 2025-05-05 NOTE — H&P (VIEW-ONLY)
Electrophysiology         Reason for visit: atrial fibrillation  Referred by :Dr Boateng     History of Present Illness:  Ms. Angeles Cardoza is a 79 year old female with a past medical history significant for hyperlipidemia, hypertension, and a paroxymal atrial fibrillation with long conversion pauses s/p implantation of Medtronic dual chamber pacemaker on 4/12/2022 and on Tikosyn 250 mcg BID who is here for follow up.     Since last office visit she has had an several episodes of atrial fibrillation for which she received an alert.  Her AF burden had increased to 19% in February.  She at that time had remained asymptomatic.  She is unaware if she is in atrial fibrillation or not on a daily basis however did have an episode of fatigue and weakness several days ago and noted that her blood pressure was low at that time as well as her blood pressure cuff saying that she was in atrial fibrillation.    Brief EP history:  - pAF in Nov 2021 during hospitalization for acute diverticulits. Started on Eliquis 5 mg BID. Started on amiodarone   - Could not tolerate amiodarone due to NV. Transitioned to Flecainide 50 mg BID.   - MCOT in early 2022, 40% burden of AF with frequent long conversion pauses (some symptomatic).   - Flecainide was stopped.   - dcPPM implantation on 4/12/2022 and started on Tikosyn 250 mcg BID.  -9.18.23 seemargaret Arteaga NP and is doing well. No changes were made to his regimen and device is functioning normally  -[employer] report 2/9/24 -  EGMs showed  AF/AFL and AF/AFL with RVR, longest on trending >2 hrs in duration but AF burden 1%. no changes made    A comprehensive 15-point review of systems was performed and was negative unless specifically mentioned in the History of Present Illness.        Past Medical History:   Diagnosis Date    Acid reflux     Allergic     Atrial fibrillation (CMS/HCC)     Hypertension     Irregular heart rhythm     Lipid disorder     Sick sinus syndrome (CMS/HCC)         Past Surgical History   Procedure Laterality Date    Aortogram abdominal with serialography N/A 2/1/2019    Performed by Abelardo Garza DO at [facility] CARDIAC CATH/EP    Breast surgery  2003    breast reduction    Cardiac pacemaker placement Left 04/12/2022    Medtronic    PPM - DUAL CHAMBER NEW N/A 4/12/2022    Performed by Neel Pizano MD at [facility] CARDIAC CATH/EP    Renal angioplasty      Selective renal angiography bilateral Right 2/1/2019    Performed by Abelardo Garza DO at [facility] CARDIAC CATH/EP    Toe surgery Left     Tonsillectomy      [place] tooth extraction         Current Outpatient Medications on File Prior to Visit   Medication Sig Dispense Refill    amLODIPine (NORVASC) 5 mg tablet Take 1 tablet (5 mg total) by mouth daily. 90 tablet 3    ascorbic acid, vitamin C, 500 mg tablet,chewable Take by mouth daily.      atorvastatin (LIPITOR) 40 mg tablet Take 40 mg by mouth daily.        calcium acetate,phosphat bind, (PHOSLO) 667 mg capsule Take 1,334 mg by mouth once.      cholecalciferol, vitamin D3, 1,000 unit (25 mcg) tablet Take 2,000 Units by mouth daily.       cyanocobalamin (VITAMIN B12) 100 mcg tablet Take 100 mcg by mouth daily.      dofetilide (TIKOSYN) 250 mcg capsule TAKE 1 CAPSULE TWICE A  capsule 3    ELIQUIS 2.5 mg tablet TAKE 1 TABLET TWICE A  tablet 3    famotidine (PEPCID) 40 mg tablet       fluocinolone acetonide oiL 0.01 % drops 2 (two) times a day. APPLY TWICE DAILY TO EARS FOR 2-4 WEEKS FOR ECZEMA FLARES      FREESTYLE LITE STRIPS strip as needed.      magnesium oxide (MAG-OX) 400 mg (241.3 mg magnesium) tablet Take 1 tablet (400 mg total) by mouth 2 (two) times a day as needed (serum mag less than 2 mEq/L). 90 tablet 3    venlafaxine XR (EFFEXOR-XR) 75 mg 24 hr capsule Take 75 mg by mouth daily.         No current facility-administered medications on file prior to visit.       Allergies, Social History and Family History were reviewed and updated in EMR.     Physical  Examination:  Vitals:    05/07/25 1258   BP: (!) 152/78   Pulse: (!) 40   SpO2: 96%       Constitutional: The patient appeared physically well and in no acute distress.  Respiratory: Clear to auscultation, no wheezes or rubs.  Cardiovascular: A comprehensive cardiovascular examination was performed. Highlights include normal jugular venous pressure, 2+ carotids, no bruits. The precordium is quiet. Regular rhythm, rate, S1 and S2 normal, no rubs, or gallops were appreciated. Murmurs: No pathologic murmurs appreciated.  Musculoskeletal/ Extremities: No edema, normal peripheral pulses.  Skin: No rashes or lesions apparent.       Lab Results   Component Value Date    WBC 9.4 12/10/2024    HGB 13.6 12/10/2024    HCT 41.9 12/10/2024     12/10/2024    CHOL 160 12/10/2024    TRIG 149 12/10/2024    HDL 58 12/10/2024    LDLCALC 77 12/10/2024    ALT 25 01/21/2022    AST 30 01/21/2022     12/10/2024    K 4.5 12/10/2024    CREATININE 0.95 12/10/2024    BUN 24 12/10/2024    TSH 2.05 01/21/2022    INR 0.9 11/13/2016       Cardiac Imaging    ECHOCARDIOGRAM - 5/2/2022  Tricuspid valve structure is grossly normal. Mild tricuspid valve regurgitation. The regurgitation jet is central.  Normal-sized LV. Normal LV systolic function. Estimated EF 65%. Normal LV septal wall motion. No regional wall motion abnormalities. Normal LV wall thickness.  Normal-sized RV. Normal RV systolic function. Pacer wire present in the RV. Normal RV wall thickness.  Mildly dilated LA. No patent foramen ovale present as seen in the LA. Intact LA septum present.  Normal-sized RA. Pacemaker wire present in the RA.  Aortic root sclerotic. Sinuses of Valsalva sclerotic. Ascending aorta sclerotic. Aortic arch grossly normal.  Tricuspid aortic valve. Sclerotic aortic valve leaflets. Mild aortic valve regurgitation. Mild aortic valve stenosis.  Sclerotic mitral valve.  Mild to moderate mitral valve regurgitation. The jet is centrally directed.  Pulmonic  valve not well visualized.  IVC is a small caliber vessel (<1.7cm). IVC collapses >50% during inspiration.  Normal pericardial structure. No evidence of pericardial effusion. No cardiac tamponade.     Dual Chamber Pacemaker Evaluation     Site Evaluation: The site of implantation in the upper left chest shows a well healed scar and is without ecchymosis, redness, drainage or hematoma. The skin is mobile over the device. The patient denies pain at the implantation site.     Device Information  Device :            MedOrthAlign  Device Model:                        [id number]  Date of Implant:            April 12, 2022  Last session: sep 18 23     Battery Data:     Estimated time to replacement:  10.5 years     Rhythm Data:  Underlying Rhythm:            Sinus bradycardia, HR in the 50s   High Rate Episodes:            1797, Atrial fibrillation   Ventricular High Rate                        16, episodes of AT  AF burden noted to be 12%, longest episode lasting 6 hours     Lead Performance Data:             RA                   RV  Lead Impedance (ohms)            418                  456  Sensing (mV)                              3.0                  14.6  Capture (V@ms)                        0.5 @ 0.4         0.75 @ 0.4  % Pacing                                    74%                    1.3 %     Final Programmed Values:  Mode:                                                AAI <-> DDD (MVP/RhythmIQ.AAI-Safe Mode)  Paced Lower-Upper Rate:            60 - 120 bpm     ECG  Atrial paced, V sensed, Qtc 435 ms      Assessment and plan:  Ms. Angeles Cardoza is a 79 year old female with a past medical history significant for hyperlipidemia, hypertension, and a paroxymal atrial fibrillation with long conversion pauses s/p implantation of Medtronic dual chamber pacemaker on 4/12/2022 who is here for follow up.      #Dual Chamber pacemaker   - Normal device evaluation and function.    - Lead function is appropriate, with  capture and sensing thresholds are as expected.   - There were no significant arrhythmias noted (see Rhythm Data above), and no device therapies were delivered since the last evaluation.  - The counters and diagnostics were reset.  - Follow up will be in 6 months, with remote follow up every 3 months until scheduled clinic visit.     #Atrial fibrillation:  She has not continued to have increased episodes of atrial fibrillation with a an AF burden of 12%.  She has had over 1700 episodes of atrial fibrillation since last office visit with the longest episode lasting 6 hours an average heart rate of 160 to 180 bpm.  This is now associated with fatigue and weakness.  Today discussed that the Tikosyn is likely not going to continue holding her in sinus rhythm and her next option would be to transition to amiodarone however she refuses to use this medication due to side effects of nausea and vomiting that she previously experienced.  Her only other option at this point would be to undergo AV node ablation for rate control.  We discussed this in depth today however patient is reluctant to proceed with this at this time.    After a very in-depth discussion, we decided to continue on Tikosyn for now and continue monitoring.  I will increase her metoprolol to 75 mg twice daily for further rate control.  If her AF burden continues to increase in and/or she continues to be symptomatic of these episodes, then she will consider AV node ablation as next option.  - Continue Eliquis 2.5 mg BID for systemic anticoagulation.   - Continue Tikosyn 250 mcg BID. QTc is appropriate.   -labs from 12/2024, Creat 0.95, 4.5.   - she will need repeat labs prior to next office visit  - Will increase metoprolol to 75 mg twice daily today    I spent 35 minutes with the patient for record review, examination, care coordination and counseling.     This letter was generated using speech recognition software. Please excuse any typographical errors.      SHOBHA Pradhan  Cardiac Electrophysiology  [facility] [address]  5/7/2025   I attest that this visit supports the complexity inherent to evaluation and management associated with medical care services that serve as the continuing focal point for all needed health care services and/or medical care services that are part of ongoing care related to this patient's single, serious condition or a complex condition.

## 2025-05-07 ENCOUNTER — OFFICE VISIT (OUTPATIENT)
Dept: CARDIOLOGY | Facility: CLINIC | Age: 80
End: 2025-05-07
Payer: MEDICARE

## 2025-05-07 VITALS
BODY MASS INDEX: 22.19 KG/M2 | HEART RATE: 40 BPM | SYSTOLIC BLOOD PRESSURE: 152 MMHG | DIASTOLIC BLOOD PRESSURE: 78 MMHG | OXYGEN SATURATION: 96 % | WEIGHT: 113 LBS | HEIGHT: 60 IN

## 2025-05-07 VITALS
DIASTOLIC BLOOD PRESSURE: 78 MMHG | HEART RATE: 74 BPM | HEIGHT: 60 IN | OXYGEN SATURATION: 96 % | SYSTOLIC BLOOD PRESSURE: 150 MMHG | BODY MASS INDEX: 22.19 KG/M2 | WEIGHT: 113 LBS

## 2025-05-07 DIAGNOSIS — E78.2 MIXED HYPERLIPIDEMIA: Chronic | ICD-10-CM

## 2025-05-07 DIAGNOSIS — I10 ESSENTIAL HYPERTENSION: Primary | Chronic | ICD-10-CM

## 2025-05-07 DIAGNOSIS — R06.02 SOB (SHORTNESS OF BREATH): ICD-10-CM

## 2025-05-07 DIAGNOSIS — I10 ESSENTIAL HYPERTENSION: Primary | ICD-10-CM

## 2025-05-07 DIAGNOSIS — I38 VALVULAR HEART DISEASE: Chronic | ICD-10-CM

## 2025-05-07 DIAGNOSIS — I25.10 CORONARY ARTERY DISEASE INVOLVING NATIVE CORONARY ARTERY OF NATIVE HEART WITHOUT ANGINA PECTORIS: ICD-10-CM

## 2025-05-07 DIAGNOSIS — I48.19 PERSISTENT ATRIAL FIBRILLATION (CMS/HCC): Chronic | ICD-10-CM

## 2025-05-07 DIAGNOSIS — I38 VALVULAR HEART DISEASE: ICD-10-CM

## 2025-05-07 DIAGNOSIS — Z95.0 PACEMAKER: ICD-10-CM

## 2025-05-07 DIAGNOSIS — I48.19 PERSISTENT ATRIAL FIBRILLATION (CMS/HCC): ICD-10-CM

## 2025-05-07 DIAGNOSIS — Z95.0 PACEMAKER: Chronic | ICD-10-CM

## 2025-05-07 LAB
ATRIAL RATE: 74
P AXIS: 22
PR INTERVAL: 182
QRS DURATION: 70
QT INTERVAL: 392
QTC CALCULATION(BAZETT): 435
R AXIS: 54
T WAVE AXIS: 72
VENTRICULAR RATE: 74

## 2025-05-07 PROCEDURE — G2211 COMPLEX E/M VISIT ADD ON: HCPCS | Performed by: INTERNAL MEDICINE

## 2025-05-07 PROCEDURE — G8753 SYS BP > OR = 140: HCPCS | Performed by: INTERNAL MEDICINE

## 2025-05-07 PROCEDURE — 99214 OFFICE O/P EST MOD 30 MIN: CPT | Performed by: INTERNAL MEDICINE

## 2025-05-07 PROCEDURE — G8754 DIAS BP LESS 90: HCPCS | Performed by: INTERNAL MEDICINE

## 2025-05-07 RX ORDER — METOPROLOL SUCCINATE 50 MG/1
75 TABLET, EXTENDED RELEASE ORAL 2 TIMES DAILY
Qty: 180 TABLET | Refills: 3 | Status: SHIPPED | OUTPATIENT
Start: 2025-05-07

## 2025-05-07 RX ORDER — FAMOTIDINE 40 MG/1
TABLET, FILM COATED ORAL
COMMUNITY
Start: 2025-04-22

## 2025-05-08 ENCOUNTER — APPOINTMENT (OUTPATIENT)
Dept: URBAN - METROPOLITAN AREA CLINIC 374 | Facility: CLINIC | Age: 80
Setting detail: DERMATOLOGY
End: 2025-05-08

## 2025-05-08 DIAGNOSIS — L82.1 OTHER SEBORRHEIC KERATOSIS: ICD-10-CM | Status: UNCHANGED

## 2025-05-08 DIAGNOSIS — Z71.89 OTHER SPECIFIED COUNSELING: ICD-10-CM

## 2025-05-08 DIAGNOSIS — L81.4 OTHER MELANIN HYPERPIGMENTATION: ICD-10-CM | Status: UNCHANGED

## 2025-05-08 DIAGNOSIS — L81.2 FRECKLES: ICD-10-CM | Status: UNCHANGED

## 2025-05-08 DIAGNOSIS — D22 MELANOCYTIC NEVI: ICD-10-CM | Status: UNCHANGED

## 2025-05-08 DIAGNOSIS — Z80.8 FAMILY HISTORY OF MALIGNANT NEOPLASM OF OTHER ORGANS OR SYSTEMS: ICD-10-CM

## 2025-05-08 DIAGNOSIS — L30.4 ERYTHEMA INTERTRIGO: ICD-10-CM | Status: INADEQUATELY CONTROLLED

## 2025-05-08 DIAGNOSIS — D18.0 HEMANGIOMA: ICD-10-CM | Status: UNCHANGED

## 2025-05-08 DIAGNOSIS — L81.5 LEUKODERMA, NOT ELSEWHERE CLASSIFIED: ICD-10-CM | Status: UNCHANGED

## 2025-05-08 PROBLEM — D18.01 HEMANGIOMA OF SKIN AND SUBCUTANEOUS TISSUE: Status: ACTIVE | Noted: 2025-05-08

## 2025-05-08 PROBLEM — D22.5 MELANOCYTIC NEVI OF TRUNK: Status: ACTIVE | Noted: 2025-05-08

## 2025-05-08 PROCEDURE — ? MEDICATION COUNSELING

## 2025-05-08 PROCEDURE — 99213 OFFICE O/P EST LOW 20 MIN: CPT

## 2025-05-08 PROCEDURE — ? PRESCRIPTION

## 2025-05-08 PROCEDURE — ? DIAGNOSIS COMMENT

## 2025-05-08 PROCEDURE — ? COUNSELING

## 2025-05-08 PROCEDURE — ? PRESCRIPTION MEDICATION MANAGEMENT

## 2025-05-08 PROCEDURE — ? TREATMENT REGIMEN

## 2025-05-08 PROCEDURE — ? FULL BODY SKIN EXAM

## 2025-05-08 RX ORDER — ECONAZOLE NITRATE 10 MG/G
CREAM TOPICAL BID
Qty: 30 | Refills: 3 | Status: ERX | COMMUNITY
Start: 2025-05-08

## 2025-05-08 RX ORDER — HYDROCORTISONE 25 MG/G
OINTMENT TOPICAL
Qty: 28.35 | Refills: 3 | Status: ERX | COMMUNITY
Start: 2025-05-08

## 2025-05-08 RX ADMIN — ECONAZOLE NITRATE: 10 CREAM TOPICAL at 00:00

## 2025-05-08 RX ADMIN — HYDROCORTISONE: 25 OINTMENT TOPICAL at 00:00

## 2025-05-08 ASSESSMENT — LOCATION DETAILED DESCRIPTION DERM
LOCATION DETAILED: LEFT RADIAL DORSAL HAND
LOCATION DETAILED: INFERIOR THORACIC SPINE
LOCATION DETAILED: LEFT INFERIOR MEDIAL MALAR CHEEK
LOCATION DETAILED: RIGHT DISTAL DORSAL FOREARM
LOCATION DETAILED: RIGHT INFRAMAMMARY CREASE (INNER QUADRANT)
LOCATION DETAILED: RIGHT CENTRAL MALAR CHEEK
LOCATION DETAILED: RIGHT RADIAL DORSAL HAND
LOCATION DETAILED: EPIGASTRIC SKIN
LOCATION DETAILED: RIGHT PROXIMAL PRETIBIAL REGION
LOCATION DETAILED: SUPERIOR THORACIC SPINE
LOCATION DETAILED: RIGHT PROXIMAL DORSAL FOREARM
LOCATION DETAILED: RIGHT MEDIAL UPPER BACK
LOCATION DETAILED: LEFT POSTERIOR SHOULDER
LOCATION DETAILED: LEFT PROXIMAL DORSAL FOREARM
LOCATION DETAILED: RIGHT POSTERIOR SHOULDER
LOCATION DETAILED: LEFT PROXIMAL PRETIBIAL REGION
LOCATION DETAILED: LEFT INFRAMAMMARY CREASE (INNER QUADRANT)
LOCATION DETAILED: LEFT DISTAL DORSAL FOREARM

## 2025-05-08 ASSESSMENT — LOCATION SIMPLE DESCRIPTION DERM
LOCATION SIMPLE: RIGHT UPPER BACK
LOCATION SIMPLE: LEFT BREAST
LOCATION SIMPLE: LEFT SHOULDER
LOCATION SIMPLE: RIGHT HAND
LOCATION SIMPLE: LEFT HAND
LOCATION SIMPLE: UPPER BACK
LOCATION SIMPLE: ABDOMEN
LOCATION SIMPLE: RIGHT FOREARM
LOCATION SIMPLE: LEFT FOREARM
LOCATION SIMPLE: LEFT PRETIBIAL REGION
LOCATION SIMPLE: LEFT CHEEK
LOCATION SIMPLE: RIGHT PRETIBIAL REGION
LOCATION SIMPLE: RIGHT CHEEK
LOCATION SIMPLE: RIGHT BREAST
LOCATION SIMPLE: RIGHT SHOULDER

## 2025-05-08 ASSESSMENT — PAIN INTENSITY VAS: HOW INTENSE IS YOUR PAIN 0 BEING NO PAIN, 10 BEING THE MOST SEVERE PAIN POSSIBLE?: NO PAIN

## 2025-05-08 ASSESSMENT — LOCATION ZONE DERM
LOCATION ZONE: HAND
LOCATION ZONE: TRUNK
LOCATION ZONE: FACE
LOCATION ZONE: ARM
LOCATION ZONE: LEG

## 2025-05-08 ASSESSMENT — BSA RASH: BSA RASH: 2

## 2025-05-08 ASSESSMENT — SEVERITY ASSESSMENT: SEVERITY: MILD TO MODERATE

## 2025-05-08 NOTE — PROCEDURE: FULL BODY SKIN EXAM
Price (Do Not Change): 0.00
Instructions: This plan will send the code FBSE to the PM system.  DO NOT or CHANGE the price.
Detail Level: Simple
Routine  care and anticipatory guidance

## 2025-05-08 NOTE — PROCEDURE: MEDICATION COUNSELING
Erythromycin Counseling:  I discussed with the patient the risks of erythromycin including but not limited to GI upset, allergic reaction, drug rash, diarrhea, increase in liver enzymes, and yeast infections.
Libtayo Counseling- I discussed with the patient the risks of Libtayo including but not limited to nausea, vomiting, diarrhea, and bone or muscle pain.  The patient verbalized understanding of the proper use and possible adverse effects of Libtayo.  All of the patient's questions and concerns were addressed.
Azelaic Acid Counseling: Patient counseled that medicine may cause skin irritation and to avoid applying near the eyes.  In the event of skin irritation, the patient was advised to reduce the amount of the drug applied or use it less frequently.   The patient verbalized understanding of the proper use and possible adverse effects of azelaic acid.  All of the patient's questions and concerns were addressed.
Cibinqo Counseling: I discussed with the patient the risks of Cibinqo therapy including but not limited to common cold, nausea, headache, cold sores, increased blood CPK levels, dizziness, UTIs, fatigue, acne, and vomitting. Live vaccines should be avoided.  This medication has been linked to serious infections; higher rate of mortality; malignancy and lymphoproliferative disorders; major adverse cardiovascular events; thrombosis; thrombocytopenia and lymphopenia; lipid elevations; and retinal detachment.
Cimetidine Counseling:  I discussed with the patient the risks of Cimetidine including but not limited to gynecomastia, headache, diarrhea, nausea, drowsiness, arrhythmias, pancreatitis, skin rashes, psychosis, bone marrow suppression and kidney toxicity.
Niacinamide Counseling: I recommended taking niacin or niacinamide, also know as vitamin B3, twice daily. Recent evidence suggests that taking vitamin B3 (500 mg twice daily) can reduce the risk of actinic keratoses and non-melanoma skin cancers. Side effects of vitamin B3 include flushing and headache.
Quinolones Pregnancy And Lactation Text: This medication is Pregnancy Category C and it isn't know if it is safe during pregnancy. It is also excreted in breast milk.
Ivermectin Pregnancy And Lactation Text: This medication is Pregnancy Category C and it isn't known if it is safe during pregnancy. It is also excreted in breast milk.
Finasteride Pregnancy And Lactation Text: This medication is absolutely contraindicated during pregnancy. It is unknown if it is excreted in breast milk.
Rhofade Pregnancy And Lactation Text: This medication has not been assigned a Pregnancy Risk Category. It is unknown if the medication is excreted in breast milk.
Siliq Counseling:  I discussed with the patient the risks of Siliq including but not limited to new or worsening depression, suicidal thoughts and behavior, immunosuppression, malignancy, posterior leukoencephalopathy syndrome, and serious infections.  The patient understands that monitoring is required including a PPD at baseline and must alert us or the primary physician if symptoms of infection or other concerning signs are noted. There is also a special program designed to monitor depression which is required with Siliq.
Cyclophosphamide Counseling:  I discussed with the patient the risks of cyclophosphamide including but not limited to hair loss, hormonal abnormalities, decreased fertility, abdominal pain, diarrhea, nausea and vomiting, bone marrow suppression and infection. The patient understands that monitoring is required while taking this medication.
Dupixent Counseling: I discussed with the patient the risks of dupilumab including but not limited to eye inflammation and irritation, cold sores, injection site reactions, allergic reactions and increased risk of parasitic infection. The patient understands that monitoring is required and they must alert us or the primary physician if symptoms of infection or other concerning signs are noted.
Griseofulvin Counseling:  I discussed with the patient the risks of griseofulvin including but not limited to photosensitivity, cytopenia, liver damage, nausea/vomiting and severe allergy.  The patient understands that this medication is best absorbed when taken with a fatty meal (e.g., ice cream or french fries).
Hydroquinone Pregnancy And Lactation Text: This medication has not been assigned a Pregnancy Risk Category but animal studies failed to show danger with the topical medication. It is unknown if the medication is excreted in breast milk.
High Dose Vitamin A Counseling: Side effects reviewed, pt to contact office should one occur.
Tranexamic Acid Pregnancy And Lactation Text: It is unknown if this medication is safe during pregnancy or breast feeding.
Taltz Counseling: I discussed with the patient the risks of ixekizumab including but not limited to immunosuppression, serious infections, worsening of inflammatory bowel disease and drug reactions.  The patient understands that monitoring is required including a PPD at baseline and must alert us or the primary physician if symptoms of infection or other concerning signs are noted.
Niacinamide Pregnancy And Lactation Text: These medications are considered safe during pregnancy.
Cimetidine Pregnancy And Lactation Text: This medication is Pregnancy Category B and is considered safe during pregnancy. It is also excreted in breast milk and breast feeding isn't recommended.
Libtayo Pregnancy And Lactation Text: This medication is contraindicated in pregnancy and when breast feeding.
Azelaic Acid Pregnancy And Lactation Text: This medication is considered safe during pregnancy and breast feeding.
Cibinqo Pregnancy And Lactation Text: It is unknown if this medication will adversely affect pregnancy or breast feeding.  You should not take this medication if you are currently pregnant or planning a pregnancy or while breastfeeding.
Erythromycin Pregnancy And Lactation Text: This medication is Pregnancy Category B and is considered safe during pregnancy. It is also excreted in breast milk.
Siliq Pregnancy And Lactation Text: The risk during pregnancy and breastfeeding is uncertain with this medication.
Solaraze Counseling:  I discussed with the patient the risks of Solaraze including but not limited to erythema, scaling, itching, weeping, crusting, and pain.
Birth Control Pills Counseling: Birth Control Pill Counseling: I discussed with the patient the potential side effects of OCPs including but not limited to increased risk of stroke, heart attack, thrombophlebitis, deep venous thrombosis, hepatic adenomas, breast changes, GI upset, headaches, and depression.  The patient verbalized understanding of the proper use and possible adverse effects of OCPs. All of the patient's questions and concerns were addressed.
Sotyktu Counseling:  I discussed the most common side effects of Sotyktu including: common cold, sore throat, sinus infections, cold sores, canker sores, folliculitis, and acne.  I also discussed more serious side effects of Sotyktu including but not limited to: serious allergic reactions; increased risk for infections such as TB; cancers such as lymphomas; rhabdomyolysis and elevated CPK; and elevated triglycerides and liver enzymes. 
Valtrex Counseling: I discussed with the patient the risks of valacyclovir including but not limited to kidney damage, nausea, vomiting and severe allergy.  The patient understands that if the infection seems to be worsening or is not improving, they are to call.
Dupixent Pregnancy And Lactation Text: This medication likely crosses the placenta but the risk for the fetus is uncertain. This medication is excreted in breast milk.
Rifampin Counseling: I discussed with the patient the risks of rifampin including but not limited to liver damage, kidney damage, red-orange body fluids, nausea/vomiting and severe allergy.
Cyclophosphamide Pregnancy And Lactation Text: This medication is Pregnancy Category D and it isn't considered safe during pregnancy. This medication is excreted in breast milk.
High Dose Vitamin A Pregnancy And Lactation Text: High dose vitamin A therapy is contraindicated during pregnancy and breast feeding.
Litfulo Counseling: I discussed with the patient the risks of Litfulo therapy including but not limited to upper respiratory tract infections, shingles, cold sores, and nausea. Live vaccines should be avoided.  This medication has been linked to serious infections; higher rate of mortality; malignancy and lymphoproliferative disorders; major adverse cardiovascular events; thrombosis; gastrointestinal perforations; neutropenia; lymphopenia; anemia; liver enzyme elevations; and lipid elevations.
Benzoyl Peroxide Counseling: Patient counseled that medicine may cause skin irritation and bleach clothing.  In the event of skin irritation, the patient was advised to reduce the amount of the drug applied or use it less frequently.   The patient verbalized understanding of the proper use and possible adverse effects of benzoyl peroxide.  All of the patient's questions and concerns were addressed.
Griseofulvin Pregnancy And Lactation Text: This medication is Pregnancy Category X and is known to cause serious birth defects. It is unknown if this medication is excreted in breast milk but breast feeding should be avoided.
Nsaids Counseling: NSAID Counseling: I discussed with the patient that NSAIDs should be taken with food. Prolonged use of NSAIDs can result in the development of stomach ulcers.  Patient advised to stop taking NSAIDs if abdominal pain occurs.  The patient verbalized understanding of the proper use and possible adverse effects of NSAIDs.  All of the patient's questions and concerns were addressed.
Imiquimod Counseling:  I discussed with the patient the risks of imiquimod including but not limited to erythema, scaling, itching, weeping, crusting, and pain.  Patient understands that the inflammatory response to imiquimod is variable from person to person and was educated regarded proper titration schedule.  If flu-like symptoms develop, patient knows to discontinue the medication and contact us.
Simlandi Counseling:  I discussed with the patient the risks of adalimumab including but not limited to myelosuppression, immunosuppression, autoimmune hepatitis, demyelinating diseases, lymphoma, and serious infections.  The patient understands that monitoring is required including a PPD at baseline and must alert us or the primary physician if symptoms of infection or other concerning signs are noted.
Doxepin Counseling:  Patient advised that the medication is sedating and not to drive a car after taking this medication. Patient informed of potential adverse effects including but not limited to dry mouth, urinary retention, and blurry vision.  The patient verbalized understanding of the proper use and possible adverse effects of doxepin.  All of the patient's questions and concerns were addressed.
Sotyktu Pregnancy And Lactation Text: There is insufficient data to evaluate whether or not Sotyktu is safe to use during pregnancy.   It is not known if Sotyktu passes into breast milk and whether or not it is safe to use when breastfeeding.  
Birth Control Pills Pregnancy And Lactation Text: This medication should be avoided if pregnant and for the first 30 days post-partum.
Odomzo Counseling- I discussed with the patient the risks of Odomzo including but not limited to nausea, vomiting, diarrhea, constipation, weight loss, changes in the sense of taste, decreased appetite, muscle spasms, and hair loss.  The patient verbalized understanding of the proper use and possible adverse effects of Odomzo.  All of the patient's questions and concerns were addressed.
Arava Counseling:  Patient counseled regarding adverse effects of Arava including but not limited to nausea, vomiting, abnormalities in liver function tests. Patients may develop mouth sores, rash, diarrhea, and abnormalities in blood counts. The patient understands that monitoring is required including LFTs and blood counts.  There is a rare possibility of scarring of the liver and lung problems that can occur when taking methotrexate. Persistent nausea, loss of appetite, pale stools, dark urine, cough, and shortness of breath should be reported immediately. Patient advised to discontinue Arava treatment and consult with a physician prior to attempting conception. The patient will have to undergo a treatment to eliminate Arava from the body prior to conception.
Metronidazole Counseling:  I discussed with the patient the risks of metronidazole including but not limited to seizures, nausea/vomiting, a metallic taste in the mouth, nausea/vomiting and severe allergy.
Solaraze Pregnancy And Lactation Text: This medication is Pregnancy Category B and is considered safe. There is some data to suggest avoiding during the third trimester. It is unknown if this medication is excreted in breast milk.
Rifampin Pregnancy And Lactation Text: This medication is Pregnancy Category C and it isn't know if it is safe during pregnancy. It is also excreted in breast milk and should not be used if you are breast feeding.
Ebglyss Counseling: I discussed with the patient the risks of lebrikizumab including but not limited to eye inflammation and irritation, cold sores, injection site reactions, allergic reactions and increased risk of parasitic infection. The patient understands that monitoring is required and they must alert us or the primary physician if symptoms of infection or other concerning signs are noted.
Cyclosporine Counseling:  I discussed with the patient the risks of cyclosporine including but not limited to hypertension, gingival hyperplasia,myelosuppression, immunosuppression, liver damage, kidney damage, neurotoxicity, lymphoma, and serious infections. The patient understands that monitoring is required including baseline blood pressure, CBC, CMP, lipid panel and uric acid, and then 1-2 times monthly CMP and blood pressure.
Valtrex Pregnancy And Lactation Text: this medication is Pregnancy Category B and is considered safe during pregnancy. This medication is not directly found in breast milk but it's metabolite acyclovir is present.
Imiquimod Pregnancy And Lactation Text: This medication is Pregnancy Category C. It is unknown if this medication is excreted in breast milk.
Tremfya Counseling: I discussed with the patient the risks of guselkumab including but not limited to immunosuppression, serious infections, and drug reactions.  The patient understands that monitoring is required including a PPD at baseline and must alert us or the primary physician if symptoms of infection or other concerning signs are noted.
Benzoyl Peroxide Pregnancy And Lactation Text: This medication is Pregnancy Category C. It is unknown if benzoyl peroxide is excreted in breast milk.
Itraconazole Counseling:  I discussed with the patient the risks of itraconazole including but not limited to liver damage, nausea/vomiting, neuropathy, and severe allergy.  The patient understands that this medication is best absorbed when taken with acidic beverages such as non-diet cola or ginger ale.  The patient understands that monitoring is required including baseline LFTs and repeat LFTs at intervals.  The patient understands that they are to contact us or the primary physician if concerning signs are noted.
Litfulo Pregnancy And Lactation Text: Based on animal studies, Lifulo may cause embryo-fetal harm when administered to pregnant women.  The medication should not be used in pregnancy.  Breastfeeding is not recommended during treatment.
Nsaids Pregnancy And Lactation Text: These medications are considered safe up to 30 weeks gestation. It is excreted in breast milk.
Odomzo Pregnancy And Lactation Text: This medication is Pregnancy Category X and is absolutely contraindicated during pregnancy. It is unknown if it is excreted in breast milk.
Doxepin Pregnancy And Lactation Text: This medication is Pregnancy Category C and it isn't known if it is safe during pregnancy. It is also excreted in breast milk and breast feeding isn't recommended.
Metronidazole Pregnancy And Lactation Text: This medication is Pregnancy Category B and considered safe during pregnancy.  It is also excreted in breast milk.
Spironolactone Counseling: Patient advised regarding risks of diarrhea, abdominal pain, hyperkalemia, birth defects (for female patients), liver toxicity and renal toxicity. The patient may need blood work to monitor liver and kidney function and potassium levels while on therapy. The patient verbalized understanding of the proper use and possible adverse effects of spironolactone.  All of the patient's questions and concerns were addressed.
Simlandi Pregnancy And Lactation Text: This medication is Pregnancy Category B and is considered safe during pregnancy. It is unknown if this medication is excreted in breast milk.
Use Enhanced Medication Counseling?: No
Sarecycline Counseling: Patient advised regarding possible photosensitivity and discoloration of the teeth, skin, lips, tongue and gums.  Patient instructed to avoid sunlight, if possible.  When exposed to sunlight, patients should wear protective clothing, sunglasses, and sunscreen.  The patient was instructed to call the office immediately if the following severe adverse effects occur:  hearing changes, easy bruising/bleeding, severe headache, or vision changes.  The patient verbalized understanding of the proper use and possible adverse effects of sarecycline.  All of the patient's questions and concerns were addressed.
Soolantra Counseling: I discussed with the patients the risks of topial Soolantra. This is a medicine which decreases the number of mites and inflammation in the skin. You experience burning, stinging, eye irritation or allergic reactions.  Please call our office if you develop any problems from using this medication.
Klisyri Counseling:  I discussed with the patient the risks of Klisyri including but not limited to erythema, scaling, itching, weeping, crusting, and pain.
Cyclosporine Pregnancy And Lactation Text: This medication is Pregnancy Category C and it isn't know if it is safe during pregnancy. This medication is excreted in breast milk.
Ebglyss Pregnancy And Lactation Text: This medication likely crosses the placenta but the risk for the fetus is uncertain. It is unknown if this medication is excreted in breast milk.
Topical Steroids Counseling: I discussed with the patient that prolonged use of topical steroids can result in the increased appearance of superficial blood vessels (telangiectasias), lightening (hypopigmentation) and thinning of the skin (atrophy).  Patient understands to avoid using high potency steroids in skin folds, the groin or the face.  The patient verbalized understanding of the proper use and possible adverse effects of topical steroids.  All of the patient's questions and concerns were addressed.
Hydroxyzine Counseling: Patient advised that the medication is sedating and not to drive a car after taking this medication.  Patient informed of potential adverse effects including but not limited to dry mouth, urinary retention, and blurry vision.  The patient verbalized understanding of the proper use and possible adverse effects of hydroxyzine.  All of the patient's questions and concerns were addressed.
Carac Counseling:  I discussed with the patient the risks of Carac including but not limited to erythema, scaling, itching, weeping, crusting, and pain.
Olumiant Counseling: I discussed with the patient the risks of Olumiant therapy including but not limited to upper respiratory tract infections, shingles, cold sores, and nausea. Live vaccines should be avoided.  This medication has been linked to serious infections; higher rate of mortality; malignancy and lymphoproliferative disorders; major adverse cardiovascular events; thrombosis; gastrointestinal perforations; neutropenia; lymphopenia; anemia; liver enzyme elevations; and lipid elevations.
Olanzapine Counseling- I discussed with the patient the common side effects of olanzapine including but are not limited to: lack of energy, dry mouth, increased appetite, sleepiness, tremor, constipation, dizziness, changes in behavior, or restlessness.  Explained that teenagers are more likely to experience headaches, abdominal pain, pain in the arms or legs, tiredness, and sleepiness.  Serious side effects include but are not limited: increased risk of death in elderly patients who are confused, have memory loss, or dementia-related psychosis; hyperglycemia; increased cholesterol and triglycerides; and weight gain.
Spironolactone Pregnancy And Lactation Text: This medication can cause feminization of the male fetus and should be avoided during pregnancy. The active metabolite is also found in breast milk.
Soolantra Pregnancy And Lactation Text: This medication is Pregnancy Category C. This medication is considered safe during breast feeding.
Sarecycline Pregnancy And Lactation Text: This medication is Pregnancy Category D and not consider safe during pregnancy. It is also excreted in breast milk.
Detail Level: Detailed
Simponi Counseling:  I discussed with the patient the risks of golimumab including but not limited to myelosuppression, immunosuppression, autoimmune hepatitis, demyelinating diseases, lymphoma, and serious infections.  The patient understands that monitoring is required including a PPD at baseline and must alert us or the primary physician if symptoms of infection or other concerning signs are noted.
Clofazimine Counseling:  I discussed with the patient the risks of clofazimine including but not limited to skin and eye pigmentation, liver damage, nausea/vomiting, gastrointestinal bleeding and allergy.
Zoryve Counseling:  I discussed with the patient that Zoryve is not for use in the eyes, mouth or vagina. The most commonly reported side effects include diarrhea, headache, insomnia, application site pain, upper respiratory tract infections, and urinary tract infections.  All of the patient's questions and concerns were addressed.
Acitretin Pregnancy And Lactation Text: This medication is Pregnancy Category X and should not be given to women who are pregnant or may become pregnant in the future. This medication is excreted in breast milk.
Dutasteride Male Counseling: Dustasteride Counseling:  I discussed with the patient the risks of use of dutasteride including but not limited to decreased libido, decreased ejaculate volume, and gynecomastia. Women who can become pregnant should not handle medication.  All of the patient's questions and concerns were addressed.
Protopic Counseling: Patient may experience a mild burning sensation during topical application. Protopic is not approved in children less than 2 years of age. There have been case reports of hematologic and skin malignancies in patients using topical calcineurin inhibitors although causality is questionable.
Bimzelx Pregnancy And Lactation Text: This medication crosses the placenta and the safety is uncertain during pregnancy. It is unknown if this medication is present in breast milk.
SSKI Counseling:  I discussed with the patient the risks of SSKI including but not limited to thyroid abnormalities, metallic taste, GI upset, fever, headache, acne, arthralgias, paraesthesias, lymphadenopathy, easy bleeding, arrhythmias, and allergic reaction.
Elidel Counseling: Patient may experience a mild burning sensation during topical application. Elidel is not approved in children less than 2 years of age. There have been case reports of hematologic and skin malignancies in patients using topical calcineurin inhibitors although causality is questionable.
Hydroxychloroquine Counseling:  I discussed with the patient that a baseline ophthalmologic exam is needed at the start of therapy and every year thereafter while on therapy. A CBC may also be warranted for monitoring.  The side effects of this medication were discussed with the patient, including but not limited to agranulocytosis, aplastic anemia, seizures, rashes, retinopathy, and liver toxicity. Patient instructed to call the office should any adverse effect occur.  The patient verbalized understanding of the proper use and possible adverse effects of Plaquenil.  All the patient's questions and concerns were addressed.
Zepbound Pregnancy And Lactation Text: The fetal risk of this medication is unknown and taking while pregnant is not recommended. It is unknown if this medication is present in breast milk.
Topical Metronidazole Pregnancy And Lactation Text: This medication is Pregnancy Category B and considered safe during pregnancy.  It is also considered safe to use while breastfeeding.
Dutasteride Female Counseling: Dutasteride Counseling:  I discussed with the patient the risks of use of dutasteride including but not limited to decreased libido and sexual dysfunction. Explained the teratogenic nature of the medication and stressed the importance of not getting pregnant during treatment. All of the patient's questions and concerns were addressed.
Protopic Pregnancy And Lactation Text: This medication is Pregnancy Category C. It is unknown if this medication is excreted in breast milk when applied topically.
Clindamycin Counseling: I counseled the patient regarding use of clindamycin as an antibiotic for prophylactic and/or therapeutic purposes. Clindamycin is active against numerous classes of bacteria, including skin bacteria. Side effects may include nausea, diarrhea, gastrointestinal upset, rash, hives, yeast infections, and in rare cases, colitis.
Nemluvio Counseling: I discussed with the patient the risks of nemolizumab including but not limited to headache, gastrointestinal complaints, nasopharyngitis, musculoskeletal complaints, injection site reactions, and allergic reactions. The patient understands that monitoring is required and they must alert us or the primary physician if any side effects are noted.
Zoryve Pregnancy And Lactation Text: It is unknown if this medication can cause problems during pregnancy and breastfeeding.
Azathioprine Counseling:  I discussed with the patient the risks of azathioprine including but not limited to myelosuppression, immunosuppression, hepatotoxicity, lymphoma, and infections.  The patient understands that monitoring is required including baseline LFTs, Creatinine, possible TPMP genotyping and weekly CBCs for the first month and then every 2 weeks thereafter.  The patient verbalized understanding of the proper use and possible adverse effects of azathioprine.  All of the patient's questions and concerns were addressed.
Cimzia Counseling:  I discussed with the patient the risks of Cimzia including but not limited to immunosuppression, allergic reactions and infections.  The patient understands that monitoring is required including a PPD at baseline and must alert us or the primary physician if symptoms of infection or other concerning signs are noted.
Bexarotene Counseling:  I discussed with the patient the risks of bexarotene including but not limited to hair loss, dry lips/skin/eyes, liver abnormalities, hyperlipidemia, pancreatitis, depression/suicidal ideation, photosensitivity, drug rash/allergic reactions, hypothyroidism, anemia, leukopenia, infection, cataracts, and teratogenicity.  Patient understands that they will need regular blood tests to check lipid profile, liver function tests, white blood cell count, thyroid function tests and pregnancy test if applicable.
Sski Pregnancy And Lactation Text: This medication is Pregnancy Category D and isn't considered safe during pregnancy. It is excreted in breast milk.
Spevigo Counseling: I discussed with the patient the risks of Spevigo including but not limited to fatigue, nasuea, vomiting, headache, pruritus, urinary tract infection, an infusion related reactions.  The patient understands that monitoring is required including screening for tuberculosis at baseline and yearly screening thereafter while continuing Spevigo therapy. They should contact us if symptoms of infection or other concerning signs are noted.
Hydroxychloroquine Pregnancy And Lactation Text: This medication has been shown to cause fetal harm but it isn't assigned a Pregnancy Risk Category. There are small amounts excreted in breast milk.
Clindamycin Pregnancy And Lactation Text: This medication can be used in pregnancy if certain situations. Clindamycin is also present in breast milk.
Nemluvio Pregnancy And Lactation Text: It is not known if Nemluvio causes fetal harm or is present in breast milk. Please proceed with caution if patients who are pregnant or breastfeeding.
Qbrexza Counseling:  I discussed with the patient the risks of Qbrexza including but not limited to headache, mydriasis, blurred vision, dry eyes, nasal dryness, dry mouth, dry throat, dry skin, urinary hesitation, and constipation.  Local skin reactions including erythema, burning, stinging, and itching can also occur.
Zyclara Counseling:  I discussed with the patient the risks of imiquimod including but not limited to erythema, scaling, itching, weeping, crusting, and pain.  Patient understands that the inflammatory response to imiquimod is variable from person to person and was educated regarded proper titration schedule.  If flu-like symptoms develop, patient knows to discontinue the medication and contact us.
Dutasteride Pregnancy And Lactation Text: This medication is absolutely contraindicated in women, especially during pregnancy and breast feeding. Feminization of male fetuses is possible if taking while pregnant.
Albendazole Counseling:  I discussed with the patient the risks of albendazole including but not limited to cytopenia, kidney damage, nausea/vomiting and severe allergy.  The patient understands that this medication is being used in an off-label manner.
Thalidomide Counseling: I discussed with the patient the risks of thalidomide including but not limited to birth defects, anxiety, weakness, chest pain, dizziness, cough and severe allergy.
Minocycline Counseling: Patient advised regarding possible photosensitivity and discoloration of the teeth, skin, lips, tongue and gums.  Patient instructed to avoid sunlight, if possible.  When exposed to sunlight, patients should wear protective clothing, sunglasses, and sunscreen.  The patient was instructed to call the office immediately if the following severe adverse effects occur:  hearing changes, easy bruising/bleeding, severe headache, or vision changes.  The patient verbalized understanding of the proper use and possible adverse effects of minocycline.  All of the patient's questions and concerns were addressed.
Azathioprine Pregnancy And Lactation Text: This medication is Pregnancy Category D and isn't considered safe during pregnancy. It is unknown if this medication is excreted in breast milk.
Bexarotene Pregnancy And Lactation Text: This medication is Pregnancy Category X and should not be given to women who are pregnant or may become pregnant. This medication should not be used if you are breast feeding.
Cimzia Pregnancy And Lactation Text: This medication crosses the placenta but can be considered safe in certain situations. Cimzia may be excreted in breast milk.
Low Dose Naltrexone Counseling- I discussed with the patient the potential risks and side effects of low dose naltrexone including but not limited to: more vivid dreams, headaches, nausea, vomiting, abdominal pain, fatigue, dizziness, and anxiety.
Eucrisa Counseling: Patient may experience a mild burning sensation during topical application. Eucrisa is not approved in children less than 3 months of age.
Spevigo Pregnancy And Lactation Text: The risk during pregnancy and breastfeeding is uncertain with this medication. This medication does cross the placenta. It is unknown if this medication is found in breast milk.
Aklief counseling:  Patient advised to apply a pea-sized amount only at bedtime and wait 30 minutes after washing their face before applying.  If too drying, patient may add a non-comedogenic moisturizer.  The most commonly reported side effects including irritation, redness, scaling, dryness, stinging, burning, itching, and increased risk of sunburn.  The patient verbalized understanding of the proper use and possible adverse effects of retinoids.  All of the patient's questions and concerns were addressed.
Erivedge Counseling- I discussed with the patient the risks of Erivedge including but not limited to nausea, vomiting, diarrhea, constipation, weight loss, changes in the sense of taste, decreased appetite, muscle spasms, and hair loss.  The patient verbalized understanding of the proper use and possible adverse effects of Erivedge.  All of the patient's questions and concerns were addressed.
Isotretinoin Counseling: Patient should get monthly blood tests, not donate blood, not drive at night if vision affected, not share medication, and not undergo elective surgery for 6 months after tx completed. Side effects reviewed, pt to contact office should one occur.
Qbrexza Pregnancy And Lactation Text: There is no available data on Qbrexza use in pregnant women.  There is no available data on Qbrexza use in lactation.
Rituxan Counseling:  I discussed with the patient the risks of Rituxan infusions. Side effects can include infusion reactions, severe drug rashes including mucocutaneous reactions, reactivation of latent hepatitis and other infections and rarely progressive multifocal leukoencephalopathy.  All of the patient's questions and concerns were addressed.
Doxycycline Counseling:  Patient counseled regarding possible photosensitivity and increased risk for sunburn.  Patient instructed to avoid sunlight, if possible.  When exposed to sunlight, patients should wear protective clothing, sunglasses, and sunscreen.  The patient was instructed to call the office immediately if the following severe adverse effects occur:  hearing changes, easy bruising/bleeding, severe headache, or vision changes.  The patient verbalized understanding of the proper use and possible adverse effects of doxycycline.  All of the patient's questions and concerns were addressed.
Finasteride Male Counseling: Finasteride Counseling:  I discussed with the patient the risks of use of finasteride including but not limited to decreased libido, decreased ejaculate volume, gynecomastia, and depression. Women should not handle medication.  All of the patient's questions and concerns were addressed.
Cosentyx Counseling:  I discussed with the patient the risks of Cosentyx including but not limited to worsening of Crohn's disease, immunosuppression, allergic reactions and infections.  The patient understands that monitoring is required including a PPD at baseline and must alert us or the primary physician if symptoms of infection or other concerning signs are noted.
Stelara Counseling:  I discussed with the patient the risks of ustekinumab including but not limited to immunosuppression, malignancy, posterior leukoencephalopathy syndrome, and serious infections.  The patient understands that monitoring is required including a PPD at baseline and must alert us or the primary physician if symptoms of infection or other concerning signs are noted.
Cellcept Counseling:  I discussed with the patient the risks of mycophenolate mofetil including but not limited to infection/immunosuppression, GI upset, hypokalemia, hypercholesterolemia, bone marrow suppression, lymphoproliferative disorders, malignancy, GI ulceration/bleed/perforation, colitis, interstitial lung disease, kidney failure, progressive multifocal leukoencephalopathy, and birth defects.  The patient understands that monitoring is required including a baseline creatinine and regular CBC testing. In addition, patient must alert us immediately if symptoms of infection or other concerning signs are noted.
Aklief Pregnancy And Lactation Text: It is unknown if this medication is safe to use during pregnancy.  It is unknown if this medication is excreted in breast milk.  Breastfeeding women should use the topical cream on the smallest area of the skin for the shortest time needed while breastfeeding.  Do not apply to nipple and areola.
Fluconazole Counseling:  Patient counseled regarding adverse effects of fluconazole including but not limited to headache, diarrhea, nausea, upset stomach, liver function test abnormalities, taste disturbance, and stomach pain.  There is a rare possibility of liver failure that can occur when taking fluconazole.  The patient understands that monitoring of LFTs and kidney function test may be required, especially at baseline. The patient verbalized understanding of the proper use and possible adverse effects of fluconazole.  All of the patient's questions and concerns were addressed.
Rituxan Pregnancy And Lactation Text: This medication is Pregnancy Category C and it isn't know if it is safe during pregnancy. It is unknown if this medication is excreted in breast milk but similar antibodies are known to be excreted.
Cantharidin Pregnancy And Lactation Text: This medication has not been proven safe during pregnancy. It is unknown if this medication is excreted in breast milk.
Low Dose Naltrexone Pregnancy And Lactation Text: Naltrexone is pregnancy category C.  There have been no adequate and well-controlled studies in pregnant women.  It should be used in pregnancy only if the potential benefit justifies the potential risk to the fetus.   Limited data indicates that naltrexone is minimally excreted into breastmilk.
Doxycycline Pregnancy And Lactation Text: This medication is Pregnancy Category D and not consider safe during pregnancy. It is also excreted in breast milk but is considered safe for shorter treatment courses.
Isotretinoin Pregnancy And Lactation Text: This medication is Pregnancy Category X and is considered extremely dangerous during pregnancy. It is unknown if it is excreted in breast milk.
Finasteride Female Counseling: Finasteride Counseling:  I discussed with the patient the risks of use of finasteride including but not limited to decreased libido and sexual dysfunction. Explained the teratogenic nature of the medication and stressed the importance of not getting pregnant during treatment. All of the patient's questions and concerns were addressed.
Rhofade Counseling: Rhofade is a topical medication which can decrease superficial blood flow where applied. Side effects are uncommon and include stinging, redness and allergic reactions.
Ivermectin Counseling:  Patient instructed to take medication on an empty stomach with a full glass of water.  Patient informed of potential adverse effects including but not limited to nausea, diarrhea, dizziness, itching, and swelling of the extremities or lymph nodes.  The patient verbalized understanding of the proper use and possible adverse effects of ivermectin.  All of the patient's questions and concerns were addressed.
Tranexamic Acid Counseling:  Patient advised of the small risk of bleeding problems with tranexamic acid. They were also instructed to call if they developed any nausea, vomiting or diarrhea. All of the patient's questions and concerns were addressed.
Quinolones Counseling:  I discussed with the patient the risks of fluoroquinolones including but not limited to GI upset, allergic reaction, drug rash, diarrhea, dizziness, photosensitivity, yeast infections, liver function test abnormalities, tendonitis/tendon rupture.
Hydroquinone Counseling:  Patient advised that medication may result in skin irritation, lightening (hypopigmentation), dryness, and burning.  In the event of skin irritation, the patient was advised to reduce the amount of the drug applied or use it less frequently.  Rarely, spots that are treated with hydroquinone can become darker (pseudoochronosis).  Should this occur, patient instructed to stop medication and call the office. The patient verbalized understanding of the proper use and possible adverse effects of hydroquinone.  All of the patient's questions and concerns were addressed.
Topical Clindamycin Counseling: Patient counseled that this medication may cause skin irritation or allergic reactions.  In the event of skin irritation, the patient was advised to reduce the amount of the drug applied or use it less frequently.   The patient verbalized understanding of the proper use and possible adverse effects of clindamycin.  All of the patient's questions and concerns were addressed.
Semaglutide Counseling: I reviewed the possible side effects including: thyroid tumors, kidney disease, gallbladder disease, abdominal pain, constipation, diarrhea, nausea, vomiting and pancreatitis. Do not take this medication if you have a history or family history of multiple endocrine neoplasia syndrome type 2. Side effects reviewed, pt to contact office should one occur.
Dapsone Pregnancy And Lactation Text: This medication is Pregnancy Category C and is not considered safe during pregnancy or breast feeding.
Azithromycin Pregnancy And Lactation Text: This medication is considered safe during pregnancy and is also secreted in breast milk.
Winlevi Counseling:  I discussed with the patient the risks of topical clascoterone including but not limited to erythema, scaling, itching, and stinging. Patient voiced their understanding.
5-Fu Pregnancy And Lactation Text: This medication is Pregnancy Category X and contraindicated in pregnancy and in women who may become pregnant. It is unknown if this medication is excreted in breast milk.
Opzelura Counseling:  I discussed with the patient the risks of Opzelura including but not limited to nasopharngitis, bronchitis, ear infection, eosinophila, hives, diarrhea, folliculitis, tonsillitis, and rhinorrhea.  Taken orally, this medication has been linked to serious infections; higher rate of mortality; malignancy and lymphoproliferative disorders; major adverse cardiovascular events; thrombosis; thrombocytopenia, anemia, and neutropenia; and lipid elevations.
Opioid Pregnancy And Lactation Text: These medications can lead to premature delivery and should be avoided during pregnancy. These medications are also present in breast milk in small amounts.
Oxybutynin Counseling:  I discussed with the patient the risks of oxybutynin including but not limited to skin rash, drowsiness, dry mouth, difficulty urinating, and blurred vision.
Gabapentin Counseling: I discussed with the patient the risks of gabapentin including but not limited to dizziness, somnolence, fatigue and ataxia.
Topical Clindamycin Pregnancy And Lactation Text: This medication is Pregnancy Category B and is considered safe during pregnancy. It is unknown if it is excreted in breast milk.
Ilumya Counseling: I discussed with the patient the risks of tildrakizumab including but not limited to immunosuppression, malignancy, posterior leukoencephalopathy syndrome, and serious infections.  The patient understands that monitoring is required including a PPD at baseline and must alert us or the primary physician if symptoms of infection or other concerning signs are noted.
Opzelura Pregnancy And Lactation Text: There is insufficient data to evaluate drug-associated risk for major birth defects, miscarriage, or other adverse maternal or fetal outcomes.  There is a pregnancy registry that monitors pregnancy outcomes in pregnant persons exposed to the medication during pregnancy.  It is unknown if this medication is excreted in breast milk.  Do not breastfeed during treatment and for about 4 weeks after the last dose.
Bactrim Counseling:  I discussed with the patient the risks of sulfa antibiotics including but not limited to GI upset, allergic reaction, drug rash, diarrhea, dizziness, photosensitivity, and yeast infections.  Rarely, more serious reactions can occur including but not limited to aplastic anemia, agranulocytosis, methemoglobinemia, blood dyscrasias, liver or kidney failure, lung infiltrates or desquamative/blistering drug rashes.
Winlevi Pregnancy And Lactation Text: This medication is considered safe during pregnancy and breastfeeding.
Drysol Counseling:  I discussed with the patient the risks of drysol/aluminum chloride including but not limited to skin rash, itching, irritation, burning.
Topical Ketoconazole Counseling: Patient counseled that this medication may cause skin irritation or allergic reactions.  In the event of skin irritation, the patient was advised to reduce the amount of the drug applied or use it less frequently.   The patient verbalized understanding of the proper use and possible adverse effects of ketoconazole.  All of the patient's questions and concerns were addressed.
Gabapentin Pregnancy And Lactation Text: This medication is Pregnancy Category C and isn't considered safe during pregnancy. It is excreted in breast milk.
Adbry Counseling: I discussed with the patient the risks of tralokinumab including but not limited to eye infection and irritation, cold sores, injection site reactions, worsening of asthma, allergic reactions and increased risk of parasitic infection.  Live vaccines should be avoided while taking tralokinumab. The patient understands that monitoring is required and they must alert us or the primary physician if symptoms of infection or other concerning signs are noted.
Wegovy Counseling: I reviewed the possible side effects including: thyroid tumors, kidney disease, gallbladder disease, abdominal pain, constipation, diarrhea, nausea, vomiting and pancreatitis. Do not take this medication if you have a history or family history of multiple endocrine neoplasia syndrome type 2. Side effects reviewed, pt to contact office should one occur.
Bactrim Pregnancy And Lactation Text: This medication is Pregnancy Category D and is known to cause fetal risk.  It is also excreted in breast milk.
Picato Counseling:  I discussed with the patient the risks of Picato including but not limited to erythema, scaling, itching, weeping, crusting, and pain.
Propranolol Counseling:  I discussed with the patient the risks of propranolol including but not limited to low heart rate, low blood pressure, low blood sugar, restlessness and increased cold sensitivity. They should call the office if they experience any of these side effects.
VTAMA Counseling: I discussed with the patient that VTAMA is not for use in the eyes, mouth or mouth. They should call the office if they develop any signs of allergic reactions to VTAMA. The patient verbalized understanding of the proper use and possible adverse effects of VTAMA.  All of the patient's questions and concerns were addressed.
Adbry Pregnancy And Lactation Text: It is unknown if this medication will adversely affect pregnancy or breast feeding.
Glycopyrrolate Counseling:  I discussed with the patient the risks of glycopyrrolate including but not limited to skin rash, drowsiness, dry mouth, difficulty urinating, and blurred vision.
Infliximab Counseling:  I discussed with the patient the risks of infliximab including but not limited to myelosuppression, immunosuppression, autoimmune hepatitis, demyelinating diseases, lymphoma, and serious infections.  The patient understands that monitoring is required including a PPD at baseline and must alert us or the primary physician if symptoms of infection or other concerning signs are noted.
Propranolol Pregnancy And Lactation Text: This medication is Pregnancy Category C and it isn't known if it is safe during pregnancy. It is excreted in breast milk.
Cephalexin Counseling: I counseled the patient regarding use of cephalexin as an antibiotic for prophylactic and/or therapeutic purposes. Cephalexin (commonly prescribed under brand name Keflex) is a cephalosporin antibiotic which is active against numerous classes of bacteria, including most skin bacteria. Side effects may include nausea, diarrhea, gastrointestinal upset, rash, hives, yeast infections, and in rare cases, hepatitis, kidney disease, seizures, fever, confusion, neurologic symptoms, and others. Patients with severe allergies to penicillin medications are cautioned that there is about a 10% incidence of cross-reactivity with cephalosporins. When possible, patients with penicillin allergies should use alternatives to cephalosporins for antibiotic therapy.
Acitretin Counseling:  I discussed with the patient the risks of acitretin including but not limited to hair loss, dry lips/skin/eyes, liver damage, hyperlipidemia, depression/suicidal ideation, photosensitivity.  Serious rare side effects can include but are not limited to pancreatitis, pseudotumor cerebri, bony changes, clot formation/stroke/heart attack.  Patient understands that alcohol is contraindicated since it can result in liver toxicity and significantly prolong the elimination of the drug by many years.
Bimzelx Counseling:  I discussed with the patient the risks of Bimzelx including but not limited to depression, immunosuppression, allergic reactions and infections.  The patient understands that monitoring is required including a PPD at baseline and must alert us or the primary physician if symptoms of infection or other concerning signs are noted.
Skyrizi Counseling: I discussed with the patient the risks of risankizumab-rzaa including but not limited to immunosuppression, and serious infections.  The patient understands that monitoring is required including a PPD at baseline and must alert us or the primary physician if symptoms of infection or other concerning signs are noted.
Topical Metronidazole Counseling: Metronidazole is a topical antibiotic medication. You may experience burning, stinging, redness, or allergic reactions.  Please call our office if you develop any problems from using this medication.
Zepbound Counseling: I reviewed the possible side effects including: thyroid tumors, kidney disease, gallbladder disease, abdominal pain, constipation, diarrhea, nausea, vomiting and pancreatitis. Do not take this medication if you have a history or family history of multiple endocrine neoplasia syndrome type 2. Side effects reviewed, pt to contact office should one occur.
Glycopyrrolate Pregnancy And Lactation Text: This medication is Pregnancy Category B and is considered safe during pregnancy. It is unknown if it is excreted breast milk.
Cephalexin Pregnancy And Lactation Text: This medication is Pregnancy Category B and considered safe during pregnancy.  It is also excreted in breast milk but can be used safely for shorter doses.
Enbrel Counseling:  I discussed with the patient the risks of etanercept including but not limited to myelosuppression, immunosuppression, autoimmune hepatitis, demyelinating diseases, lymphoma, and infections.  The patient understands that monitoring is required including a PPD at baseline and must alert us or the primary physician if symptoms of infection or other concerning signs are noted.
Klisyri Pregnancy And Lactation Text: It is unknown if this medication can harm a developing fetus or if it is excreted in breast milk.
Ketoconazole Counseling:   Patient counseled regarding improving absorption with orange juice.  Adverse effects include but are not limited to breast enlargement, headache, diarrhea, nausea, upset stomach, liver function test abnormalities, taste disturbance, and stomach pain.  There is a rare possibility of liver failure that can occur when taking ketoconazole. The patient understands that monitoring of LFTs may be required, especially at baseline. The patient verbalized understanding of the proper use and possible adverse effects of ketoconazole.  All of the patient's questions and concerns were addressed.
Topical Steroids Applications Pregnancy And Lactation Text: Most topical steroids are considered safe to use during pregnancy and lactation.  Any topical steroid applied to the breast or nipple should be washed off before breastfeeding.
Xolair Counseling:  Patient informed of potential adverse effects including but not limited to fever, muscle aches, rash and allergic reactions.  The patient verbalized understanding of the proper use and possible adverse effects of Xolair.  All of the patient's questions and concerns were addressed.
Methotrexate Counseling:  Patient counseled regarding adverse effects of methotrexate including but not limited to nausea, vomiting, abnormalities in liver function tests. Patients may develop mouth sores, rash, diarrhea, and abnormalities in blood counts. The patient understands that monitoring is required including LFT's and blood counts.  There is a rare possibility of scarring of the liver and lung problems that can occur when taking methotrexate. Persistent nausea, loss of appetite, pale stools, dark urine, cough, and shortness of breath should be reported immediately. Patient advised to discontinue methotrexate treatment at least three months before attempting to become pregnant.  I discussed the need for folate supplements while taking methotrexate.  These supplements can decrease side effects during methotrexate treatment. The patient verbalized understanding of the proper use and possible adverse effects of methotrexate.  All of the patient's questions and concerns were addressed.
Olanzapine Pregnancy And Lactation Text: This medication is pregnancy category C.   There are no adequate and well controlled trials with olanzapine in pregnant females.  Olanzapine should be used during pregnancy only if the potential benefit justifies the potential risk to the fetus.   In a study in lactating healthy women, olanzapine was excreted in breast milk.  It is recommended that women taking olanzapine should not breast feed.
Hydroxyzine Pregnancy And Lactation Text: This medication is not safe during pregnancy and should not be taken. It is also excreted in breast milk and breast feeding isn't recommended.
Olumiant Pregnancy And Lactation Text: Based on animal studies, Olumiant may cause embryo-fetal harm when administered to pregnant women.  The medication should not be used in pregnancy.  Breastfeeding is not recommended during treatment.
Ozempic Counseling: I reviewed the possible side effects including: thyroid tumors, kidney disease, gallbladder disease, abdominal pain, constipation, diarrhea, nausea, vomiting and pancreatitis. Do not take this medication if you have a history or family history of multiple endocrine neoplasia syndrome type 2. Side effects reviewed, pt to contact office should one occur.
Topical Retinoid counseling:  Patient advised to apply a pea-sized amount only at bedtime and wait 30 minutes after washing their face before applying.  If too drying, patient may add a non-comedogenic moisturizer. The patient verbalized understanding of the proper use and possible adverse effects of retinoids.  All of the patient's questions and concerns were addressed.
Methotrexate Pregnancy And Lactation Text: This medication is Pregnancy Category X and is known to cause fetal harm. This medication is excreted in breast milk.
Tetracycline Counseling: Patient counseled regarding possible photosensitivity and increased risk for sunburn.  Patient instructed to avoid sunlight, if possible.  When exposed to sunlight, patients should wear protective clothing, sunglasses, and sunscreen.  The patient was instructed to call the office immediately if the following severe adverse effects occur:  hearing changes, easy bruising/bleeding, severe headache, or vision changes.  The patient verbalized understanding of the proper use and possible adverse effects of tetracycline.  All of the patient's questions and concerns were addressed. Patient understands to avoid pregnancy while on therapy due to potential birth defects.
Xolair Pregnancy And Lactation Text: This medication is Pregnancy Category B and is considered safe during pregnancy. This medication is excreted in breast milk.
Include Pregnancy/Lactation Warning?: Add Automatically Based on Childbearing Potential and Patient Age
Rinvoq Counseling: I discussed with the patient the risks of Rinvoq therapy including but not limited to upper respiratory tract infections, shingles, cold sores, bronchitis, nausea, cough, fever, acne, and headache. Live vaccines should be avoided.  This medication has been linked to serious infections; higher rate of mortality; malignancy and lymphoproliferative disorders; major adverse cardiovascular events; thrombosis; thrombocytopenia, anemia, and neutropenia; lipid elevations; liver enzyme elevations; and gastrointestinal perforations.
Topical Sulfur Applications Counseling: Topical Sulfur Counseling: Patient counseled that this medication may cause skin irritation or allergic reactions.  In the event of skin irritation, the patient was advised to reduce the amount of the drug applied or use it less frequently.   The patient verbalized understanding of the proper use and possible adverse effects of topical sulfur application.  All of the patient's questions and concerns were addressed.
Calcipotriene Counseling:  I discussed with the patient the risks of calcipotriene including but not limited to erythema, scaling, itching, and irritation.
Minoxidil Counseling: Minoxidil is a topical medication which can increase blood flow where it is applied. It is uncertain how this medication increases hair growth. Side effects are uncommon and include stinging and allergic reactions.
Ketoconazole Pregnancy And Lactation Text: This medication is Pregnancy Category C and it isn't know if it is safe during pregnancy. It is also excreted in breast milk and breast feeding isn't recommended.
Oral Minoxidil Counseling- I discussed with the patient the risks of oral minoxidil including but not limited to shortness of breath, swelling of the feet or ankles, dizziness, lightheadedness, unwanted hair growth and allergic reaction.  The patient verbalized understanding of the proper use and possible adverse effects of oral minoxidil.  All of the patient's questions and concerns were addressed.
Colchicine Counseling:  Patient counseled regarding adverse effects including but not limited to stomach upset (nausea, vomiting, stomach pain, or diarrhea).  Patient instructed to limit alcohol consumption while taking this medication.  Colchicine may reduce blood counts especially with prolonged use.  The patient understands that monitoring of kidney function and blood counts may be required, especially at baseline. The patient verbalized understanding of the proper use and possible adverse effects of colchicine.  All of the patient's questions and concerns were addressed.
Humira Counseling:  I discussed with the patient the risks of adalimumab including but not limited to myelosuppression, immunosuppression, autoimmune hepatitis, demyelinating diseases, lymphoma, and serious infections.  The patient understands that monitoring is required including a PPD at baseline and must alert us or the primary physician if symptoms of infection or other concerning signs are noted.
Prednisone Counseling:  I discussed with the patient the risks of prolonged use of prednisone including but not limited to weight gain, insomnia, osteoporosis, mood changes, diabetes, susceptibility to infection, glaucoma and high blood pressure.  In cases where prednisone use is prolonged, patients should be monitored with blood pressure checks, serum glucose levels and an eye exam.  Additionally, the patient may need to be placed on GI prophylaxis, PCP prophylaxis, and calcium and vitamin D supplementation and/or a bisphosphonate.  The patient verbalized understanding of the proper use and the possible adverse effects of prednisone.  All of the patient's questions and concerns were addressed.
Topical Sulfur Applications Pregnancy And Lactation Text: This medication is considered safe during pregnancy and breast feeding secondary to limited systemic absorption.
Terbinafine Counseling: Patient counseling regarding adverse effects of terbinafine including but not limited to headache, diarrhea, rash, upset stomach, liver function test abnormalities, itching, taste/smell disturbance, nausea, abdominal pain, and flatulence.  There is a rare possibility of liver failure that can occur when taking terbinafine.  The patient understands that a baseline LFT and kidney function test may be required. The patient verbalized understanding of the proper use and possible adverse effects of terbinafine.  All of the patient's questions and concerns were addressed.
Rinvoq Pregnancy And Lactation Text: Based on animal studies, Rinvoq may cause embryo-fetal harm when administered to pregnant women.  The medication should not be used in pregnancy.  Breastfeeding is not recommended during treatment and for 6 days after the last dose.
Calcipotriene Pregnancy And Lactation Text: The use of this medication during pregnancy or lactation is not recommended as there is insufficient data.
Oral Minoxidil Pregnancy And Lactation Text: This medication should only be used when clearly needed if you are pregnant, attempting to become pregnant or breast feeding.
Tazorac Counseling:  Patient advised that medication is irritating and drying.  Patient may need to apply sparingly and wash off after an hour before eventually leaving it on overnight.  The patient verbalized understanding of the proper use and possible adverse effects of tazorac.  All of the patient's questions and concerns were addressed.
Saxenda Counseling: I reviewed the possible side effects including: thyroid tumors, kidney disease, gallbladder disease, abdominal pain, constipation, diarrhea, nausea, vomiting and pancreatitis. Do not take this medication if you have a history or family history of multiple endocrine neoplasia syndrome type 2. Side effects reviewed, pt to contact office should one occur.
Otezla Counseling: The side effects of Otezla were discussed with the patient, including but not limited to worsening or new depression, weight loss, diarrhea, nausea, upper respiratory tract infection, and headache. Patient instructed to call the office should any adverse effect occur.  The patient verbalized understanding of the proper use and possible adverse effects of Otezla.  All the patient's questions and concerns were addressed.
Mirvaso Counseling: Mirvaso is a topical medication which can decrease superficial blood flow where applied. Side effects are uncommon and include stinging, redness and allergic reactions.
Xeljanz Counseling: I discussed with the patient the risks of Xeljanz therapy including increased risk of infection, liver issues, headache, diarrhea, or cold symptoms. Live vaccines should be avoided. They were instructed to call if they have any problems.
Wartpeel Counseling:  I discussed with the patient the risks of Wartpeel including but not limited to erythema, scaling, itching, weeping, crusting, and pain.
Cantharidin Counseling:  I discussed with the patient the risks of Cantharidin including but not limited to pain, redness, burning, itching, and blistering.
Tazorac Pregnancy And Lactation Text: This medication is not safe during pregnancy. It is unknown if this medication is excreted in breast milk.
Dapsone Counseling: I discussed with the patient the risks of dapsone including but not limited to hemolytic anemia, agranulocytosis, rashes, methemoglobinemia, kidney failure, peripheral neuropathy, headaches, GI upset, and liver toxicity.  Patients who start dapsone require monitoring including baseline LFTs and weekly CBCs for the first month, then every month thereafter.  The patient verbalized understanding of the proper use and possible adverse effects of dapsone.  All of the patient's questions and concerns were addressed.
Hyrimoz Counseling:  I discussed with the patient the risks of adalimumab including but not limited to myelosuppression, immunosuppression, autoimmune hepatitis, demyelinating diseases, lymphoma, and serious infections.  The patient understands that monitoring is required including a PPD at baseline and must alert us or the primary physician if symptoms of infection or other concerning signs are noted.
Otezla Pregnancy And Lactation Text: This medication is Pregnancy Category C and it isn't known if it is safe during pregnancy. It is unknown if it is excreted in breast milk.
Azithromycin Counseling:  I discussed with the patient the risks of azithromycin including but not limited to GI upset, allergic reaction, drug rash, diarrhea, and yeast infections.
5-Fu Counseling: 5-Fluorouracil Counseling:  I discussed with the patient the risks of 5-fluorouracil including but not limited to erythema, scaling, itching, weeping, crusting, and pain.
Opioid Counseling: I discussed with the patient the potential side effects of opioids including but not limited to addiction, altered mental status, and depression. I stressed avoiding alcohol, benzodiazepines, muscle relaxants and sleep aids unless specifically okayed by a physician. The patient verbalized understanding of the proper use and possible adverse effects of opioids. All of the patient's questions and concerns were addressed. They were instructed to flush the remaining pills down the toilet if they did not need them for pain.
Xelcésarz Pregnancy And Lactation Text: This medication is Pregnancy Category D and is not considered safe during pregnancy.  The risk during breast feeding is also uncertain.

## 2025-05-08 NOTE — PROCEDURE: PRESCRIPTION MEDICATION MANAGEMENT
Initiate Treatment: econazole nitrate 1 % topical cream BID: Apply twice daily to rash x 6-8weeks.\\n\\nhydrocortisone 2.5 % topical ointment : Apply to AA of skin BID for no more than 2-3 weeks at a time
Detail Level: Zone
Render In Strict Bullet Format?: No

## 2025-05-09 ENCOUNTER — TELEPHONE (OUTPATIENT)
Dept: SCHEDULING | Facility: CLINIC | Age: 80
End: 2025-05-09
Payer: MEDICARE

## 2025-05-09 DIAGNOSIS — I49.5 SICK SINUS SYNDROME (CMS/HCC): ICD-10-CM

## 2025-05-09 DIAGNOSIS — I48.19 PERSISTENT ATRIAL FIBRILLATION (CMS/HCC): Primary | ICD-10-CM

## 2025-05-29 LAB
BASOPHILS # BLD AUTO: 46 CELLS/UL (ref 0–200)
BASOPHILS NFR BLD AUTO: 0.6 %
BUN SERPL-MCNC: 22 MG/DL (ref 7–25)
BUN/CREAT SERPL: ABNORMAL (CALC) (ref 6–22)
CALCIUM SERPL-MCNC: 9.7 MG/DL (ref 8.6–10.4)
CHLORIDE SERPL-SCNC: 105 MMOL/L (ref 98–110)
CO2 SERPL-SCNC: 30 MMOL/L (ref 20–32)
CREAT SERPL-MCNC: 0.83 MG/DL (ref 0.6–0.95)
EGFRCR SERPLBLD CKD-EPI 2021: 71 ML/MIN/1.73M2
EOSINOPHIL # BLD AUTO: 131 CELLS/UL (ref 15–500)
EOSINOPHIL NFR BLD AUTO: 1.7 %
ERYTHROCYTE [DISTWIDTH] IN BLOOD BY AUTOMATED COUNT: 14.9 % (ref 11–15)
GLUCOSE SERPL-MCNC: 115 MG/DL (ref 65–99)
HCT VFR BLD AUTO: 37.4 % (ref 35–45)
HGB BLD-MCNC: 12.1 G/DL (ref 11.7–15.5)
LYMPHOCYTES # BLD AUTO: 993 CELLS/UL (ref 850–3900)
LYMPHOCYTES NFR BLD AUTO: 12.9 %
MAGNESIUM SERPL-MCNC: 2.4 MG/DL (ref 1.5–2.5)
MCH RBC QN AUTO: 32.2 PG (ref 27–33)
MCHC RBC AUTO-ENTMCNC: 32.4 G/DL (ref 32–36)
MCV RBC AUTO: 99.5 FL (ref 80–100)
MONOCYTES # BLD AUTO: 801 CELLS/UL (ref 200–950)
MONOCYTES NFR BLD AUTO: 10.4 %
NEUTROPHILS # BLD AUTO: 5729 CELLS/UL (ref 1500–7800)
NEUTROPHILS NFR BLD AUTO: 74.4 %
PLATELET # BLD AUTO: 347 THOUSAND/UL (ref 140–400)
PMV BLD REES-ECKER: 10.7 FL (ref 7.5–12.5)
POTASSIUM SERPL-SCNC: 4.6 MMOL/L (ref 3.5–5.3)
RBC # BLD AUTO: 3.76 MILLION/UL (ref 3.8–5.1)
SODIUM SERPL-SCNC: 143 MMOL/L (ref 135–146)
WBC # BLD AUTO: 7.7 THOUSAND/UL (ref 3.8–10.8)

## 2025-06-03 ENCOUNTER — TRANSCRIBE ORDERS (OUTPATIENT)
Dept: SCHEDULING | Age: 80
End: 2025-06-03

## 2025-06-03 DIAGNOSIS — M54.50 LUMBAGO: ICD-10-CM

## 2025-06-03 DIAGNOSIS — M41.25 IDIOPATHIC SCOLIOSIS OF THORACOLUMBAR SPINE: ICD-10-CM

## 2025-06-03 DIAGNOSIS — S39.012A STRAIN OF BACK: ICD-10-CM

## 2025-06-03 DIAGNOSIS — M51.16 NEURITIS OR RADICULITIS DUE TO RUPTURE OF LUMBAR INTERVERTEBRAL DISC: Primary | ICD-10-CM

## 2025-06-05 ENCOUNTER — ANESTHESIA EVENT (OUTPATIENT)
Dept: CARDIOLOGY | Facility: HOSPITAL | Age: 80
Setting detail: HOSPITAL OUTPATIENT SURGERY
End: 2025-06-05
Payer: MEDICARE

## 2025-06-05 NOTE — ANESTHESIA PREPROCEDURE EVALUATION
Relevant Problems   CARDIOVASCULAR   (+) Coronary artery disease involving native coronary artery of native heart without angina pectoris   (+) Essential hypertension   (+) Pacemaker   (+) Persistent atrial fibrillation (CMS/HCC)   (+) Sick sinus syndrome (CMS/HCC)   (+) Valvular heart disease      RESPIRATORY SYSTEM   (+) SOB (shortness of breath)       ROS/Med Hx     Past Surgical History   Procedure Laterality Date    Aortogram abdominal with serialography N/A 2/1/2019    [place] Abel[place] [facility] [place]    Breast surgery  2003    breast reduction    Cardiac pacemaker placement Left 04/12/2022    [employer]    PPM - DUAL CHAMBER NEW N/A 4/12/2022    [place] Neel Pizano [place] [facility] [place]    Renal angioplasty      Selective renal angiography bilateral Right 2/1/2019    [place]formed by Abelardo Garza [place] [facility] [place]    Toe surgery Left     Tonsillectomy      Allen tooth extraction       Patient Active Problem List   Diagnosis    Mixed hyperlipidemia    Essential hypertension    Valvular heart disease    SOB (shortness of breath)    Sick sinus syndrome (CMS/HCC)    Persistent atrial fibrillation (CMS/HCC)    Pacemaker    Preoperative cardiovascular examination    Coronary artery disease involving native coronary artery of native heart without angina pectoris       No current facility-administered medications for this encounter.       Prior to Admission medications   Medication Sig Start Date End Date Taking? Authorizing Provider   amLODIPine (NORVASC) 5 mg tablet Take 1 tablet (5 mg total) by mouth daily. 2/21/25   Gwyn Boateng DO   ascorbic acid, vitamin C, 500 mg tablet,chewable Take by mouth daily.    Provider, Barak Matt MD   atorvastatin (LIPITOR) 40 mg tablet Take 40 mg by mouth daily.   1/25/12   Chano Boateng DO   calcium acetate,phosphat bind, (PHOSLO) 667 mg capsule Take 1,334 mg by mouth once.    Provider, Barak Matt MD   cholecalciferol,  vitamin D3, 1,000 unit (25 mcg) tablet Take 2,000 Units by mouth daily.     Vernell Casarez MD   cyanocobalamin (VITAMIN B12) 100 mcg tablet Take 100 mcg by mouth daily.    Vernell Casarez MD   dofetilide (TIKOSYN) 250 mcg capsule TAKE 1 CAPSULE TWICE A DAY 3/3/25   Gwyn Boateng DO   ELIQUIS 2.5 mg tablet TAKE 1 TABLET TWICE A DAY 4/14/25   Sukumar Ramirez MD   famotidine (PEPCID) 40 mg tablet  4/22/25   Barak Casarez MD   fluocinolone acetonide oiL 0.01 % drops 2 (two) times a day. APPLY TWICE DAILY TO EARS FOR 2-4 WEEKS FOR ECZEMA FLARES 1/16/23   Barak Casarez MD   FREESTYLE LITE STRIPS strip as needed. 1/20/23   Barak Casarez MD   magnesium oxide (MAG-OX) 400 mg (241.3 mg magnesium) tablet Take 1 tablet (400 mg total) by mouth 2 (two) times a day as needed (serum mag less than 2 mEq/L). 2/12/24   Neel Pizano MD   metoprolol succinate XL (TOPROL-XL) 50 mg 24 hr tablet Take 1.5 tablets (75 mg total) by mouth 2 (two) times a day. 5/7/25   Owen Hamilton CRNP   venlafaxine XR (EFFEXOR-XR) 75 mg 24 hr capsule Take 75 mg by mouth daily.   10/21/15   Vernell Casarez MD       CBC Results         05/28/25 12/10/24 04/23/22     1101 1059 0701    WBC 7.7 9.4 7.90    RBC 3.76 4.40 4.58    HGB 12.1 13.6 13.2    HCT 37.4 41.9 41.2    MCV 99.5 95.2 90.0    MCH 32.2 30.9 28.8    MCHC 32.4 32.5 32.0     364 396           Comment for MCHC at 1101 on 05/28/25: For adults, a slight decrease in the calculated MCHC  value (in the range of 30 to 32 g/dL) is most likely  not clinically significant; however, it should be  interpreted with caution in correlation with other  red cell parameters and the patient's clinical  condition.      Comment for MCHC at 1059 on 12/10/24: For adults, a slight decrease in the calculated MCHC  value (in the range of 30 to 32 g/dL) is most likely  not clinically significant; however, it should be  interpreted with caution in  correlation with other  red cell parameters and the patient's clinical  condition.              BMP Results         05/28/25 12/10/24 04/29/24     1101 1059 1332     141 138    K 4.6 4.5 4.1    Cl 105 104 102    CO2 30 26 25    Glucose 115 106 95    BUN 22 24 19    Creatinine 0.83 0.95 0.88    Calcium 9.7 9.8 9.3    EGFR 71 60 66           Comment for Glucose at 1101 on 05/28/25:               Fasting reference interval     For someone without known diabetes, a glucose value  between 100 and 125 mg/dL is consistent with  prediabetes and should be confirmed with a  follow-up test.         Comment for Glucose at 1059 on 12/10/24:               Fasting reference interval     For someone without known diabetes, a glucose value  between 100 and 125 mg/dL is consistent with  prediabetes and should be confirmed with a  follow-up test.         Comment for Glucose at 1332 on 04/29/24:               Fasting reference interval                  Transthoracic echo (TTE) complete  Interpretation Summary  Show Result Comparison     Left Ventricle: Normal ventricle size. Sigmoid septum left ventricular hypertrophy. Preserved systolic function. Estimated EF 60-65%. Wall motion appears grossly normal. Normal septal motion. Normal diastolic filling pattern for age.    Left Atrium: Severely dilated atrium.    Mitral Valve: Sclerotic mitral valve. Normal leaflet motion. Mild regurgitation. No stenosis. Mean gradient = 1.00.    Aortic Valve: Tricuspid valve.  Sclerotic leaflets. Mild regurgitation. No stenosis. Calculated dimensionless index = 0.67.    Aorta: Aortic root sclerotic. Ascending aorta sclerotic.    Right Atrium: Normal sized atrium. Pacemaker wire present.    Right Ventricle: Normal ventricle size. Pacer wire present. Normal systolic function.    Tricuspid Valve: Structure is grossly normal. Mild to moderate regurgitation. Estimated RVSP = 37 mmHg.     Physical Exam    Airway   Mallampati: II   TM distance: >3 FB    Neck ROM: full  Cardiovascular - normal   Rhythm: regular   Rate: normalPulmonary - normal   clear to auscultation  Dental - normal        Anesthesia Plan    Plan: MAC    Technique: MAC     Lines and Monitors: PIV     Airway: natural airway / supplemental oxygen    3 ASA  Anesthetic plan and risks discussed with: patient  Induction:    intravenous   Postop Plan:   Patient Disposition: phase II then home   Pain Management: IV analgesics  Comments:    Plan: Nasal Canula

## 2025-06-06 ENCOUNTER — HOSPITAL ENCOUNTER (OUTPATIENT)
Facility: HOSPITAL | Age: 80
Setting detail: HOSPITAL OUTPATIENT SURGERY
Discharge: HOME | End: 2025-06-06
Attending: INTERNAL MEDICINE | Admitting: INTERNAL MEDICINE
Payer: MEDICARE

## 2025-06-06 ENCOUNTER — ANESTHESIA (OUTPATIENT)
Dept: CARDIOLOGY | Facility: HOSPITAL | Age: 80
Setting detail: HOSPITAL OUTPATIENT SURGERY
End: 2025-06-06
Payer: MEDICARE

## 2025-06-06 VITALS
OXYGEN SATURATION: 98 % | WEIGHT: 113 LBS | RESPIRATION RATE: 16 BRPM | BODY MASS INDEX: 22.19 KG/M2 | HEIGHT: 60 IN | DIASTOLIC BLOOD PRESSURE: 58 MMHG | SYSTOLIC BLOOD PRESSURE: 117 MMHG | HEART RATE: 78 BPM

## 2025-06-06 DIAGNOSIS — I48.19 PERSISTENT ATRIAL FIBRILLATION (CMS/HCC): ICD-10-CM

## 2025-06-06 DIAGNOSIS — I49.5 SICK SINUS SYNDROME (CMS/HCC): ICD-10-CM

## 2025-06-06 PROBLEM — I97.190 COMPLETE AV BLOCK DUE TO AV NODAL ABLATION (CMS/HCC): Status: ACTIVE | Noted: 2025-06-06

## 2025-06-06 PROBLEM — I44.2 COMPLETE AV BLOCK DUE TO AV NODAL ABLATION (CMS/HCC): Status: ACTIVE | Noted: 2025-06-06

## 2025-06-06 LAB
ATRIAL RATE: 80
P AXIS: -3
POCT ACT-LR: 205 SEC (ref 116–155)
POCT TEST: ABNORMAL
PR INTERVAL: 176
QRS DURATION: 118
QT INTERVAL: 422
QTC CALCULATION(BAZETT): 486
R AXIS: -40
T WAVE AXIS: 113
VENTRICULAR RATE: 80

## 2025-06-06 PROCEDURE — 63700000 HC SELF-ADMINISTRABLE DRUG: Performed by: PHYSICIAN ASSISTANT

## 2025-06-06 PROCEDURE — 63600105 HC IODINE BASED CONTRAST: Performed by: INTERNAL MEDICINE

## 2025-06-06 PROCEDURE — C1760 CLOSURE DEV, VASC: HCPCS | Performed by: INTERNAL MEDICINE

## 2025-06-06 PROCEDURE — 4A023FZ MEASUREMENT OF CARDIAC RHYTHM, PERCUTANEOUS APPROACH: ICD-10-PCS | Performed by: INTERNAL MEDICINE

## 2025-06-06 PROCEDURE — 93005 ELECTROCARDIOGRAM TRACING: CPT | Performed by: PHYSICIAN ASSISTANT

## 2025-06-06 PROCEDURE — 02K83ZZ MAP CONDUCTION MECHANISM, PERCUTANEOUS APPROACH: ICD-10-PCS | Performed by: INTERNAL MEDICINE

## 2025-06-06 PROCEDURE — 4A0234Z MEASUREMENT OF CARDIAC ELECTRICAL ACTIVITY, PERCUTANEOUS APPROACH: ICD-10-PCS | Performed by: INTERNAL MEDICINE

## 2025-06-06 PROCEDURE — 63700000 HC SELF-ADMINISTRABLE DRUG: Performed by: STUDENT IN AN ORGANIZED HEALTH CARE EDUCATION/TRAINING PROGRAM

## 2025-06-06 PROCEDURE — 25800000 HC PHARMACY IV SOLUTIONS

## 2025-06-06 PROCEDURE — 25000000 HC PHARMACY GENERAL: Performed by: INTERNAL MEDICINE

## 2025-06-06 PROCEDURE — 85347 COAGULATION TIME ACTIVATED: CPT | Performed by: INTERNAL MEDICINE

## 2025-06-06 PROCEDURE — C1894 INTRO/SHEATH, NON-LASER: HCPCS | Performed by: INTERNAL MEDICINE

## 2025-06-06 PROCEDURE — 37000002 HC ANESTHESIA MAC: Performed by: INTERNAL MEDICINE

## 2025-06-06 PROCEDURE — 71000001 HC PACU PHASE 1 INITIAL 30MIN: Performed by: INTERNAL MEDICINE

## 2025-06-06 PROCEDURE — 27200000 HC STERILE SUPPLY: Performed by: INTERNAL MEDICINE

## 2025-06-06 PROCEDURE — 71000011 HC PACU PHASE 1 EA ADDL MIN: Performed by: INTERNAL MEDICINE

## 2025-06-06 PROCEDURE — C1893 INTRO/SHEATH, FIXED,NON-PEEL: HCPCS | Performed by: INTERNAL MEDICINE

## 2025-06-06 PROCEDURE — 25000000 HC PHARMACY GENERAL

## 2025-06-06 PROCEDURE — 93010 ELECTROCARDIOGRAM REPORT: CPT | Mod: XU | Performed by: INTERNAL MEDICINE

## 2025-06-06 PROCEDURE — 93650 ICAR CATH ABLTJ AV NODE FUNC: CPT | Performed by: INTERNAL MEDICINE

## 2025-06-06 PROCEDURE — 02583ZZ DESTRUCTION OF CONDUCTION MECHANISM, PERCUTANEOUS APPROACH: ICD-10-PCS | Performed by: INTERNAL MEDICINE

## 2025-06-06 PROCEDURE — 63600000 HC DRUGS/DETAIL CODE: Mod: JZ

## 2025-06-06 DEVICE — PERCLOSE™ PROSTYLE™ SUTURE-MEDIATED CLOSURE AND REPAIR SYSTEM
Type: IMPLANTABLE DEVICE | Site: GROIN | Status: FUNCTIONAL
Brand: PERCLOSE™ PROSTYLE™

## 2025-06-06 RX ORDER — ADHESIVE BANDAGE 7/8"
15-30 BANDAGE TOPICAL AS NEEDED
Status: CANCELLED | OUTPATIENT
Start: 2025-06-06

## 2025-06-06 RX ORDER — DEXTROSE 40 %
15-30 GEL (GRAM) ORAL AS NEEDED
Status: CANCELLED | OUTPATIENT
Start: 2025-06-06

## 2025-06-06 RX ORDER — EPHEDRINE SULFATE/0.9% NACL/PF 50 MG/5 ML
SYRINGE (ML) INTRAVENOUS AS NEEDED
Status: DISCONTINUED | OUTPATIENT
Start: 2025-06-06 | End: 2025-06-06 | Stop reason: SURG

## 2025-06-06 RX ORDER — MIDAZOLAM HYDROCHLORIDE 2 MG/2ML
INJECTION, SOLUTION INTRAMUSCULAR; INTRAVENOUS AS NEEDED
Status: DISCONTINUED | OUTPATIENT
Start: 2025-06-06 | End: 2025-06-06 | Stop reason: SURG

## 2025-06-06 RX ORDER — PROPOFOL 10 MG/ML
INJECTION, EMULSION INTRAVENOUS CONTINUOUS PRN
Status: DISCONTINUED | OUTPATIENT
Start: 2025-06-06 | End: 2025-06-06 | Stop reason: SURG

## 2025-06-06 RX ORDER — FENTANYL CITRATE 50 UG/ML
INJECTION, SOLUTION INTRAMUSCULAR; INTRAVENOUS AS NEEDED
Status: DISCONTINUED | OUTPATIENT
Start: 2025-06-06 | End: 2025-06-06 | Stop reason: SURG

## 2025-06-06 RX ORDER — HEPARIN SOD,PORCINE/0.9 % NACL 4K/1000 ML
INTRAVENOUS SOLUTION INTRAVENOUS ONCE
Status: DISCONTINUED | OUTPATIENT
Start: 2025-06-06 | End: 2025-06-06 | Stop reason: HOSPADM

## 2025-06-06 RX ORDER — ACETAMINOPHEN 325 MG/1
975 TABLET ORAL EVERY 6 HOURS PRN
Status: DISCONTINUED | OUTPATIENT
Start: 2025-06-06 | End: 2025-06-06 | Stop reason: HOSPADM

## 2025-06-06 RX ORDER — ONDANSETRON HYDROCHLORIDE 2 MG/ML
4 INJECTION, SOLUTION INTRAVENOUS
Status: CANCELLED | OUTPATIENT
Start: 2025-06-06

## 2025-06-06 RX ORDER — IOPAMIDOL 612 MG/ML
INJECTION, SOLUTION INTRAVASCULAR
Status: DISCONTINUED | OUTPATIENT
Start: 2025-06-06 | End: 2025-06-06 | Stop reason: HOSPADM

## 2025-06-06 RX ORDER — SODIUM CHLORIDE 9 MG/ML
INJECTION, SOLUTION INTRAVENOUS CONTINUOUS PRN
Status: DISCONTINUED | OUTPATIENT
Start: 2025-06-06 | End: 2025-06-06 | Stop reason: SURG

## 2025-06-06 RX ORDER — LIDOCAINE HYDROCHLORIDE 10 MG/ML
INJECTION, SOLUTION INFILTRATION; PERINEURAL
Status: DISCONTINUED | OUTPATIENT
Start: 2025-06-06 | End: 2025-06-06 | Stop reason: HOSPADM

## 2025-06-06 RX ORDER — ACETAMINOPHEN 325 MG/1
650 TABLET ORAL EVERY 4 HOURS PRN
Start: 2025-06-06 | End: 2025-07-06

## 2025-06-06 RX ORDER — HEPARIN SODIUM 1000 [USP'U]/ML
INJECTION, SOLUTION INTRAVENOUS; SUBCUTANEOUS AS NEEDED
Status: DISCONTINUED | OUTPATIENT
Start: 2025-06-06 | End: 2025-06-06 | Stop reason: SURG

## 2025-06-06 RX ORDER — AMLODIPINE BESYLATE 5 MG/1
5 TABLET ORAL DAILY
Status: DISCONTINUED | OUTPATIENT
Start: 2025-06-06 | End: 2025-06-06 | Stop reason: HOSPADM

## 2025-06-06 RX ORDER — DEXTROSE 50 % IN WATER (D50W) INTRAVENOUS SYRINGE
25 AS NEEDED
Status: CANCELLED | OUTPATIENT
Start: 2025-06-06

## 2025-06-06 RX ADMIN — HEPARIN SODIUM 3000 UNITS: 1000 INJECTION, SOLUTION INTRAVENOUS; SUBCUTANEOUS at 10:12

## 2025-06-06 RX ADMIN — PROPOFOL 40 MCG/KG/MIN: 10 INJECTION, EMULSION INTRAVENOUS at 07:45

## 2025-06-06 RX ADMIN — Medication 5 MG: at 08:51

## 2025-06-06 RX ADMIN — Medication 5 MG: at 08:48

## 2025-06-06 RX ADMIN — FENTANYL CITRATE 25 MCG: 50 INJECTION, SOLUTION INTRAMUSCULAR; INTRAVENOUS at 08:14

## 2025-06-06 RX ADMIN — FENTANYL CITRATE 25 MCG: 50 INJECTION, SOLUTION INTRAMUSCULAR; INTRAVENOUS at 10:01

## 2025-06-06 RX ADMIN — SODIUM CHLORIDE: 9 INJECTION, SOLUTION INTRAVENOUS at 07:39

## 2025-06-06 RX ADMIN — ACETAMINOPHEN 975 MG: 325 TABLET ORAL at 11:18

## 2025-06-06 RX ADMIN — HEPARIN SODIUM 5000 UNITS: 1000 INJECTION, SOLUTION INTRAVENOUS; SUBCUTANEOUS at 09:53

## 2025-06-06 RX ADMIN — FENTANYL CITRATE 25 MCG: 50 INJECTION, SOLUTION INTRAMUSCULAR; INTRAVENOUS at 10:37

## 2025-06-06 RX ADMIN — MIDAZOLAM HYDROCHLORIDE 0.5 MG: 1 INJECTION, SOLUTION INTRAMUSCULAR; INTRAVENOUS at 09:43

## 2025-06-06 RX ADMIN — Medication 5 MG: at 09:11

## 2025-06-06 RX ADMIN — MIDAZOLAM HYDROCHLORIDE 0.5 MG: 1 INJECTION, SOLUTION INTRAMUSCULAR; INTRAVENOUS at 08:14

## 2025-06-06 RX ADMIN — AMLODIPINE BESYLATE 5 MG: 5 TABLET ORAL at 13:23

## 2025-06-06 RX ADMIN — Medication 5 MG: at 08:47

## 2025-06-06 RX ADMIN — Medication 5 MG: at 10:26

## 2025-06-06 RX ADMIN — FENTANYL CITRATE 25 MCG: 50 INJECTION, SOLUTION INTRAMUSCULAR; INTRAVENOUS at 07:45

## 2025-06-06 RX ADMIN — APIXABAN 2.5 MG: 2.5 TABLET, FILM COATED ORAL at 13:27

## 2025-06-06 RX ADMIN — MIDAZOLAM HYDROCHLORIDE 1 MG: 1 INJECTION, SOLUTION INTRAMUSCULAR; INTRAVENOUS at 07:40

## 2025-06-06 NOTE — Clinical Note
Sheath exchanged in the right femoral vein. Over Wire. 8F ultimum sheath removed intact. Wire then removed intact

## 2025-06-06 NOTE — Clinical Note
Closure device placed for the right femoral artery. Closure device used: Perclose. Suture completed. Hemostasis achieved.

## 2025-06-06 NOTE — ANESTHESIA POSTPROCEDURE EVALUATION
Patient: Angeles Cardoza    Procedure Summary       Date: 06/06/25 Room / Location: [facility] EP LAB 4 / [facility] CATH/EP    Anesthesia Start: 0739 Anesthesia Stop: 1051    Procedure: AV NODE ABLATION Diagnosis:       Persistent atrial fibrillation (CMS/HCC)      Sick sinus syndrome (CMS/HCC)      (AF)    Providers: Neel Pizano MD Responsible Provider: Rolando Dacosta MD    Anesthesia Type: MAC ASA Status: 3            Anesthesia Type: MAC  PACU Vitals  6/6/2025 1039 - 6/6/2025 1100        6/6/2025  1055             BP: 189/82    Pulse: 78    Resp: 16    SpO2: 95 %              Anesthesia Post Evaluation    Pain management: adequate  Patient location during evaluation: PACU  Patient participation: complete - patient participated  Level of consciousness: awake and alert  Cardiovascular status: acceptable  Airway Patency: adequate  Respiratory status: acceptable  Hydration status: acceptable  Anesthetic complications: no

## 2025-06-06 NOTE — DISCHARGE INSTRUCTIONS
Groin Site Care:   A bruise or small lump under the skin where the catheters were is normal. These usually go away within one week.   Please check your wound site daily to make sure there is no redness, bleeding, or swelling.   The groin dressing, if present, should be removed the day following the procedure. A Band-Aid can be used if needed.    Showering:   You may shower 24 hours after your procedure. Clean the area with mild soap and water. Do not rub the area. Pat the area dry with a clean towel.   Do not take a bath or submerge the area under water for three days.     If you have bleeding where your catheter was:  Lie down and hold direct pressure for 10 minutes. If bleeding does not stop in 10 minutes, or if there is a large amount of bleeding, call 911. If bleeding does stop, rest for at least 4 hours. Call your doctor.    Activity:   Do not strain, push, pull, run or lift anything over 10 pounds for 7 days.   You may exercise in 7 days.   You may walk and climb stairs when you return home.    Driving:   Do not drive a car or use any machinery for 2 days. Someone else must drive you home today.    Call your doctor if you have:   Increased redness, heat, pus, bruising, or bleeding at the site after 24 hours.   Swelling at the catheter site.   Increased pain at the catheter site.   Numbness, pain or coolness in the leg.   Temperature of 100.5 degrees Fahrenheit or greater.   Chest pain, shortness of breath, dizziness, fainting, recurrent symptoms, or any questions.

## 2025-06-06 NOTE — ANESTHESIOLOGIST PRE-PROCEDURE ATTESTATION
Pre-Procedure Patient Identification:  I am the Primary Anesthesiologist and have identified the patient on 06/06/25 at 7:11 AM.   I have confirmed the procedure(s) will be performed by the following surgeon/proceduralist Neel Pizano MD.

## 2025-06-06 NOTE — Clinical Note
Patient placed on procedure table in supine position with arms at side. Positioning devices: all pressure points padded, arm board under arms, heel pads in place, safety strap applied, wrist straps in place, donut foam under head, pillow under knees and gel ring under head.

## 2025-06-06 NOTE — Clinical Note
Closure device placed for the right femoral artery. Closure device used: Perclose. Inserted as pre-close.

## 2025-06-06 NOTE — Clinical Note
Closure device placed for the right femoral vein. Closure device used: Perclose. Suture completed. Hemostasis achieved.

## 2025-06-06 NOTE — INTERVAL H&P NOTE
H&P reviewed. The patient was examined and there are no changes to the H&P.    She presents today, in sinus rhythm (atrial pacing) for AV node ablation. Indication: symptomatic drug refractory PAF with rapid ventricular response. The details of the procedure were reviewed again, including risks, benefits, alternatives.  All of her questions were answered. She agrees to proceed.

## 2025-06-08 ENCOUNTER — TELEPHONE (OUTPATIENT)
Dept: CARDIOLOGY | Facility: CLINIC | Age: 80
End: 2025-06-08
Payer: MEDICARE

## 2025-06-08 NOTE — TELEPHONE ENCOUNTER
Patient called yesterday afternoon with nausea, dry heaving and lightheadedness post AV node ablation on 6/6/2025.  She denies chest pain, dyspnea or syncope.  She reports chronically labile blood pressure readings but no significant hypotension on home BP cuff throughout the day.  Symptoms are likely anesthesia related and she was encouraged to eat bland foods and try to increase hydration with low sugar Gatorade or an electrolyte drink as able.  If symptoms do not improve or worsen, she was advised to come into the ED.

## 2025-06-09 ENCOUNTER — TELEPHONE (OUTPATIENT)
Dept: SCHEDULING | Facility: CLINIC | Age: 80
End: 2025-06-09
Payer: MEDICARE

## 2025-07-08 ENCOUNTER — HOSPITAL ENCOUNTER (OUTPATIENT)
Dept: RADIOLOGY | Facility: HOSPITAL | Age: 80
Discharge: HOME | End: 2025-07-08
Attending: PHYSICIAN ASSISTANT
Payer: MEDICARE

## 2025-07-08 DIAGNOSIS — M51.16 NEURITIS OR RADICULITIS DUE TO RUPTURE OF LUMBAR INTERVERTEBRAL DISC: ICD-10-CM

## 2025-07-08 DIAGNOSIS — M41.25 IDIOPATHIC SCOLIOSIS OF THORACOLUMBAR SPINE: ICD-10-CM

## 2025-07-08 DIAGNOSIS — M54.50 LUMBAGO: ICD-10-CM

## 2025-07-08 DIAGNOSIS — S39.012A STRAIN OF BACK: ICD-10-CM

## 2025-07-08 PROCEDURE — 72148 MRI LUMBAR SPINE W/O DYE: CPT

## 2025-07-17 ENCOUNTER — OFFICE VISIT (OUTPATIENT)
Dept: CARDIOLOGY | Facility: CLINIC | Age: 80
End: 2025-07-17
Payer: MEDICARE

## 2025-07-17 VITALS
HEIGHT: 60 IN | WEIGHT: 113 LBS | SYSTOLIC BLOOD PRESSURE: 138 MMHG | DIASTOLIC BLOOD PRESSURE: 76 MMHG | HEART RATE: 83 BPM | BODY MASS INDEX: 22.19 KG/M2

## 2025-07-17 DIAGNOSIS — I10 ESSENTIAL HYPERTENSION: Primary | ICD-10-CM

## 2025-07-17 LAB
ATRIAL RATE: 82
P AXIS: 1
PR INTERVAL: 178
QRS DURATION: 116
QT INTERVAL: 412
QTC CALCULATION(BAZETT): 481
R AXIS: -24
T WAVE AXIS: 58
VENTRICULAR RATE: 82

## 2025-07-17 PROCEDURE — 93000 ELECTROCARDIOGRAM COMPLETE: CPT | Performed by: INTERNAL MEDICINE

## 2025-07-17 PROCEDURE — G8752 SYS BP LESS 140: HCPCS | Performed by: INTERNAL MEDICINE

## 2025-07-17 PROCEDURE — G2211 COMPLEX E/M VISIT ADD ON: HCPCS | Performed by: INTERNAL MEDICINE

## 2025-07-17 PROCEDURE — G8754 DIAS BP LESS 90: HCPCS | Performed by: INTERNAL MEDICINE

## 2025-07-17 PROCEDURE — 99214 OFFICE O/P EST MOD 30 MIN: CPT | Performed by: INTERNAL MEDICINE

## 2025-07-17 RX ORDER — LIDOCAINE 50 MG/G
PATCH TOPICAL
COMMUNITY
Start: 2025-05-12

## 2025-07-17 NOTE — PROGRESS NOTES
Electrophysiology         Reason for visit: atrial fibrillation  Referred by :Dr Boateng     History of Present Illness:  Ms. Angeles Cardoza is a 79 year old female with a past medical history significant for hyperlipidemia, hypertension, and a paroxymal atrial fibrillation with long conversion pauses s/p implantation of [employer] dual chamber pacemaker on 4/12/2022 and on Tikosyn 250 mcg BID and s/p AV node ablation on 6/6/2025 who is here for follow up.     With ablation on 6/6/2025.  She was very exhausted after the procedure but it has resolved in the past few weeks.  She has returned to her normal activity.  Denies chest pain, shortness of breath, lightness or dizziness.    Brief EP history:  - pAF in Nov 2021 during hospitalization for acute diverticulits. Started on Eliquis 5 mg BID. Started on amiodarone   - Could not tolerate amiodarone due to NV. Transitioned to Flecainide 50 mg BID.   - [facility] in early 2022, 40% burden of AF with frequent long conversion pauses (some symptomatic).   - Flecainide was stopped.   - dcPPM implantation on 4/12/2022 and started on Tikosyn 250 mcg BID.  -9.18.23 sees JYOTI Arteaga and is doing well. No changes were made to his regimen and device is functioning normally  -[employer] report 2/9/24 -  EGMs showed  AF/AFL and AF/AFL with RVR, longest on trending >2 hrs in duration but AF burden 1%. no changes made    A comprehensive 15-point review of systems was performed and was negative unless specifically mentioned in the History of Present Illness.        Past Medical History:   Diagnosis Date    Acid reflux     Allergic     Atrial fibrillation (CMS/HCC)     Coronary artery disease involving native coronary artery of native heart without angina pectoris 05/07/2025    Hypertension     Irregular heart rhythm     Lipid disorder     Pacemaker 05/02/2022    Status post dual-chamber pacemaker implant on 4/12/2022      Sick sinus syndrome (CMS/HCC)     Valvular heart disease  05/16/2018       Past Surgical History   Procedure Laterality Date    Aortogram abdominal with serialography N/A 2/1/2019    Performed by Abelardo Garza DO at [facility] CARDIAC CATH/EP    AV NODE ABLATION N/A 6/6/2025    Performed by Neel Pizano MD at [facility] CARDIAC CATH/EP    Breast surgery  2003    breast reduction    Cardiac pacemaker placement Left 04/12/2022    [employer]    PPM - DUAL CHAMBER NEW N/A 4/12/2022    Performed by Neel Pizano MD at [facility] CARDIAC CATH/EP    Renal angioplasty      Selective renal angiography bilateral Right 2/1/2019    Performed by Abelardo Garza DO at [facility] CARDIAC CATH/EP    Toe surgery Left     Tonsillectomy      Hansen tooth extraction         Current Outpatient Medications on File Prior to Visit   Medication Sig Dispense Refill    amLODIPine (NORVASC) 5 mg tablet Take 1 tablet (5 mg total) by mouth daily. 90 tablet 3    ascorbic acid, vitamin C, 500 mg tablet,chewable Take by mouth daily.      atorvastatin (LIPITOR) 40 mg tablet Take 40 mg by mouth daily.        calcium acetate,phosphat bind, (PHOSLO) 667 mg capsule Take 1,334 mg by mouth once.      cholecalciferol, vitamin D3, 1,000 unit (25 mcg) tablet Take 2,000 Units by mouth daily.       cyanocobalamin (VITAMIN B12) 100 mcg tablet Take 100 mcg by mouth daily.      dofetilide (TIKOSYN) 250 mcg capsule TAKE 1 CAPSULE TWICE A  capsule 3    ELIQUIS 2.5 mg tablet TAKE 1 TABLET TWICE A  tablet 3    famotidine (PEPCID) 40 mg tablet       fluocinolone acetonide oiL 0.01 % drops 2 (two) times a day. APPLY TWICE DAILY TO EARS FOR 2-4 WEEKS FOR ECZEMA FLARES      FREESTYLE LITE STRIPS strip as needed.      lidocaine (LIDODERM) 5 % patch       magnesium oxide (MAG-OX) 400 mg (241.3 mg magnesium) tablet Take 1 tablet (400 mg total) by mouth 2 (two) times a day as needed (serum mag less than 2 mEq/L). 90 tablet 3    venlafaxine XR (EFFEXOR-XR) 75 mg 24 hr capsule Take 75 mg by mouth daily.         No current  facility-administered medications on file prior to visit.       Allergies, Social History and Family History were reviewed and updated in EMR.     Physical Examination:  Vitals:    07/17/25 1321   BP: 138/76   Pulse: 83         Constitutional: The patient appeared physically well and in no acute distress.  Respiratory: Clear to auscultation, no wheezes or rubs.  Cardiovascular: A comprehensive cardiovascular examination was performed. Highlights include normal jugular venous pressure, 2+ carotids, no bruits. The precordium is quiet. Regular rhythm, rate, S1 and S2 normal, no rubs, or gallops were appreciated. Murmurs: No pathologic murmurs appreciated.  Musculoskeletal/ Extremities: No edema, normal peripheral pulses.  Skin: No rashes or lesions apparent.       Lab Results   Component Value Date    WBC 7.7 05/28/2025    HGB 12.1 05/28/2025    HCT 37.4 05/28/2025     05/28/2025    CHOL 160 12/10/2024    TRIG 149 12/10/2024    HDL 58 12/10/2024    LDLCALC 77 12/10/2024    ALT 25 01/21/2022    AST 30 01/21/2022     05/28/2025    K 4.6 05/28/2025    CREATININE 0.83 05/28/2025    BUN 22 05/28/2025    TSH 2.05 01/21/2022    INR 0.9 11/13/2016       Cardiac Imaging    ECHOCARDIOGRAM - 5/2/2022  Tricuspid valve structure is grossly normal. Mild tricuspid valve regurgitation. The regurgitation jet is central.  Normal-sized LV. Normal LV systolic function. Estimated EF 65%. Normal LV septal wall motion. No regional wall motion abnormalities. Normal LV wall thickness.  Normal-sized RV. Normal RV systolic function. Pacer wire present in the RV. Normal RV wall thickness.  Mildly dilated LA. No patent foramen ovale present as seen in the LA. Intact LA septum present.  Normal-sized RA. Pacemaker wire present in the RA.  Aortic root sclerotic. Sinuses of Valsalva sclerotic. Ascending aorta sclerotic. Aortic arch grossly normal.  Tricuspid aortic valve. Sclerotic aortic valve leaflets. Mild aortic valve regurgitation.  Mild aortic valve stenosis.  Sclerotic mitral valve.  Mild to moderate mitral valve regurgitation. The jet is centrally directed.  Pulmonic valve not well visualized.  IVC is a small caliber vessel (<1.7cm). IVC collapses >50% during inspiration.  Normal pericardial structure. No evidence of pericardial effusion. No cardiac tamponade.     Dual Chamber Pacemaker Evaluation     Site Evaluation: The site of implantation in the upper left chest shows a well healed scar and is without ecchymosis, redness, drainage or hematoma. The skin is mobile over the device. The patient denies pain at the implantation site.     Device Information  Device :            ShedWorx  Device Model:                        [id number]  Date of Implant:            April 12, 2022  Last session: sep 18 23     Battery Data:     Estimated time to replacement:  10.5 years     Rhythm Data:  Underlying Rhythm:            Sinus bradycardia, HR in the 50s   High Rate Episodes:            27 episodes of AF, burden 0.9%  Ventricular High Rate                        NA     Lead Performance Data:             RA                   RV  Lead Impedance (ohms)            361                  437  Sensing (mV)                              3.3                  NS6  Capture (V@ms)                       NA         0.75 @ 0.4  % Pacing                                    74%                    1.3 %     Final Programmed Values:  Mode:                                                AAI <-> DDD (MVP/RhythmIQ.AAI-Safe Mode)  Paced Lower-Upper Rate:            70 - 120 bpm     ECG  AV paced, Qtc is 480 ms      Assessment and plan:  Ms. Angeles Cardoza is a 79 year old female with a past medical history significant for hyperlipidemia, hypertension, and a paroxymal atrial fibrillation with long conversion pauses s/p implantation of [employer]tr[employer] dual chamber pacemaker on 4/12/2022 and on Tikosyn 250 mcg BID and s/p AV node ablation on 6/6/2025 who is here for follow  up.     #Dual Chamber pacemaker   - Normal device evaluation and function.    - Lead function is appropriate, with capture and sensing thresholds are as expected.   - There were no significant arrhythmias noted (see Rhythm Data above), and no device therapies were delivered since the last evaluation.  - The counters and diagnostics were reset.  - Follow up will be in 6 months, with remote follow up every 3 months until scheduled clinic visit.     #Atrial fibrillation:  With AV node ablation, her heart rates are fully controlled.  At this point she does not need any AV jarett agents.  I will stop metoprolol completely.  However, I will keep her on Tikosyn 250 mcg twice daily to maintain sinus rhythm as much as possible.  I will have a low threshold to stop this medication as well.  - Continue Tikosyn 250 mcg BID. QTc is appropriate.   - labs from 5/2025, Creat 0.83, K 4.6.   - Stop Metoprolol.     I spent 35 minutes with the patient for record review, examination, care coordination and counseling.     This letter was generated using speech recognition software. Please excuse any typographical errors.     Neel Pizano MD [facility]  Cardiac Electrophysiology  [facility]  7/17/2025   I attest that this visit supports the complexity inherent to evaluation and management associated with medical care services that serve as the continuing focal point for all needed health care services and/or medical care services that are part of ongoing care related to this patient's single, serious condition or a complex condition.

## 2025-07-17 NOTE — LETTER
July 17, 2025     José Antonio REAGAN DO  [address]  [facility] [address]    Patient: Angeles Cardoza  YOB: 1945  Date of Visit: 7/17/2025      Dear Dr. Leroy:    Thank you for referring Angeles Cardoza to me for evaluation. Below are my notes for this consultation.    If you have questions, please do not hesitate to call me. I look forward to following your patient along with you.         Sincerely,        Neel Pizano MD        CC: DO Harinder Gutierrez Ali R, MD  7/17/2025  1:55 PM  Sign when Signing Visit       Electrophysiology         Reason for visit: atrial fibrillation  Referred by :Dr Boateng     History of Present Illness:  Ms. Angeles Cardoza is a 79 year old female with a past medical history significant for hyperlipidemia, hypertension, and a paroxymal atrial fibrillation with long conversion pauses s/p implantation of Medtronic dual chamber pacemaker on 4/12/2022 and on Tikosyn 250 mcg BID and s/p AV node ablation on 6/6/2025 who is here for follow up.     With ablation on 6/6/2025.  She was very exhausted after the procedure but it has resolved in the past few weeks.  She has returned to her normal activity.  Denies chest pain, shortness of breath, lightness or dizziness.    Brief EP history:  - pAF in Nov 2021 during hospitalization for acute diverticulits. Started on Eliquis 5 mg BID. Started on amiodarone   - Could not tolerate amiodarone due to NV. Transitioned to Flecainide 50 mg BID.   - MCOT in early 2022, 40% burden of AF with frequent long conversion pauses (some symptomatic).   - Flecainide was stopped.   - dcPPM implantation on 4/12/2022 and started on Tikosyn 250 mcg BID.  -9.18.23 sees JYOTI Arteaga and is doing well. No changes were made to his regimen and device is functioning normally  -[employer] report 2/9/24 -  EGMs showed  AF/AFL and AF/AFL with RVR, longest on trending >2 hrs in duration but AF burden 1%. no changes made    A comprehensive  15-point review of systems was performed and was negative unless specifically mentioned in the History of Present Illness.        Past Medical History:   Diagnosis Date   • Acid reflux    • Allergic    • Atrial fibrillation (CMS/HCC)    • Coronary artery disease involving native coronary artery of native heart without angina pectoris 05/07/2025   • Hypertension    • Irregular heart rhythm    • Lipid disorder    • Pacemaker 05/02/2022    Status post dual-chamber pacemaker implant on 4/12/2022     • Sick sinus syndrome (CMS/HCC)    • Valvular heart disease 05/16/2018       Past Surgical History   Procedure Laterality Date   • Aortogram abdominal with serialography N/A 2/1/2019    [place] [facility] [place]   • AV NODE ABLATION N/A 6/6/2025    [place] [facility] [place]   • Breast surgery  2003    breast reduction   • Cardiac pacemaker placement Left 04/12/2022    Medtronic   • PPM - DUAL CHAMBER NEW N/A 4/12/2022    [place] [facility] [place]   • Renal angioplasty     • Selective renal angiography bilateral Right 2/1/2019    [place] [facility] [place]   • Toe surgery Left    • Tonsillectomy     • [place] tooth extraction         Current Outpatient Medications on File Prior to Visit   Medication Sig Dispense Refill   • amLODIPine (NORVASC) 5 mg tablet Take 1 tablet (5 mg total) by mouth daily. 90 tablet 3   • ascorbic acid, vitamin C, 500 mg tablet,chewable Take by mouth daily.     • atorvastatin (LIPITOR) 40 mg tablet Take 40 mg by mouth daily.       • calcium acetate,phosphat bind, (PHOSLO) 667 mg capsule Take 1,334 mg by mouth once.     • cholecalciferol, vitamin D3, 1,000 unit (25 mcg) tablet Take 2,000 Units by mouth daily.      • cyanocobalamin (VITAMIN B12) 100 mcg tablet Take 100 mcg by mouth daily.     • dofetilide (TIKOSYN) 250 mcg capsule TAKE 1 CAPSULE TWICE A  capsule 3   • ELIQUIS 2.5 mg  tablet TAKE 1 TABLET TWICE A  tablet 3   • famotidine (PEPCID) 40 mg tablet      • fluocinolone acetonide oiL 0.01 % drops 2 (two) times a day. APPLY TWICE DAILY TO EARS FOR 2-4 WEEKS FOR ECZEMA FLARES     • FREESTYLE LITE STRIPS strip as needed.     • lidocaine (LIDODERM) 5 % patch      • magnesium oxide (MAG-OX) 400 mg (241.3 mg magnesium) tablet Take 1 tablet (400 mg total) by mouth 2 (two) times a day as needed (serum mag less than 2 mEq/L). 90 tablet 3   • venlafaxine XR (EFFEXOR-XR) 75 mg 24 hr capsule Take 75 mg by mouth daily.         No current facility-administered medications on file prior to visit.       Allergies, Social History and Family History were reviewed and updated in EMR.     Physical Examination:  Vitals:    07/17/25 1321   BP: 138/76   Pulse: 83         Constitutional: The patient appeared physically well and in no acute distress.  Respiratory: Clear to auscultation, no wheezes or rubs.  Cardiovascular: A comprehensive cardiovascular examination was performed. Highlights include normal jugular venous pressure, 2+ carotids, no bruits. The precordium is quiet. Regular rhythm, rate, S1 and S2 normal, no rubs, or gallops were appreciated. Murmurs: No pathologic murmurs appreciated.  Musculoskeletal/ Extremities: No edema, normal peripheral pulses.  Skin: No rashes or lesions apparent.       Lab Results   Component Value Date    WBC 7.7 05/28/2025    HGB 12.1 05/28/2025    HCT 37.4 05/28/2025     05/28/2025    CHOL 160 12/10/2024    TRIG 149 12/10/2024    HDL 58 12/10/2024    LDLCALC 77 12/10/2024    ALT 25 01/21/2022    AST 30 01/21/2022     05/28/2025    K 4.6 05/28/2025    CREATININE 0.83 05/28/2025    BUN 22 05/28/2025    TSH 2.05 01/21/2022    INR 0.9 11/13/2016       Cardiac Imaging    ECHOCARDIOGRAM - 5/2/2022  Tricuspid valve structure is grossly normal. Mild tricuspid valve regurgitation. The regurgitation jet is central.  Normal-sized LV. Normal LV systolic function.  Estimated EF 65%. Normal LV septal wall motion. No regional wall motion abnormalities. Normal LV wall thickness.  Normal-sized RV. Normal RV systolic function. Pacer wire present in the RV. Normal RV wall thickness.  Mildly dilated LA. No patent foramen ovale present as seen in the LA. Intact LA septum present.  Normal-sized RA. Pacemaker wire present in the RA.  Aortic root sclerotic. Sinuses of Valsalva sclerotic. Ascending aorta sclerotic. Aortic arch grossly normal.  Tricuspid aortic valve. Sclerotic aortic valve leaflets. Mild aortic valve regurgitation. Mild aortic valve stenosis.  Sclerotic mitral valve.  Mild to moderate mitral valve regurgitation. The jet is centrally directed.  Pulmonic valve not well visualized.  IVC is a small caliber vessel (<1.7cm). IVC collapses >50% during inspiration.  Normal pericardial structure. No evidence of pericardial effusion. No cardiac tamponade.     Dual Chamber Pacemaker Evaluation     Site Evaluation: The site of implantation in the upper left chest shows a well healed scar and is without ecchymosis, redness, drainage or hematoma. The skin is mobile over the device. The patient denies pain at the implantation site.     Device Information  Device :            [email or web address]  Device Model:                        [id number]  Date of Implant:            April 12, 2022  Last session: sep 18 23     Battery Data:     Estimated time to replacement:  10.5 years     Rhythm Data:  Underlying Rhythm:            Sinus bradycardia, HR in the 50s   High Rate Episodes:            27 episodes of AF, burden 0.9%  Ventricular High Rate                        NA     Lead Performance Data:             RA                   RV  Lead Impedance (ohms)            361                  437  Sensing (mV)                              3.3                  NS6  Capture (V@ms)                       NA         0.75 @ 0.4  % Pacing                                    74%                    1.3 %      Final Programmed Values:  Mode:                                                AAI <-> DDD (MVP/RhythmIQ.AAI-Safe Mode)  Paced Lower-Upper Rate:            70 - 120 bpm     ECG  AV paced, Qtc is 480 ms      Assessment and plan:  Ms. Angeles Cardoza is a 79 year old female with a past medical history significant for hyperlipidemia, hypertension, and a paroxymal atrial fibrillation with long conversion pauses s/p implantation of Medtronic dual chamber pacemaker on 4/12/2022 and on Tikosyn 250 mcg BID and s/p AV node ablation on 6/6/2025 who is here for follow up.     #Dual Chamber pacemaker   - Normal device evaluation and function.    - Lead function is appropriate, with capture and sensing thresholds are as expected.   - There were no significant arrhythmias noted (see Rhythm Data above), and no device therapies were delivered since the last evaluation.  - The counters and diagnostics were reset.  - Follow up will be in 6 months, with remote follow up every 3 months until scheduled clinic visit.     #Atrial fibrillation:  With AV node ablation, her heart rates are fully controlled.  At this point she does not need any AV jarett agents.  I will stop metoprolol completely.  However, I will keep her on Tikosyn 250 mcg twice daily to maintain sinus rhythm as much as possible.  I will have a low threshold to stop this medication as well.  - Continue Tikosyn 250 mcg BID. QTc is appropriate.   - labs from 5/2025, Creat 0.83, K 4.6.   - Stop Metoprolol.     I spent 35 minutes with the patient for record review, examination, care coordination and counseling.     This letter was generated using speech recognition software. Please excuse any typographical errors.     Neel Pizano MD [facility]  Cardiac Electrophysiology  [facility]  7/17/2025   I attest that this visit supports the complexity inherent to evaluation and management associated with medical care services that serve as the continuing focal point  for all needed health care services and/or medical care services that are part of ongoing care related to this patient's single, serious condition or a complex condition.

## (undated) DEVICE — CATH 6F INFINITI JR4.0 125CM

## (undated) DEVICE — CATH FLEXABILITY ABLATION 4MM BD

## (undated) DEVICE — R-BAND RADIAL HEMOSTASIS 24CM

## (undated) DEVICE — SET MICROPUNCTURE 4FR NITINOL

## (undated) DEVICE — SHEATH ULTIMUM 8FR ACT 12CM 10/BX

## (undated) DEVICE — GUIDEWIRE VERSACORE MOD J 145CM

## (undated) DEVICE — SHEATH 9FR X 65CM

## (undated) DEVICE — KIT CATH LAB ANGIO

## (undated) DEVICE — TUBING SET COOL POINT

## (undated) DEVICE — SHEATH PRELUDE SNAP 9FR 13CM

## (undated) DEVICE — SHEATH PRELUDE SNAP 7FR 13CM

## (undated) DEVICE — SHEATH INTRODUCER PRELUDE IDEAL HYDROPHILIC SF 6F .021MM

## (undated) DEVICE — SHEATH SRO 8.5FR

## (undated) DEVICE — GUIDEWIRE DIAGNOSTIC J .035. 150 (ORDER IN 5'S)

## (undated) DEVICE — CATH GUIDE ADROIT 6FR .072 JR4 100CM

## (undated) DEVICE — CATHETER DEFLECTABLE HIS-BUNDLE SELECTSITE

## (undated) DEVICE — CATH 6FR PIGTAIL